# Patient Record
Sex: MALE | Race: WHITE
[De-identification: names, ages, dates, MRNs, and addresses within clinical notes are randomized per-mention and may not be internally consistent; named-entity substitution may affect disease eponyms.]

---

## 2017-01-14 ENCOUNTER — HOSPITAL ENCOUNTER (EMERGENCY)
Dept: HOSPITAL 53 - M ED | Age: 72
Discharge: HOME | End: 2017-01-14
Payer: MEDICARE

## 2017-01-14 DIAGNOSIS — E11.9: ICD-10-CM

## 2017-01-14 DIAGNOSIS — Z87.891: ICD-10-CM

## 2017-01-14 DIAGNOSIS — I25.2: ICD-10-CM

## 2017-01-14 DIAGNOSIS — Z79.82: ICD-10-CM

## 2017-01-14 DIAGNOSIS — Z79.899: ICD-10-CM

## 2017-01-14 DIAGNOSIS — E78.00: ICD-10-CM

## 2017-01-14 DIAGNOSIS — Z95.0: ICD-10-CM

## 2017-01-14 DIAGNOSIS — J09.X2: Primary | ICD-10-CM

## 2017-01-14 DIAGNOSIS — Z91.018: ICD-10-CM

## 2017-01-14 DIAGNOSIS — Z79.02: ICD-10-CM

## 2017-01-14 LAB
ANION GAP SERPL CALC-SCNC: 6 MEQ/L (ref 8–16)
BASOPHILS # BLD AUTO: 0.1 K/MM3 (ref 0–0.2)
BASOPHILS NFR BLD AUTO: 1.1 % (ref 0–1)
BUN SERPL-MCNC: 45 MG/DL (ref 7–18)
CALCIUM SERPL-MCNC: 8.4 MG/DL (ref 8.8–10.2)
CHLORIDE SERPL-SCNC: 97 MEQ/L (ref 98–107)
CO2 SERPL-SCNC: 31 MEQ/L (ref 21–32)
CREAT SERPL-MCNC: 2.18 MG/DL (ref 0.7–1.3)
EOSINOPHIL # BLD AUTO: 0.5 K/MM3 (ref 0–0.5)
EOSINOPHIL NFR BLD AUTO: 5.4 % (ref 0–3)
ERYTHROCYTE [DISTWIDTH] IN BLOOD BY AUTOMATED COUNT: 13.2 % (ref 11.5–14.5)
GFR SERPL CREATININE-BSD FRML MDRD: 32 ML/MIN/{1.73_M2} (ref 42–?)
GLUCOSE SERPL-MCNC: 352 MG/DL (ref 83–110)
LARGE UNSTAINED CELL #: 0.3 K/MM3 (ref 0–0.4)
LARGE UNSTAINED CELL %: 3.1 % (ref 0–4)
LYMPHOCYTES # BLD AUTO: 0.8 K/MM3 (ref 1.5–4.5)
LYMPHOCYTES NFR BLD AUTO: 6.7 % (ref 24–44)
MCH RBC QN AUTO: 28.7 PG (ref 27–33)
MCHC RBC AUTO-ENTMCNC: 32.6 G/DL (ref 32–36.5)
MCV RBC AUTO: 88 FL (ref 80–96)
MONOCYTES # BLD AUTO: 0.8 K/MM3 (ref 0–0.8)
MONOCYTES NFR BLD AUTO: 9 % (ref 0–5)
NEUTROPHILS # BLD AUTO: 6.3 K/MM3 (ref 1.8–7.7)
NEUTROPHILS NFR BLD AUTO: 74.7 % (ref 36–66)
PLATELET # BLD AUTO: 185 K/MM3 (ref 150–450)
POTASSIUM SERPL-SCNC: 4.9 MEQ/L (ref 3.5–5.1)
SODIUM SERPL-SCNC: 134 MEQ/L (ref 136–145)
WBC # BLD AUTO: 8.5 K/MM3 (ref 4–10)

## 2017-01-14 NOTE — EDDOCDS
Nurse's Notes                                                                                     

[facility]                                                                         

Name: Sánchez Dunlap                                                                              

Age: 71 yrs                                                                                       

Sex: Male                                                                                         

: 1945                                                                                   

MRN: [id number]                                                                                     

Arrival Date: 2017                                                                          

Time: 14:19                                                                                       

Account#: [id number]                                                                              

Bed 5                                                                                             

Private MD: Ghislaine Segovia                                                                      

Diagnosis: Influenza due to identified novel influenza A virus                                    

                                                                                                  

Presentation:                                                                                     

                                                                                             

14:46 Presenting complaint: Patient states: was seen by PMD on 'ed with pneumonia    bcj 

      and started on Zithromax for same. today feels worse - no energy. having a hard time        

      breathing. coughing up thick green yellow sputum. no temp at home. not eating /             

      drinking well. Adult Sepsis Screening: The patient does not have new or worsening           

      altered mentation. Patient's respiratory rate is less than 22. Systolic blood pressure      

      is greater than 100. Patient has a qSOFA score of 0- Negative Sepsis Screen.                

      Suicide/Homicide risk assessment- the patient denies having any suicidal and/or             

      homicidal ideations and does not present with any other emotional, behavioral or mental     

      health complaints.  Status: Patient is not a  or              

      dependent. Transition of care: patient was not received from another setting of care.       

14:46 Acuity: KENDELL Level 3                                                                     [facility] 

14:46 Method Of Arrival: Walkin/Carried/Asstd                                                 [facility] 

                                                                                                  

Triage Assessment:                                                                                

14:52 General: Appears ill, Behavior is cooperative. Pain: Location: chest Pain currently is  bcj 

      8 out of 10 on a pain scale.                                                                

                                                                                                  

Historical:                                                                                       

- Allergies: turkey;                                                                              

- Home Meds:                                                                                      

1. Zithromax 250 mg Oral tab 1 tab once daily                                                   

2. aspirin 325 mg Oral tab 1 tab once daily                                                     

3. furosemide 40 mg Oral tab 1 tab once daily                                                   

4. Lyrica 150mg Oral 2 times per day                                                            

5. metoprolol succinate 50 mg Tb24 1 tab once daily                                             

6. Percocet 5-325 mg Oral tab 2 tabs every 4-6 hours                                            

7. Plavix 75 mg Oral tab 1 tab once daily                                                       

8. potassium chloride 10 mEq Oral cpER 1 cap once daily                                         

- PMHx: Diabetes - IDDM: uncontrolled; Hypercholesterolemia; MI;                                  

- PSHx: Stents, Coronary; pacemaker/defibrillator insertion; fem/pop bypass                       

bilaterally; CABG;                                                                              

- Social history: Smoking status: Patient states former smoker of tobacco. No barriers            

to communication noted, The patient speaks fluent English, Speaks appropriately for             

age.                                                                                            

- Family history: Not pertinent.                                                                  

- : The pt / caregiver states he / she is on anticoagulants: Plavix. Home medication              

list is obtained from the patient.                                                              

- Exposure Risk Screening:: None identified.                                                      

                                                                                                  

                                                                                                  

Screening:                                                                                        

15:55 Screening information is obtained from the patient. Fall risk: At risk due to weakness. ck1 

      The following interventions are performed due to a positive Fall Risk Screen: Fall Risk     

      is added to Special Handling on the patient Summary Screen. A Fall Risk Bracelet was        

      applied to the patient. Side Rails are placed in the up position. A Call Duenas is given      

      with instruction to call for help when getting out of bed. Fall Alert bracelet is           

      placed on the patient. Assistance ADL's: requires no assistance with activities of          

      daily living. Abuse/DV Screen: The patient / caregiver reports he/she is: not in a          

      situation that causes fear, pain or injury. Nutritional screening: No deficits noted.       

      home support is adequate.                                                                   

19:28 Advance Directives: There is no active DNR order.                                       nn1 

                                                                                                  

Assessment:                                                                                       

15:30 General: Appears in no apparent distress, comfortable, Behavior is appropriate for age, ttb 

      cooperative, pleasant, quiet. General: IV started. Daughter at bedside. Pt resting on       

      stretcher. Primary RN aware.. Neurological: Level of Consciousness is awake, alert.         

      Respiratory: Airway is patent Respiratory effort is even, unlabored, Reports cough that     

      is persistent.                                                                              

15:56 General: Appears in no apparent distress, comfortable, Behavior is appropriate for age, ck1 

      cooperative. Pain: Denies pain. Neurological: Level of Consciousness is awake, alert,       

      Oriented to person, place, time. Cardiovascular: Rhythm is sinus rhythm. Respiratory:       

      Respiratory effort is unlabored, Respiratory pattern is regular, symmetrical, Breath        

      sounds with wheezes bilaterally. GI: No deficits noted. Derm: Skin is pink, warm & dry.   

16:00 Reassessment: Patient appears in no apparent distress at this time. General: Appears in ck1 

      no apparent distress, comfortable, to be sleeping. Pain: Location: chest Aggravated by      

      coughing. Neurological: Level of Consciousness is awake, alert, obeys commands.             

      Cardiovascular: Rhythm is sinus rhythm. Respiratory: Respiratory effort is unlabored,       

      Respiratory pattern is regular, symmetrical. Derm: Skin is pink, warm & dry.              

17:00 Reassessment: Patient appears in no apparent distress at this time.                     ck1 

18:18 General: Appears in no apparent distress, comfortable, to be sleeping. Behavior is      ck1 

      appropriate for age, cooperative. Pain: Location: chest Pain currently is 7 out of 10       

      on a pain scale. Aggravated by cough. Neurological: Level of Consciousness is awake,        

      alert, obeys commands, Oriented to person, place, time. Cardiovascular: Rhythm is.          

      Respiratory: Respiratory effort is unlabored, Respiratory pattern is regular,               

      symmetrical. GI: No deficits noted. Derm: Skin is pink, warm & dry.                       

18:44 General: Patient ambulated approximately 75 feet on room air. Gait steady, pulse ox     ck1 

      92%-94%. patient tolerated well. Dr. Gregory aware.                                         

19:27 General: Appears in no apparent distress, comfortable, Behavior is appropriate for age, nn1 

      cooperative. Pain: Denies pain. Neurological: Level of Consciousness is awake, alert,       

      obeys commands, Oriented to person, place, time. Respiratory: Airway is patent              

      Respiratory effort is even, unlabored, Respiratory pattern is regular.                      

                                                                                                  

Vital Signs:                                                                                      

14:20  / 73; Pulse 82; Resp 18 S; Temp 97.7(O); Pulse Ox 94% on R/A; Weight 90.72 kg    dd6 

      (R); Height 5 ft. 6 in. (167.64 cm) (R);                                                    

15:19  / 77 (auto/);                                                                    ck1 

15:20 Pulse 82 MON; Pulse Ox 94% ;                                                            ck1 

17:49  / 77 (auto/);                                                                    ck1 

17:49  / 77 (auto/);                                                                    ck1 

17:50 Pulse 94 MON; Pulse Ox 96% ;                                                            ck1 

17:50 Pulse 94 MON; Pulse Ox 96% ;                                                            ck1 

18:17  / 65; Pulse 90; Resp 18; Temp 98.6(O); Pulse Ox 94% on 2 lpm NC; Pain 7/10;      ck1 

19:26  / 63; Pulse 94; Resp 18; Temp 99.1(TE); Pulse Ox 94% on R/A; Pain 0/10;          nn1 

14:20 Body Mass Index 32.28 (90.72 kg, 167.64 cm)                                             dd6 

                                                                                                  

Vitals:                                                                                           

14:20 Log In Time: 2017 at 14:18.                                                 dd6 

                                                                                                  

ED Course:                                                                                        

14:20 Patient visited by Lauro Liu IMMANUEL.                                             dd6 

14:20 Ghislaine Segovia is Private Physician.                                                  dd6 

14:20 Patient moved to Waiting                                                                dd6 

14:21 Patient moved to Pre RCE                                                                dd6 

14:50 Triage Initiated                                                                        bcj 

14:53 Patient visited by Wayne Torres, RN.                                                  bcj 

14:54 Rosalind Marshall,RN is Primary Nurse.                                                bcj 

14:54 Patient moved to 5                                                                      bcj 

15:03 Jaleesa Roman MD is Attending Physician.                                      sd1 

15:03 Patient visited by Jaleesa Roman MD.                                          sd1 

15:25 Patient visited by Brendon Hernandez PCA.                                               jrd 

15:25 EKG done. (by ED staff). Reviewed by Jaleesa Roman MD.                          jrd 

15:36 Troponin Sent.                                                                          ttb 

15:36 Cardiac Injury Profile Sent.                                                            ttb 

15:36 CBC with Diff Sent.                                                                     ttb 

15:36 Basic Metabolic Profile Sent.                                                           ttb 

15:36 B-Type Natiuretic Peptide Sent.                                                         ttb 

15:36 -Blood Culture Sent.                                                                    ttb 

15:36 Inserted peripheral IV: 20gauge IV in right antecubital area and blood collected.       ttb 

      Patient tolerated the procedure well. Labs drawn. (by ED staff). Labs/Blood culture         

      drawn.                                                                                      

15:38 Patient visited by Mi Jain RN.                                                  ttb 

15:46 [facility] Payment Agreement was scanned into [employer] and attached to record.               [id number]

15:54 Chest, 1 View Returned.                                                                 EDMS

15:55 Patient visited by Rosalind Marshall,MAXIMILIAN.                                              ck1 

15:55 BLOOD CULTURES Sent.                                                                    ck1 

15:55 -Influenza A&B Rapid Antigen - Nose Sent.                                               ck1

15:56 The patient / caregiver is instructed regarding the plan of care and ED course.         ck1 

16:18 Patient visited by Rosalind Marshall,MAXIMILIAN.                                              ck1 

16:38 Patient visited by Rosalind Marshall,MAXIMILIAN.                                              ck1 

17:30 Patient visited by Rosalind Marshall,MAXIMILIAN.                                              ck1 

17:47 Patient visited by Rosalind Marshall,RN.                                              ck1 

17:48 Patient visited by Rosalind Marshall,RN.                                              ck1 

17:59 Patient visited by Rosalind Mrashall,MAXIMILIAN.                                              ck1 

18:19 Patient visited by Rosalind Marshall,RN.                                              ck1 

18:44 Patient visited by Rosalind Marshall,MAXIMILIAN.                                              ck1 

18:54 Primary Nurse role handed off by Rosalind Marshall,RN                                 ck1 

19:28 No procedures done that require assistance.                                             nn1 

                                                                                                  

Administered Medications:                                                                         

15:36 Drug: NS 0.9% 1000 ml [sodium chloride 0.9 % intravenous solution] Route: IV; Rate: 150 ttb 

      mL/hr; Site: right antecubital;                                                             

16:37 Drug: Albuterol-Ipratropium 3 ml [ipratropium-albuterol 0.5 mg-3 mg(2.5 mg base)/3 mL   jh6 

      nebulization soln (3 mL)] Route: Inhalation;                                                

16:38 Drug: Oseltamivir 75 mg [oseltamivir 75 mg capsule (1 caps)] Route: PO;                 ck1 

18:53 Drug: predniSONE 40 mg [prednisone 20 mg tablet (2 tabs)] Route: PO;                    ck1 

                                                                                                  

                                                                                                  

RT:                                                                                               

16:37 Initial Med Neb Given as ordered Patient was instructed and evaluated on procedure      jh6 

      Patient tolerated procedure well without adverse effect. Respiratory: Airway is patent      

      Respiratory effort is even, unlabored, Respiratory pattern is regular symmetrical,          

      Breath sounds are coarse in left posterior upper lobe, right posterior upper lobe, left     

      posterior lower lobe, right posterior middle lobe and right posterior lower lobe Breath     

      sounds are diminished in left posterior upper lobe, right posterior upper lobe, left        

      posterior lower lobe, right posterior middle lobe and right posterior lower lobe Breath     

      sounds with wheezes in left posterior upper lobe, right posterior upper lobe, left          

      posterior lower lobe, right posterior middle lobe and right posterior lower lobe at         

      expiration.                                                                                 

16:45 Respiratory: Airway is patent Respiratory effort is even, unlabored, Respiratory        jh6 

      pattern is regular symmetrical, Breath sounds are coarse in left posterior upper lobe,      

      right posterior upper lobe, left posterior lower lobe, right posterior middle lobe and      

      right posterior lower lobe Breath sounds with crackles in left posterior upper lobe,        

      right posterior upper lobe, left posterior lower lobe, right posterior middle lobe and      

      right posterior lower lobe Breath sounds are diminished in left posterior lower lobe        

      and right posterior lower lobe.                                                             

                                                                                                  

Order Results:                                                                                    

Lab Order: B-Type Natiuretic Peptide; SPEC'M 17 15:32                                       

      Test: BRAIN NATRIURETIC PEPTIDE; Value: 747; Range: <100; Abnormal: Above high normal;      

      Units: PG/ML; Status: F                                                                     

Lab Order: Basic Metabolic Profile; SPEC17 16:08                                         

      Test: GLUCOSE, FASTING; Value: 352; Range: ; Abnormal: Above high normal; Units:      

      MG/DL; Status: F                                                                            

      Test: BLOOD UREA NITROGEN; Value: 45; Range: 7-18; Abnormal: Above high normal; Units:      

      MG/DL; Status: F                                                                            

      Test: CREATININE FOR GFR; Value: 2.18; Range: 0.70-1.30; Abnormal: Above high normal;       

      Units: MG/DL; Status: F                                                                     

      Test: GLOMERULAR FILTRATION RATE; Value: 32.0; Range: >42; Abnormal: Below low normal;      

      Status: F                                                                                   

      Test: SODIUM LEVEL; Value: 134; Range: 136-145; Abnormal: Below low normal; Units:          

      MEQ/L; Status: F                                                                            

      Test: POTASSIUM SERUM; Value: 4.9; Range: 3.5-5.1; Units: MEQ/L; Status: F                  

      Test: CHLORIDE LEVEL; Value: 97; Range: ; Abnormal: Below low normal; Units:          

      MEQ/L; Status: F                                                                            

      Test: CARBON DIOXIDE LEVEL; Value: 31; Range: 21-32; Units: MEQ/L; Status: F                

      Test: ANION GAP; Value: 6; Range: 8-16; Abnormal: Below low normal; Units: MEQ/L;           

      Status: F                                                                                   

      Test: CALCIUM LEVEL; Value: 8.4; Range: 8.8-10.2; Abnormal: Below low normal; Units:        

      MG/DL; Status: F                                                                            

      Test Note: &nbsp;; Units are mL/min/1.73 m2 Chronic Kidney Disease Staging per NKF:       

      Stage I & II GFR >=60 Normal to Mildly Decreased Stage III GFR 30-59 Moderately           

      Decreased Stage IV GFR 15-29 Severely Decreased Stage V GFR <15 Very Little GFR Left        

      ESRD GFR <15 on RRT                                                                         

Lab Order: CBC with Diff; SPEC17 15:32                                                   

      Test: WHITE BLOOD COUNT; Value: 8.5; Range: 4.0-10.0; Units: K/mm3; Status: F               

      Test: RED BLOOD COUNT; Value: 3.68; Range: 4.30-6.10; Abnormal: Below low normal;           

      Units: M/mm3; Status: F                                                                     

      Test: HEMOGLOBIN; Value: 10.5; Range: 14.0-18.0; Abnormal: Below low normal; Units:         

      g/dl; Status: F                                                                             

      Test: HEMATOCRIT; Value: 32.4; Range: 42.0-52.0; Abnormal: Below low normal; Units: %;      

      Status: F                                                                                   

      Test: MEAN CORPUSCULAR VOLUME; Value: 88.0; Range: 80.0-96.0; Units: fl; Status: F          

      Test: MEAN CORPUSCULAR HEMOGLOBIN; Value: 28.7; Range: 27.0-33.0; Units: pg; Status: F      

      Test: MEAN CORPUSCULAR HGB CONC; Value: 32.6; Range: 32.0-36.5; Units: g/dl; Status: F      

      Test: RED CELL DISTRIBUTION WIDTH; Value: 13.2; Range: 11.5-14.5; Units: %; Status: F       

      Test: PLATELET COUNT, AUTOMATED; Value: 185; Range: 150-450; Units: k/mm3; Status: F        

      Test: NEUTROPHILS %; Value: 74.7; Range: 36.0-66.0; Abnormal: Above high normal; Units:     

      %; Status: F                                                                                

      Test: LYMPH %; Value: 6.7; Range: 24.0-44.0; Abnormal: Below low normal; Units: %;          

      Status: F                                                                                   

      Test: MONO %; Value: 9.0; Range: 0.0-5.0; Abnormal: Above high normal; Units: %;            

      Status: F                                                                                   

      Test: EOS %; Value: 5.4; Range: 0.0-3.0; Abnormal: Above high normal; Units: %; Status:     

      F                                                                                           

      Test: BASO %; Value: 1.1; Range: 0.0-1.0; Abnormal: Above high normal; Units: %;            

      Status: F                                                                                   

      Test: LARGE UNSTAINED CELL %; Value: 3.1; Range: 0.0-4.0; Units: %; Status: F               

      Test: NEUTROPHILS #; Value: 6.3; Range: 1.8-7.7; Units: K/mm3; Status: F                    

      Test: LYMPH #; Value: 0.8; Range: 1.5-4.5; Abnormal: Below low normal; Units: K/mm3;        

      Status: F                                                                                   

      Test: MONO #; Value: 0.8; Range: 0.0-0.8; Units: K/mm3; Status: F                           

      Test: EOS #; Value: 0.5; Range: 0.0-0.50; Units: K/mm3; Status: F                           

      Test: BASO #; Value: 0.1; Range: 0.0-0.2; Units: K/mm3; Status: F                           

      Test: LARGE UNSTAINED CELL #; Value: 0.3; Range: 0.0-0.4; Units: K/mm3; Status: F           

Lab Order: Cardiac Injury Profile; SPEC'M 17 16:08                                          

      Test: CPK CREATINE PHOSPHOKINASE; Value: 280; Range: ; Units: U/L; Status: F          

      Test: CK-MB VALUE MASS; Value: 3.2; Range: 0.0-3.6; Units: NG/ML; Status: F                 

      Test: MB/CK RELATIVE INDEX; Value: 1.14; Range: < OR =4; Status: F                          

      Test Note: &nbsp;; DIAGNOSIS CRITERIA MMB ng/ml Relative Index (RI) NON-AMI < or = 5      

      N/A GRAY ZONE > 5 < or = 4 AMI > 5 > 4                                                      

Lab Order: Troponin; SPEC'M 17 16:08                                                        

      Test: TROPONIN I; Value: < 0.02; Range: < 0.10; Units: NG/ML; Status: F                     

      Test Note: &nbsp;; Troponin I Reference Interval for Siemens Trendr LOCI: 99th             

      Percentile= 0.00-0.045 ng/ml Risk Stratification: <= 0.10 ng/ml Decreased Risk for          

      Adverse Clinical Events. 0.10-1.50 ng/ml Increased Risk for Adverse Clinical Events.        

      Evaluation of additional criterion and/or repeat testing in 2-6 hours is suggested to       

      rule out myocardial damage. >= 1.50 ng/ml Indicative of Myocardial Injury.                  

Lab Order: -Influenza A&B Rapid Antigen - Nose; SPEC'M 17 15:54                           

      Test: INFLUENZA A RAPID SCR by ICA; Value: INFLUENZA A RESULTS POSITIVE; Abnormal:          

      Abnormal; Status: F                                                                         

      Test: INFLUENZA A RAPID SCR by ICA; Value: Comments:; Status: F                             

      Test: INFLUENZA B RAPID SCR by ICA; Value: INFLUENZA B RESULTS NEGATIVE; Status: F          

      Test Note: &nbsp;; The Influenza test is a direct rapid immunoassay for the qualitative   

      detection of Influenza viral antigen. Cell culture (Viral Culture) testing should be        

      considered to confirm NEGATIVE results and to assist in detecting other viruses that        

      can provide similar clinical symptoms. Please contact the lab within 24 hours               

      ([phone] if confirmatory testing is desired.                                              

                                                                                                  

Radiology Order: Chest, 1 View                                                                    

      Test: Chest, 1 View                                                                         

      REASON FOR EXAMINATION: Shortness of Breath; Clinical: Acute shortness of breath .; ;       

      Comparison: 10/02/2016 .; ; Findings:; The mediastinum and cardiac silhouette are           

      stable. Prior sternotomy and; pacemaker are again appreciated. The lung fields              

      demonstrate stable changes; without acute consolidation, effusion, or pneumothorax.         

      Skeletal structures are; intact.; ; Impression:; No acute cardiopulmonary process           

      appreciated.; ; ; Signed by; Hao Adrian MD 2017 03:45 P;                          

Outcome:                                                                                          

19:03 Discharge ordered by Provider.                                                          sd1 

19:27 Discharge Assessment: Patient awake, alert and oriented x 3. No cognitive and/or        nn1 

      functional deficits noted. Patient verbalized understanding of disposition                  

      instructions. patient administered narcotics - no.                                          

19:28 The following High Risk Discharge criteria are identified: None. Discharged to home     nn1 

      ambulatory, with family. Condition: stable. No special radiology studies were               

      completed. Property :Personal belongings accompany Pt.                                      

19:28 Patient left the ED.                                                                    nn1 

                                                                                                  

Signatures:                                                                                       

Dispatcher [employer]                           Jaleesa Thurston MD MD   sd1                                                  

Wayne Torres, RN                      Rosalind Orozco,MAXIMILIAN                  RN   ck1                                                  

Lauro Liu, PCA                 PCA  6                                                  

Kiko Brito                                 Mi Capellan RN RN ttb Donoghue, Joseph, PCA                   PCA  Alejandro Ernandez RN                        RN   nn1                                                  

Rosaura Luque, Reg                 Reg  ks16                                                 

                                                                                                  

**************************************************************************************************

MTDD

## 2017-01-14 NOTE — REP
Clinical:  Acute shortness of breath .

 

Comparison:  10/02/2016 .

 

Findings:

The mediastinum and cardiac silhouette are stable.  Prior sternotomy and

pacemaker are again appreciated.  The lung fields demonstrate stable changes

without acute consolidation, effusion, or pneumothorax.  Skeletal structures are

intact.

 

Impression:

No acute cardiopulmonary process appreciated.

 

 

Signed by

Hao Adrian MD 01/14/2017 03:45 P

## 2017-01-14 NOTE — EDDOCDS
Physician Documentation                                                                           

[facility]                                                                         

Name: Sánchez Dunlap                                                                              

Age: 71 yrs                                                                                       

Sex: Male                                                                                         

: 1945                                                                                   

MRN: [id number]                                                                                     

Arrival Date: 2017                                                                          

Time: 14:19                                                                                       

Account#: [id number]                                                                              

Bed 5                                                                                             

Private MD: Ghislaine Segovia                                                                      

Disposition:                                                                                      

17 19:03 Discharged to Home/Self Care. Impression: Influenza due to identified novel        

influenza A virus.                                                                              

- Condition is Stable.                                                                            

- Discharge Instructions: Influenza Adult, How to Use a Nebulizer.                                

- Prescriptions for Tamiflu 75 mg Oral Capsule - take 1 capsule by ORAL route every 12            

hours for 5 days; 10 capsule. Albuterol Sulfate 2.5 mg /3 mL (0.083 %) Inhalation               

Solution for Nebulization - inhale 1 unit by NEBULIZATION route 4 times per day As              

needed; 1 box. Home Nebulizer - Dx: (wheezing/influenza). Duration: 3 months).                  

Prednisone 20 mg Oral Tablet - take 2 tablet by ORAL route once daily for 5 days; 10            

tablet.                                                                                         

- Medication Reconciliation, Local Pharmacy Hours form.                                           

- Follow up: Emergency Department; When: Tomorrow; Reason: Recheck today's complaints.            

- Problem is new.                                                                                 

- Symptoms have improved.                                                                         

                                                                                                  

                                                                                                  

                                                                                                  

Historical:                                                                                       

- Allergies: turkey;                                                                              

- Home Meds:                                                                                      

1. Zithromax 250 mg Oral tab 1 tab once daily                                                   

2. aspirin 325 mg Oral tab 1 tab once daily                                                     

3. furosemide 40 mg Oral tab 1 tab once daily                                                   

4. Lyrica 150mg Oral 2 times per day                                                            

5. metoprolol succinate 50 mg Tb24 1 tab once daily                                             

6. Percocet 5-325 mg Oral tab 2 tabs every 4-6 hours                                            

7. Plavix 75 mg Oral tab 1 tab once daily                                                       

8. potassium chloride 10 mEq Oral cpER 1 cap once daily                                         

- PMHx: Diabetes - IDDM: uncontrolled; Hypercholesterolemia; MI;                                  

- PSHx: Stents, Coronary; pacemaker/defibrillator insertion; fem/pop bypass                       

bilaterally; CABG;                                                                              

- Social history: Smoking status: Patient states former smoker of tobacco. No barriers            

to communication noted, The patient speaks fluent English, Speaks appropriately for             

age.                                                                                            

- Family history: Not pertinent.                                                                  

- : The pt / caregiver states he / she is on anticoagulants: Plavix. Home medication              

list is obtained from the patient.                                                              

- Exposure Risk Screening:: None identified.                                                      

                                                                                                  

                                                                                                  

Vital Signs:                                                                                      

01/14                                                                                             

14:20  / 73; Pulse 82; Resp 18 S; Temp 97.7(O); Pulse Ox 94% on R/A; Weight 90.72 kg /  dd6 

      200 lbs (R); Height 5 ft. 6 in. (167.64 cm) (R);                                            

15:19  / 77 (auto/);                                                                    ck1 

15:20 Pulse 82 MON; Pulse Ox 94% ;                                                            ck1 

17:49  / 77 (auto/);                                                                    ck1 

17:49  / 77 (auto/);                                                                    ck1 

17:50 Pulse 94 MON; Pulse Ox 96% ;                                                            ck1 

17:50 Pulse 94 MON; Pulse Ox 96% ;                                                            ck1 

18:17  / 65; Pulse 90; Resp 18; Temp 98.6(O); Pulse Ox 94% on 2 lpm NC; Pain 7/10;      ck1 

19:26  / 63; Pulse 94; Resp 18; Temp 99.1(TE); Pulse Ox 94% on R/A; Pain 0/10;          nn1 

14:20 Body Mass Index 32.28 (90.72 kg, 167.64 cm)                                             dd6 

                                                                                                  

MDM:                                                                                              

15:02 -Blood Culture (Adults Only), peripheral from different site, or from device/port/PICC  sd1 

      etc. if present ordered.                                                                    

15:02 Cardiac Monitor/Pulse Ox/q 15 min VS ordered.                                           sd1 

15:02 IV Saline Lock ordered.                                                                 sd1 

15:02 Oxygen at 4L/Min NC or Home dosage ordered.                                             sd1 

15:02 Rhythm Strip to chart ordered.                                                          sd1 

15:02 B-Type Natiuretic Peptide Ordered.                                                      EDMS

15:02 Basic Metabolic Profile Ordered.                                                        EDMS

15:02 CBC with Diff Ordered.                                                                  EDMS

15:02 Cardiac Injury Profile Ordered.                                                         EDMS

15:02 Troponin Ordered.                                                                       EDMS

15:02 -Blood Culture Ordered.                                                                 EDMS

15:03 Chest, 1 View Ordered.                                                                  EDMS

15:03 ECG WITH READING ER PHYS+CARDIAG ordered.                                               EDMS

15:08 NS 0.9% 1000 ml IV at 150 mL/hr continuous ordered.                                     sd1 

15:08 -Blood Culture (Adults Only), peripheral from different site, or from device/port/PICC  deg 

      etc. if present complete.                                                                   

15:09 Obtain sample by nasopharyngeal swab ordered.                                           sd1 

15:09 BLOOD CULTURES Ordered.                                                                 EDMS

15:09 -Influenza A&B Rapid Antigen - Nose Ordered.                                            EDMS

15:46 Financial registration complete.                                                        ks16

15:46 [facility] Payment Agreement was scanned into [employer] and attached to record.               ks16

15:59 CBC with Diff Reviewed.                                                                 sd1 

15:59 Chest, 1 View Reviewed.                                                                 sd1 

16:12 Albuterol-Ipratropium 3 ml Inhalation once ordered.                                     sd1 

16:31 B-Type Natiuretic Peptide Reviewed.                                                     sd1 

16:31 -Influenza A&B Rapid Antigen - Nose Reviewed.                                           sd1

16:32 Oseltamivir 75 mg PO once ordered.                                                      sd1 

16:43 REGULAR+DIET ordered.                                                                   EDMS

17:07 Basic Metabolic Profile Reviewed.                                                       sd1 

17:07 Cardiac Injury Profile Reviewed.                                                        sd1 

17:07 Troponin Reviewed.                                                                      sd1 

18:30 Misc. Nursing Order ordered.                                                            sd1 

18:43 predniSONE 40 mg PO once; administer with food or milk ordered.                         sd1 

                                                                                                  

Administered Medications:                                                                         

15:36 Drug: NS 0.9% 1000 ml [sodium chloride 0.9 % intravenous solution] Route: IV; Rate: 150 ttb 

      mL/hr; Site: right antecubital;                                                             

16:37 Drug: Albuterol-Ipratropium 3 ml [ipratropium-albuterol 0.5 mg-3 mg(2.5 mg base)/3 mL   jh6 

      nebulization soln (3 mL)] Route: Inhalation;                                                

16:38 Drug: Oseltamivir 75 mg [oseltamivir 75 mg capsule (1 caps)] Route: PO;                 ck1 

18:53 Drug: predniSONE 40 mg [prednisone 20 mg tablet (2 tabs)] Route: PO;                    ck1 

                                                                                                  

                                                                                                  

Signatures:                                                                                       

[facility]                                                 

Jaleesa Roman MD MD   sd1                                                  

Jewell Gale, Unit Clerk              Unit deg                                                  

Wayne Torres RN                      RN   Rosalind ChapaRN                  RN   ck1                                                  

Alejandro Rosenberg RN                        RN   nn1                                                  

Rosaura Luque, Reg                 Reg  ks16                                                 

Kiko Brito                                jh6                                                  

Mi Jain RN   ttb                                                  

                                                                                                  

The chart was reviewed and I authenticate all verbal orders and agree with the evaluation and 
treatment provided.Attachments:

15:46 [facility] Payment Agreement                                                                [id number]

                                                                                                  

**************************************************************************************************

MTDD

## 2017-01-15 NOTE — ECGEPIP
Stationary ECG Study

                           [facility] - ED

                                       

                                       Test Date:    2017

Pat Name:     VINNY EASLEY           Department:   

Patient ID:   [id number]                 Room:         -

Gender:       M                        Technician:   aide

:          1945               Requested By: Jaleesa Roman 

Order Number: [id number]     Reading MD:   Jaleesa Roman

                                 Measurements

Intervals                              Axis          

Rate:         80                       P:            49

SC:           192                      QRS:          25

QRSD:         109                      T:            135

QT:           387                                    

QTc:          448                                    

                           Interpretive Statements

SINUS RHYTHM

POSSIBLE LEFT ATRIAL ENLARGEMENT

PROBABLE INFERIOR MYOCARDIAL INFARCTION, PROBABLY OLD

MODERATE T-WAVE ABNORMALITY, CONSIDER ISCHEMIA

SIMILAR 10/2/16

Electronically Signed On 1- 7:29:58 EST by Jaleesa Roman

## 2017-01-16 NOTE — EDDOCDS
Physician Documentation                                                                           

[facility]                                                                         

Name: Sánchez Dunlap                                                                              

Age: 71 yrs                                                                                       

Sex: Male                                                                                         

: 1945                                                                                   

MRN: [id number]                                                                                     

Arrival Date: 2017                                                                          

Time: 14:19                                                                                       

Account#: [id number]                                                                              

Bed 5                                                                                             

Private MD: Ghislaine Segovia                                                                      

Disposition:                                                                                      

17 19:03 Discharged to Home/Self Care. Impression: Influenza due to identified novel        

influenza A virus.                                                                              

- Condition is Stable.                                                                            

- Discharge Instructions: Influenza Adult, How to Use a Nebulizer.                                

- Prescriptions for Tamiflu 75 mg Oral Capsule - take 1 capsule by ORAL route every 12            

hours for 5 days; 10 capsule. Albuterol Sulfate 2.5 mg /3 mL (0.083 %) Inhalation               

Solution for Nebulization - inhale 1 unit by NEBULIZATION route 4 times per day As              

needed; 1 box. Home Nebulizer - Dx: (wheezing/influenza). Duration: 3 months).                  

Prednisone 20 mg Oral Tablet - take 2 tablet by ORAL route once daily for 5 days; 10            

tablet.                                                                                         

- Medication Reconciliation, Local Pharmacy Hours form.                                           

- Follow up: Emergency Department; When: Tomorrow; Reason: Recheck today's complaints.            

- Problem is new.                                                                                 

- Symptoms have improved.                                                                         

                                                                                                  

                                                                                                  

                                                                                                  

Historical:                                                                                       

- Allergies: turkey;                                                                              

- Home Meds:                                                                                      

1. Zithromax 250 mg Oral tab 1 tab once daily                                                   

2. aspirin 325 mg Oral tab 1 tab once daily                                                     

3. furosemide 40 mg Oral tab 1 tab once daily                                                   

4. Lyrica 150mg Oral 2 times per day                                                            

5. metoprolol succinate 50 mg Tb24 1 tab once daily                                             

6. Percocet 5-325 mg Oral tab 2 tabs every 4-6 hours                                            

7. Plavix 75 mg Oral tab 1 tab once daily                                                       

8. potassium chloride 10 mEq Oral cpER 1 cap once daily                                         

- PMHx: Diabetes - IDDM: uncontrolled; Hypercholesterolemia; MI;                                  

- PSHx: Stents, Coronary; pacemaker/defibrillator insertion; fem/pop bypass                       

bilaterally; CABG;                                                                              

- Social history: Smoking status: Patient states former smoker of tobacco. No barriers            

to communication noted, The patient speaks fluent English, Speaks appropriately for             

age.                                                                                            

- Family history: Not pertinent.                                                                  

- : The pt / caregiver states he / she is on anticoagulants: Plavix. Home medication              

list is obtained from the patient.                                                              

- Exposure Risk Screening:: None identified.                                                      

                                                                                                  

                                                                                                  

Vital Signs:                                                                                      

01/14                                                                                             

14:20  / 73; Pulse 82; Resp 18 S; Temp 97.7(O); Pulse Ox 94% on R/A; Weight 90.72 kg /  dd6 

      200 lbs (R); Height 5 ft. 6 in. (167.64 cm) (R);                                            

15:19  / 77 (auto/);                                                                    ck1 

15:20 Pulse 82 MON; Pulse Ox 94% ;                                                            ck1 

17:49  / 77 (auto/);                                                                    ck1 

17:49  / 77 (auto/);                                                                    ck1 

17:50 Pulse 94 MON; Pulse Ox 96% ;                                                            ck1 

17:50 Pulse 94 MON; Pulse Ox 96% ;                                                            ck1 

18:17  / 65; Pulse 90; Resp 18; Temp 98.6(O); Pulse Ox 94% on 2 lpm NC; Pain 7/10;      ck1 

19:26  / 63; Pulse 94; Resp 18; Temp 99.1(TE); Pulse Ox 94% on R/A; Pain 0/10;          nn1 

14:20 Body Mass Index 32.28 (90.72 kg, 167.64 cm)                                             dd6 

                                                                                                  

MDM:                                                                                              

15:02 -Blood Culture (Adults Only), peripheral from different site, or from device/port/PICC  sd1 

      etc. if present ordered.                                                                    

15:02 Cardiac Monitor/Pulse Ox/q 15 min VS ordered.                                           sd1 

15:02 IV Saline Lock ordered.                                                                 sd1 

15:02 Oxygen at 4L/Min NC or Home dosage ordered.                                             sd1 

15:02 Rhythm Strip to chart ordered.                                                          sd1 

15:02 B-Type Natiuretic Peptide Ordered.                                                      EDMS

15:02 Basic Metabolic Profile Ordered.                                                        EDMS

15:02 CBC with Diff Ordered.                                                                  EDMS

15:02 Cardiac Injury Profile Ordered.                                                         EDMS

15:02 Troponin Ordered.                                                                       EDMS

15:02 -Blood Culture Ordered.                                                                 EDMS

15:03 Chest, 1 View Ordered.                                                                  EDMS

15:03 ECG WITH READING ER PHYS+CARDIAG ordered.                                               EDMS

15:08 NS 0.9% 1000 ml IV at 150 mL/hr continuous ordered.                                     sd1 

15:08 -Blood Culture (Adults Only), peripheral from different site, or from device/port/PICC  deg 

      etc. if present complete.                                                                   

15:09 Obtain sample by nasopharyngeal swab ordered.                                           sd1 

15:09 BLOOD CULTURES Ordered.                                                                 EDMS

15:09 -Influenza A&B Rapid Antigen - Nose Ordered.                                            EDMS

15:46 Financial registration complete.                                                        ks16

15:46 [facility] Payment Agreement was scanned into [employer] and attached to record.               ks16

15:59 CBC with Diff Reviewed.                                                                 sd1 

15:59 Chest, 1 View Reviewed.                                                                 sd1 

16:12 Albuterol-Ipratropium 3 ml Inhalation once ordered.                                     sd1 

16:31 B-Type Natiuretic Peptide Reviewed.                                                     sd1 

16:31 -Influenza A&B Rapid Antigen - Nose Reviewed.                                           sd1

16:32 Oseltamivir 75 mg PO once ordered.                                                      sd1 

16:43 REGULAR+DIET ordered.                                                                   EDMS

17:07 Basic Metabolic Profile Reviewed.                                                       sd1 

17:07 Cardiac Injury Profile Reviewed.                                                        sd1 

17:07 Troponin Reviewed.                                                                      sd1 

18:30 Misc. Nursing Order ordered.                                                            sd1 

18:43 predniSONE 40 mg PO once; administer with food or milk ordered.                         sd1 

                                                                                             

13:56 T-Sheet-- Draft Copy was scanned into [employer] and attached to record.                     

13:56 ECG/EKG was scanned into [employer] and attached to record.                                gb  

                                                                                                  

Administered Medications:                                                                         

                                                                                             

15:36 Drug: NS 0.9% 1000 ml [sodium chloride 0.9 % intravenous solution] Route: IV; Rate: 150 ttb 

      mL/hr; Site: right antecubital;                                                             

16:37 Drug: Albuterol-Ipratropium 3 ml [ipratropium-albuterol 0.5 mg-3 mg(2.5 mg base)/3 mL   6 

      nebulization soln (3 mL)] Route: Inhalation;                                                

16:38 Drug: Oseltamivir 75 mg [oseltamivir 75 mg capsule (1 caps)] Route: PO;                 ck1 

18:53 Drug: predniSONE 40 mg [prednisone 20 mg tablet (2 tabs)] Route: PO;                    ck1 

                                                                                                  

                                                                                                  

Signatures:                                                                                       

Dispatcher [employer]                           EDMS                                                 

Jaleesa Roman MD MD   sd1                                                  

Jewell Gale, Unit Clerk              Unit deg                                                  

Wayne Torres RN                      RN   Leyda Mancuso, Reg                  Reg  gb                                                   

Rosalind Marshall RN                  RN   ck1                                                  

Alejandro Rosenberg RN                        RN   nn1                                                  

Rosaura Luque, Reg                 Reg  ks16                                                 

Kiko Brito                                jh6                                                  

Mi Jain RN   ttb                                                  

                                                                                                  

The chart was reviewed and I authenticate all verbal orders and agree with the evaluation and 
treatment provided.Attachments:

15:46 [facility] Payment Agreement                                                                ks16

                                                                                             

13:56 T-Sheet-- Draft Copy                                                                    gb  

13:56 ECG/EKG                                                                                 gb  

                                                                                                  

**************************************************************************************************



*** Chart Complete ***
MTDD

## 2017-01-16 NOTE — EDDOCDS
Physician Documentation                                                                           

[facility]                                                                         

Name: Sánchez Dunlap                                                                              

Age: 71 yrs                                                                                       

Sex: Male                                                                                         

: 1945                                                                                   

MRN: [id number]                                                                                     

Arrival Date: 2017                                                                          

Time: 14:19                                                                                       

Account#: [id number]                                                                              

Bed 5                                                                                             

Private MD: Ghislaine Segovia                                                                      

Disposition:                                                                                      

17 19:03 Discharged to Home/Self Care. Impression: Influenza due to identified novel        

influenza A virus.                                                                              

- Condition is Stable.                                                                            

- Discharge Instructions: Influenza Adult, How to Use a Nebulizer.                                

- Prescriptions for Tamiflu 75 mg Oral Capsule - take 1 capsule by ORAL route every 12            

hours for 5 days; 10 capsule. Albuterol Sulfate 2.5 mg /3 mL (0.083 %) Inhalation               

Solution for Nebulization - inhale 1 unit by NEBULIZATION route 4 times per day As              

needed; 1 box. Home Nebulizer - Dx: (wheezing/influenza). Duration: 3 months).                  

Prednisone 20 mg Oral Tablet - take 2 tablet by ORAL route once daily for 5 days; 10            

tablet.                                                                                         

- Medication Reconciliation, Local Pharmacy Hours form.                                           

- Follow up: Emergency Department; When: Tomorrow; Reason: Recheck today's complaints.            

- Problem is new.                                                                                 

- Symptoms have improved.                                                                         

                                                                                                  

                                                                                                  

                                                                                                  

Historical:                                                                                       

- Allergies: turkey;                                                                              

- Home Meds:                                                                                      

1. Zithromax 250 mg Oral tab 1 tab once daily                                                   

2. aspirin 325 mg Oral tab 1 tab once daily                                                     

3. furosemide 40 mg Oral tab 1 tab once daily                                                   

4. Lyrica 150mg Oral 2 times per day                                                            

5. metoprolol succinate 50 mg Tb24 1 tab once daily                                             

6. Percocet 5-325 mg Oral tab 2 tabs every 4-6 hours                                            

7. Plavix 75 mg Oral tab 1 tab once daily                                                       

8. potassium chloride 10 mEq Oral cpER 1 cap once daily                                         

- PMHx: Diabetes - IDDM: uncontrolled; Hypercholesterolemia; MI;                                  

- PSHx: Stents, Coronary; pacemaker/defibrillator insertion; fem/pop bypass                       

bilaterally; CABG;                                                                              

- Social history: Smoking status: Patient states former smoker of tobacco. No barriers            

to communication noted, The patient speaks fluent English, Speaks appropriately for             

age.                                                                                            

- Family history: Not pertinent.                                                                  

- : The pt / caregiver states he / she is on anticoagulants: Plavix. Home medication              

list is obtained from the patient.                                                              

- Exposure Risk Screening:: None identified.                                                      

                                                                                                  

                                                                                                  

Vital Signs:                                                                                      

01/14                                                                                             

14:20  / 73; Pulse 82; Resp 18 S; Temp 97.7(O); Pulse Ox 94% on R/A; Weight 90.72 kg /  dd6 

      200 lbs (R); Height 5 ft. 6 in. (167.64 cm) (R);                                            

15:19  / 77 (auto/);                                                                    ck1 

15:20 Pulse 82 MON; Pulse Ox 94% ;                                                            ck1 

17:49  / 77 (auto/);                                                                    ck1 

17:49  / 77 (auto/);                                                                    ck1 

17:50 Pulse 94 MON; Pulse Ox 96% ;                                                            ck1 

17:50 Pulse 94 MON; Pulse Ox 96% ;                                                            ck1 

18:17  / 65; Pulse 90; Resp 18; Temp 98.6(O); Pulse Ox 94% on 2 lpm NC; Pain 7/10;      ck1 

19:26  / 63; Pulse 94; Resp 18; Temp 99.1(TE); Pulse Ox 94% on R/A; Pain 0/10;          nn1 

14:20 Body Mass Index 32.28 (90.72 kg, 167.64 cm)                                             dd6 

                                                                                                  

MDM:                                                                                              

15:02 -Blood Culture (Adults Only), peripheral from different site, or from device/port/PICC  sd1 

      etc. if present ordered.                                                                    

15:02 Cardiac Monitor/Pulse Ox/q 15 min VS ordered.                                           sd1 

15:02 IV Saline Lock ordered.                                                                 sd1 

15:02 Oxygen at 4L/Min NC or Home dosage ordered.                                             sd1 

15:02 Rhythm Strip to chart ordered.                                                          sd1 

15:02 B-Type Natiuretic Peptide Ordered.                                                      EDMS

15:02 Basic Metabolic Profile Ordered.                                                        EDMS

15:02 CBC with Diff Ordered.                                                                  EDMS

15:02 Cardiac Injury Profile Ordered.                                                         EDMS

15:02 Troponin Ordered.                                                                       EDMS

15:02 -Blood Culture Ordered.                                                                 EDMS

15:03 Chest, 1 View Ordered.                                                                  EDMS

15:03 ECG WITH READING ER PHYS+CARDIAG ordered.                                               EDMS

15:08 NS 0.9% 1000 ml IV at 150 mL/hr continuous ordered.                                     sd1 

15:08 -Blood Culture (Adults Only), peripheral from different site, or from device/port/PICC  deg 

      etc. if present complete.                                                                   

15:09 Obtain sample by nasopharyngeal swab ordered.                                           sd1 

15:09 BLOOD CULTURES Ordered.                                                                 EDMS

15:09 -Influenza A&B Rapid Antigen - Nose Ordered.                                            EDMS

15:46 Financial registration complete.                                                        ks16

15:46 [facility] Payment Agreement was scanned into [employer] and attached to record.               ks16

15:59 CBC with Diff Reviewed.                                                                 sd1 

15:59 Chest, 1 View Reviewed.                                                                 sd1 

16:12 Albuterol-Ipratropium 3 ml Inhalation once ordered.                                     sd1 

16:31 B-Type Natiuretic Peptide Reviewed.                                                     sd1 

16:31 -Influenza A&B Rapid Antigen - Nose Reviewed.                                           sd1

16:32 Oseltamivir 75 mg PO once ordered.                                                      sd1 

16:43 REGULAR+DIET ordered.                                                                   EDMS

17:07 Basic Metabolic Profile Reviewed.                                                       sd1 

17:07 Cardiac Injury Profile Reviewed.                                                        sd1 

17:07 Troponin Reviewed.                                                                      sd1 

18:30 Misc. Nursing Order ordered.                                                            sd1 

18:43 predniSONE 40 mg PO once; administer with food or milk ordered.                         sd1 

                                                                                             

13:56 T-Sheet-- Draft Copy was scanned into [employer] and attached to record.                     

13:56 ECG/EKG was scanned into [employer] and attached to record.                                gb  

                                                                                                  

Administered Medications:                                                                         

                                                                                             

15:36 Drug: NS 0.9% 1000 ml [sodium chloride 0.9 % intravenous solution] Route: IV; Rate: 150 ttb 

      mL/hr; Site: right antecubital;                                                             

16:37 Drug: Albuterol-Ipratropium 3 ml [ipratropium-albuterol 0.5 mg-3 mg(2.5 mg base)/3 mL   6 

      nebulization soln (3 mL)] Route: Inhalation;                                                

16:38 Drug: Oseltamivir 75 mg [oseltamivir 75 mg capsule (1 caps)] Route: PO;                 ck1 

18:53 Drug: predniSONE 40 mg [prednisone 20 mg tablet (2 tabs)] Route: PO;                    ck1 

                                                                                                  

                                                                                                  

Signatures:                                                                                       

Dispatcher [employer]                           EDMS                                                 

Jaleesa Roman MD MD   sd1                                                  

Jewell Gale, Unit Clerk              Unit deg                                                  

Wayne Torres RN                      RN   Leyda Mancuso, Reg                  Reg  gb                                                   

Rosalind Marshall RN                  RN   ck1                                                  

Alejandro Rosenberg RN                        RN   nn1                                                  

Rosaura Luque, Reg                 Reg  ks16                                                 

Kiko Brito                                jh6                                                  

Mi Jain RN   ttb                                                  

                                                                                                  

The chart was reviewed and I authenticate all verbal orders and agree with the evaluation and 
treatment provided.Attachments:

15:46 [facility] Payment Agreement                                                                [id number]

                                                                                             

13:56 T-Sheet-- Draft Copy                                                                    gb  

13:56 ECG/EKG                                                                                 gb  

                                                                                                  

**************************************************************************************************



*** Chart Complete ***
MTDD

## 2017-01-16 NOTE — EDDOCDS
Nurse's Notes                                                                                     

[facility]                                                                         

Name: Vinny Dunlap                                                                              

Age: 71 yrs                                                                                       

Sex: Male                                                                                         

: 1945                                                                                   

MRN: [id number]                                                                                     

Arrival Date: 2017                                                                          

Time: 14:19                                                                                       

Account#: [id number]                                                                              

Bed 5                                                                                             

Private MD: Ghislaine Segovia                                                                      

Diagnosis: Influenza due to identified novel influenza A virus                                    

                                                                                                  

Presentation:                                                                                     

                                                                                             

14:46 Presenting complaint: Patient states: was seen by PMD on 'ed with pneumonia    bcj 

      and started on Zithromax for same. today feels worse - no energy. having a hard time        

      breathing. coughing up thick green yellow sputum. no temp at home. not eating /             

      drinking well. Adult Sepsis Screening: The patient does not have new or worsening           

      altered mentation. Patient's respiratory rate is less than 22. Systolic blood pressure      

      is greater than 100. Patient has a qSOFA score of 0- Negative Sepsis Screen.                

      Suicide/Homicide risk assessment- the patient denies having any suicidal and/or             

      homicidal ideations and does not present with any other emotional, behavioral or mental     

      health complaints.  Status: Patient is not a  or              

      dependent. Transition of care: patient was not received from another setting of care.       

14:46 Acuity: KENDELL Level 3                                                                     [facility] 

14:46 Method Of Arrival: Walkin/Carried/Asstd                                                 [facility] 

                                                                                                  

Triage Assessment:                                                                                

14:52 General: Appears ill, Behavior is cooperative. Pain: Location: chest Pain currently is  bcj 

      8 out of 10 on a pain scale.                                                                

                                                                                                  

Historical:                                                                                       

- Allergies: turkey;                                                                              

- Home Meds:                                                                                      

1. Zithromax 250 mg Oral tab 1 tab once daily                                                   

2. aspirin 325 mg Oral tab 1 tab once daily                                                     

3. furosemide 40 mg Oral tab 1 tab once daily                                                   

4. Lyrica 150mg Oral 2 times per day                                                            

5. metoprolol succinate 50 mg Tb24 1 tab once daily                                             

6. Percocet 5-325 mg Oral tab 2 tabs every 4-6 hours                                            

7. Plavix 75 mg Oral tab 1 tab once daily                                                       

8. potassium chloride 10 mEq Oral cpER 1 cap once daily                                         

- PMHx: Diabetes - IDDM: uncontrolled; Hypercholesterolemia; MI;                                  

- PSHx: Stents, Coronary; pacemaker/defibrillator insertion; fem/pop bypass                       

bilaterally; CABG;                                                                              

- Social history: Smoking status: Patient states former smoker of tobacco. No barriers            

to communication noted, The patient speaks fluent English, Speaks appropriately for             

age.                                                                                            

- Family history: Not pertinent.                                                                  

- : The pt / caregiver states he / she is on anticoagulants: Plavix. Home medication              

list is obtained from the patient.                                                              

- Exposure Risk Screening:: None identified.                                                      

                                                                                                  

                                                                                                  

Screening:                                                                                        

15:55 Screening information is obtained from the patient. Fall risk: At risk due to weakness. ck1 

      The following interventions are performed due to a positive Fall Risk Screen: Fall Risk     

      is added to Special Handling on the patient Summary Screen. A Fall Risk Bracelet was        

      applied to the patient. Side Rails are placed in the up position. A Call Duenas is given      

      with instruction to call for help when getting out of bed. Fall Alert bracelet is           

      placed on the patient. Assistance ADL's: requires no assistance with activities of          

      daily living. Abuse/DV Screen: The patient / caregiver reports he/she is: not in a          

      situation that causes fear, pain or injury. Nutritional screening: No deficits noted.       

      home support is adequate.                                                                   

19:28 Advance Directives: There is no active DNR order.                                       nn1 

                                                                                                  

Assessment:                                                                                       

15:30 General: Appears in no apparent distress, comfortable, Behavior is appropriate for age, ttb 

      cooperative, pleasant, quiet. General: IV started. Daughter at bedside. Pt resting on       

      stretcher. Primary RN aware.. Neurological: Level of Consciousness is awake, alert.         

      Respiratory: Airway is patent Respiratory effort is even, unlabored, Reports cough that     

      is persistent.                                                                              

15:56 General: Appears in no apparent distress, comfortable, Behavior is appropriate for age, ck1 

      cooperative. Pain: Denies pain. Neurological: Level of Consciousness is awake, alert,       

      Oriented to person, place, time. Cardiovascular: Rhythm is sinus rhythm. Respiratory:       

      Respiratory effort is unlabored, Respiratory pattern is regular, symmetrical, Breath        

      sounds with wheezes bilaterally. GI: No deficits noted. Derm: Skin is pink, warm & dry.   

16:00 Reassessment: Patient appears in no apparent distress at this time. General: Appears in ck1 

      no apparent distress, comfortable, to be sleeping. Pain: Location: chest Aggravated by      

      coughing. Neurological: Level of Consciousness is awake, alert, obeys commands.             

      Cardiovascular: Rhythm is sinus rhythm. Respiratory: Respiratory effort is unlabored,       

      Respiratory pattern is regular, symmetrical. Derm: Skin is pink, warm & dry.              

17:00 Reassessment: Patient appears in no apparent distress at this time.                     ck1 

18:18 General: Appears in no apparent distress, comfortable, to be sleeping. Behavior is      ck1 

      appropriate for age, cooperative. Pain: Location: chest Pain currently is 7 out of 10       

      on a pain scale. Aggravated by cough. Neurological: Level of Consciousness is awake,        

      alert, obeys commands, Oriented to person, place, time. Cardiovascular: Rhythm is.          

      Respiratory: Respiratory effort is unlabored, Respiratory pattern is regular,               

      symmetrical. GI: No deficits noted. Derm: Skin is pink, warm & dry.                       

18:44 General: Patient ambulated approximately 75 feet on room air. Gait steady, pulse ox     ck1 

      92%-94%. patient tolerated well. Dr. Gregory aware.                                         

19:27 General: Appears in no apparent distress, comfortable, Behavior is appropriate for age, nn1 

      cooperative. Pain: Denies pain. Neurological: Level of Consciousness is awake, alert,       

      obeys commands, Oriented to person, place, time. Respiratory: Airway is patent              

      Respiratory effort is even, unlabored, Respiratory pattern is regular.                      

                                                                                                  

Vital Signs:                                                                                      

14:20  / 73; Pulse 82; Resp 18 S; Temp 97.7(O); Pulse Ox 94% on R/A; Weight 90.72 kg    dd6 

      (R); Height 5 ft. 6 in. (167.64 cm) (R);                                                    

15:19  / 77 (auto/);                                                                    ck1 

15:20 Pulse 82 MON; Pulse Ox 94% ;                                                            ck1 

17:49  / 77 (auto/);                                                                    ck1 

17:49  / 77 (auto/);                                                                    ck1 

17:50 Pulse 94 MON; Pulse Ox 96% ;                                                            ck1 

17:50 Pulse 94 MON; Pulse Ox 96% ;                                                            ck1 

18:17  / 65; Pulse 90; Resp 18; Temp 98.6(O); Pulse Ox 94% on 2 lpm NC; Pain 7/10;      ck1 

19:26  / 63; Pulse 94; Resp 18; Temp 99.1(TE); Pulse Ox 94% on R/A; Pain 0/10;          nn1 

14:20 Body Mass Index 32.28 (90.72 kg, 167.64 cm)                                             dd6 

                                                                                                  

Vitals:                                                                                           

14:20 Log In Time: 2017 at 14:18.                                                 dd6 

                                                                                                  

ED Course:                                                                                        

14:20 Patient visited by Lauro Liu IMMANUEL.                                             dd6 

14:20 Ghislaine Segovia is Private Physician.                                                  dd6 

14:20 Patient moved to Waiting                                                                dd6 

14:21 Patient moved to Pre RCE                                                                dd6 

14:50 Triage Initiated                                                                        bcj 

14:53 Patient visited by Wayne Torres, RN.                                                  bcj 

14:54 Rosalind Marshall,RN is Primary Nurse.                                                bcj 

14:54 Patient moved to 5                                                                      bcj 

15:03 Jaleesa Roman MD is Attending Physician.                                      sd1 

15:03 Patient visited by Jaleesa Roman MD.                                          sd1 

15:25 Patient visited by Brendon Hernandez PCA.                                               jrd 

15:25 EKG done. (by ED staff). Reviewed by Jaleesa Roman MD.                          jrd 

15:36 Troponin Sent.                                                                          ttb 

15:36 Cardiac Injury Profile Sent.                                                            ttb 

15:36 CBC with Diff Sent.                                                                     ttb 

15:36 Basic Metabolic Profile Sent.                                                           ttb 

15:36 B-Type Natiuretic Peptide Sent.                                                         ttb 

15:36 -Blood Culture Sent.                                                                    ttb 

15:36 Inserted peripheral IV: 20gauge IV in right antecubital area and blood collected.       ttb 

      Patient tolerated the procedure well. Labs drawn. (by ED staff). Labs/Blood culture         

      drawn.                                                                                      

15:38 Patient visited by Mi Jain RN.                                                  ttb 

15:46 [facility] Payment Agreement was scanned into [employer] and attached to record.               [id number]

15:54 Chest, 1 View Returned.                                                                 EDMS

15:55 Patient visited by Rosalind Marshall,MAXIMILIAN.                                              ck1 

15:55 BLOOD CULTURES Sent.                                                                    ck1 

15:55 -Influenza A&B Rapid Antigen - Nose Sent.                                               ck1

15:56 The patient / caregiver is instructed regarding the plan of care and ED course.         ck1 

16:18 Patient visited by Rosalind Marshall,MAXIMILIAN.                                              ck1 

16:38 Patient visited by Rosalind Marshall,MAXIMILIAN.                                              ck1 

17:30 Patient visited by Rosalind Marshall,MAXIMILIAN.                                              ck1 

17:47 Patient visited by Rosalind Marshall,RN.                                              ck1 

17:48 Patient visited by Rosalind Marshall,RN.                                              ck1 

17:59 Patient visited by Rosalind Marshall,RN.                                              ck1 

18:19 Patient visited by Rosalind Marshall,RN.                                              ck1 

18:44 Patient visited by Rosalind Marshall,RN.                                              ck1 

18:54 Primary Nurse role handed off by Rosalind Marshall,RN                                 ck1 

19:28 No procedures done that require assistance.                                             nn1 

01/15                                                                                             

07:58 EKG-ADULT Returned.                                                                     EDMS

                                                                                             

13:56 T-Sheet-- Draft Copy was scanned into [employer] and attached to record.                   gb  

13:56 ECG/EKG was scanned into [employer] and attached to record.                                gb  

                                                                                                  

Administered Medications:                                                                         

                                                                                             

15:36 Drug: NS 0.9% 1000 ml [sodium chloride 0.9 % intravenous solution] Route: IV; Rate: 150 ttb 

      mL/hr; Site: right antecubital;                                                             

16:37 Drug: Albuterol-Ipratropium 3 ml [ipratropium-albuterol 0.5 mg-3 mg(2.5 mg base)/3 mL   jh6 

      nebulization soln (3 mL)] Route: Inhalation;                                                

16:38 Drug: Oseltamivir 75 mg [oseltamivir 75 mg capsule (1 caps)] Route: PO;                 ck1 

18:53 Drug: predniSONE 40 mg [prednisone 20 mg tablet (2 tabs)] Route: PO;                    ck1 

                                                                                                  

                                                                                                  

RT:                                                                                               

16:37 Initial Med Neb Given as ordered Patient was instructed and evaluated on procedure      jh6 

      Patient tolerated procedure well without adverse effect. Respiratory: Airway is patent      

      Respiratory effort is even, unlabored, Respiratory pattern is regular symmetrical,          

      Breath sounds are coarse in left posterior upper lobe, right posterior upper lobe, left     

      posterior lower lobe, right posterior middle lobe and right posterior lower lobe Breath     

      sounds are diminished in left posterior upper lobe, right posterior upper lobe, left        

      posterior lower lobe, right posterior middle lobe and right posterior lower lobe Breath     

      sounds with wheezes in left posterior upper lobe, right posterior upper lobe, left          

      posterior lower lobe, right posterior middle lobe and right posterior lower lobe at         

      expiration.                                                                                 

16:45 Respiratory: Airway is patent Respiratory effort is even, unlabored, Respiratory        jh6 

      pattern is regular symmetrical, Breath sounds are coarse in left posterior upper lobe,      

      right posterior upper lobe, left posterior lower lobe, right posterior middle lobe and      

      right posterior lower lobe Breath sounds with crackles in left posterior upper lobe,        

      right posterior upper lobe, left posterior lower lobe, right posterior middle lobe and      

      right posterior lower lobe Breath sounds are diminished in left posterior lower lobe        

      and right posterior lower lobe.                                                             

                                                                                                  

Order Results:                                                                                    

Lab Order: -Blood Culture; SPEC'M 17 15:32                                                  

      Test: BLOOD CULTURE; Value: No growth after 24 hours . All specimens observed; Status: F    

      Test: BLOOD CULTURE; Value: for 5 days. Results final at that time.; Status: F              

      Test: BLOOD CULTURE; Value: No Growth after 48 hours. All Specimens observed; Status: F     

      Test: BLOOD CULTURE; Value: for 7 days. Results final at that time.; Status: F              

Lab Order: B-Type Natiuretic Peptide; SPEC'M 17 15:32                                       

      Test: BRAIN NATRIURETIC PEPTIDE; Value: 747; Range: <100; Abnormal: Above high normal;      

      Units: PG/ML; Status: F                                                                     

Lab Order: Basic Metabolic Profile; SPEC'M 17 16:08                                         

      Test: GLUCOSE, FASTING; Value: 352; Range: ; Abnormal: Above high normal; Units:      

      MG/DL; Status: F                                                                            

      Test: BLOOD UREA NITROGEN; Value: 45; Range: 7-18; Abnormal: Above high normal; Units:      

      MG/DL; Status: F                                                                            

      Test: CREATININE FOR GFR; Value: 2.18; Range: 0.70-1.30; Abnormal: Above high normal;       

      Units: MG/DL; Status: F                                                                     

      Test: GLOMERULAR FILTRATION RATE; Value: 32.0; Range: >42; Abnormal: Below low normal;      

      Status: F                                                                                   

      Test: SODIUM LEVEL; Value: 134; Range: 136-145; Abnormal: Below low normal; Units:          

      MEQ/L; Status: F                                                                            

      Test: POTASSIUM SERUM; Value: 4.9; Range: 3.5-5.1; Units: MEQ/L; Status: F                  

      Test: CHLORIDE LEVEL; Value: 97; Range: ; Abnormal: Below low normal; Units:          

      MEQ/L; Status: F                                                                            

      Test: CARBON DIOXIDE LEVEL; Value: 31; Range: 21-32; Units: MEQ/L; Status: F                

      Test: ANION GAP; Value: 6; Range: 8-16; Abnormal: Below low normal; Units: MEQ/L;           

      Status: F                                                                                   

      Test: CALCIUM LEVEL; Value: 8.4; Range: 8.8-10.2; Abnormal: Below low normal; Units:        

      MG/DL; Status: F                                                                            

      Test Note: &nbsp;; Units are mL/min/1.73 m2 Chronic Kidney Disease Staging per NKF:       

      Stage I & II GFR >=60 Normal to Mildly Decreased Stage III GFR 30-59 Moderately           

      Decreased Stage IV GFR 15-29 Severely Decreased Stage V GFR <15 Very Little GFR Left        

      ESRD GFR <15 on RRT                                                                         

Lab Order: CBC with Diff; SPEC'M 17 15:32                                                   

      Test: WHITE BLOOD COUNT; Value: 8.5; Range: 4.0-10.0; Units: K/mm3; Status: F               

      Test: RED BLOOD COUNT; Value: 3.68; Range: 4.30-6.10; Abnormal: Below low normal;           

      Units: M/mm3; Status: F                                                                     

      Test: HEMOGLOBIN; Value: 10.5; Range: 14.0-18.0; Abnormal: Below low normal; Units:         

      g/dl; Status: F                                                                             

      Test: HEMATOCRIT; Value: 32.4; Range: 42.0-52.0; Abnormal: Below low normal; Units: %;      

      Status: F                                                                                   

      Test: MEAN CORPUSCULAR VOLUME; Value: 88.0; Range: 80.0-96.0; Units: fl; Status: F          

      Test: MEAN CORPUSCULAR HEMOGLOBIN; Value: 28.7; Range: 27.0-33.0; Units: pg; Status: F      

      Test: MEAN CORPUSCULAR HGB CONC; Value: 32.6; Range: 32.0-36.5; Units: g/dl; Status: F      

      Test: RED CELL DISTRIBUTION WIDTH; Value: 13.2; Range: 11.5-14.5; Units: %; Status: F       

      Test: PLATELET COUNT, AUTOMATED; Value: 185; Range: 150-450; Units: k/mm3; Status: F        

      Test: NEUTROPHILS %; Value: 74.7; Range: 36.0-66.0; Abnormal: Above high normal; Units:     

      %; Status: F                                                                                

      Test: LYMPH %; Value: 6.7; Range: 24.0-44.0; Abnormal: Below low normal; Units: %;          

      Status: F                                                                                   

      Test: MONO %; Value: 9.0; Range: 0.0-5.0; Abnormal: Above high normal; Units: %;            

      Status: F                                                                                   

      Test: EOS %; Value: 5.4; Range: 0.0-3.0; Abnormal: Above high normal; Units: %; Status:     

      F                                                                                           

      Test: BASO %; Value: 1.1; Range: 0.0-1.0; Abnormal: Above high normal; Units: %;            

      Status: F                                                                                   

      Test: LARGE UNSTAINED CELL %; Value: 3.1; Range: 0.0-4.0; Units: %; Status: F               

      Test: NEUTROPHILS #; Value: 6.3; Range: 1.8-7.7; Units: K/mm3; Status: F                    

      Test: LYMPH #; Value: 0.8; Range: 1.5-4.5; Abnormal: Below low normal; Units: K/mm3;        

      Status: F                                                                                   

      Test: MONO #; Value: 0.8; Range: 0.0-0.8; Units: K/mm3; Status: F                           

      Test: EOS #; Value: 0.5; Range: 0.0-0.50; Units: K/mm3; Status: F                           

      Test: BASO #; Value: 0.1; Range: 0.0-0.2; Units: K/mm3; Status: F                           

      Test: LARGE UNSTAINED CELL #; Value: 0.3; Range: 0.0-0.4; Units: K/mm3; Status: F           

Lab Order: Cardiac Injury Profile; SPEC'M 17 16:08                                          

      Test: CPK CREATINE PHOSPHOKINASE; Value: 280; Range: ; Units: U/L; Status: F          

      Test: CK-MB VALUE MASS; Value: 3.2; Range: 0.0-3.6; Units: NG/ML; Status: F                 

      Test: MB/CK RELATIVE INDEX; Value: 1.14; Range: < OR =4; Status: F                          

      Test Note: &nbsp;; DIAGNOSIS CRITERIA MMB ng/ml Relative Index (RI) NON-AMI < or = 5      

      N/A GRAY ZONE > 5 < or = 4 AMI > 5 > 4                                                      

Lab Order: Troponin; SPEC'M 17 16:08                                                        

      Test: TROPONIN I; Value: < 0.02; Range: < 0.10; Units: NG/ML; Status: F                     

      Test Note: &nbsp;; Troponin I Reference Interval for Siemens Calcium LOCI: 99th             

      Percentile= 0.00-0.045 ng/ml Risk Stratification: <= 0.10 ng/ml Decreased Risk for          

      Adverse Clinical Events. 0.10-1.50 ng/ml Increased Risk for Adverse Clinical Events.        

      Evaluation of additional criterion and/or repeat testing in 2-6 hours is suggested to       

      rule out myocardial damage. >= 1.50 ng/ml Indicative of Myocardial Injury.                  

Lab Order: -Influenza A&B Rapid Antigen - Nose; SPEC'M 17 15:54                           

      Test: INFLUENZA A RAPID SCR by ICA; Value: INFLUENZA A RESULTS POSITIVE; Abnormal:          

      Abnormal; Status: F                                                                         

      Test: INFLUENZA A RAPID SCR by ICA; Value: Comments:; Status: F                             

      Test: INFLUENZA B RAPID SCR by ICA; Value: INFLUENZA B RESULTS NEGATIVE; Status: F          

      Test Note: &nbsp;; The Influenza test is a direct rapid immunoassay for the qualitative   

      detection of Influenza viral antigen. Cell culture (Viral Culture) testing should be        

      considered to confirm NEGATIVE results and to assist in detecting other viruses that        

      can provide similar clinical symptoms. Please contact the lab within 24 hours               

      ([phone] if confirmatory testing is desired.                                              

Lab Order: BLOOD CULTURES; SPEC'M 17 15:54                                                  

      Test: BLOOD CULTURE; Value: No growth after 24 hours . All specimens observed; Status: F    

      Test: BLOOD CULTURE; Value: for 5 days. Results final at that time.; Status: F              

      Test: BLOOD CULTURE; Value: No Growth after 48 hours. All Specimens observed; Status: F     

      Test: BLOOD CULTURE; Value: for 7 days. Results final at that time.; Status: F              

                                                                                                  

Radiology Order: Chest, 1 View                                                                    

      Test: Chest, 1 View                                                                         

      REASON FOR EXAMINATION: Shortness of Breath; Clinical: Acute shortness of breath .; ;       

      Comparison: 10/02/2016 .; ; Findings:; The mediastinum and cardiac silhouette are           

      stable. Prior sternotomy and; pacemaker are again appreciated. The lung fields              

      demonstrate stable changes; without acute consolidation, effusion, or pneumothorax.         

      Skeletal structures are; intact.; ; Impression:; No acute cardiopulmonary process           

      appreciated.; ; ; Signed by; Hao Adrian MD 2017 03:45 P;                          

Radiology Order: EKG-ADULT                                                                        

      Test: EKG-ADULT                                                                             

      REASON FOR EXAMINATION: Shortness of Breath; Stationary ECG Study; [facility] -     

      ED; ; Test Date: 2017; Pat Name: VINNY DUNLAP Department:; Patient ID: [id number]     

      Room: -; Gender: M Technician: aide; : 1945 Requested By: Jaleesa Roman; Order Number: [id number] Reading MD: Jaleesa Roman;      

      Measurements; Intervals Axis; Rate: 80 P: 49; UT: 192 QRS: 25; QRSD: 109 T: 135; QT:        

      387; QTc: 448; Interpretive Statements; SINUS RHYTHM; POSSIBLE LEFT ATRIAL ENLARGEMENT;     

      PROBABLE INFERIOR MYOCARDIAL INFARCTION, PROBABLY OLD; MODERATE T-WAVE ABNORMALITY,         

      CONSIDER ISCHEMIA; SIMILAR 10/2/16; Electronically Signed On 1- 7:29:58 EST by       

      Jaleesa Roman;                                                                      

Outcome:                                                                                          

19:03 Discharge ordered by Provider.                                                          sd1 

19:27 Discharge Assessment: Patient awake, alert and oriented x 3. No cognitive and/or        nn1 

      functional deficits noted. Patient verbalized understanding of disposition                  

      instructions. patient administered narcotics - no.                                          

19:28 The following High Risk Discharge criteria are identified: None. Discharged to home     nn1 

      ambulatory, with family. Condition: stable. No special radiology studies were               

      completed. Property :Personal belongings accompany Pt.                                      

19:28 Patient left the ED.                                                                    nn1 

19:29 Prescriptions given X 4.                                                                nn1 

                                                                                                  

Signatures:                                                                                       

Dispatcher [employer]                           EDMS                                                 

Jaleesa Roman MD MD sd1 Johnson, Bruce, RN                      RN   Leyda Mancuso, Reg                  Reg  gb                                                   

Rosalind Marshall,RN                  RN   ck1                                                  

Lauro Liu, PCA                 PCA  6                                                  

Kiko Brito                                 Mi Capellan, MAXIMILIAN                      RN   Brendon Constantino, PCA                   PCA  Alejandro Ernandez RN                        RN   nn1                                                  

Rosaura Luque, Reg                 Reg  ks16                                                 

                                                                                                  

**************************************************************************************************



*** Chart Complete ***
MTDD

## 2017-01-20 ENCOUNTER — HOSPITAL ENCOUNTER (OUTPATIENT)
Dept: HOSPITAL 53 - M RAD | Age: 72
End: 2017-01-20
Attending: SURGERY
Payer: MEDICARE

## 2017-01-20 DIAGNOSIS — M54.5: ICD-10-CM

## 2017-01-20 DIAGNOSIS — M51.37: ICD-10-CM

## 2017-01-20 DIAGNOSIS — M48.00: Primary | ICD-10-CM

## 2017-01-20 DIAGNOSIS — M51.36: ICD-10-CM

## 2017-01-20 NOTE — REP
CT LUMBAR SPINE WITHOUT CONTRAST:

 

HISTORY:  Back pain.

 

There is no disc bulge or herniation at the L1-2 level. The L1 nerves exit the

neural foramina without compression.

 

A diffuse disc bulge is present at the L2-3 level. There is minimal compression

at the thecal sac. The L2 nerves exit the neural foramina without compression.

 

A diffuse disc bulge is present at the L3-4 level. There is minimal compression

of the thecal sac. There is hypertrophy of the posterior articulating facets. The

L3 nerves exit the neural foramina without compression.

 

 

 

A diffuse disc bulge is present at the L4-5 level. There is minimal compression

of the thecal sac. There is hypertrophy of the posterior articulating facets. The

L4 nerves exit the neural foramina without compression.

 

A diffuse disc bulge is present at the L5-S1 level. There is minimal compression

of the thecal sac.  There is  hypertrophy of the posterior articulating facets.

The L5 nerves exit the neural foramina without compression.

 

The L2-3 through L5-S1 intervertebral discs are decreased in height consistent

with disc degeneration. There is no subluxation.

 

IMPRESSION:

 

Diffuse disc bulges at the L2-3 through L5-S1 levels with minimal thecal sac

compression.?

 

 

Signed by

Ghassan Palma MD 01/20/2017 03:09 P

## 2017-01-22 ENCOUNTER — HOSPITAL ENCOUNTER (INPATIENT)
Dept: HOSPITAL 53 - M ED | Age: 72
LOS: 11 days | Discharge: HOME HEALTH | DRG: 682 | End: 2017-02-02
Attending: HOSPITALIST | Admitting: HOSPITALIST
Payer: MEDICARE

## 2017-01-22 VITALS — SYSTOLIC BLOOD PRESSURE: 128 MMHG | DIASTOLIC BLOOD PRESSURE: 60 MMHG

## 2017-01-22 VITALS — WEIGHT: 196.21 LBS | HEIGHT: 67 IN | BODY MASS INDEX: 30.8 KG/M2

## 2017-01-22 VITALS — SYSTOLIC BLOOD PRESSURE: 142 MMHG | DIASTOLIC BLOOD PRESSURE: 63 MMHG

## 2017-01-22 DIAGNOSIS — Z79.4: ICD-10-CM

## 2017-01-22 DIAGNOSIS — Z79.51: ICD-10-CM

## 2017-01-22 DIAGNOSIS — E78.5: ICD-10-CM

## 2017-01-22 DIAGNOSIS — D47.2: ICD-10-CM

## 2017-01-22 DIAGNOSIS — Z79.899: ICD-10-CM

## 2017-01-22 DIAGNOSIS — Z95.810: ICD-10-CM

## 2017-01-22 DIAGNOSIS — F17.220: ICD-10-CM

## 2017-01-22 DIAGNOSIS — I25.2: ICD-10-CM

## 2017-01-22 DIAGNOSIS — I13.0: ICD-10-CM

## 2017-01-22 DIAGNOSIS — D63.1: ICD-10-CM

## 2017-01-22 DIAGNOSIS — Z79.82: ICD-10-CM

## 2017-01-22 DIAGNOSIS — I50.33: ICD-10-CM

## 2017-01-22 DIAGNOSIS — Z66: ICD-10-CM

## 2017-01-22 DIAGNOSIS — R65.11: ICD-10-CM

## 2017-01-22 DIAGNOSIS — Z79.02: ICD-10-CM

## 2017-01-22 DIAGNOSIS — Z98.62: ICD-10-CM

## 2017-01-22 DIAGNOSIS — Z95.5: ICD-10-CM

## 2017-01-22 DIAGNOSIS — N18.9: ICD-10-CM

## 2017-01-22 DIAGNOSIS — I73.9: ICD-10-CM

## 2017-01-22 DIAGNOSIS — N17.9: Primary | ICD-10-CM

## 2017-01-22 DIAGNOSIS — K21.9: ICD-10-CM

## 2017-01-22 DIAGNOSIS — E86.0: ICD-10-CM

## 2017-01-22 DIAGNOSIS — I25.10: ICD-10-CM

## 2017-01-22 DIAGNOSIS — E11.40: ICD-10-CM

## 2017-01-22 LAB
ANION GAP SERPL CALC-SCNC: 8 MEQ/L (ref 8–16)
BASOPHILS # BLD AUTO: 0 K/MM3 (ref 0–0.2)
BASOPHILS NFR BLD AUTO: 0.2 % (ref 0–1)
BUN SERPL-MCNC: 55 MG/DL (ref 7–18)
CALCIUM SERPL-MCNC: 9.1 MG/DL (ref 8.8–10.2)
CHLORIDE SERPL-SCNC: 94 MEQ/L (ref 98–107)
CO2 SERPL-SCNC: 31 MEQ/L (ref 21–32)
CREAT SERPL-MCNC: 2.53 MG/DL (ref 0.7–1.3)
EOSINOPHIL # BLD AUTO: 0.3 K/MM3 (ref 0–0.5)
EOSINOPHIL NFR BLD AUTO: 1.2 % (ref 0–3)
ERYTHROCYTE [DISTWIDTH] IN BLOOD BY AUTOMATED COUNT: 12.1 % (ref 11.5–14.5)
GFR SERPL CREATININE-BSD FRML MDRD: 26.9 ML/MIN/{1.73_M2} (ref 42–?)
GLUCOSE SERPL-MCNC: 231 MG/DL (ref 83–110)
INR PPP: 1.02
LARGE UNSTAINED CELL #: 0.1 K/MM3 (ref 0–0.4)
LARGE UNSTAINED CELL %: 0.6 % (ref 0–4)
LYMPHOCYTES # BLD AUTO: 0.9 K/MM3 (ref 1.5–4.5)
LYMPHOCYTES NFR BLD AUTO: 3.5 % (ref 24–44)
MAGNESIUM SERPL-MCNC: 2.4 MG/DL (ref 1.8–2.4)
MCH RBC QN AUTO: 29 PG (ref 27–33)
MCHC RBC AUTO-ENTMCNC: 33.7 G/DL (ref 32–36.5)
MCV RBC AUTO: 85.9 FL (ref 80–96)
MONOCYTES # BLD AUTO: 0.9 K/MM3 (ref 0–0.8)
MONOCYTES NFR BLD AUTO: 3.5 % (ref 0–5)
NEUTROPHILS # BLD AUTO: 23.1 K/MM3 (ref 1.8–7.7)
NEUTROPHILS NFR BLD AUTO: 91.1 % (ref 36–66)
PHOSPHATE SERPL-MCNC: 4.4 MG/DL (ref 2.5–4.9)
PLATELET # BLD AUTO: 311 K/MM3 (ref 150–450)
POTASSIUM SERPL-SCNC: 4.5 MEQ/L (ref 3.5–5.1)
SODIUM SERPL-SCNC: 133 MEQ/L (ref 136–145)
WBC # BLD AUTO: 25.4 K/MM3 (ref 4–10)

## 2017-01-22 RX ADMIN — PREGABALIN SCH MG: 75 CAPSULE ORAL at 21:27

## 2017-01-22 RX ADMIN — INSULIN LISPRO SCH UNITS: 100 INJECTION, SOLUTION INTRAVENOUS; SUBCUTANEOUS at 21:00

## 2017-01-22 RX ADMIN — CALCIUM CARBONATE-CHOLECALCIFEROL TAB 250 MG-125 UNIT SCH MG: 250-125 TAB at 21:26

## 2017-01-22 RX ADMIN — ENOXAPARIN SODIUM SCH MG: 30 INJECTION SUBCUTANEOUS at 21:27

## 2017-01-22 NOTE — EDDOCDS
Nurse's Notes                                                                                     

[facility]                                                                         

Name: Sánchez Dunlap                                                                              

Age: 71 yrs                                                                                       

Sex: Male                                                                                         

: 1945                                                                                   

MRN: [id number]                                                                                     

Arrival Date: 2017                                                                          

Time: 10:31                                                                                       

Account#: [id number]                                                                              

Bed 4                                                                                             

Private MD: Henrique Snow MD                                                                   

Diagnosis: Syncope and collapse;Acute pulmonary edema;Hypotension                                 

                                                                                                  

Presentation:                                                                                     

                                                                                             

10:33 Presenting complaint: EMS states: pt states his defibrillator went off. pt was awake,   dsf 

      alert and lethargic on EMS arrival. SR on montior. pt denies CP. Pt reports weakness.       

      Aspirin was taken PTA. Adult Sepsis Screening:. Suicide/Homicide risk assessment- the       

      patient denies having any suicidal and/or homicidal ideations and does not present with     

      any other emotional, behavioral or mental health complaints.  Status: Patient       

      is not a  or  dependent. Transition of care: patient was not          

      received from another setting of care.                                                      

10:33 Acuity: KENDELL Level 2                                                                     dsf 

10:33 Method Of Arrival: Ambulance                                                            dsf 

10:35 Care prior to arrival: See EMS report. Glucose check. 260.                              dsf 

10:35 Adult Sepsis Screening: The patient does not have new or worsening altered mentation.   bcj 

      Patient's respiratory rate is less than 22. Systolic blood pressure is less than or         

      equal to 100 (1 point). Patient has a qSOFA score of 1- Negative Sepsis Screen.             

                                                                                                  

Triage Assessment:                                                                                

10:35 General: Appears in no apparent distress, Behavior is cooperative. Pain: Denies pain.   dsf 

      The patient is triaged at the bedside. See Assessment in Nurses Notes section of ED         

      record. Neurological: Level of Consciousness is lethargic, Reports weakness.                

      Cardiovascular: Chest pain is denied. Respiratory: Airway is patent Respiratory effort      

      is even, unlabored, Respiratory pattern is regular, symmetrical. Derm: Skin is pink,        

      warm & dry.                                                                               

                                                                                                  

Historical:                                                                                       

- Allergies: turkey;                                                                              

- Home Meds:                                                                                      

1. Lyrica 150mg Oral 2 times per day                                                            

2. Percocet 5-325 mg Oral tab 1 tab every 8 hours                                               

3. Prilosec 40 mg Oral cpDR 1 cap once daily                                                    

4. furosemide 40 mg Oral tab 1 tab once daily                                                   

     (Last dose: 2017 07:00)                                                                

5. aspirin 325 mg Oral tab 1 tab once daily                                                     

     (Last dose: 2017 07:00)                                                                

6. metoprolol succinate 50 mg Tb24 1 tab once daily                                             

     (Last dose: 2017 07:00)                                                                

7. potassium chloride 10 mEq Oral cpER 1 cap once daily                                         

     (Last dose: 2017 07:00)                                                                

8. albuterol sulfate 2.5 mg /3 mL (0.083 %) Nebulizer nebu 4 times per day                      

     (Last dose: 2017 07:00)                                                                

- PMHx: Diabetes - IDDM: uncontrolled; Hypercholesterolemia; MI; Hypertension;                    

- PSHx: Stents, Coronary; pacemaker/defibrillator insertion; fem/pop bypass                       

bilaterally; CABG;                                                                              

- Social history: Smoking status: Patient states former smoker of tobacco. No barriers            

to communication noted, The patient speaks fluent English, Speaks appropriately for             

age.                                                                                            

- : Unable to assess if pt is on anticoagulants. Home medication list is obtained from            

patients' pharmacy.                                                                             

- Exposure Risk Screening:: None identified.                                                      

                                                                                                  

                                                                                                  

Screenin:09 Screening information is obtained from the patient. Fall risk: No risks identified.     bcj 

      Assistance ADL's: requires no assistance with activities of daily living. Abuse/DV          

      Screen: The patient / caregiver reports he/she is: not in a situation that causes fear,     

      pain or injury. Nutritional screening: No deficits noted. Advance Directives:               

      Currently, there is no health care proxy. home support is adequate.                         

                                                                                                  

Assessment:                                                                                       

10:53 Adult Sepsis Screening: The patient does not have new or worsening altered mentation.   dsf 

      Patient's respiratory rate is less than 22. Systolic blood pressure is less than or         

      equal to 100 (1 point). Patient has a qSOFA score of 1- Negative Sepsis Screen.             

      General: Appears in no apparent distress, Behavior is lethargic. Pain: Denies pain.         

      Neurological: Level of Consciousness is lethargic. Cardiovascular: Capillary refill < 3     

      seconds Heart tones S1 S2 present Rhythm is sinus rhythm No ectopy. Respiratory: Airway     

      is patent Respiratory effort is even, unlabored, Respiratory pattern is regular,            

      symmetrical, Breath sounds with crackles in left posterior lower lobe and right             

      posterior lower lobe Breath sounds are diminished in right upper lobe and left upper        

      lobe Reports cough that is productive. GI: Abdomen is non- distended Bowel sounds           

      present X 4 quads. Abd is soft and non tender X 4 quads. Derm: Skin is pink, warm & dry.  

11:58 General: Appears in no apparent distress, Behavior is lethargic . Pain: Denies pain.    dsf 

      Neurological: Level of Consciousness is lethargic, Reports weakness. Cardiovascular:        

      Capillary refill < 3 seconds Rhythm is sinus rhythm No ectopy. Respiratory: Airway is       

      patent Respiratory effort is even, unlabored, Respiratory pattern is regular,               

      symmetrical. Derm: Skin is pink, warm & dry.                                              

12:51 Adult Sepsis Screening: The patient does not have new or worsening altered mentation.   dsf 

      Patient's respiratory rate is less than 22. Systolic blood pressure is less than or         

      equal to 100 (1 point). Patient has a qSOFA score of 1- Negative Sepsis Screen.             

      General: Appears to be sleeping. Respiratory: Airway is patent Respiratory effort is        

      even, unlabored, Respiratory pattern is regular, symmetrical. Derm: Skin is pink, warm      

      & dry.                                                                                    

13:34 General: Appears to be sleeping. Cardiovascular: Rhythm is sinus rhythm No ectopy.      dsf 

      Respiratory: Airway is patent Respiratory effort is even, unlabored, Respiratory            

      pattern is regular, symmetrical. Derm: Skin is pink, warm & dry.                          

13:40 General: Dr. contreras talking to patient about test results and plan of care .            dsf 

14:00 Adult Sepsis Screening: The patient does not have new or worsening altered mentation.   dsf 

      Patient's respiratory rate is less than 22. Systolic blood pressure is less than or         

      equal to 100 (1 point). Patient has a qSOFA score of 1- Negative Sepsis Screen.             

      General: Appears in no apparent distress, comfortable, Behavior is appropriate for age,     

      cooperative. Pain: Denies pain. Neurological: Level of Consciousness is awake, alert.       

      Cardiovascular: Capillary refill < 3 seconds Heart tones S1 S2 present Rhythm is sinus      

      rhythm No ectopy. Respiratory: Airway is patent Respiratory effort is even, unlabored,      

      Respiratory pattern is regular, symmetrical, Breath sounds with crackles bilaterally.       

      GI: Abdomen is non- distended Bowel sounds present X 4 quads. Abd is soft and non           

      tender X 4 quads. Derm: Skin is pink, warm & dry.                                         

14:19 General: Dr. caba in room examining patient. Dr. caba aware of BP .                   dsf 

15:05 General: Appears in no apparent distress, comfortable, to be sleeping. Cardiovascular:  dsf 

      Rhythm is sinus rhythm No ectopy. Respiratory: Airway is patent Respiratory effort is       

      even, unlabored, Respiratory pattern is regular, symmetrical, Breath sounds with            

      crackles bilaterally. Derm: Skin is pink, warm & dry.                                     

16:05 Adult Sepsis Screening: The patient does not have new or worsening altered mentation.   dsf 

      Patient's respiratory rate is less than 22. Systolic blood pressure is less than or         

      equal to 100 (1 point). Patient has a qSOFA score of 1- Negative Sepsis Screen.             

      General: Appears to be sleeping. Cardiovascular: Rhythm is sinus rhythm No ectopy.          

      Respiratory: Airway is patent Respiratory effort is even, unlabored, Respiratory            

      pattern is regular, symmetrical. Derm: Skin is pink, warm & dry.                          

17:37 General: Appears in no apparent distress, to be sleeping. Cardiovascular: Capillary     dsf 

      refill < 3 seconds Heart tones S1 S2 present Rhythm is sinus rhythm No ectopy.              

      Respiratory: Airway is patent Respiratory effort is even, unlabored, Respiratory            

      pattern is regular, symmetrical, Breath sounds with crackles bilaterally. GI: Abdomen       

      is non- distended Bowel sounds present X 4 quads. Derm: Skin is pink, warm & dry.         

18:12 General: Appears in no apparent distress, Behavior is appropriate for age, cooperative. bcj 

      Neurological: Level of Consciousness is awake, alert. Cardiovascular: Capillary refill      

      < 3 seconds Rhythm is sinus rhythm No ectopy. Respiratory: Airway is patent Respiratory     

      effort is even, unlabored, Respiratory pattern is regular, symmetrical. Derm: Skin is       

      pink, warm & dry.                                                                         

                                                                                                  

Vital Signs:                                                                                      

10:35 Weight 93.89 kg (R); Height 5 ft. 7 in. (170.18 cm); Pain 0/10;                         dsf 

10:44 BP 95 / 52; Pulse 93; Resp 18; Temp 100.3(TE); Pulse Ox 98% on 3 lpm NC; Weight 87.7 kg ct3 

      (M);                                                                                        

10:48 BP 79 / 41 (auto/);                                                                     dsf 

10:50 Pulse 90 MON; Pulse Ox 93% ;                                                            dsf 

10:51 BP 91 / 52 (auto/);                                                                     dsf 

10:51 Pulse 90 MON; Pulse Ox 93% ;                                                            dsf 

11:03 BP 98 / 54 (auto/);                                                                     dsf 

11:03 Pulse 90 MON; Pulse Ox 94% ;                                                            dsf 

11:44  / 77 (auto/);                                                                    dsf 

11:44 Pulse 84 MON; Pulse Ox 97% ;                                                            dsf 

12:33 BP 77 / 41 (auto/);                                                                     dsf 

12:33 Pulse 78 MON; Pulse Ox 95% ;                                                            dsf 

12:42 Resp 18; Temp 98.7(TE);                                                                 ct3 

12:42 Pulse 76 MON; Pulse Ox 94% ;                                                            dsf 

12:42 BP 87 / 47 (auto/);                                                                     dsf 

12:48 BP 84 / 49 (auto/);                                                                     dsf 

12:48 Pulse 76 MON; Pulse Ox 94% ;                                                            dsf 

13:03 BP 89 / 48 (auto/);                                                                     dsf 

13:04 Pulse 74 MON; Pulse Ox 96% ;                                                            dsf 

13:11  / 53 (auto/);                                                                    dsf 

13:11 Pulse 74 MON; Pulse Ox 97% ;                                                            dsf 

13:18 BP 96 / 55 (auto/);                                                                     dsf 

13:19 Pulse 76 MON; Pulse Ox 97% ;                                                            dsf 

13:32 Pulse 76 MON; Pulse Ox 96% ;                                                            dsf 

13:33 BP 94 / 55 (auto/);                                                                     dsf 

13:48 BP 95 / 56 (auto/);                                                                     dsf 

13:49 Pulse 78 MON; Pulse Ox 97% ;                                                            dsf 

14:03 BP 89 / 62 (auto/);                                                                     dsf 

14:03 Pulse 76 MON; Pulse Ox 97% ;                                                            dsf 

14:37 BP 96 / 51 (auto/);                                                                     dsf 

14:37 Pulse 74 MON; Resp 20; Temp 98.0(TE); Pulse Ox 97% on 3 lpm NC; Pain 0/10;              dsf 

16:39  / 55 (auto/);                                                                    dsf 

16:40 Pulse 72 MON; Pulse Ox 97% ;                                                            dsf 

18:10  / 60 (auto/);                                                                    bcj 

18:10 Pulse 74 MON; Resp 20; Pulse Ox 97% on 3 lpm NC; Pain 0/10;                             bcj 

18:10 Temp 97.1(T);                                                                           bcj 

10:44 Body Mass Index 30.28 (87.70 kg, 170.18 cm)                                             ct3 

12:42 Dr. Contreras notified new orders recieved                                                 dsf 

                                                                                                  

Vitals:                                                                                           

10:35 Log In Time N/A - ambulance arrival.                                                    f 

                                                                                                  

ED Course:                                                                                        

10:32 Patient visited by Letitia Velazquez PCA.                                                  ar3 

10:32 Henrique Snow is Private Physician.                                                   ar3 

10:32 Patient moved to Waiting                                                                ar3 

10:32 Patient moved to 4                                                                      ar3 

10:34 Triage Initiated                                                                        dsf 

10:35 The patient / caregiver is instructed regarding the plan of care and ED course.         dsf 

10:35 O2 via nasal cannula \T\ 3L/min.                                                          dsf 

10:40 Juana Contreras MD is Attending Physician.                                               br1 

10:45 Basic Metabolic Profile Sent.                                                           dsf 

10:45 CBC with Diff Sent.                                                                     dsf 

10:45 Cardiac Injury Profile Sent.                                                            dsf 

10:45 Troponin Sent.                                                                          dsf 

10:45 EKG done. (by ED staff). Reviewed by Juana Contreras MD.                                   tk  

10:48 Patient visited by Justin Alfonso.                                                    tk  

10:53 Patient visited by Darline Sauer PCA.                                              ct3 

10:53 Accompanied by Family Member, Patient has correct armband on for positive               ct3 

      identification. Placed in gown. Bed in low position. Call light in reach. Side rails up     

      X2. Cardiac monitor on. Pulse ox on. NIBP on.                                               

10:54 Patient visited by Rita Iverson RN.                                                   dsf 

10:54 Inserted saline lock: 20 gauge in right antecubital area The patient tolerated the      dsf 

      procedure well.                                                                             

11:02 Patient visited by Juana Contreras MD.                                                   br1 

11:13 Patient moved to CT                                                                     pml 

11:17 Patient moved to 4                                                                      je3 

11:37 Jimenez cath inserted 16 Fr. Balloon inflated. Urine specimen collected. returned clear   dsf 

      yellow urine. Patient tolerated well.                                                       

11:39 [facility] Payment Agreement was scanned into [employer] and attached to record.               lg  

11:47 Urine Culture Sent.                                                                     dsf 

11:47 Urinalysis Sent.                                                                        dsf 

11:57 BLOOD CULTURES Sent.                                                                    dsf 

11:59 Patient visited by Rita Iverson RN.                                                   dsf 

12:30 Patient visited by Darline Sauer PCA.                                              ct3 

12:33 Patient visited by Justin Alfonso.                                                    tk  

12:33 Assisted with bedpan.                                                                   tk  

12:33 CT Head Without Contrast Returned.                                                      EDMS

12:43 Patient visited by Darline Sauer PCA.                                              ct3 

12:51 Patient visited by Rita Iverson RN.                                                   dsf 

13:34 Patient visited by Rita Iverson RN.                                                   dsf 

13:43 Patient visited by Rita Iverson RN.                                                   dsf 

13:43 EKG-ADULT Returned.                                                                     EDMS

14:20 Patient visited by Rita Iverson RN.                                                   dsf 

14:46 Shereen Caba is Hospitalizing Provider.                                                  br1 

15:05 Patient visited by Rita Iverson RN.                                                   dsf 

16:08 Patient moved to Ultrasound                                                             dsf 

18:06 Patient moved to [facility] 

18:08 Duplex, Ext LOWER veins, bilat Returned.                                                EDMS

18:10 No procedures done that require assistance.                                             bcj 

                                                                                                  

Administered Medications:                                                                         

11:19 Drug: Acetaminophen 650 mg [acetaminophen 325 mg tablet (2 tabs)] Route: PO;            dsf 

12:19 Follow up: Response: Temperature is decreased                                           dsf 

11:20 Drug: NS 0.9% 500 ml [sodium chloride 0.9 % injection solution] Route: IV; Rate: bolus; dsf 

      Site: right antecubital;                                                                    

12:40 Follow up: IV Status: Completed infusion; IV Intake: 500ml                              dsf 

11:57 Drug: cefTRIAXone 2 grams [ceftriaxone 1 gram solution for injection] Route: IVPB;      dsf 

      Infused Over: 30 mins; Site: right antecubital;                                             

12:40 Follow up: IV Status: Completed infusion; IV Intake: 100ml                              dsf 

12:49 Drug: NS 0.9% 500 ml [sodium chloride 0.9 % injection solution] Route: IV; Rate: bolus; dsf 

      Site: right antecubital;                                                                    

13:48 Follow up: IV Status: Completed infusion; IV Intake: 500ml                              ld5 

14:40 Follow up: IV Status: Completed infusion; IV Intake: 500ml                              dsf 

14:21 CANCELLED (Dr. Caba wants to hold fluids ): NS 0.9% 500 ml IV at bolus once            dsf 

                                                                                                  

                                                                                                  

Intake:                                                                                           

12:40 IV: 100.00ml; Total: 100.00ml.                                                          dsf 

12:40 IV: 500.00ml; Total: 600.00ml.                                                          dsf 

13:48 IV: 500.00ml; Total: 1100.00ml.                                                         ld5 

14:40 IV: 500.00ml; Total: 1600.00ml.                                                         dsf 

                                                                                                  

Output:                                                                                           

17:06 Urine: 175.00ml (Jimenez); Total: 175.00ml.                                               dsf 

                                                                                                  

Order Results:                                                                                    

Lab Order: Basic Metabolic Profile; SPEC'M 17 11:22                                         

      Test: GLUCOSE, FASTING; Value: 231; Range: ; Abnormal: Above high normal; Units:      

      MG/DL; Status: F                                                                            

      Test: BLOOD UREA NITROGEN; Value: 55; Range: 7-18; Abnormal: Above high normal; Units:      

      MG/DL; Status: F                                                                            

      Test: CREATININE FOR GFR; Value: 2.53; Range: 0.70-1.30; Abnormal: Above high normal;       

      Units: MG/DL; Status: F                                                                     

      Test: GLOMERULAR FILTRATION RATE; Value: 26.9; Range: >42; Abnormal: Below low normal;      

      Status: F                                                                                   

      Test: SODIUM LEVEL; Value: 133; Range: 136-145; Abnormal: Below low normal; Units:          

      MEQ/L; Status: F                                                                            

      Test: POTASSIUM SERUM; Value: 4.5; Range: 3.5-5.1; Units: MEQ/L; Status: F                  

      Test: CHLORIDE LEVEL; Value: 94; Range: ; Abnormal: Below low normal; Units:          

      MEQ/L; Status: F                                                                            

      Test: CARBON DIOXIDE LEVEL; Value: 31; Range: 21-32; Units: MEQ/L; Status: F                

      Test: ANION GAP; Value: 8; Range: 8-16; Units: MEQ/L; Status: F                             

      Test: CALCIUM LEVEL; Value: 9.1; Range: 8.8-10.2; Units: MG/DL; Status: F                   

      Test Note: &nbsp;; Units are mL/min/1.73 m2 Chronic Kidney Disease Staging per NKF:       

      Stage I & II GFR >=60 Normal to Mildly Decreased Stage III GFR 30-59 Moderately           

      Decreased Stage IV GFR 15-29 Severely Decreased Stage V GFR <15 Very Little GFR Left        

      ESRD GFR <15 on RRT                                                                         

Lab Order: CBC with Diff; SPEC'M 17 10:43                                                   

      Test: WHITE BLOOD COUNT; Value: 25.4; Range: 4.0-10.0; Abnormal: Above high normal;         

      Units: K/mm3; Status: F                                                                     

      Test: RED BLOOD COUNT; Value: 4.01; Range: 4.30-6.10; Abnormal: Below low normal;           

      Units: M/mm3; Status: F                                                                     

      Test: HEMOGLOBIN; Value: 11.6; Range: 14.0-18.0; Abnormal: Below low normal; Units:         

      g/dl; Status: F                                                                             

      Test: HEMATOCRIT; Value: 34.5; Range: 42.0-52.0; Abnormal: Below low normal; Units: %;      

      Status: F                                                                                   

      Test: MEAN CORPUSCULAR VOLUME; Value: 85.9; Range: 80.0-96.0; Units: fl; Status: F          

      Test: MEAN CORPUSCULAR HEMOGLOBIN; Value: 29.0; Range: 27.0-33.0; Units: pg; Status: F      

      Test: MEAN CORPUSCULAR HGB CONC; Value: 33.7; Range: 32.0-36.5; Units: g/dl; Status: F      

      Test: RED CELL DISTRIBUTION WIDTH; Value: 12.1; Range: 11.5-14.5; Units: %; Status: F       

      Test: PLATELET COUNT, AUTOMATED; Value: 311; Range: 150-450; Units: k/mm3; Status: F        

      Test: NEUTROPHILS %; Value: 91.1; Range: 36.0-66.0; Abnormal: Above high normal; Units:     

      %; Status: F                                                                                

      Test: LYMPH %; Value: 3.5; Range: 24.0-44.0; Abnormal: Below low normal; Units: %;          

      Status: F                                                                                   

      Test: MONO %; Value: 3.5; Range: 0.0-5.0; Units: %; Status: F                               

      Test: EOS %; Value: 1.2; Range: 0.0-3.0; Units: %; Status: F                                

      Test: BASO %; Value: 0.2; Range: 0.0-1.0; Units: %; Status: F                               

      Test: LARGE UNSTAINED CELL %; Value: 0.6; Range: 0.0-4.0; Units: %; Status: F               

      Test: NEUTROPHILS #; Value: 23.1; Range: 1.8-7.7; Abnormal: Above high normal; Units:       

      K/mm3; Status: F                                                                            

      Test: LYMPH #; Value: 0.9; Range: 1.5-4.5; Abnormal: Below low normal; Units: K/mm3;        

      Status: F                                                                                   

      Test: MONO #; Value: 0.9; Range: 0.0-0.8; Abnormal: Above high normal; Units: K/mm3;        

      Status: F                                                                                   

      Test: EOS #; Value: 0.3; Range: 0.0-0.50; Units: K/mm3; Status: F                           

      Test: BASO #; Value: 0.0; Range: 0.0-0.2; Units: K/mm3; Status: F                           

      Test: LARGE UNSTAINED CELL #; Value: 0.1; Range: 0.0-0.4; Units: K/mm3; Status: F           

Lab Order: Cardiac Injury Profile; SPEC'M 17 11:22                                          

      Test: CPK CREATINE PHOSPHOKINASE; Value: 166; Range: ; Units: U/L; Status: F          

      Test: CK-MB VALUE MASS; Value: 4.3; Range: 0.0-3.6; Abnormal: Above high normal; Units:     

      NG/ML; Status: F                                                                            

      Test: MB/CK RELATIVE INDEX; Value: 2.59; Range: < OR =4; Status: F                          

      Test Note: &nbsp;; DIAGNOSIS CRITERIA MMB ng/ml Relative Index (RI) NON-AMI < or = 5      

      N/A GRAY ZONE > 5 < or = 4 AMI > 5 > 4                                                      

Lab Order: Troponin; SPEC'M 17 11:22                                                        

      Test: TROPONIN I; Value: 0.05; Range: < 0.10; Units: NG/ML; Status: F                       

      Test Note: &nbsp;; Troponin I Reference Interval for [employer] [place] LOCI: 99th             

      Percentile= 0.00-0.045 ng/ml Risk Stratification: <= 0.10 ng/ml Decreased Risk for          

      Adverse Clinical Events. 0.10-1.50 ng/ml Increased Risk for Adverse Clinical Events.        

      Evaluation of additional criterion and/or repeat testing in 2-6 hours is suggested to       

      rule out myocardial damage. >= 1.50 ng/ml Indicative of Myocardial Injury.                  

Lab Order: PT/INR; SPEC'M 17 11:24                                                          

      Test: PROTHROMBIN TIME; Value: 13.5; Range: 12.3-14.5; Units: SECONDS; Status: F            

      Test: INR; Value: 1.02; Status: F                                                           

      Test Note: &nbsp;; THERAPUTIC HUMAN INR VALUES INDICATIONS NORMAL RANGES                  

      PROPHYLAXIS/TREATMENT OF: VENOUS THROMBOSIS 2.0-3.0 PULMONARY EMBOLISM 2.0-3.0              

      PREVENTION OF SYSTEMIC EMBOLISM FROM: TISSUE HEART VALVES 2.0-3.0 ACUTE MYOCARDIAL          

      INFARCTION 2.0-3.0 VALVULAR HEART DISEASE 2.0-3.0 ATRIAL FIBRILLATION 2.0-3.0               

      MECHANICAL VALVES(HIGH RISK) 2.5-3.5 RECURRENT MYOCARDIAL INFARCTION 2.5-3.5                

Lab Order: PTT; SPEC'M 17 11:24                                                             

      Test: PARTIAL THROMBOPLASTIN TIME; Value: 30.3; Range: 26.6-37.1; Units: SECONDS;           

      Status: F                                                                                   

Lab Order: MAGNESIUM LEVEL; SPEC'M 17 11:22                                                 

      Test: MAGNESIUM LEVEL; Value: 2.4; Range: 1.8-2.4; Units: MG/DL; Status: F                  

Lab Order: PHOSPHOROUS LEVEL; SPEC'M 17 11:22                                               

      Test: PHOSPHORUS LEVEL; Value: 4.4; Range: 2.5-4.9; Units: MG/DL; Status: F                 

Lab Order: Lactic Acid (Gray tube on ice); SPEC 17 11:22                                  

      Test: LACTIC ACID LEVEL, LACTATE; Value: 1.9; Range: 0.4-2.0; Units: MMOL/L; Status: F      

Lab Order: Urinalysis; SPEC 17 11:42                                                      

      Test: APPEARANCE, URINE; Value: CLEAR; Range: CLEAR; Status: F                              

      Test: COLOR, URINE; Value: YELLOW; Range: YELLOW; Status: F                                 

      Test: PH,URINE; Value: 5.0; Range: 5.0-9.0; Units: UNITS; Status: F                         

      Test: SPECIFIC GRAVITY URINE AUTO; Value: 1.008; Range: 1.002-1.035; Status: F              

      Test: PROTEIN, URINE AUTO; Value: 3+; Range: NEGATIVE; Abnormal: Above high normal;         

      Units: mg/dL; Status: F                                                                     

      Test: GLUCOSE, URINE (UA) AUTO; Value: 3+; Range: NEGATIVE; Abnormal: Above high            

      normal; Units: mg/dL; Status: F                                                             

      Test: KETONE, URINE AUTO; Value: NEGATIVE; Range: NEGATIVE; Units: mg/dL; Status: F         

      Test: UROBILINOGEN, URINE AUTO; Value: 0.2; Range: 0.0-2.0; Units: mg/dL; Status: F         

      Test: BILIRUBIN, URINE AUTO; Value: NEGATIVE; Range: NEGATIVE; Status: F                    

      Test: NITRITE, URINE AUTO; Value: NEGATIVE; Range: NEGATIVE; Status: F                      

      Test: LEUKOCYTE ESTERASE, URINE AUTO; Value: NEGATIVE; Range: NEGATIVE; Status: F           

      Test: BLOOD, URINE BLOOD; Value: 1+; Range: NEGATIVE; Abnormal: Above high normal;          

      Status: F                                                                                   

      Test: WBC, URINE AUTO; Value: 1; Range: 0-3; Units: /HPF; Status: F                         

      Test: RBC, URINE AUTO; Value: 2; Range: 0-3; Units: /HPF; Status: F                         

      Test: BACTERIA, URINE AUTO; Value: NEGATIVE; Range: NEGATIVE; Status: F                     

      Test: SQUAMOUS EPITHELIAL CELL UR AU; Value: 0; Range: 0-6; Units: /HPF; Status: F          

      Test: HYALINE CAST, URINE AUTO; Value: 4; Range: 0-1; Units: /LPF; Status: F                

      Test: AMORPHOUS SEDIMENT; Value: SMALL; Range: NEGATIVE; Abnormal: Above high normal;       

      Status: F                                                                                   

                                                                                                  

Radiology Order: EKG-ADULT                                                                        

      Test: EKG-ADULT                                                                             

      REASON FOR EXAMINATION: Chest Pain; Stationary ECG Study; [facility] - ED; ;        

      Test Date: 2017; Pat Name: SÁNCHEZ DUNLAP Department:; Patient ID: [id number] Room:     

      -; Gender: M Technician: tk; : 1945 Requested By: JUANA HERNANDEZ; Order           

      Number: [id number] Reading MD: Jaleesa Roman; Measurements; Intervals     

      Axis; Rate: 90 P: 55; MN: 163 QRS: 23; QRSD: 109 T: 137; QT: 360; QTc: 443;                 

      Interpretive Statements; SINUS RHYTHM; LEFT ATRIAL ENLARGEMENT; INFERIOR MYOCARDIAL         

      INFARCTION, PROBABLY OLD; MODERATE T-WAVE ABNORMALITY, CONSIDER ISCHEMIA; SIMILAR           

      17; Electronically Signed On 2017 13:16:30 EST by Jaleesa Roman;          

Radiology Order: CT Head Without Contrast                                                         

      Test: CT Head Without Contrast                                                              

      REASON FOR EXAMINATION: CVA >4.5hrs; CTA brain without contrast 17; ; Indication :     

      CVA greater than 4.5 hours; ; Comparison: None; ; Findings: The ventricles are of           

      normal size and configuration for age. Small; amount of periventricular and subcortical     

      white matter hypodensities are; consistent with small vessel ischemic disease. Old          

      small bilateral basal; ganglia lacunar infarcts are identified. There is no                 

      intracranial hemorrhage or; extra-axial fluid collection. There is no midline shift or      

      mass effect.; ; Moderate calcifications are present in the distal vertebral arteries,       

      left; greater than right. Small amount of calcification is seen in the basilar artery.;     

      There are extensive bilateral carotid siphon calcifications.; ; The skull is without        

      fracture . Mucoperiosteal thickening is present within the; right sphenoid sinus.           

      Mastoid sinuses are clear.; ; Impression; 1. No acute intracranial pathology or             

      hemorrhage.; ; 2. Old small bilateral basal ganglia lacunar infarcts are present.; ; 3.     

      Extensive bilateral carotid siphon calcifications.; ; ; ; ; ; ; Signed by; Shanice Agudelo MD 2017 12:15 P;                                                                

Radiology Order: Duplex, Ext LOWER veins, bilat                                                   

      Test: Duplex, Ext LOWER veins, bilat                                                        

      REASON FOR EXAMINATION: leg pain; Bilateral lower extremity deep vein duplex                

      ultrasound:; ; Comparison is 2016.; ; The deep veins demonstrate normal               

      compression, normal Doppler color flow and; normal Doppler waveforms with respiration       

      and augmentation at multiple levels; from the popliteal veins to the common femoral         

      veins bilaterally.; ; Impression:; ; There is no evidence of deep vein thrombus in the      

      right lower extremity or left; lower extremity.; ; ; Signed by; Canelo Dalton MD            

      2017 05:31 P;                                                                         

Outcome:                                                                                          

14:47 Decision to Hospitalize by Provider.                                                    br1 

18:09 CT Study completed. Admission hand-off: Report called to Sonya YAO.                    [facility] 

18:11 Discharge Assessment: Patient awake, alert and oriented x 3. No cognitive and/or        bcj 

      functional deficits noted. Patient verbalized understanding of disposition                  

      instructions. patient administered narcotics - no. The following High Risk Discharge        

      criteria are identified: None. Admitted to ICU accompanied by nurse, accompanied by         

      tech, family with patient, via stretcher, with oxygen, on monitor, with chart.              

      Condition: stable. Property :Personal belongings accompany Pt.                              

18:25 Patient left the ED.                                                                    dsf 

                                                                                                  

Signatures:                                                                                       

Dispatcher [employer]                           EDMS                                                 

Wayne Torres RN                      RN   bcYeison Flores, Yann                    Reg  Ghassan Fajardo Brian, MD MD   br1                                                  

Letitia Velazquez, PCA                      PCA  ar3                                                  

Elizabeth TranRN                      RN   ld5                                                  

Darline Sauer, PCA                  PCA  ct3                                                  

Rita Iverson RN                       RN   dsf                                                  

Danitza Epstein RN RN pml Kenealy, Timothy tk                                                   

                                                                                                  

Corrections: (The following items were deleted from the chart)                                    

10:53 10:44 87.7 kg Measured; BMI: 30.2; ct3                                                  ct3 

                                                                                                  

**************************************************************************************************

MTDD

## 2017-01-22 NOTE — REP
CTA brain without contrast  1/22/17

 

Indication :  CVA greater than  4.5 hours

 

Comparison:  None

 

Findings:  The ventricles are of normal size and configuration for age.  Small

amount of periventricular and subcortical white matter hypodensities are

consistent with  small vessel ischemic disease.  Old small bilateral basal

ganglia lacunar infarcts are identified.  There is no intracranial hemorrhage or

extra-axial fluid collection.  There is no midline shift or mass effect.

 

Moderate calcifications are present in the distal vertebral arteries, left

greater than right.  Small amount of calcification is seen in the basilar artery.

There are extensive bilateral carotid siphon calcifications.

 

The skull is without fracture . Mucoperiosteal thickening is present within the

right sphenoid sinus. Mastoid sinuses are clear.

 

Impression

1.  No acute intracranial pathology or hemorrhage.

 

2. Old small bilateral basal ganglia lacunar infarcts are present.

 

3.   Extensive bilateral carotid siphon calcifications.

 

 

 

 

 

 

Signed by

Shanice Agudelo MD 01/22/2017 12:15 P

## 2017-01-22 NOTE — HPE
DATE OF ADMISSION:  01/22/2017

 

PRIMARY CARE PROVIDER:  Ghislaine Segovia.

 

REASON FOR ADMISSION:  Sepsis, unknown source.

 

HISTORY OF PRESENT ILLNESS

The patient is a 71-year-old male past medical history significant for diabetes,

congestive heart failure, history of myocardial infarction (MI), hypertension,

hyperlipidemia, who presented to the emergency room today stating his

defibrillator went off while he was sitting at rest.  He stated he was recently

diagnosed with the flu a week ago, completed one course of Tamiflu. In the

emergency room he was found to be hypotensive with blood pressure initially

79/41, pulse of 90, had a low-grade temperature 100.3, saturating 98% on three

liters.  The patient denied any chest pain at this time. He stated he took

aspirin en route. Stated that the last time he had his defibrillator checked was

three months ago when he was seen by Dr. Snow, his cardiologist. No other

symptoms or complaints at this time.  The patient was admitted by hospitalist to

the intensive care unit (ICU).

 

REVIEW OF SYSTEMS:

12-point review of systems was obtained all of which was negative except for

those mentioned above.

 

PAST MEDICAL HISTORY: Notable for

1. Diabetes.

2. Hypertension.

3. Hyperlipidemia.

4. MI.

5. Congestive heart failure.

 

PAST SURGICAL HISTORY:

1. Significant for coronary stents, pacemaker defibrillator insertion.

 2. Femoral-popliteal (fem-pop) bypass bilaterally.

3. Coronary artery bypass graft (CABG) two years ago.

 

SOCIAL HISTORY:

The patient quit four years ago.  Used to smoke one pack per day for the past 57

years.  Drinks occasionally.  Lives at home alone.  He walks with a walker.

 

ALLERGIES:  No known drug allergies.

 

HOME MEDICATIONS:

- albuterol as needed

- aspirin 325 mg by mouth daily

- calcium and vitamin D

- Plavix 75 daily

- insulin Lantus

- lisinopril 5 mg by mouth daily

- metoprolol 50 mg by mouth daily

- nitroglycerin

- omeprazole

- torsemide

 

FAMILY HISTORY:  Noncontributory.

 

PHYSICAL FINDINGS:

VITAL SIGNS: Blood pressure right now 96/55, pulse 74, respiratory rate 18,

temperature 98.7, pulse oximetry 94% on room air.

HEENT:  Pupils equal, round, reactive to light and accommodation.

NECK: Supple.  No jugular venous distention (JVD).

LUNGS: Clear to auscultation bilaterally.

ABDOMEN: Soft, nontender, nondistended.

Extremities:  No clubbing, cyanosis. Trace edema.

 

LABORATORY FINDINGS:

WBC 25.4, hemoglobin 11.6, hematocrit 34.5, platelet count 311.  Sodium 133,

potassium 4.5, chloride 94, BUN 55, creatinine 2.5, lactic acid 1.9, glucose 231,

troponin 0.05.

 

ASSESSMENT AND PLAN:

1.  Sepsis, unknown etiology at this time. Did not receive any antibiotics at

this time. Blood and urine cultures are pending as well as a

methicillin-resistant Staphylococcus aureus (MRSA) screen. Likely may be due to

flu which the patient already completed a course of. We will continue to trend

leukocytosis and a C-reactive protein (CRP) and sedimentation rate.  We will hold

off on antibiotic initiation at this time. Lactic acid was negative.

2.  Coronary artery disease status post coronary artery bypass graft (CABG).

Continue the patient's aspirin.  We will hold the patient's metoprolol due to

hypotension.  We will resume in the morning if his  pressure is more stable.

3.  Congestive heart failure.  Status post pacemaker defibrillator which went off

earlier today.  We will defer to primary care provider to consult Dr. Snow in

the morning. We will continue to monitor the patient on telemetry.  Will continue

to trend enzymes.

Continue torsemide to the patient's home medication.

4.  Shortness of breath, resolved.

5.  Leukocytosis may be secondary to reactive process due to influenza which was

positive last week.

6.  Kidney injury.  The patient's baseline creatinine is around 1.6, currently

2.5.  We will repeat in the morning. The patient does not appear to be grossly

overloaded at this time. We will continue his home diuretics. We will hold

lisinopril due to hypotension and acute on chronic kidney disease.

7.  Deep venous thrombosis (DVT) prophylaxis.  We will start the patient on

Lovenox while in bed.

## 2017-01-22 NOTE — REP
Bilateral lower extremity deep vein duplex ultrasound:

 

Comparison is 04/01/2016.

 

The deep veins demonstrate normal compression, normal Doppler color flow and

normal Doppler waveforms with respiration and augmentation at multiple levels

from the popliteal veins to the common femoral veins bilaterally.

 

Impression:

 

There is no evidence of deep vein thrombus in the right lower extremity or left

lower extremity.

 

 

Signed by

Canelo Dalton MD 01/22/2017 05:31 P

## 2017-01-22 NOTE — REP
AP portable chest 01/22 17

 

Indication:  Chest pain

 

Comparison:  The AP portable chest 01/14/2017, CT chest 09/26/16, chest

radiograph 09/26/2016

 

Findings:  There has been a prior median sternotomy.  Right ventricular pacer

lead is unchanged.  Cardiac silhouette is upper normal size .There is

cephalization of pulmonary vasculature and interstitial prominence with

progression.  Findings are consistent with mild interstitial pulmonary edema.

Degenerative changes are present in thoracic spine.

 

Impression: prior median sternotomy; cardiac silhouette upper normal size

 

Mild interstitial pulmonary edema

 

 

Signed by

Shanice Agudelo MD 01/22/2017 08:08 P

## 2017-01-22 NOTE — EDDOCDS
Physician Documentation                                                                           

[facility]                                                                         

Name: Sánchez Dunlap                                                                              

Age: 71 yrs                                                                                       

Sex: Male                                                                                         

: 1945                                                                                   

MRN: [id number]                                                                                     

Arrival Date: 2017                                                                          

Time: 10:31                                                                                       

Account#: [id number]                                                                              

Bed 4                                                                                             

Private MD: Henrique Snow MD                                                                   

Disposition:                                                                                      

17 14:47 Hospitalization ordered by Shereen Caba for Inpatient Admission. Preliminary        

diagnosis are Syncope and collapse, Acute pulmonary edema, Hypotension.                         

- Bed requested for M ICU.                                                                        

- Status is Inpatient Admission.                                                              dsf 

- Condition is Stable.                                                                            

- Problem is new.                                                                                 

- Symptoms are unchanged.                                                                         

                                                                                                  

                                                                                                  

                                                                                                  

Historical:                                                                                       

- Allergies: turkey;                                                                              

- Home Meds:                                                                                      

1. Lyrica 150mg Oral 2 times per day                                                            

2. Percocet 5-325 mg Oral tab 1 tab every 8 hours                                               

3. Prilosec 40 mg Oral cpDR 1 cap once daily                                                    

4. furosemide 40 mg Oral tab 1 tab once daily                                                   

     (Last dose: 2017 07:00)                                                                

5. aspirin 325 mg Oral tab 1 tab once daily                                                     

     (Last dose: 2017 07:00)                                                                

6. metoprolol succinate 50 mg Tb24 1 tab once daily                                             

     (Last dose: 2017 07:00)                                                                

7. potassium chloride 10 mEq Oral cpER 1 cap once daily                                         

     (Last dose: 2017 07:00)                                                                

8. albuterol sulfate 2.5 mg /3 mL (0.083 %) Nebulizer nebu 4 times per day                      

     (Last dose: 2017 07:00)                                                                

- PMHx: Diabetes - IDDM: uncontrolled; Hypercholesterolemia; MI; Hypertension;                    

- PSHx: Stents, Coronary; pacemaker/defibrillator insertion; fem/pop bypass                       

bilaterally; CABG;                                                                              

- Social history: Smoking status: Patient states former smoker of tobacco. No barriers            

to communication noted, The patient speaks fluent English, Speaks appropriately for             

age.                                                                                            

- : Unable to assess if pt is on anticoagulants. Home medication list is obtained from            

patients' pharmacy.                                                                             

- Exposure Risk Screening:: None identified.                                                      

                                                                                                  

                                                                                                  

Vital Signs:                                                                                      

                                                                                             

10:35 Weight 93.89 kg / 206.99 lbs (R); Height 5 ft. 7 in. (170.18 cm); Pain 0/10;            dsf 

10:44 BP 95 / 52; Pulse 93; Resp 18; Temp 100.3(TE); Pulse Ox 98% on 3 lpm NC; Weight 87.7 kg ct3 

      / 193.35 lbs (M);                                                                           

10:48 BP 79 / 41 (auto/);                                                                     dsf 

10:50 Pulse 90 MON; Pulse Ox 93% ;                                                            dsf 

10:51 BP 91 / 52 (auto/);                                                                     dsf 

10:51 Pulse 90 MON; Pulse Ox 93% ;                                                            dsf 

11:03 BP 98 / 54 (auto/);                                                                     dsf 

11:03 Pulse 90 MON; Pulse Ox 94% ;                                                            dsf 

11:44  / 77 (auto/);                                                                    dsf 

11:44 Pulse 84 MON; Pulse Ox 97% ;                                                            dsf 

12:33 BP 77 / 41 (auto/);                                                                     dsf 

12:33 Pulse 78 MON; Pulse Ox 95% ;                                                            dsf 

12:42 Resp 18; Temp 98.7(TE);                                                                 ct3 

12:42 Pulse 76 MON; Pulse Ox 94% ;                                                            dsf 

12:42 BP 87 / 47 (auto/);                                                                     dsf 

12:48 BP 84 / 49 (auto/);                                                                     dsf 

12:48 Pulse 76 MON; Pulse Ox 94% ;                                                            dsf 

13:03 BP 89 / 48 (auto/);                                                                     dsf 

13:04 Pulse 74 MON; Pulse Ox 96% ;                                                            dsf 

13:11  / 53 (auto/);                                                                    dsf 

13:11 Pulse 74 MON; Pulse Ox 97% ;                                                            dsf 

13:18 BP 96 / 55 (auto/);                                                                     dsf 

13:19 Pulse 76 MON; Pulse Ox 97% ;                                                            dsf 

13:32 Pulse 76 MON; Pulse Ox 96% ;                                                            dsf 

13:33 BP 94 / 55 (auto/);                                                                     dsf 

13:48 BP 95 / 56 (auto/);                                                                     dsf 

13:49 Pulse 78 MON; Pulse Ox 97% ;                                                            dsf 

14:03 BP 89 / 62 (auto/);                                                                     dsf 

14:03 Pulse 76 MON; Pulse Ox 97% ;                                                            dsf 

14:37 BP 96 / 51 (auto/);                                                                     dsf 

14:37 Pulse 74 MON; Resp 20; Temp 98.0(TE); Pulse Ox 97% on 3 lpm NC; Pain 0/10;              dsf 

16:39  / 55 (auto/);                                                                    dsf 

16:40 Pulse 72 MON; Pulse Ox 97% ;                                                            dsf 

18:10  / 60 (auto/);                                                                    bcj 

18:10 Pulse 74 MON; Resp 20; Pulse Ox 97% on 3 lpm NC; Pain 0/10;                             bcj 

18:10 Temp 97.1(T);                                                                           bcj 

10:44 Body Mass Index 30.28 (87.70 kg, 170.18 cm)                                             ct3 

12:42 Dr. Tran notified new orders recieved                                                 dsf 

                                                                                                  

MDM:                                                                                              

10:36 Cardiac Monitor/Pulse Ox/q 30 min VS ordered.                                           br1 

10:36 IV Saline Lock ordered.                                                                 br1 

10:36 Rhythm Strip to chart ordered.                                                          br1 

10:36 Undress patient appropriately for examination ordered.                                  br1 

10:38 Basic Metabolic Profile Ordered.                                                        EDMS

10:38 CBC with Diff Ordered.                                                                  EDMS

10:38 Cardiac Injury Profile Ordered.                                                         EDMS

10:38 Troponin Ordered.                                                                       EDMS

10:38 portable chest Ordered.                                                                 EDMS

10:39 ECG WITH READING ER PHYS+CARDIAG ordered.                                               EDMS

10:47 PT/INR Ordered.                                                                         EDMS

10:47 PTT Ordered.                                                                            EDMS

10:51 MAGNESIUM LEVEL Ordered.                                                                EDMS

10:52 PHOSPHOROUS LEVEL Ordered.                                                              EDMS

11:02 NS 0.9% 500 ml IV at bolus once ordered.                                                br1 

11:02 -Blood Culture (Adults Only), peripheral from different site, or from device/port/PICC  br1 

      etc. if present ordered.                                                                    

11:03 Lactic Acid (Gray tube on ice) Ordered.                                                 EDMS

11:03 -Blood Culture Ordered.                                                                 EDMS

11:03 Acetaminophen Tablet 650 mg PO once ordered.                                            br1 

11:04 -Blood Culture (Adults Only), peripheral from different site, or from device/port/PICC  ar3 

      etc. if present complete.                                                                   

11:04 Jimenez ordered.                                                                          br1 

11:04 CBC with Diff Reviewed.                                                                 br1 

11:04 CT Head Without Contrast Ordered.                                                       EDMS

11:05 BLOOD CULTURES Ordered.                                                                 EDMS

11:05 Urine Culture Ordered.                                                                  EDMS

11:05 Urinalysis Ordered.                                                                     EDMS

11:05 cefTRIAXone 2 grams IVPB once over 30 mins; dilute in 50mL of NS or D5W ordered.        br1 

11:09 BED REQUEST+ADM ordered.                                                                EDMS

11:39 [facility] Payment Agreement was scanned into [employer] and attached to record.               lg  

11:47 Financial registration complete.                                                        lg  

12:09 Basic Metabolic Profile Reviewed.                                                       br1 

12:09 Cardiac Injury Profile Reviewed.                                                        br1 

12:09 Urinalysis Reviewed.                                                                    br1 

12:09 Troponin Reviewed.                                                                      br1 

12:09 PT/INR Reviewed.                                                                        br1 

12:09 PTT Reviewed.                                                                           br1 

12:09 MAGNESIUM LEVEL Reviewed.                                                               br1 

12:09 PHOSPHOROUS LEVEL Reviewed.                                                             br1 

12:09 Lactic Acid (Gray tube on ice) Reviewed.                                                br1 

12:51 NS 0.9% 500 ml IV at bolus once ordered.                                                dsf 

15:09 Duplex, Ext LOWER veins, bilat Ordered.                                                 EDMS

15:10 Admission / Observation Status ordered.                                                 EDMS

15:10 CONSISTENT CARBOHYDRATES ordered.                                                       EDMS

16:11 MRSA SCREEN Ordered.                                                                    EDMS

                                                                                                  

Administered Medications:                                                                         

11:19 Drug: Acetaminophen 650 mg [acetaminophen 325 mg tablet (2 tabs)] Route: PO;            dsf 

12:19 Follow up: Response: Temperature is decreased                                           dsf 

11:20 Drug: NS 0.9% 500 ml [sodium chloride 0.9 % injection solution] Route: IV; Rate: bolus; dsf 

      Site: right antecubital;                                                                    

12:40 Follow up: IV Status: Completed infusion; IV Intake: 500ml                              dsf 

11:57 Drug: cefTRIAXone 2 grams [ceftriaxone 1 gram solution for injection] Route: IVPB;      dsf 

      Infused Over: 30 mins; Site: right antecubital;                                             

12:40 Follow up: IV Status: Completed infusion; IV Intake: 100ml                              dsf 

12:49 Drug: NS 0.9% 500 ml [sodium chloride 0.9 % injection solution] Route: IV; Rate: bolus; dsf 

      Site: right antecubital;                                                                    

13:48 Follow up: IV Status: Completed infusion; IV Intake: 500ml                              ld5 

14:40 Follow up: IV Status: Completed infusion; IV Intake: 500ml                              dsf 

14:21 CANCELLED (Dr. Caba wants to hold fluids ): NS 0.9% 500 ml IV at bolus once            dsf 

                                                                                                  

                                                                                                  

Signatures:                                                                                       

Dispatcher [employer]                           EDMS                                                 

Yeison Hilario, Reg                    Reg  lg                                                   

James Tran MD MD   br1                                                  

Letitia Velazquez, PCA                      PCA  ar3                                                  

Rita Iverson RN                       RN   dsf                                                  

Elizabeth Tran                        RN   ld5                                                  

                                                                                                  

The chart was reviewed and I authenticate all verbal orders and agree with the evaluation and 
treatment provided.Corrections: (The following items were deleted from the chart)

10:51 10:47 MAGNESIUM LEVEL+LAB ordered. EDMS                                                 EDMS

10:51 10:47 PHOSPHOROUS LEVEL+LAB ordered. EDMS                                               EDMS

14:21 14:16 NS 0.9% 500 ml IV at bolus once ordered. br1                                      dsf 

                                                                                                  

Attachments:                                                                                      

11:39 [facility] Payment Agreement                                                                lg  

                                                                                                  

**************************************************************************************************

MTDD

## 2017-01-22 NOTE — ECGEPIP
Stationary ECG Study

                           [facility] - ED

                                       

                                       Test Date:    2017

Pat Name:     VINNY EASLEY           Department:   

Patient ID:   [id number]                 Room:         -

Gender:       M                        Technician:   tk

:          1945               Requested By: JUANA HERNANDEZ

Order Number: [id number]     Reading MD:   Jaleesa Roman

                                 Measurements

Intervals                              Axis          

Rate:         90                       P:            55

AZ:           163                      QRS:          23

QRSD:         109                      T:            137

QT:           360                                    

QTc:          443                                    

                           Interpretive Statements

SINUS RHYTHM

LEFT ATRIAL ENLARGEMENT

INFERIOR MYOCARDIAL INFARCTION, PROBABLY OLD

MODERATE T-WAVE ABNORMALITY, CONSIDER ISCHEMIA

SIMILAR 17

Electronically Signed On 2017 13:16:30 EST by Jaleesa Roman

## 2017-01-23 VITALS — DIASTOLIC BLOOD PRESSURE: 60 MMHG | SYSTOLIC BLOOD PRESSURE: 129 MMHG

## 2017-01-23 VITALS — DIASTOLIC BLOOD PRESSURE: 59 MMHG | SYSTOLIC BLOOD PRESSURE: 145 MMHG

## 2017-01-23 VITALS — SYSTOLIC BLOOD PRESSURE: 128 MMHG | DIASTOLIC BLOOD PRESSURE: 64 MMHG

## 2017-01-23 VITALS — SYSTOLIC BLOOD PRESSURE: 132 MMHG | DIASTOLIC BLOOD PRESSURE: 62 MMHG

## 2017-01-23 VITALS — SYSTOLIC BLOOD PRESSURE: 151 MMHG | DIASTOLIC BLOOD PRESSURE: 67 MMHG

## 2017-01-23 VITALS — SYSTOLIC BLOOD PRESSURE: 120 MMHG | DIASTOLIC BLOOD PRESSURE: 53 MMHG

## 2017-01-23 VITALS — SYSTOLIC BLOOD PRESSURE: 133 MMHG | DIASTOLIC BLOOD PRESSURE: 60 MMHG

## 2017-01-23 LAB
ALBUMIN SERPL BCG-MCNC: 2.2 GM/DL (ref 3.2–5.2)
ALBUMIN/GLOB SERPL: 0.52 {RATIO} (ref 1–1.93)
ALP SERPL-CCNC: 90 U/L (ref 45–117)
ALT SERPL W P-5'-P-CCNC: 17 U/L (ref 12–78)
ANION GAP SERPL CALC-SCNC: 10 MEQ/L (ref 8–16)
AST SERPL-CCNC: 13 U/L (ref 15–37)
BASOPHILS # BLD AUTO: 0.1 K/MM3 (ref 0–0.2)
BASOPHILS NFR BLD AUTO: 0.4 % (ref 0–1)
BILIRUB SERPL-MCNC: 0.2 MG/DL (ref 0.2–1)
BUN SERPL-MCNC: 68 MG/DL (ref 7–18)
CALCIUM SERPL-MCNC: 8.2 MG/DL (ref 8.8–10.2)
CHLORIDE SERPL-SCNC: 95 MEQ/L (ref 98–107)
CO2 SERPL-SCNC: 27 MEQ/L (ref 21–32)
CREAT SERPL-MCNC: 3 MG/DL (ref 0.7–1.3)
EOSINOPHIL # BLD AUTO: 0.3 K/MM3 (ref 0–0.5)
EOSINOPHIL NFR BLD AUTO: 1.8 % (ref 0–3)
ERYTHROCYTE [DISTWIDTH] IN BLOOD BY AUTOMATED COUNT: 12.1 % (ref 11.5–14.5)
ERYTHROCYTE [SEDIMENTATION RATE] IN BLOOD BY WESTERGREN METHOD: 73 MM/HR (ref 0–20)
GFR SERPL CREATININE-BSD FRML MDRD: 22.1 ML/MIN/{1.73_M2} (ref 42–?)
GLUCOSE SERPL-MCNC: 248 MG/DL (ref 83–110)
LARGE UNSTAINED CELL #: 0.2 K/MM3 (ref 0–0.4)
LARGE UNSTAINED CELL %: 0.9 % (ref 0–4)
LYMPHOCYTES # BLD AUTO: 1.4 K/MM3 (ref 1.5–4.5)
LYMPHOCYTES NFR BLD AUTO: 7.7 % (ref 24–44)
MAGNESIUM SERPL-MCNC: 2.4 MG/DL (ref 1.8–2.4)
MCH RBC QN AUTO: 29.3 PG (ref 27–33)
MCHC RBC AUTO-ENTMCNC: 33.4 G/DL (ref 32–36.5)
MCV RBC AUTO: 87.7 FL (ref 80–96)
MONOCYTES # BLD AUTO: 0.6 K/MM3 (ref 0–0.8)
MONOCYTES NFR BLD AUTO: 3.3 % (ref 0–5)
NEUTROPHILS # BLD AUTO: 15.2 K/MM3 (ref 1.8–7.7)
NEUTROPHILS NFR BLD AUTO: 85.8 % (ref 36–66)
PLATELET # BLD AUTO: 260 K/MM3 (ref 150–450)
POTASSIUM SERPL-SCNC: 4.4 MEQ/L (ref 3.5–5.1)
PROT SERPL-MCNC: 6.4 GM/DL (ref 6.4–8.2)
SODIUM SERPL-SCNC: 132 MEQ/L (ref 136–145)
WBC # BLD AUTO: 17.6 K/MM3 (ref 4–10)

## 2017-01-23 RX ADMIN — INSULIN LISPRO SCH UNITS: 100 INJECTION, SOLUTION INTRAVENOUS; SUBCUTANEOUS at 17:04

## 2017-01-23 RX ADMIN — OMEPRAZOLE SCH MG: 20 CAPSULE, DELAYED RELEASE ORAL at 08:39

## 2017-01-23 RX ADMIN — CLOPIDOGREL BISULFATE SCH MG: 75 TABLET ORAL at 08:39

## 2017-01-23 RX ADMIN — HYDROCODONE BITARTRATE AND ACETAMINOPHEN PRN TAB: 5; 325 TABLET ORAL at 10:28

## 2017-01-23 RX ADMIN — INSULIN LISPRO SCH UNITS: 100 INJECTION, SOLUTION INTRAVENOUS; SUBCUTANEOUS at 11:54

## 2017-01-23 RX ADMIN — CALCIUM CARBONATE-CHOLECALCIFEROL TAB 250 MG-125 UNIT SCH MG: 250-125 TAB at 21:23

## 2017-01-23 RX ADMIN — METOPROLOL SUCCINATE SCH MG: 50 TABLET, EXTENDED RELEASE ORAL at 08:41

## 2017-01-23 RX ADMIN — INSULIN LISPRO SCH UNITS: 100 INJECTION, SOLUTION INTRAVENOUS; SUBCUTANEOUS at 08:39

## 2017-01-23 RX ADMIN — CALCIUM CARBONATE-CHOLECALCIFEROL TAB 250 MG-125 UNIT SCH MG: 250-125 TAB at 08:40

## 2017-01-23 RX ADMIN — POTASSIUM CHLORIDE SCH MEQ: 750 TABLET, EXTENDED RELEASE ORAL at 08:40

## 2017-01-23 RX ADMIN — ASPIRIN 325 MG ORAL TABLET SCH MG: 325 PILL ORAL at 08:40

## 2017-01-23 RX ADMIN — INSULIN LISPRO SCH UNITS: 100 INJECTION, SOLUTION INTRAVENOUS; SUBCUTANEOUS at 21:23

## 2017-01-23 RX ADMIN — PREGABALIN SCH MG: 75 CAPSULE ORAL at 08:40

## 2017-01-23 RX ADMIN — ENOXAPARIN SODIUM SCH MG: 30 INJECTION SUBCUTANEOUS at 21:22

## 2017-01-23 RX ADMIN — PREGABALIN SCH MG: 75 CAPSULE ORAL at 21:23

## 2017-01-23 NOTE — CR.PDOC
[facility] Cardiology Consultation


Date of Consultation


17





Cadiology Consultation


REFERRING PHYSICIAN: Hospitalist





REASON FOR REFERRAL: Defibrillator activation, history of CHF and CAD





HISTORY OF PRESENT ILLNESS: Mr Dunlap is a 71-year-old male who presented to 

the hospital after he states his defibrillator fired when he was home, sitting 

down and resting. He initially presented with hypotensive blood pressure of 79/

41, with pulse in the 90s and a temperature of 100.3. He was admitted for 

sepsis secondary to influenza virus was diagnosed with about a week prior, 

history of coronary artery disease status post CABG procedure two years prior 

and CHF. The patient states that he has been in his usual state of health, but 

two days prior to presentation he started feeling fatigued. He states that one 

day ago he was laying in bed and felt a sudden "jolt" where his whole body and 

legs twitched, and he knew his defibrillator had been activated. He then got 

out of bed, and laid down on his couch where he fell asleep. He states that his 

neighbor who usually checks on him came over and tried to wake him up, but that 

he was too fatigued to arouse. His friend then called EMS. As per the patient, 

EMS also had to "shock" the patient once they arrived. 





PAST MEDICAL:


Hypertension


Diabetes 


History of myocardial infarction


hyperlipidemia


Congestive heart failure


 





SURGICAL HISTORY:


Coronary artery stent placement, pacemaker defibrillator insertion


Femoral-popliteal bypass bilaterally


Urinary artery bypass graft 2 years ago








FAMILY HISTORY: father and brother both suffered MI's


SOCIAL HISTORY: 


Patient quit smoking about 5 years ago, he used to smoke pack a day for about 

60 years. He does drink occasionally, he lives at home alone and ambulates with 

a walker.





REVIEW OF SYSTEMS:





Cardiovascular: Denies chest pain, palpitations, syncope


Respiratory: No wheezing, rhonchi, rales appreciated on exam. Good air 

expansion bilaterally


GI/: Denies changes in bowel or bladder habits, denies hematuria or dysuria


HEENT: denies blurred or changed vision or headache





PHYSICAL EXAMINATION:


VITAL SIGNS: Please see below.


GENERAL APPEARANCE: Laying in bed at 45 incline, patient appears comfortable, 

not labored and in no distress.


EYES: EOMI, PERRLA


ENT/Mouth: Moist mucus membranes, tongue midline


NECK: supple, no JVD appreciated


EXTREMITIES: No cyanosis, clubbing, edema appreciated


SKIN: intact


NEUROLOGIC/PSYCHOLOGIC: No focal deficits appreciated


HEART: normal s1 and s2, no murmurs, rubs or gallops appreciated


ARTERIAL PULSES: +2 dorsalis pedis b/l, +2 radial b/l


LOWER EXTREMITY EDEMA: non appreciable


ABDOMEN: NABSx4, non distended, non tender to palpitation, no organomegaly





ALLERGIES: Please see below.


 


HOME MEDICATIONS: Please see below.


 


CURRENT MEDICATIONS: Please see below.





Electrocardiogram: 17 shows sinus rhythm with inferior myocardial 

infarction, probably old, moderate T-wave abnormality potential ischemia. 

Similar as to prior on 2017.





LABORATORY DATA: Please see below.





IMAGIN-22-17


Duplex U/S LE





There is no evidence of deep vein thrombus in the right lower extremity or left


lower extremity.


 





Head CT 17





1.  No acute intracranial pathology or hemorrhage.


 


2. Old small bilateral basal ganglia lacunar infarcts are present.


 


3.   Extensive bilateral carotid siphon calcifications.


CXR 17





prior median sternotomy; cardiac silhouette upper normal size


 


Mild interstitial pulmonary edema





ASSESSMENT/PLAN: 





When patient was examined on bedside this morning he does not appear in distress

, he does not appear to be fluid overloaded or grossly edematous. He denied 

chest pain, palpitations or shortness of breath. Stated his legs have been achy 

for the past few days, bilaterally in his groin area with radiation down 

through the back of his thigh and calf muscles. He states his vascular sugeon 

in [place] ordered a CT of his low back to evaluate for a pinched nerve. He 

has to see him next week apparently for the results. When he followed up with 

his cardiologist 3 months prior to presentation, the patient states that his 

defibrillator was checked and he was told it was working properly. He does have 

a bit of acute kidney injury on this visit. Blood pressure seems to be stable 

at 120/60 range with heart rate in the 80s. He is currently afebrile and his 

white count is 17.6 down from 25.4 one day prior. Would suggest continued 

anticoagulation with Lovenox and Metoprolol therapy as this seems to be 

controlling his blood pressure adequately. We will continue to monitor intake 

and output. He received his home dose of 100 mg of Torsemide and seems to have 

had 20 mL out in the last 24 hours. Would continue with current care, no 

further recommendations at this time. 





Thank you kindly for asking me to participate in the care of your patient.





Addendum Franky BLAND: I reviewed patient's record and obtained independent 

history and exam. Agree with the note above. I interrogated his ICD and no 

shocks were delivered and no episodes of tachycardia were documented. It is not 

clear what patient perceived as a shock. I would continue supportive therapy 

for likely infection.





Vital Signs/I&O





 Vital Signs








  Date Time  Temp Pulse Resp B/P Pulse Ox O2 Delivery O2 Flow Rate FiO2


 


17 08:00      Room Air  


 


17 08:00 97.9 78 24 129/60 94   


 


17 00:00       1.0 














 I&O- Last 24 Hours up to 6 AM 


 


 17





 06:00


 


Intake Total 880 ml


 


Output Total 1035 ml


 


Balance -155 ml











Laboratory Data


Labs 24H


 Laboratory Tests 2


17 10:43: 


White Blood Count 25.4H, Red Blood Count 4.01L, Hemoglobin 11.6L, Hematocrit 

34.5L, Mean Corpuscular Volume 85.9, Mean Corpuscular Hemoglobin 29.0, Mean 

Corpuscular Hemoglobin Concent 33.7, Red Cell Distribution Width 12.1, Platelet 

Count 311, Neutrophils (%) (Auto) 91.1H, Lymphocytes (%) (Auto) 3.5L, Monocytes 

(%) (Auto) 3.5, Eosinophils (%) (Auto) 1.2, Basophils (%) (Auto) 0.2, 

Neutrophils # (Auto) 23.1H, Lymphocytes # (Auto) 0.9L, Monocytes # (Auto) 0.9H, 

Eosinophils # (Auto) 0.3, Basophils # (Auto) 0.0, Large Unclassified Cells # 0.1

, Large Unclassified Cells % 0.6


17 11:22: 


Anion Gap 8, Blood Urea Nitrogen 55H, Creatinine 2.53H, Sodium Level 133L, 

Potassium Level 4.5, Chloride Level 94L, Carbon Dioxide Level 31, Calcium Level 

9.1, Phosphorus Level 4.4, Total Creatine Kinase 166, Creatine Kinase MB 4.3H, 

Creatine Kinase MB Relative Index 2.59, Glomerular Filtration Rate 26.9L, 

Lactic Acid Level 1.9, Magnesium Level 2.4, Troponin I 0.05


17 11:24: 


Activated Partial Thromboplast Time 30.3, Prothromb Time International Ratio 

1.02, Prothrombin Time 13.5


17 11:42: 


Urine Amorphous Sediment SMALLH, Urine Appearance CLEAR, Urine Color YELLOW, 

Urine pH 5.0, Urine Specific Gravity 1.008, Urine Protein 3+H, Urine Glucose (UA

) 3+H, Urine Ketones NEGATIVE, Urine Urobilinogen 0.2, Urine Bilirubin NEGATIVE

, Urine Leukocyte Esterase NEGATIVE, Urine Bacteria (Auto) NEGATIVE, Urine 

Blood 1+H, Urine Calcium Carbonate Cryst(Auto) , Urine Calcium Oxalate Cryst (

Auto) , Urine Calcium Phosphate Rebekah (Auto) , Urine Cellular Casts , Urine 

Cystine Crystals , Urine Granular Casts (Auto) , Urine Hyaline Casts (Auto) 4, 

Urine Leucine Crystals , Urine Mucus (Auto) , Urine Nitrite NEGATIVE, Urine 

Oval Fat Bodies (Auto) , Urine RBC (Auto) 2, Urine Renal Epithelial Cells , 

Urine Sperm (Auto) , Urine Squamous Epithelial Cells 0, Urine Transitional 

Epithelial Cells , Urine Trichomonas (Auto) , Urine Triple Phosphate Cryst (Auto

) , Urine Tyrosine Crystals , Urine Uric Acid Crystals (Auto) , Urine WBC (Auto

) 1, Urine Waxy Casts (Auto) , Urine Yeast-Like Cells (Auto) 


17 20:21: Troponin I 0.02#


17 21:29: Bedside Glucose (Misc Panel) 235H


17 01:55: Troponin I 0.03#


17 04:34: 


Blood Urea Nitrogen 68H, Creatinine 3.00H, Sodium Level 132L, Potassium Level 

4.4, Chloride Level 95L, Carbon Dioxide Level 27, Calcium Level 8.2L, Aspartate 

Amino Transf (AST/SGOT) 13L, Alanine Aminotransferase (ALT/SGPT) 17, Alkaline 

Phosphatase 90, Total Bilirubin 0.2, Total Protein 6.4, Albumin 2.2L, Albumin/

Globulin Ratio 0.52L, Anion Gap 10, White Blood Count 17.6H, Red Blood Count 

3.55L, Hemoglobin 10.4L, Hematocrit 31.1L, Mean Corpuscular Volume 87.7, Mean 

Corpuscular Hemoglobin 29.3, Mean Corpuscular Hemoglobin Concent 33.4, Red Cell 

Distribution Width 12.1, Platelet Count 260, Neutrophils (%) (Auto) 85.8H, 

Lymphocytes (%) (Auto) 7.7L, Monocytes (%) (Auto) 3.3, Eosinophils (%) (Auto) 

1.8, Basophils (%) (Auto) 0.4, Neutrophils # (Auto) 15.2H, Lymphocytes # (Auto) 

1.4L, Monocytes # (Auto) 0.6, Eosinophils # (Auto) 0.3, Basophils # (Auto) 0.1, 

C-Reactive Protein, Quantitative 12.00H, Erythrocyte Sedimentation Rate 73H, 

Glomerular Filtration Rate 22.1L, Large Unclassified Cells # 0.2, Large 

Unclassified Cells % 0.9, Magnesium Level 2.4


17 08:10: Troponin I 0.02#


CBC/BMP


 Laboratory Tests


17 10:43








Red Blood Count 4.01 L, Mean Corpuscular Volume 85.9, Mean Corpuscular 

Hemoglobin 29.0, Mean Corpuscular Hemoglobin Concent 33.7, Red Cell 

Distribution Width 12.1, Neutrophils (%) (Auto) 91.1 H, Lymphocytes (%) (Auto) 

3.5 L, Monocytes (%) (Auto) 3.5, Eosinophils (%) (Auto) 1.2, Basophils (%) (Auto

) 0.2, Neutrophils # (Auto) 23.1 H, Lymphocytes # (Auto) 0.9 L, Monocytes # (

Auto) 0.9 H, Eosinophils # (Auto) 0.3, Basophils # (Auto) 0.0





17 11:22








Calcium Level 9.1, Phosphorus Level 4.4, Total Creatine Kinase 166





17 04:34








Red Blood Count 3.55 L, Mean Corpuscular Volume 87.7, Mean Corpuscular 

Hemoglobin 29.3, Mean Corpuscular Hemoglobin Concent 33.4, Red Cell 

Distribution Width 12.1, Neutrophils (%) (Auto) 85.8 H, Lymphocytes (%) (Auto) 

7.7 L, Monocytes (%) (Auto) 3.3, Eosinophils (%) (Auto) 1.8, Basophils (%) (Auto

) 0.4, Neutrophils # (Auto) 15.2 H, Lymphocytes # (Auto) 1.4 L, Monocytes # (

Auto) 0.6, Eosinophils # (Auto) 0.3, Basophils # (Auto) 0.1, Calcium Level 8.2 L

, Aspartate Amino Transf (AST/SGOT) 13 L, Alanine Aminotransferase (ALT/SGPT) 17

, Alkaline Phosphatase 90, Total Bilirubin 0.2, Total Protein 6.4, Albumin 2.2 L


FSBS





Laboratory Tests








Test


  17


21:29 Range/Units


 


 


Bedside Glucose (Misc Panel) 235   MG/DL








Microbiology





 Microbiology


17 Blood Culture, Received


          Pending


17 Blood Culture, Received


          Pending


17 MRSA Screen, Received


          Pending


17 Urine Culture, Received


          Pending





Home Medications


Scheduled


Aspirin (Aspirin) 325 Mg Tab 325 MG PO DAILY  (Reported) 


Calcium/Vitamin D (Oyster Shell Calcium 250+ 250-125 mg-Unit) 1 Tab Tab 1 TAB 

PO BID  (Reported) 


Clopidogrel Bisulfate (Clopidogrel) 75 Mg Tab 75 MG PO DAILY  (Reported) 


Insulin Glargine (Lantus) 1 Units/0.01 Ml Susp 1 DOSE SC BID  (Reported) 


   SEE COMMENTS 


Lisinopril (Lisinopril) 5 Mg Tab 5 MG PO DAILY  (Reported) 


Metoprolol Succinate (Metoprolol Succinate ER) 50 Mg Tab 50 MG PO DAILY  (

Reported) 


Omeprazole (Omeprazole) 40 Mg Cap 40 MG PO DAILY  (Reported) 


Potassium Chloride (Klor-Con M10) 10 Meq Tabcr 10 MEQ PO DAILY  (Reported) 


Pregabalin (Lyrica) 150 Mg Cap 150 MG PO BID  (Reported) 


Torsemide (Torsemide) 100 Mg Tab 100 MG PO DAILY  (Reported) 





Scheduled PRN


Albuterol Sulfate (Albuterol Sulfate) 2.5 Mg/3 Ml Nebu 2.5 MG INH QID PRN PRN 

SHORTNESS OF BREATH (Reported) 


Guaifenesin/Codeine (Guaifenesin/Codeine 100-10 mg/5Ml) 5 Ml Syrp 5 ML PO Q4H 

PRN PRN COUGH (Reported) 


Nitroglycerin (Nitrostat) 0.4 Mg Subl 0.4 MG SL Q5MP PRN PRN CHEST PAIN (

Reported) 


Oxycodone/Acetaminophen (Percocet 2.5-325 mg) 1 Tab Tab 1 TAB PO Q8H PRN PRN 

PAIN (Reported) 





Current Medications





 Current Medications


Acetaminophen (Tylenol) 650 mg STK-MED ONCE As Ordered ;  Start 17 at 11:13

;  Stop 17 at 11:14;  Status DC


Acetaminophen/ Hydrocodone Bitart (Norco, [place] 5/325) 1 tab Q4HP  PRN PO 

MODERATE PAIN (PS 5-7);  Start 17 at 15:15;  Stop 17 at 15:14


Aspirin (Aspirin) 325 mg DAILY PO  Last administered on  08:40;  Start 

17 at 09:00;  Stop 17 at 08:59


Calcium/Vitamin D (Oscal D) 1 mg BID PO ;  Start 17 at 21:00;  Stop  at 21:15;  Status DC


Calcium/Vitamin D (Oscal D) 250 mg BID PO  Last administered on  08:40

;  Start 17 at 21:00;  Stop 17 at 20:59


Calcium/Vitamin D (Oscal D) 250 mg BID PO ;  Start 17 at 21:15;  Stop  at 21:15;  Status DC


Clopidogrel Bisulfate (PLAVix) 75 mg DAILY PO  Last administered on  08

:39;  Start 17 at 09:00;  Stop 17 at 08:59


Codeine Phosphate/ Guaifenesin (Robitussin Ac) 5 ml Q4H  PRN PO COUGH;  Start  at 20:00;  Stop 17 at 19:59


Dextrose (Dextrose 50%) 25 ml ASDIRECTED  PRN IV SEE LABEL COMMENTS;  Start  at 22:45;  Stop 17 at 22:44


Enoxaparin Sodium (Lovenox) 30 mg DAILY@21 SC  Last administered on  21

:27;  Start 17 at 21:00;  Stop 17 at 20:59


Glucagon 1 mg 1 mg ASDIRECTED  PRN SC SEE LABEL COMMENTS;  Start 17 at 22:

45;  Stop 17 at 22:44


Glucose (Glucose) 16 GM ASDIRECTED  PRN PO SEE LABEL COMMENTS;  Start 17 

at 22:45;  Stop 17 at 22:44


Home Med (Med Rec Complete!)  ASDIRECTED XX ;  Start 17 at 14:30;  Stop  at 14:30;  Status DC


Insulin Human Lispro (HumaLOG INSULIN) SEE PROTOCOL TABLE AC SC  Last 

administered on  08:39;  Start 17 at 07:30;  Stop 17 at 07:29


Insulin Human Lispro (HumaLOG INSULIN) SEE PROTOCOL TABLE QHS SC ;  Start  at 21:00;  Stop 17 at 20:59


Metoprolol Succinate (TopROL XL) 50 mg DAILY PO  Last administered on  

08:41;  Start 17 at 09:00;  Stop 17 at 08:59


Nitroglycerin (Nitrostat (1/ 150)) 0.4 mg Q5MP  PRN SL CHEST PAIN;  Start  at 20:00;  Stop 17 at 19:59


Omeprazole (PriLOSEC) 40 mg DAILY PO  Last administered on  08:39;  

Start 17 at 09:00;  Stop 17 at 08:59


Potassium Chloride (Micro-K Extencaps) 10 meq DAILY PO  Last administered on  08:40;  Start 17 at 09:00;  Stop 17 at 08:59


Pregabalin (Lyrica) 150 mg BID PO  Last administered on  08:40;  Start 

17 at 21:00;  Stop 17 at 20:59


Sodium Chloride (Nacl 0.9%) 1,000 ml @  50 mls/hr Q20H IV  Last administered on 

 07:53;  Start 17 at 07:45;  Stop 17 at 07:44


Torsemide (Demadex) 100 mg DAILY PO ;  Start 17 at 09:00;  Stop 17 at 

09:00;  Status DC





Allergies


Allergies:  


Coded Allergies:  


     No Known Drug Allergy (Verified  Allergy, Unknown, 13)


     TURKEY (Verified  Allergy, Unknown, VOMITING, 11)





GME ATTESTATION


GME ATTESTATION


My preceptor for this patient encounter was physically present in the building 

during the encounter and was fully available. As needed, all aspects of the 

patient interview, examination, medical decision making process, and medical 

care plan development were reviewed and approved by the preceptor. Preceptor is 

aware and concurs with the plan as stated in the body of this note and will 

attest to such by his/her cosignature.








JOSÉ MIGUEL GARCIA DO 2017 10:39


Henrique Snow MD 2017 21:43

## 2017-01-23 NOTE — IPNPDOC
Assessment/Plan


Date Seen


The patient was seen on 1/23/17.





Problems


Problems:  


(1) SIRS (systemic inflammatory response syndrome)


Status:  Acute


Problem Text:  evidenced by HR 90, temp 100.3, WBC 25, and BP 79/41 upon 

arrival to ED; recent flu infection but thus far blood and urine cultures are 

negative; I suspect that this was secondary to dehydration as the patient has a 

concomitant HUSSEIN and has responded to IVF; vitals now stabilized, afebrile, WBC 

improved to 17; continue to follow up cultures; no antibiotics at this time as 

no clear infection





(2) Acute on chronic kidney failure


Status:  Acute


Problem Text:  baseline Cr around mid to upper 1s; Cr upon admission was 2.53, 

now increased to 3.0; stop torsemide and check FENa; I suspect this is 

secondary to dehydration/prerenal; start very gentle IVF





(3) Defibrillator discharge


Status:  Acute


Problem Text:  Dr. Snow consulted; patient denies chest pain; trops were 

negative





(4) CAD (coronary artery disease)


Status:  Chronic


Problem Text:  hx of MI and s/p CABG and stents; no current chest pain; trops 

negative; continue home ASA, plavix, beta blocker; needs to discuss with 

primary physicians adding a statin





(5) HTN (hypertension)


Status:  Chronic


Problem Text:  continue home beta blocker





(6) Diabetes mellitus


Status:  Chronic


Problem Text:  patient is unclear on home lantus dosing; SSI while in house and 

add and adjust levemir accordingly





(7) Chronic diastolic CHF (congestive heart failure)


Status:  Chronic


Problem Text:  not overloaded; stop home torsemide given HUSSEIN








Plan / VTE


VTE Prophylaxis Ordered?:  Yes (lovenox)





Plan


Advance Directives:  DNR





Disposition


ICU care given hypotension on arrival and defibrillator firing; may be able to 

be downgraded if BP remains stable and Dr. Snow approves it





Subjective


Review of Systems


CC/HPI


The patient is a 71-year-old male admitted with a reason for visit of Sepsis.


Events since last encounter


patient states he feels much better; no chest pain


Pulmonary:  Denies: Cough


Cardiovascular:  Denies: Chest Pain


Gastrointestinal:  Denies: Vomiting





Objective


Physical Examination


General Exam:  Positive: Alert, Cooperative, No Acute Distress


Eye Exam:  Positive: EOMI, 


   Negative: Sclera icteric


ENT Exam:  Positive: Mucous membr. moist/pink


Neck Exam:  Positive: Supple


Chest Exam:  Positive: Clear to auscultation, Normal air movement


Heart Exam:  Positive: Rate Normal, Regular Rhythm


Abdomen Exam:  Positive: Normal bowel sounds, Soft


Extremity Exam:  Negative: Edema


Neuro Exam:  Positive: Normal Speech


Psych Exam:  Positive: Mood NL, Oriented x 3





Vital Signs/I&O





 Vital Signs








  Date Time  Temp Pulse Resp B/P Pulse Ox O2 Delivery O2 Flow Rate FiO2


 


1/23/17 12:00 97.0 90 24 133/60 94 Room Air  


 


1/23/17 00:00       1.0 














 I&O- Last 24 Hours up to 6 AM 


 


 1/23/17





 06:00


 


Intake Total 880 ml


 


Output Total 1035 ml


 


Balance -155 ml











Laboratory Data


Labs 24H


 Laboratory Tests 2


1/22/17 20:21: Troponin I 0.02#


1/22/17 21:29: Bedside Glucose (Misc Panel) 235H


1/23/17 01:55: Troponin I 0.03#


1/23/17 04:34: 


Blood Urea Nitrogen 68H, Creatinine 3.00H, Sodium Level 132L, Potassium Level 

4.4, Chloride Level 95L, Carbon Dioxide Level 27, Calcium Level 8.2L, Aspartate 

Amino Transf (AST/SGOT) 13L, Alanine Aminotransferase (ALT/SGPT) 17, Alkaline 

Phosphatase 90, Total Bilirubin 0.2, Total Protein 6.4, Albumin 2.2L, Albumin/

Globulin Ratio 0.52L, Anion Gap 10, White Blood Count 17.6H, Red Blood Count 

3.55L, Hemoglobin 10.4L, Hematocrit 31.1L, Mean Corpuscular Volume 87.7, Mean 

Corpuscular Hemoglobin 29.3, Mean Corpuscular Hemoglobin Concent 33.4, Red Cell 

Distribution Width 12.1, Platelet Count 260, Neutrophils (%) (Auto) 85.8H, 

Lymphocytes (%) (Auto) 7.7L, Monocytes (%) (Auto) 3.3, Eosinophils (%) (Auto) 

1.8, Basophils (%) (Auto) 0.4, Neutrophils # (Auto) 15.2H, Lymphocytes # (Auto) 

1.4L, Monocytes # (Auto) 0.6, Eosinophils # (Auto) 0.3, Basophils # (Auto) 0.1, 

C-Reactive Protein, Quantitative 12.00H, Erythrocyte Sedimentation Rate 73H, 

Glomerular Filtration Rate 22.1L, Large Unclassified Cells # 0.2, Large 

Unclassified Cells % 0.9, Magnesium Level 2.4


1/23/17 08:10: Troponin I 0.02#


1/23/17 11:47: Bedside Glucose (Misc Panel) 380H


CBC/BMP


 Laboratory Tests


1/23/17 04:34








Calcium Level 8.2 L, Aspartate Amino Transf (AST/SGOT) 13 L, Alanine 

Aminotransferase (ALT/SGPT) 17, Alkaline Phosphatase 90, Total Bilirubin 0.2, 

Total Protein 6.4, Albumin 2.2 L, Red Blood Count 3.55 L, Mean Corpuscular 

Volume 87.7, Mean Corpuscular Hemoglobin 29.3, Mean Corpuscular Hemoglobin 

Concent 33.4, Red Cell Distribution Width 12.1, Neutrophils (%) (Auto) 85.8 H, 

Lymphocytes (%) (Auto) 7.7 L, Monocytes (%) (Auto) 3.3, Eosinophils (%) (Auto) 

1.8, Basophils (%) (Auto) 0.4, Neutrophils # (Auto) 15.2 H, Lymphocytes # (Auto

) 1.4 L, Monocytes # (Auto) 0.6, Eosinophils # (Auto) 0.3, Basophils # (Auto) 

0.1


FSBS





Laboratory Tests








Test


  1/22/17


21:29 1/23/17


11:47 Range/Units


 


 


Bedside Glucose (Misc Panel) 235 380   MG/DL








Microbiology





 Microbiology


1/22/17 Blood Culture - Preliminary, Resulted


          No growth after 24 hours . All specim...


1/22/17 Blood Culture - Preliminary, Resulted


          No growth after 24 hours . All specim...


1/22/17 MRSA Screen, Received


          Pending


1/22/17 Urine Culture, Received


          Pending








JANA ACKERMAN Jan 23, 2017 14:16

## 2017-01-24 VITALS — SYSTOLIC BLOOD PRESSURE: 147 MMHG | DIASTOLIC BLOOD PRESSURE: 68 MMHG

## 2017-01-24 VITALS — DIASTOLIC BLOOD PRESSURE: 74 MMHG | SYSTOLIC BLOOD PRESSURE: 178 MMHG

## 2017-01-24 VITALS — DIASTOLIC BLOOD PRESSURE: 63 MMHG | SYSTOLIC BLOOD PRESSURE: 142 MMHG

## 2017-01-24 VITALS — DIASTOLIC BLOOD PRESSURE: 63 MMHG | SYSTOLIC BLOOD PRESSURE: 151 MMHG

## 2017-01-24 VITALS — SYSTOLIC BLOOD PRESSURE: 159 MMHG | DIASTOLIC BLOOD PRESSURE: 73 MMHG

## 2017-01-24 VITALS — DIASTOLIC BLOOD PRESSURE: 52 MMHG | SYSTOLIC BLOOD PRESSURE: 150 MMHG

## 2017-01-24 VITALS — DIASTOLIC BLOOD PRESSURE: 76 MMHG | SYSTOLIC BLOOD PRESSURE: 193 MMHG

## 2017-01-24 LAB
ALBUMIN SERPL BCG-MCNC: 2.2 GM/DL (ref 3.2–5.2)
ALBUMIN/GLOB SERPL: 0.47 {RATIO} (ref 1–1.93)
ALP SERPL-CCNC: 111 U/L (ref 45–117)
ALT SERPL W P-5'-P-CCNC: 19 U/L (ref 12–78)
ANION GAP SERPL CALC-SCNC: 9 MEQ/L (ref 8–16)
AST SERPL-CCNC: 13 U/L (ref 15–37)
BASOPHILS # BLD AUTO: 0.1 K/MM3 (ref 0–0.2)
BASOPHILS NFR BLD AUTO: 0.4 % (ref 0–1)
BILIRUB SERPL-MCNC: 0.1 MG/DL (ref 0.2–1)
BUN SERPL-MCNC: 63 MG/DL (ref 7–18)
CALCIUM SERPL-MCNC: 8.4 MG/DL (ref 8.8–10.2)
CHLORIDE SERPL-SCNC: 99 MEQ/L (ref 98–107)
CO2 SERPL-SCNC: 25 MEQ/L (ref 21–32)
CREAT SERPL-MCNC: 2.42 MG/DL (ref 0.7–1.3)
EOSINOPHIL # BLD AUTO: 0.3 K/MM3 (ref 0–0.5)
EOSINOPHIL NFR BLD AUTO: 2.1 % (ref 0–3)
ERYTHROCYTE [DISTWIDTH] IN BLOOD BY AUTOMATED COUNT: 11.8 % (ref 11.5–14.5)
GFR SERPL CREATININE-BSD FRML MDRD: 28.4 ML/MIN/{1.73_M2} (ref 42–?)
GLUCOSE SERPL-MCNC: 315 MG/DL (ref 83–110)
LARGE UNSTAINED CELL #: 0.1 K/MM3 (ref 0–0.4)
LARGE UNSTAINED CELL %: 1 % (ref 0–4)
LYMPHOCYTES # BLD AUTO: 1 K/MM3 (ref 1.5–4.5)
LYMPHOCYTES NFR BLD AUTO: 6.8 % (ref 24–44)
MAGNESIUM SERPL-MCNC: 2.4 MG/DL (ref 1.8–2.4)
MCH RBC QN AUTO: 28.7 PG (ref 27–33)
MCHC RBC AUTO-ENTMCNC: 31.9 G/DL (ref 32–36.5)
MCV RBC AUTO: 90.1 FL (ref 80–96)
MONOCYTES # BLD AUTO: 0.8 K/MM3 (ref 0–0.8)
MONOCYTES NFR BLD AUTO: 5 % (ref 0–5)
NEUTROPHILS # BLD AUTO: 12.7 K/MM3 (ref 1.8–7.7)
NEUTROPHILS NFR BLD AUTO: 84.7 % (ref 36–66)
PLATELET # BLD AUTO: 248 K/MM3 (ref 150–450)
POTASSIUM SERPL-SCNC: 5 MEQ/L (ref 3.5–5.1)
PROT SERPL-MCNC: 6.9 GM/DL (ref 6.4–8.2)
SODIUM SERPL-SCNC: 133 MEQ/L (ref 136–145)
WBC # BLD AUTO: 15 K/MM3 (ref 4–10)

## 2017-01-24 RX ADMIN — HYDROCODONE BITARTRATE AND ACETAMINOPHEN PRN TAB: 5; 325 TABLET ORAL at 21:23

## 2017-01-24 RX ADMIN — INSULIN LISPRO SCH UNITS: 100 INJECTION, SOLUTION INTRAVENOUS; SUBCUTANEOUS at 11:29

## 2017-01-24 RX ADMIN — HYDROCODONE BITARTRATE AND ACETAMINOPHEN PRN TAB: 5; 325 TABLET ORAL at 17:14

## 2017-01-24 RX ADMIN — METOPROLOL SUCCINATE SCH MG: 50 TABLET, EXTENDED RELEASE ORAL at 08:19

## 2017-01-24 RX ADMIN — ENOXAPARIN SODIUM SCH MG: 30 INJECTION SUBCUTANEOUS at 21:22

## 2017-01-24 RX ADMIN — INSULIN LISPRO SCH UNITS: 100 INJECTION, SOLUTION INTRAVENOUS; SUBCUTANEOUS at 08:19

## 2017-01-24 RX ADMIN — PREGABALIN SCH MG: 75 CAPSULE ORAL at 08:19

## 2017-01-24 RX ADMIN — INSULIN DETEMIR SCH UNITS: 100 INJECTION, SOLUTION SUBCUTANEOUS at 08:23

## 2017-01-24 RX ADMIN — CALCIUM CARBONATE-CHOLECALCIFEROL TAB 250 MG-125 UNIT SCH MG: 250-125 TAB at 08:19

## 2017-01-24 RX ADMIN — CALCIUM CARBONATE-CHOLECALCIFEROL TAB 250 MG-125 UNIT SCH MG: 250-125 TAB at 21:24

## 2017-01-24 RX ADMIN — INSULIN LISPRO SCH UNITS: 100 INJECTION, SOLUTION INTRAVENOUS; SUBCUTANEOUS at 17:13

## 2017-01-24 RX ADMIN — ASPIRIN 325 MG ORAL TABLET SCH MG: 325 PILL ORAL at 08:19

## 2017-01-24 RX ADMIN — OMEPRAZOLE SCH MG: 20 CAPSULE, DELAYED RELEASE ORAL at 08:19

## 2017-01-24 RX ADMIN — PREGABALIN SCH MG: 75 CAPSULE ORAL at 21:23

## 2017-01-24 RX ADMIN — CLOPIDOGREL BISULFATE SCH MG: 75 TABLET ORAL at 08:19

## 2017-01-24 RX ADMIN — POTASSIUM CHLORIDE SCH MEQ: 750 TABLET, EXTENDED RELEASE ORAL at 08:20

## 2017-01-24 RX ADMIN — INSULIN LISPRO SCH UNITS: 100 INJECTION, SOLUTION INTRAVENOUS; SUBCUTANEOUS at 21:22

## 2017-01-24 NOTE — IPNPDOC
Assessment/Plan


Date Seen


The patient was seen on 1/24/17.





Problems


Problems:  


(1) SIRS due to non-infectious process without acute organ dysfunction


Status:  Acute


Problem Text:  evidenced by HR 90, temp 100.3, WBC 25, and BP 79/41 upon 

arrival to ED; recent flu infection but thus far blood and urine cultures are 

negative; I suspect that this was secondary to dehydration as the patient has a 

concomitant HUSSEIN and has responded to IVF; patient was recently recovering from 

influenza so had poor appetite and was tired so was not eating or drinking 

adequately this probably led to his dehydration and HUSSEIN. 


vitals now stabilized, afebrile, WBC improved to 15; continue to follow up 

cultures; no antibiotics at this time as no clear infection





(2) Acute on chronic kidney failure


Status:  Acute


Problem Text:  baseline Cr around mid to upper 1s; Cr upon admission was 2.53, 

now increased to 3.0; stop torsemide and check FENa; I suspect this is 

secondary to dehydration/prerenal; start very gentle IVF





(3) Defibrillator discharge


Status:  Resolved


Problem Text:  ICD interogated. ICD  did not fire there was no arrhythmia. 


Patient may have had a myoclonic jerk Dr. Snow consulted; patient denies 

chest pain; trops were negative





(4) CAD (coronary artery disease)


Status:  Chronic


Problem Text:  hx of MI and s/p CABG and stents; no current chest pain; trops 

negative; continue home ASA, plavix, beta blocker; needs to discuss with 

primary physicians adding a statin





(5) HTN (hypertension)


Status:  Chronic


Problem Text:  continue home beta blocker





(6) Diabetes mellitus


Status:  Chronic


Problem Text:  patient is unclear on home lantus dosing; SSI while in house and 

add and adjust levemir accordingly





(7) Chronic diastolic CHF (congestive heart failure)


Status:  Chronic


Problem Text:  came in dehydrated however developed some fluid overload after 

iv hydration , NS stopped, will continue to monitor I/O , will continue to hold 

torsemide





(8) Dyslipidemia


Status:  Chronic


(9) GERD (gastroesophageal reflux disease)


Status:  Chronic





Plan / VTE


VTE Prophylaxis Ordered?:  Yes (lovenox)





Plan


Advance Directives:  DNR





Subjective


Review of Systems


CC/HPI


The patient is a 71-year-old male admitted with a reason for visit of Sepsis.


Events since last encounter


no complaints this am, feeling better, no fever or chills, no cough or phlegm , 

no nausea or vomiting or diarrhea, no rash , no signs of infection identified.





Objective


Physical Examination


General Exam:  Positive: Alert, Cooperative, No Acute Distress


Eye Exam:  Positive: EOMI, 


   Negative: Sclera icteric


ENT Exam:  Positive: Mucous membr. moist/pink


Neck Exam:  Positive: Supple


Chest Exam:  Positive: Clear to auscultation, Normal air movement


Heart Exam:  Positive: Rate Normal, Regular Rhythm


Abdomen Exam:  Positive: Normal bowel sounds, Soft


Extremity Exam:  Negative: Edema


Neuro Exam:  Positive: Normal Speech


Psych Exam:  Positive: Mood NL, Oriented x 3





Vital Signs/I&O





 Vital Signs








  Date Time  Temp Pulse Resp B/P Pulse Ox O2 Delivery O2 Flow Rate FiO2


 


1/24/17 12:00 97.7 75 20 150/52 95 Room Air  


 


1/23/17 00:00       1.0 














 I&O- Last 24 Hours up to 6 AM 


 


 1/24/17





 05:59


 


Intake Total 2725 ml


 


Output Total 2255 ml


 


Balance 470 ml











Laboratory Data


Labs 24H


 Laboratory Tests 2


1/23/17 16:57: Bedside Glucose (Misc Panel) 269H


1/23/17 19:35: 


Urine Random Creatinine 64.5, Urine Random Sodium 40


1/23/17 21:15: Bedside Glucose (Misc Panel) 293H


1/24/17 05:08: 


Blood Urea Nitrogen 63H, Creatinine 2.42H, Sodium Level 133L, Potassium Level 

5.0, Chloride Level 99, Carbon Dioxide Level 25, Calcium Level 8.4L, Aspartate 

Amino Transf (AST/SGOT) 13L, Alanine Aminotransferase (ALT/SGPT) 19, Alkaline 

Phosphatase 111, Total Bilirubin 0.1L, Total Protein 6.9, Albumin 2.2L, Albumin/

Globulin Ratio 0.47L, Anion Gap 9, White Blood Count 15.0H, Red Blood Count 

3.85L, Hemoglobin 11.1L, Hematocrit 34.7L, Mean Corpuscular Volume 90.1, Mean 

Corpuscular Hemoglobin 28.7, Mean Corpuscular Hemoglobin Concent 31.9L, Red 

Cell Distribution Width 11.8, Platelet Count 248, Neutrophils (%) (Auto) 84.7H, 

Lymphocytes (%) (Auto) 6.8L, Monocytes (%) (Auto) 5.0, Eosinophils (%) (Auto) 

2.1, Basophils (%) (Auto) 0.4, Neutrophils # (Auto) 12.7H, Lymphocytes # (Auto) 

1.0L, Monocytes # (Auto) 0.8, Eosinophils # (Auto) 0.3, Basophils # (Auto) 0.1, 

C-Reactive Protein, Quantitative 9.29H, Glomerular Filtration Rate 28.4L, Large 

Unclassified Cells # 0.1, Large Unclassified Cells % 1.0, Magnesium Level 2.4


1/24/17 06:20: Erythrocyte Sedimentation Rate 95H


1/24/17 07:48: Bedside Glucose (Misc Panel) 361H


1/24/17 11:25: Bedside Glucose (Misc Panel) 396H


CBC/BMP


 Laboratory Tests


1/24/17 05:08








Calcium Level 8.4 L, Aspartate Amino Transf (AST/SGOT) 13 L, Alanine 

Aminotransferase (ALT/SGPT) 19, Alkaline Phosphatase 111, Total Bilirubin 0.1 L

, Total Protein 6.9, Albumin 2.2 L, Red Blood Count 3.85 L, Mean Corpuscular 

Volume 90.1, Mean Corpuscular Hemoglobin 28.7, Mean Corpuscular Hemoglobin 

Concent 31.9 L, Red Cell Distribution Width 11.8, Neutrophils (%) (Auto) 84.7 H

, Lymphocytes (%) (Auto) 6.8 L, Monocytes (%) (Auto) 5.0, Eosinophils (%) (Auto

) 2.1, Basophils (%) (Auto) 0.4, Neutrophils # (Auto) 12.7 H, Lymphocytes # (

Auto) 1.0 L, Monocytes # (Auto) 0.8, Eosinophils # (Auto) 0.3, Basophils # (Auto

) 0.1


FSBS





Laboratory Tests








Test


  1/23/17


16:57 1/23/17


21:15 1/24/17


07:48 1/24/17


11:25 Range/Units


 


 


Bedside Glucose (Misc Panel) 269 293 361 396   MG/DL








Microbiology





 Microbiology


1/22/17 Blood Culture - Preliminary, Resulted


          No Growth after 48 hours. All Specime...


1/22/17 Blood Culture - Preliminary, Resulted


          No Growth after 48 hours. All Specime...


1/22/17 MRSA Screen - Final, Complete


          Staph.aureus Methicillin Resis


1/22/17 Urine Culture - Final, Complete








ROSY LOUIS MD Jan 24, 2017 12:26

## 2017-01-24 NOTE — IPNPDOC
[facility] Cardiology Progress Note


Date of Service/Time


The patient was seen on 1/24/17 at 10:49.





Cardiology Progress Note


SUBJECTIVE: Mr Ghosh is a 70 y/o  male who was seen and examined at 

bedside this morning, patient was laying down comfortably in no distress. He 

denied any chest palpitations, shortness of breath or chest pain but did admit 

to nagging productive cough.





OBJECTIVE:


PHYSICAL EXAMINATION:


VITAL SIGNS: Please see below.


GENERAL APPEARANCE: Laying comfortably in bed watching TV at a slight 30 

incline, nonlabored and conversant


HEENT: NCAT, EOMI, nares patent bilaterally, moist mucous membranes, tongue 

midline.


LUNGS: Although much improved from one day prior patient still has bibasilar 

crackles, otherwise no rales or rhonchi appreciated, air expansion is equal b/l


HEART: S1, S2, no murmurs, rubs, gallops appreciated. Patient does have JVD 

measured to 4-5 cm.


ABDOMEN: Soft, nontender to palpitation, nondistended.


SKIN:  intact


EXTREMITIES: No swelling, cyanosis, clubbing appreciated


NEUROLOGICAL: No focal deficits appreciated


PSYCHIATRIC: Appropriate affect





LABORATORY WORK: Please see below.





ASSESSMENT AND PLAN:





Mr Dunlap appears to be doing very well this morning, he denies shortness of 

breath but admits to productive cough, however this is also improved from one 

day prior. His JVD is elevated to 4-5 cm, would be cautious about fluid 

management on this patient and hydration. IV fluids were discontinued one night 

prior, agree with this decision. Heart rate has been in the 80s with blood 

pressure hovering around 150 systolic over 70 diastolic. White blood cell count 

has come down to 11.1 from one day prior, CRP has also come down to 9.29 from 

12 one day prior. It would seem from an infectious standpoint patient is 

getting better, he is also febrile. Would continue to actively monitor for and 

treat the patient for infectious process, as patient still does have productive 

cough and crackles at lung bases bilaterally on physical exam. No further 

recommendations at this time, we are comfortable with patient being moved from 

the unit to progressive care.





Vital Signs/I&O


VS/I&O





 Vital Signs








  Date Time  Temp Pulse Resp B/P Pulse Ox O2 Delivery O2 Flow Rate FiO2


 


1/24/17 08:19  82  159/73    


 


1/24/17 08:00      Room Air  


 


1/24/17 08:00 98.0  18  91   


 


1/23/17 00:00       1.0 














 I&O- Last 24 Hours up to 6 AM 


 


 1/24/17





 06:00


 


Intake Total 2600 ml


 


Output Total 2180 ml


 


Balance 420 ml











Laboratory Data


24H LABS


 Laboratory Tests 2


1/23/17 11:47: Bedside Glucose (Misc Panel) 380H


1/23/17 16:57: Bedside Glucose (Misc Panel) 269H


1/23/17 19:35: 


Urine Random Creatinine 64.5, Urine Random Sodium 40


1/23/17 21:15: Bedside Glucose (Misc Panel) 293H


1/24/17 05:08: 


Blood Urea Nitrogen 63H, Creatinine 2.42H, Sodium Level 133L, Potassium Level 

5.0, Chloride Level 99, Carbon Dioxide Level 25, Calcium Level 8.4L, Aspartate 

Amino Transf (AST/SGOT) 13L, Alanine Aminotransferase (ALT/SGPT) 19, Alkaline 

Phosphatase 111, Total Bilirubin 0.1L, Total Protein 6.9, Albumin 2.2L, Albumin/

Globulin Ratio 0.47L, Anion Gap 9, White Blood Count 15.0H, Red Blood Count 

3.85L, Hemoglobin 11.1L, Hematocrit 34.7L, Mean Corpuscular Volume 90.1, Mean 

Corpuscular Hemoglobin 28.7, Mean Corpuscular Hemoglobin Concent 31.9L, Red 

Cell Distribution Width 11.8, Platelet Count 248, Neutrophils (%) (Auto) 84.7H, 

Lymphocytes (%) (Auto) 6.8L, Monocytes (%) (Auto) 5.0, Eosinophils (%) (Auto) 

2.1, Basophils (%) (Auto) 0.4, Neutrophils # (Auto) 12.7H, Lymphocytes # (Auto) 

1.0L, Monocytes # (Auto) 0.8, Eosinophils # (Auto) 0.3, Basophils # (Auto) 0.1, 

C-Reactive Protein, Quantitative 9.29H, Glomerular Filtration Rate 28.4L, Large 

Unclassified Cells # 0.1, Large Unclassified Cells % 1.0, Magnesium Level 2.4


1/24/17 06:20: Erythrocyte Sedimentation Rate 95H


1/24/17 07:48: Bedside Glucose (Misc Panel) 361H


CBC/BMP


 Laboratory Tests


1/24/17 05:08








Calcium Level 8.4 L, Aspartate Amino Transf (AST/SGOT) 13 L, Alanine 

Aminotransferase (ALT/SGPT) 19, Alkaline Phosphatase 111, Total Bilirubin 0.1 L

, Total Protein 6.9, Albumin 2.2 L, Red Blood Count 3.85 L, Mean Corpuscular 

Volume 90.1, Mean Corpuscular Hemoglobin 28.7, Mean Corpuscular Hemoglobin 

Concent 31.9 L, Red Cell Distribution Width 11.8, Neutrophils (%) (Auto) 84.7 H

, Lymphocytes (%) (Auto) 6.8 L, Monocytes (%) (Auto) 5.0, Eosinophils (%) (Auto

) 2.1, Basophils (%) (Auto) 0.4, Neutrophils # (Auto) 12.7 H, Lymphocytes # (

Auto) 1.0 L, Monocytes # (Auto) 0.8, Eosinophils # (Auto) 0.3, Basophils # (Auto

) 0.1


FSBS





Laboratory Tests








Test


  1/23/17


11:47 1/23/17


16:57 1/23/17


21:15 1/24/17


07:48 Range/Units


 


 


Bedside Glucose (Misc Panel) 380 269 293 361   MG/DL








Microbiology





 Microbiology


1/22/17 Blood Culture - Preliminary, Resulted


          No growth after 24 hours . All specim...


1/22/17 Blood Culture - Preliminary, Resulted


          No growth after 24 hours . All specim...


1/22/17 MRSA Screen - Final, Complete


          Staph.aureus Methicillin Resis


1/22/17 Urine Culture - Final, Complete





GME ATTESTATION


GME ATTESTATION


My preceptor for this patient encounter was physically present in the building 

during the encounter and was fully available. As needed, all aspects of the 

patient interview, examination, medical decision making process, and medical 

care plan development were reviewed and approved by the preceptor. Preceptor is 

aware and concurs with the plan as stated in the body of this note and will 

attest to such by his/her cosignature.








JOSÉ MIGUEL GARCIA DO Jan 24, 2017 11:03

## 2017-01-24 NOTE — EDDOCDS
Physician Documentation                                                                           

[facility]                                                                         

Name: Sánchez Dunlap                                                                              

Age: 71 yrs                                                                                       

Sex: Male                                                                                         

: 1945                                                                                   

MRN: [id number]                                                                                     

Arrival Date: 2017                                                                          

Time: 10:31                                                                                       

Account#: [id number]                                                                              

Bed 4                                                                                             

Private MD: Henrique Snow MD                                                                   

Disposition:                                                                                      

17 14:47 Hospitalization ordered by Shereen Caba for Inpatient Admission. Preliminary        

diagnosis are Syncope and collapse, Acute pulmonary edema, Hypotension.                         

- Bed requested for M ICU.                                                                        

- Status is Inpatient Admission.                                                              dsf 

- Condition is Stable.                                                                            

- Problem is new.                                                                                 

- Symptoms are unchanged.                                                                         

                                                                                                  

                                                                                                  

                                                                                                  

Historical:                                                                                       

- Allergies: turkey;                                                                              

- Home Meds:                                                                                      

1. Lyrica 150mg Oral 2 times per day                                                            

2. Percocet 5-325 mg Oral tab 1 tab every 8 hours                                               

3. Prilosec 40 mg Oral cpDR 1 cap once daily                                                    

4. furosemide 40 mg Oral tab 1 tab once daily                                                   

     (Last dose: 2017 07:00)                                                                

5. aspirin 325 mg Oral tab 1 tab once daily                                                     

     (Last dose: 2017 07:00)                                                                

6. metoprolol succinate 50 mg Tb24 1 tab once daily                                             

     (Last dose: 2017 07:00)                                                                

7. potassium chloride 10 mEq Oral cpER 1 cap once daily                                         

     (Last dose: 2017 07:00)                                                                

8. albuterol sulfate 2.5 mg /3 mL (0.083 %) Nebulizer nebu 4 times per day                      

     (Last dose: 2017 07:00)                                                                

- PMHx: Diabetes - IDDM: uncontrolled; Hypercholesterolemia; MI; Hypertension;                    

- PSHx: Stents, Coronary; pacemaker/defibrillator insertion; fem/pop bypass                       

bilaterally; CABG;                                                                              

- Social history: Smoking status: Patient states former smoker of tobacco. No barriers            

to communication noted, The patient speaks fluent English, Speaks appropriately for             

age.                                                                                            

- : Unable to assess if pt is on anticoagulants. Home medication list is obtained from            

patients' pharmacy.                                                                             

- Exposure Risk Screening:: None identified.                                                      

                                                                                                  

                                                                                                  

Vital Signs:                                                                                      

                                                                                             

10:35 Weight 93.89 kg / 206.99 lbs (R); Height 5 ft. 7 in. (170.18 cm); Pain 0/10;            dsf 

10:44 BP 95 / 52; Pulse 93; Resp 18; Temp 100.3(TE); Pulse Ox 98% on 3 lpm NC; Weight 87.7 kg ct3 

      / 193.35 lbs (M);                                                                           

10:48 BP 79 / 41 (auto/);                                                                     dsf 

10:50 Pulse 90 MON; Pulse Ox 93% ;                                                            dsf 

10:51 BP 91 / 52 (auto/);                                                                     dsf 

10:51 Pulse 90 MON; Pulse Ox 93% ;                                                            dsf 

11:03 BP 98 / 54 (auto/);                                                                     dsf 

11:03 Pulse 90 MON; Pulse Ox 94% ;                                                            dsf 

11:44  / 77 (auto/);                                                                    dsf 

11:44 Pulse 84 MON; Pulse Ox 97% ;                                                            dsf 

12:33 BP 77 / 41 (auto/);                                                                     dsf 

12:33 Pulse 78 MON; Pulse Ox 95% ;                                                            dsf 

12:42 Resp 18; Temp 98.7(TE);                                                                 ct3 

12:42 Pulse 76 MON; Pulse Ox 94% ;                                                            dsf 

12:42 BP 87 / 47 (auto/);                                                                     dsf 

12:48 BP 84 / 49 (auto/);                                                                     dsf 

12:48 Pulse 76 MON; Pulse Ox 94% ;                                                            dsf 

13:03 BP 89 / 48 (auto/);                                                                     dsf 

13:04 Pulse 74 MON; Pulse Ox 96% ;                                                            dsf 

13:11  / 53 (auto/);                                                                    dsf 

13:11 Pulse 74 MON; Pulse Ox 97% ;                                                            dsf 

13:18 BP 96 / 55 (auto/);                                                                     dsf 

13:19 Pulse 76 MON; Pulse Ox 97% ;                                                            dsf 

13:32 Pulse 76 MON; Pulse Ox 96% ;                                                            dsf 

13:33 BP 94 / 55 (auto/);                                                                     dsf 

13:48 BP 95 / 56 (auto/);                                                                     dsf 

13:49 Pulse 78 MON; Pulse Ox 97% ;                                                            dsf 

14:03 BP 89 / 62 (auto/);                                                                     dsf 

14:03 Pulse 76 MON; Pulse Ox 97% ;                                                            dsf 

14:37 BP 96 / 51 (auto/);                                                                     dsf 

14:37 Pulse 74 MON; Resp 20; Temp 98.0(TE); Pulse Ox 97% on 3 lpm NC; Pain 0/10;              dsf 

16:39  / 55 (auto/);                                                                    dsf 

16:40 Pulse 72 MON; Pulse Ox 97% ;                                                            dsf 

18:10  / 60 (auto/);                                                                    bcj 

18:10 Pulse 74 MON; Resp 20; Pulse Ox 97% on 3 lpm NC; Pain 0/10;                             bcj 

18:10 Temp 97.1(T);                                                                           bcj 

10:44 Body Mass Index 30.28 (87.70 kg, 170.18 cm)                                             ct3 

12:42 Dr. Tran notified new orders recieved                                                 dsf 

                                                                                                  

MDM:                                                                                              

10:36 Cardiac Monitor/Pulse Ox/q 30 min VS ordered.                                           br1 

10:36 IV Saline Lock ordered.                                                                 br1 

10:36 Rhythm Strip to chart ordered.                                                          br1 

10:36 Undress patient appropriately for examination ordered.                                  br1 

10:38 Basic Metabolic Profile Ordered.                                                        EDMS

10:38 CBC with Diff Ordered.                                                                  EDMS

10:38 Cardiac Injury Profile Ordered.                                                         EDMS

10:38 Troponin Ordered.                                                                       EDMS

10:38 portable chest Ordered.                                                                 EDMS

10:39 ECG WITH READING ER PHYS+CARDIAG ordered.                                               EDMS

10:47 PT/INR Ordered.                                                                         EDMS

10:47 PTT Ordered.                                                                            EDMS

10:51 MAGNESIUM LEVEL Ordered.                                                                EDMS

10:52 PHOSPHOROUS LEVEL Ordered.                                                              EDMS

11:02 NS 0.9% 500 ml IV at bolus once ordered.                                                br1 

11:02 -Blood Culture (Adults Only), peripheral from different site, or from device/port/PICC  br1 

      etc. if present ordered.                                                                    

11:03 Lactic Acid (Gray tube on ice) Ordered.                                                 EDMS

11:03 -Blood Culture Ordered.                                                                 EDMS

11:03 Acetaminophen Tablet 650 mg PO once ordered.                                            br1 

11:04 -Blood Culture (Adults Only), peripheral from different site, or from device/port/PICC  ar3 

      etc. if present complete.                                                                   

11:04 Jimenez ordered.                                                                          br1 

11:04 CBC with Diff Reviewed.                                                                 br1 

11:04 CT Head Without Contrast Ordered.                                                       EDMS

11:05 BLOOD CULTURES Ordered.                                                                 EDMS

11:05 Urine Culture Ordered.                                                                  EDMS

11:05 Urinalysis Ordered.                                                                     EDMS

11:05 cefTRIAXone 2 grams IVPB once over 30 mins; dilute in 50mL of NS or D5W ordered.        br1 

11:09 BED REQUEST+ADM ordered.                                                                EDMS

11:39 [facility] Payment Agreement was scanned into [employer] and attached to record.               lg  

11:47 Financial registration complete.                                                        lg  

12:09 Basic Metabolic Profile Reviewed.                                                       br1 

12:09 Cardiac Injury Profile Reviewed.                                                        br1 

12:09 Urinalysis Reviewed.                                                                    br1 

12:09 Troponin Reviewed.                                                                      br1 

12:09 PT/INR Reviewed.                                                                        br1 

12:09 PTT Reviewed.                                                                           br1 

12:09 MAGNESIUM LEVEL Reviewed.                                                               br1 

12:09 PHOSPHOROUS LEVEL Reviewed.                                                             br1 

12:09 Lactic Acid (Gray tube on ice) Reviewed.                                                br1 

12:51 NS 0.9% 500 ml IV at bolus once ordered.                                                dsf 

15:09 Duplex, Ext LOWER veins, bilat Ordered.                                                 EDMS

15:10 Admission / Observation Status ordered.                                                 EDMS

15:10 CONSISTENT CARBOHYDRATES ordered.                                                       EDMS

16:11 MRSA SCREEN Ordered.                                                                    EDMS

22:20 T-Sheet-- Draft Copy was scanned into [employer] and attached to record.                   klr 

                                                                                             

10:49 ECG/EKG was scanned into [employer] and attached to record.                                gb  

10:49 PCR was scanned into [employer] and attached to record.                                    gb  

11:02 T-Sheet-- Draft Copy was scanned into [employer] and attached to record.                   gb  

                                                                                                  

Administered Medications:                                                                         

                                                                                             

11:19 Drug: Acetaminophen 650 mg [acetaminophen 325 mg tablet (2 tabs)] Route: PO;            dsf 

12:19 Follow up: Response: Temperature is decreased                                           dsf 

11:20 Drug: NS 0.9% 500 ml [sodium chloride 0.9 % injection solution] Route: IV; Rate: bolus; dsf 

      Site: right antecubital;                                                                    

12:40 Follow up: IV Status: Completed infusion; IV Intake: 500ml                              dsf 

11:57 Drug: cefTRIAXone 2 grams [ceftriaxone 1 gram solution for injection] Route: IVPB;      dsf 

      Infused Over: 30 mins; Site: right antecubital;                                             

12:40 Follow up: IV Status: Completed infusion; IV Intake: 100ml                              dsf 

12:49 Drug: NS 0.9% 500 ml [sodium chloride 0.9 % injection solution] Route: IV; Rate: bolus; dsf 

      Site: right antecubital;                                                                    

13:48 Follow up: IV Status: Completed infusion; IV Intake: 500ml                              ld5 

14:40 Follow up: IV Status: Completed infusion; IV Intake: 500ml                              dsf 

14:21 CANCELLED (Dr. Caba wants to hold fluids ): NS 0.9% 500 ml IV at bolus once            dsf 

                                                                                                  

                                                                                                  

Signatures:                                                                                       

Dispatcher [employer]                           EDMS                                                 

Hali, Leyda, Reg                  Reg  Yeison Crowell, Reg                    Reg  lg                                                   

James Tran MD MD   br1                                                  

Letitia Velazquez, PCA                      PCA  ar3                                                  

Rita Iverson RN                       RN   dsf                                                  

Megan Miller Laura RN   ld5                                                  

                                                                                                  

The chart was reviewed and I authenticate all verbal orders and agree with the evaluation and 
treatment provided.Corrections: (The following items were deleted from the chart)

10:51 10:47 MAGNESIUM LEVEL+LAB ordered. EDMS                                                 EDMS

10:51 10:47 PHOSPHOROUS LEVEL+LAB ordered. EDMS                                               EDMS

14:21 14:16 NS 0.9% 500 ml IV at bolus once ordered. [email or web address]                                      bertha 

                                                                                                  

Attachments:                                                                                      

11:39 [facility] Payment Agreement                                                                lg  

                                                                                             

10:49 ECG/EKG                                                                                 gb  

11:02 T-Sheet-- Draft Copy                                                                    gb  

                                                                                                  

**************************************************************************************************



*** Chart Complete ***
MTDD

## 2017-01-24 NOTE — EDDOCDS
Nurse's Notes                                                                                     

[facility]                                                                         

Name: Sánchez Dunlap                                                                              

Age: 71 yrs                                                                                       

Sex: Male                                                                                         

: 1945                                                                                   

MRN: [id number]                                                                                     

Arrival Date: 2017                                                                          

Time: 10:31                                                                                       

Account#: [id number]                                                                              

Bed 4                                                                                             

Private MD: Henrique Snow MD                                                                   

Diagnosis: Syncope and collapse;Acute pulmonary edema;Hypotension                                 

                                                                                                  

Presentation:                                                                                     

                                                                                             

10:33 Presenting complaint: EMS states: pt states his defibrillator went off. pt was awake,   dsf 

      alert and lethargic on EMS arrival. SR on montior. pt denies CP. Pt reports weakness.       

      Aspirin was taken PTA. Adult Sepsis Screening:. Suicide/Homicide risk assessment- the       

      patient denies having any suicidal and/or homicidal ideations and does not present with     

      any other emotional, behavioral or mental health complaints.  Status: Patient       

      is not a  or  dependent. Transition of care: patient was not          

      received from another setting of care.                                                      

10:33 Acuity: KENDELL Level 2                                                                     dsf 

10:33 Method Of Arrival: Ambulance                                                            dsf 

10:35 Care prior to arrival: See EMS report. Glucose check. 260.                              dsf 

10:35 Adult Sepsis Screening: The patient does not have new or worsening altered mentation.   bcj 

      Patient's respiratory rate is less than 22. Systolic blood pressure is less than or         

      equal to 100 (1 point). Patient has a qSOFA score of 1- Negative Sepsis Screen.             

                                                                                                  

Triage Assessment:                                                                                

10:35 General: Appears in no apparent distress, Behavior is cooperative. Pain: Denies pain.   dsf 

      The patient is triaged at the bedside. See Assessment in Nurses Notes section of ED         

      record. Neurological: Level of Consciousness is lethargic, Reports weakness.                

      Cardiovascular: Chest pain is denied. Respiratory: Airway is patent Respiratory effort      

      is even, unlabored, Respiratory pattern is regular, symmetrical. Derm: Skin is pink,        

      warm & dry.                                                                               

                                                                                                  

Historical:                                                                                       

- Allergies: turkey;                                                                              

- Home Meds:                                                                                      

1. Lyrica 150mg Oral 2 times per day                                                            

2. Percocet 5-325 mg Oral tab 1 tab every 8 hours                                               

3. Prilosec 40 mg Oral cpDR 1 cap once daily                                                    

4. furosemide 40 mg Oral tab 1 tab once daily                                                   

     (Last dose: 2017 07:00)                                                                

5. aspirin 325 mg Oral tab 1 tab once daily                                                     

     (Last dose: 2017 07:00)                                                                

6. metoprolol succinate 50 mg Tb24 1 tab once daily                                             

     (Last dose: 2017 07:00)                                                                

7. potassium chloride 10 mEq Oral cpER 1 cap once daily                                         

     (Last dose: 2017 07:00)                                                                

8. albuterol sulfate 2.5 mg /3 mL (0.083 %) Nebulizer nebu 4 times per day                      

     (Last dose: 2017 07:00)                                                                

- PMHx: Diabetes - IDDM: uncontrolled; Hypercholesterolemia; MI; Hypertension;                    

- PSHx: Stents, Coronary; pacemaker/defibrillator insertion; fem/pop bypass                       

bilaterally; CABG;                                                                              

- Social history: Smoking status: Patient states former smoker of tobacco. No barriers            

to communication noted, The patient speaks fluent English, Speaks appropriately for             

age.                                                                                            

- : Unable to assess if pt is on anticoagulants. Home medication list is obtained from            

patients' pharmacy.                                                                             

- Exposure Risk Screening:: None identified.                                                      

                                                                                                  

                                                                                                  

Screenin:09 Screening information is obtained from the patient. Fall risk: No risks identified.     bcj 

      Assistance ADL's: requires no assistance with activities of daily living. Abuse/DV          

      Screen: The patient / caregiver reports he/she is: not in a situation that causes fear,     

      pain or injury. Nutritional screening: No deficits noted. Advance Directives:               

      Currently, there is no health care proxy. home support is adequate.                         

                                                                                                  

Assessment:                                                                                       

10:53 Adult Sepsis Screening: The patient does not have new or worsening altered mentation.   dsf 

      Patient's respiratory rate is less than 22. Systolic blood pressure is less than or         

      equal to 100 (1 point). Patient has a qSOFA score of 1- Negative Sepsis Screen.             

      General: Appears in no apparent distress, Behavior is lethargic. Pain: Denies pain.         

      Neurological: Level of Consciousness is lethargic. Cardiovascular: Capillary refill < 3     

      seconds Heart tones S1 S2 present Rhythm is sinus rhythm No ectopy. Respiratory: Airway     

      is patent Respiratory effort is even, unlabored, Respiratory pattern is regular,            

      symmetrical, Breath sounds with crackles in left posterior lower lobe and right             

      posterior lower lobe Breath sounds are diminished in right upper lobe and left upper        

      lobe Reports cough that is productive. GI: Abdomen is non- distended Bowel sounds           

      present X 4 quads. Abd is soft and non tender X 4 quads. Derm: Skin is pink, warm & dry.  

11:58 General: Appears in no apparent distress, Behavior is lethargic . Pain: Denies pain.    dsf 

      Neurological: Level of Consciousness is lethargic, Reports weakness. Cardiovascular:        

      Capillary refill < 3 seconds Rhythm is sinus rhythm No ectopy. Respiratory: Airway is       

      patent Respiratory effort is even, unlabored, Respiratory pattern is regular,               

      symmetrical. Derm: Skin is pink, warm & dry.                                              

12:51 Adult Sepsis Screening: The patient does not have new or worsening altered mentation.   dsf 

      Patient's respiratory rate is less than 22. Systolic blood pressure is less than or         

      equal to 100 (1 point). Patient has a qSOFA score of 1- Negative Sepsis Screen.             

      General: Appears to be sleeping. Respiratory: Airway is patent Respiratory effort is        

      even, unlabored, Respiratory pattern is regular, symmetrical. Derm: Skin is pink, warm      

      & dry.                                                                                    

13:34 General: Appears to be sleeping. Cardiovascular: Rhythm is sinus rhythm No ectopy.      dsf 

      Respiratory: Airway is patent Respiratory effort is even, unlabored, Respiratory            

      pattern is regular, symmetrical. Derm: Skin is pink, warm & dry.                          

13:40 General: Dr. contreras talking to patient about test results and plan of care .            dsf 

14:00 Adult Sepsis Screening: The patient does not have new or worsening altered mentation.   dsf 

      Patient's respiratory rate is less than 22. Systolic blood pressure is less than or         

      equal to 100 (1 point). Patient has a qSOFA score of 1- Negative Sepsis Screen.             

      General: Appears in no apparent distress, comfortable, Behavior is appropriate for age,     

      cooperative. Pain: Denies pain. Neurological: Level of Consciousness is awake, alert.       

      Cardiovascular: Capillary refill < 3 seconds Heart tones S1 S2 present Rhythm is sinus      

      rhythm No ectopy. Respiratory: Airway is patent Respiratory effort is even, unlabored,      

      Respiratory pattern is regular, symmetrical, Breath sounds with crackles bilaterally.       

      GI: Abdomen is non- distended Bowel sounds present X 4 quads. Abd is soft and non           

      tender X 4 quads. Derm: Skin is pink, warm & dry.                                         

14:19 General: Dr. caba in room examining patient. Dr. caba aware of BP .                   dsf 

15:05 General: Appears in no apparent distress, comfortable, to be sleeping. Cardiovascular:  dsf 

      Rhythm is sinus rhythm No ectopy. Respiratory: Airway is patent Respiratory effort is       

      even, unlabored, Respiratory pattern is regular, symmetrical, Breath sounds with            

      crackles bilaterally. Derm: Skin is pink, warm & dry.                                     

16:05 Adult Sepsis Screening: The patient does not have new or worsening altered mentation.   dsf 

      Patient's respiratory rate is less than 22. Systolic blood pressure is less than or         

      equal to 100 (1 point). Patient has a qSOFA score of 1- Negative Sepsis Screen.             

      General: Appears to be sleeping. Cardiovascular: Rhythm is sinus rhythm No ectopy.          

      Respiratory: Airway is patent Respiratory effort is even, unlabored, Respiratory            

      pattern is regular, symmetrical. Derm: Skin is pink, warm & dry.                          

17:37 General: Appears in no apparent distress, to be sleeping. Cardiovascular: Capillary     dsf 

      refill < 3 seconds Heart tones S1 S2 present Rhythm is sinus rhythm No ectopy.              

      Respiratory: Airway is patent Respiratory effort is even, unlabored, Respiratory            

      pattern is regular, symmetrical, Breath sounds with crackles bilaterally. GI: Abdomen       

      is non- distended Bowel sounds present X 4 quads. Derm: Skin is pink, warm & dry.         

18:12 General: Appears in no apparent distress, Behavior is appropriate for age, cooperative. bcj 

      Neurological: Level of Consciousness is awake, alert. Cardiovascular: Capillary refill      

      < 3 seconds Rhythm is sinus rhythm No ectopy. Respiratory: Airway is patent Respiratory     

      effort is even, unlabored, Respiratory pattern is regular, symmetrical. Derm: Skin is       

      pink, warm & dry.                                                                         

                                                                                                  

Vital Signs:                                                                                      

10:35 Weight 93.89 kg (R); Height 5 ft. 7 in. (170.18 cm); Pain 0/10;                         dsf 

10:44 BP 95 / 52; Pulse 93; Resp 18; Temp 100.3(TE); Pulse Ox 98% on 3 lpm NC; Weight 87.7 kg ct3 

      (M);                                                                                        

10:48 BP 79 / 41 (auto/);                                                                     dsf 

10:50 Pulse 90 MON; Pulse Ox 93% ;                                                            dsf 

10:51 BP 91 / 52 (auto/);                                                                     dsf 

10:51 Pulse 90 MON; Pulse Ox 93% ;                                                            dsf 

11:03 BP 98 / 54 (auto/);                                                                     dsf 

11:03 Pulse 90 MON; Pulse Ox 94% ;                                                            dsf 

11:44  / 77 (auto/);                                                                    dsf 

11:44 Pulse 84 MON; Pulse Ox 97% ;                                                            dsf 

12:33 BP 77 / 41 (auto/);                                                                     dsf 

12:33 Pulse 78 MON; Pulse Ox 95% ;                                                            dsf 

12:42 Resp 18; Temp 98.7(TE);                                                                 ct3 

12:42 Pulse 76 MON; Pulse Ox 94% ;                                                            dsf 

12:42 BP 87 / 47 (auto/);                                                                     dsf 

12:48 BP 84 / 49 (auto/);                                                                     dsf 

12:48 Pulse 76 MON; Pulse Ox 94% ;                                                            dsf 

13:03 BP 89 / 48 (auto/);                                                                     dsf 

13:04 Pulse 74 MON; Pulse Ox 96% ;                                                            dsf 

13:11  / 53 (auto/);                                                                    dsf 

13:11 Pulse 74 MON; Pulse Ox 97% ;                                                            dsf 

13:18 BP 96 / 55 (auto/);                                                                     dsf 

13:19 Pulse 76 MON; Pulse Ox 97% ;                                                            dsf 

13:32 Pulse 76 MON; Pulse Ox 96% ;                                                            dsf 

13:33 BP 94 / 55 (auto/);                                                                     dsf 

13:48 BP 95 / 56 (auto/);                                                                     dsf 

13:49 Pulse 78 MON; Pulse Ox 97% ;                                                            dsf 

14:03 BP 89 / 62 (auto/);                                                                     dsf 

14:03 Pulse 76 MON; Pulse Ox 97% ;                                                            dsf 

14:37 BP 96 / 51 (auto/);                                                                     dsf 

14:37 Pulse 74 MON; Resp 20; Temp 98.0(TE); Pulse Ox 97% on 3 lpm NC; Pain 0/10;              dsf 

16:39  / 55 (auto/);                                                                    dsf 

16:40 Pulse 72 MON; Pulse Ox 97% ;                                                            dsf 

18:10  / 60 (auto/);                                                                    bcj 

18:10 Pulse 74 MON; Resp 20; Pulse Ox 97% on 3 lpm NC; Pain 0/10;                             bcj 

18:10 Temp 97.1(T);                                                                           bcj 

10:44 Body Mass Index 30.28 (87.70 kg, 170.18 cm)                                             ct3 

12:42 Dr. Contreras notified new orders recieved                                                 dsf 

                                                                                                  

Vitals:                                                                                           

10:35 Log In Time N/A - ambulance arrival.                                                    f 

                                                                                                  

ED Course:                                                                                        

10:32 Patient visited by Letitia Velazquez PCA.                                                  ar3 

10:32 Henrique Snow is Private Physician.                                                   ar3 

10:32 Patient moved to Waiting                                                                ar3 

10:32 Patient moved to 4                                                                      ar3 

10:34 Triage Initiated                                                                        dsf 

10:35 The patient / caregiver is instructed regarding the plan of care and ED course.         dsf 

10:35 O2 via nasal cannula \T\ 3L/min.                                                          dsf 

10:40 Juana Contreras MD is Attending Physician.                                               br1 

10:45 Basic Metabolic Profile Sent.                                                           dsf 

10:45 CBC with Diff Sent.                                                                     dsf 

10:45 Cardiac Injury Profile Sent.                                                            dsf 

10:45 Troponin Sent.                                                                          dsf 

10:45 EKG done. (by ED staff). Reviewed by Juana Contreras MD.                                   tk  

10:48 Patient visited by Justin Alfonso.                                                    tk  

10:53 Patient visited by Darline Sauer PCA.                                              ct3 

10:53 Accompanied by Family Member, Patient has correct armband on for positive               ct3 

      identification. Placed in gown. Bed in low position. Call light in reach. Side rails up     

      X2. Cardiac monitor on. Pulse ox on. NIBP on.                                               

10:54 Patient visited by Rita Iverson RN.                                                   dsf 

10:54 Inserted saline lock: 20 gauge in right antecubital area The patient tolerated the      dsf 

      procedure well.                                                                             

11:02 Patient visited by Juana Contreras MD.                                                   br1 

11:13 Patient moved to CT                                                                     pml 

11:17 Patient moved to 4                                                                      je3 

11:37 Jimenez cath inserted 16 Fr. Balloon inflated. Urine specimen collected. returned clear   dsf 

      yellow urine. Patient tolerated well.                                                       

11:39 [facility] Payment Agreement was scanned into [employer] and attached to record.               lg  

11:47 Urine Culture Sent.                                                                     dsf 

11:47 Urinalysis Sent.                                                                        dsf 

11:57 BLOOD CULTURES Sent.                                                                    dsf 

11:59 Patient visited by Rita Iverson RN.                                                   dsf 

12:30 Patient visited by Darline Sauer PCA.                                              ct3 

12:33 Patient visited by Justin Alfonso.                                                    tk  

12:33 Assisted with bedpan.                                                                   tk  

12:33 CT Head Without Contrast Returned.                                                      EDMS

12:43 Patient visited by Darline Sauer PCA.                                              ct3 

12:51 Patient visited by Rita Iverson RN.                                                   dsf 

13:34 Patient visited by Rita Iverson RN.                                                   dsf 

13:43 Patient visited by Rita Iverson RN.                                                   dsf 

13:43 EKG-ADULT Returned.                                                                     EDMS

14:20 Patient visited by Rita IversonMAXIMILIAN.                                                   dsf 

14:46 Shereen Caba is Hospitalizing Provider.                                                  br1 

15:05 Patient visited by Rita Iverson RN.                                                   dsf 

16:08 Patient moved to Ultrasound                                                             dsf 

18:06 Patient moved to 4                                                                      bcj 

18:08 Duplex, Ext LOWER veins, bilat Returned.                                                EDMS

18:10 No procedures done that require assistance.                                             bcj 

22:20 T-Sheet-- Draft Copy was scanned into [employer] and attached to record.                   klr 

                                                                                             

10:49 ECG/EKG was scanned into [employer] and attached to record.                                gb  

10:49 PCR was scanned into [employer] and attached to record.                                    gb  

11:02 T-Sheet-- Draft Copy was scanned into [employer] and attached to record.                   gb  

                                                                                                  

Administered Medications:                                                                         

                                                                                             

11:19 Drug: Acetaminophen 650 mg [acetaminophen 325 mg tablet (2 tabs)] Route: PO;            dsf 

12:19 Follow up: Response: Temperature is decreased                                           dsf 

11:20 Drug: NS 0.9% 500 ml [sodium chloride 0.9 % injection solution] Route: IV; Rate: bolus; dsf 

      Site: right antecubital;                                                                    

12:40 Follow up: IV Status: Completed infusion; IV Intake: 500ml                              dsf 

11:57 Drug: cefTRIAXone 2 grams [ceftriaxone 1 gram solution for injection] Route: IVPB;      dsf 

      Infused Over: 30 mins; Site: right antecubital;                                             

12:40 Follow up: IV Status: Completed infusion; IV Intake: 100ml                              dsf 

12:49 Drug: NS 0.9% 500 ml [sodium chloride 0.9 % injection solution] Route: IV; Rate: bolus; dsf 

      Site: right antecubital;                                                                    

13:48 Follow up: IV Status: Completed infusion; IV Intake: 500ml                              ld5 

14:40 Follow up: IV Status: Completed infusion; IV Intake: 500ml                              dsf 

14:21 CANCELLED (Dr. Caba wants to hold fluids ): NS 0.9% 500 ml IV at bolus once            dsf 

                                                                                                  

                                                                                                  

Intake:                                                                                           

12:40 IV: 100.00ml; Total: 100.00ml.                                                          dsf 

12:40 IV: 500.00ml; Total: 600.00ml.                                                          dsf 

13:48 IV: 500.00ml; Total: 1100.00ml.                                                         ld5 

14:40 IV: 500.00ml; Total: 1600.00ml.                                                         dsf 

                                                                                                  

Output:                                                                                           

17:06 Urine: 175.00ml (Jimenez); Total: 175.00ml.                                               dsf 

                                                                                                  

Order Results:                                                                                    

Lab Order: Basic Metabolic Profile; SPEC'M 17 11:22                                         

      Test: GLUCOSE, FASTING; Value: 231; Range: ; Abnormal: Above high normal; Units:      

      MG/DL; Status: F                                                                            

      Test: BLOOD UREA NITROGEN; Value: 55; Range: 7-18; Abnormal: Above high normal; Units:      

      MG/DL; Status: F                                                                            

      Test: CREATININE FOR GFR; Value: 2.53; Range: 0.70-1.30; Abnormal: Above high normal;       

      Units: MG/DL; Status: F                                                                     

      Test: GLOMERULAR FILTRATION RATE; Value: 26.9; Range: >42; Abnormal: Below low normal;      

      Status: F                                                                                   

      Test: SODIUM LEVEL; Value: 133; Range: 136-145; Abnormal: Below low normal; Units:          

      MEQ/L; Status: F                                                                            

      Test: POTASSIUM SERUM; Value: 4.5; Range: 3.5-5.1; Units: MEQ/L; Status: F                  

      Test: CHLORIDE LEVEL; Value: 94; Range: ; Abnormal: Below low normal; Units:          

      MEQ/L; Status: F                                                                            

      Test: CARBON DIOXIDE LEVEL; Value: 31; Range: 21-32; Units: MEQ/L; Status: F                

      Test: ANION GAP; Value: 8; Range: 8-16; Units: MEQ/L; Status: F                             

      Test: CALCIUM LEVEL; Value: 9.1; Range: 8.8-10.2; Units: MG/DL; Status: F                   

      Test Note: &nbsp;; Units are mL/min/1.73 m2 Chronic Kidney Disease Staging per NKF:       

      Stage I & II GFR >=60 Normal to Mildly Decreased Stage III GFR 30-59 Moderately           

      Decreased Stage IV GFR 15-29 Severely Decreased Stage V GFR <15 Very Little GFR Left        

      ESRD GFR <15 on RRT                                                                         

Lab Order: CBC with Diff; SPEC'M 17 10:43                                                   

      Test: WHITE BLOOD COUNT; Value: 25.4; Range: 4.0-10.0; Abnormal: Above high normal;         

      Units: K/mm3; Status: F                                                                     

      Test: RED BLOOD COUNT; Value: 4.01; Range: 4.30-6.10; Abnormal: Below low normal;           

      Units: M/mm3; Status: F                                                                     

      Test: HEMOGLOBIN; Value: 11.6; Range: 14.0-18.0; Abnormal: Below low normal; Units:         

      g/dl; Status: F                                                                             

      Test: HEMATOCRIT; Value: 34.5; Range: 42.0-52.0; Abnormal: Below low normal; Units: %;      

      Status: F                                                                                   

      Test: MEAN CORPUSCULAR VOLUME; Value: 85.9; Range: 80.0-96.0; Units: fl; Status: F          

      Test: MEAN CORPUSCULAR HEMOGLOBIN; Value: 29.0; Range: 27.0-33.0; Units: pg; Status: F      

      Test: MEAN CORPUSCULAR HGB CONC; Value: 33.7; Range: 32.0-36.5; Units: g/dl; Status: F      

      Test: RED CELL DISTRIBUTION WIDTH; Value: 12.1; Range: 11.5-14.5; Units: %; Status: F       

      Test: PLATELET COUNT, AUTOMATED; Value: 311; Range: 150-450; Units: k/mm3; Status: F        

      Test: NEUTROPHILS %; Value: 91.1; Range: 36.0-66.0; Abnormal: Above high normal; Units:     

      %; Status: F                                                                                

      Test: LYMPH %; Value: 3.5; Range: 24.0-44.0; Abnormal: Below low normal; Units: %;          

      Status: F                                                                                   

      Test: MONO %; Value: 3.5; Range: 0.0-5.0; Units: %; Status: F                               

      Test: EOS %; Value: 1.2; Range: 0.0-3.0; Units: %; Status: F                                

      Test: BASO %; Value: 0.2; Range: 0.0-1.0; Units: %; Status: F                               

      Test: LARGE UNSTAINED CELL %; Value: 0.6; Range: 0.0-4.0; Units: %; Status: F               

      Test: NEUTROPHILS #; Value: 23.1; Range: 1.8-7.7; Abnormal: Above high normal; Units:       

      K/mm3; Status: F                                                                            

      Test: LYMPH #; Value: 0.9; Range: 1.5-4.5; Abnormal: Below low normal; Units: K/mm3;        

      Status: F                                                                                   

      Test: MONO #; Value: 0.9; Range: 0.0-0.8; Abnormal: Above high normal; Units: K/mm3;        

      Status: F                                                                                   

      Test: EOS #; Value: 0.3; Range: 0.0-0.50; Units: K/mm3; Status: F                           

      Test: BASO #; Value: 0.0; Range: 0.0-0.2; Units: K/mm3; Status: F                           

      Test: LARGE UNSTAINED CELL #; Value: 0.1; Range: 0.0-0.4; Units: K/mm3; Status: F           

Lab Order: Cardiac Injury Profile; SPEC' 17 11:22                                          

      Test: CPK CREATINE PHOSPHOKINASE; Value: 166; Range: ; Units: U/L; Status: F          

      Test: CK-MB VALUE MASS; Value: 4.3; Range: 0.0-3.6; Abnormal: Above high normal; Units:     

      NG/ML; Status: F                                                                            

      Test: MB/CK RELATIVE INDEX; Value: 2.59; Range: < OR =4; Status: F                          

      Test Note: &nbsp;; DIAGNOSIS CRITERIA MMB ng/ml Relative Index (RI) NON-AMI < or = 5      

      N/A GRAY ZONE > 5 < or = 4 AMI > 5 > 4                                                      

Lab Order: Troponin; SPEC' 17 11:22                                                        

      Test: TROPONIN I; Value: 0.05; Range: < 0.10; Units: NG/ML; Status: F                       

      Test Note: &nbsp;; Troponin I Reference Interval for [employer] [place] LOCI: 99th             

      Percentile= 0.00-0.045 ng/ml Risk Stratification: <= 0.10 ng/ml Decreased Risk for          

      Adverse Clinical Events. 0.10-1.50 ng/ml Increased Risk for Adverse Clinical Events.        

      Evaluation of additional criterion and/or repeat testing in 2-6 hours is suggested to       

      rule out myocardial damage. >= 1.50 ng/ml Indicative of Myocardial Injury.                  

Lab Order: PT/INR; SPEC' 17 11:24                                                          

      Test: PROTHROMBIN TIME; Value: 13.5; Range: 12.3-14.5; Units: SECONDS; Status: F            

      Test: INR; Value: 1.02; Status: F                                                           

      Test Note: &nbsp;; THERAPUTIC HUMAN INR VALUES INDICATIONS NORMAL RANGES                  

      PROPHYLAXIS/TREATMENT OF: VENOUS THROMBOSIS 2.0-3.0 PULMONARY EMBOLISM 2.0-3.0              

      PREVENTION OF SYSTEMIC EMBOLISM FROM: TISSUE HEART VALVES 2.0-3.0 ACUTE MYOCARDIAL          

      INFARCTION 2.0-3.0 VALVULAR HEART DISEASE 2.0-3.0 ATRIAL FIBRILLATION 2.0-3.0               

      MECHANICAL VALVES(HIGH RISK) 2.5-3.5 RECURRENT MYOCARDIAL INFARCTION 2.5-3.5                

Lab Order: PTT; SPEC'M 17 11:24                                                             

      Test: PARTIAL THROMBOPLASTIN TIME; Value: 30.3; Range: 26.6-37.1; Units: SECONDS;           

      Status: F                                                                                   

Lab Order: MAGNESIUM LEVEL; SPEC'M 17 11:22                                                 

      Test: MAGNESIUM LEVEL; Value: 2.4; Range: 1.8-2.4; Units: MG/DL; Status: F                  

Lab Order: PHOSPHOROUS LEVEL; SPEC'M 17 11:22                                               

      Test: PHOSPHORUS LEVEL; Value: 4.4; Range: 2.5-4.9; Units: MG/DL; Status: F                 

Lab Order: Lactic Acid (Gray tube on ice); SPEC'M 17 11:22                                  

      Test: LACTIC ACID LEVEL, LACTATE; Value: 1.9; Range: 0.4-2.0; Units: MMOL/L; Status: F      

Lab Order: Urinalysis; SPEC'M 17 11:42                                                      

      Test: APPEARANCE, URINE; Value: CLEAR; Range: CLEAR; Status: F                              

      Test: COLOR, URINE; Value: YELLOW; Range: YELLOW; Status: F                                 

      Test: PH,URINE; Value: 5.0; Range: 5.0-9.0; Units: UNITS; Status: F                         

      Test: SPECIFIC GRAVITY URINE AUTO; Value: 1.008; Range: 1.002-1.035; Status: F              

      Test: PROTEIN, URINE AUTO; Value: 3+; Range: NEGATIVE; Abnormal: Above high normal;         

      Units: mg/dL; Status: F                                                                     

      Test: GLUCOSE, URINE (UA) AUTO; Value: 3+; Range: NEGATIVE; Abnormal: Above high            

      normal; Units: mg/dL; Status: F                                                             

      Test: KETONE, URINE AUTO; Value: NEGATIVE; Range: NEGATIVE; Units: mg/dL; Status: F         

      Test: UROBILINOGEN, URINE AUTO; Value: 0.2; Range: 0.0-2.0; Units: mg/dL; Status: F         

      Test: BILIRUBIN, URINE AUTO; Value: NEGATIVE; Range: NEGATIVE; Status: F                    

      Test: NITRITE, URINE AUTO; Value: NEGATIVE; Range: NEGATIVE; Status: F                      

      Test: LEUKOCYTE ESTERASE, URINE AUTO; Value: NEGATIVE; Range: NEGATIVE; Status: F           

      Test: BLOOD, URINE BLOOD; Value: 1+; Range: NEGATIVE; Abnormal: Above high normal;          

      Status: F                                                                                   

      Test: WBC, URINE AUTO; Value: 1; Range: 0-3; Units: /HPF; Status: F                         

      Test: RBC, URINE AUTO; Value: 2; Range: 0-3; Units: /HPF; Status: F                         

      Test: BACTERIA, URINE AUTO; Value: NEGATIVE; Range: NEGATIVE; Status: F                     

      Test: SQUAMOUS EPITHELIAL CELL UR AU; Value: 0; Range: 0-6; Units: /HPF; Status: F          

      Test: HYALINE CAST, URINE AUTO; Value: 4; Range: 0-1; Units: /LPF; Status: F                

      Test: AMORPHOUS SEDIMENT; Value: SMALL; Range: NEGATIVE; Abnormal: Above high normal;       

      Status: F                                                                                   

                                                                                                  

Radiology Order: EKG-ADULT                                                                        

      Test: EKG-ADULT                                                                             

      REASON FOR EXAMINATION: Chest Pain; Stationary ECG Study; [facility] - ED; ;        

      Test Date: 2017; Pat Name: SÁNCHEZ DUNLAP Department:; Patient ID: [id number] Room:     

      -; Gender: M Technician: tk; : 1945 Requested By: JUANA HERNANDEZ; Order           

      Number: [id number] Reading MD: Jaleesa Roman; Measurements; Intervals     

      Axis; Rate: 90 P: 55; UT: 163 QRS: 23; QRSD: 109 T: 137; QT: 360; QTc: 443;                 

      Interpretive Statements; SINUS RHYTHM; LEFT ATRIAL ENLARGEMENT; INFERIOR MYOCARDIAL         

      INFARCTION, PROBABLY OLD; MODERATE T-WAVE ABNORMALITY, CONSIDER ISCHEMIA; SIMILAR           

      17; Electronically Signed On 2017 13:16:30 EST by Jaleesa Roman;          

Radiology Order: CT Head Without Contrast                                                         

      Test: CT Head Without Contrast                                                              

      REASON FOR EXAMINATION: CVA >4.5hrs; CTA brain without contrast 17; ; Indication :     

      CVA greater than 4.5 hours; ; Comparison: None; ; Findings: The ventricles are of           

      normal size and configuration for age. Small; amount of periventricular and subcortical     

      white matter hypodensities are; consistent with small vessel ischemic disease. Old          

      small bilateral basal; ganglia lacunar infarcts are identified. There is no                 

      intracranial hemorrhage or; extra-axial fluid collection. There is no midline shift or      

      mass effect.; ; Moderate calcifications are present in the distal vertebral arteries,       

      left; greater than right. Small amount of calcification is seen in the basilar artery.;     

      There are extensive bilateral carotid siphon calcifications.; ; The skull is without        

      fracture . Mucoperiosteal thickening is present within the; right sphenoid sinus.           

      Mastoid sinuses are clear.; ; Impression; 1. No acute intracranial pathology or             

      hemorrhage.; ; 2. Old small bilateral basal ganglia lacunar infarcts are present.; ; 3.     

      Extensive bilateral carotid siphon calcifications.; ; ; ; ; ; ; Signed by; Shanice Agudelo MD 2017 12:15 P;                                                                

Radiology Order: Duplex, Ext LOWER veins, bilat                                                   

      Test: Duplex, Ext LOWER veins, bilat                                                        

      REASON FOR EXAMINATION: leg pain; Bilateral lower extremity deep vein duplex                

      ultrasound:; ; Comparison is 2016.; ; The deep veins demonstrate normal               

      compression, normal Doppler color flow and; normal Doppler waveforms with respiration       

      and augmentation at multiple levels; from the popliteal veins to the common femoral         

      veins bilaterally.; ; Impression:; ; There is no evidence of deep vein thrombus in the      

      right lower extremity or left; lower extremity.; ; ; Signed by; Canelo Dalton MD            

      2017 05:31 P;                                                                         

Outcome:                                                                                          

14:47 Decision to Hospitalize by Provider.                                                    br1 

18:09 CT Study completed. Admission hand-off: Report called to Sonya YAO.                    susie 

18:11 Discharge Assessment: Patient awake, alert and oriented x 3. No cognitive and/or        bcj 

      functional deficits noted. Patient verbalized understanding of disposition                  

      instructions. patient administered narcotics - no. The following High Risk Discharge        

      criteria are identified: None. Admitted to ICU accompanied by nurse, accompanied by         

      tech, family with patient, via stretcher, with oxygen, on monitor, with chart.              

      Condition: stable. Property :Personal belongings accompany Pt.                              

18:25 Patient left the ED.                                                                    dsf 

                                                                                                  

Signatures:                                                                                       

Dispatcher [employer]                           EDWayne Clayton RN RN bcj Barnhardt, Gloria, Yeiosn Zheng, Reg                    Reg  lg                                                   

, Ghassan                                je3                                                  

Juana Contreras MD MD   br1                                                  

Letitia Velazquez, PCA                      PCA  ar3                                                  

Elizabeth Tran,RN                      RN   ld5                                                  

Darline Sauer, PCA                  PCA  ct3                                                  

Riat Iverson RN                       RN   dsDanitza Tidwell RN                         RN   Justin Esquivel Kathie klr                                                  

                                                                                                  

Corrections: (The following items were deleted from the chart)                                    

10:53 10:44 87.7 kg Measured; BMI: 30.2; ct3                                                  ct3 

                                                                                                  

**************************************************************************************************



*** Chart Complete ***
MTDD

## 2017-01-24 NOTE — EDDOCDS
Physician Documentation                                                                           

[facility]                                                                         

Name: Sánchez Dunlap                                                                              

Age: 71 yrs                                                                                       

Sex: Male                                                                                         

: 1945                                                                                   

MRN: [id number]                                                                                     

Arrival Date: 2017                                                                          

Time: 10:31                                                                                       

Account#: [id number]                                                                              

Bed 4                                                                                             

Private MD: Henrique Snow MD                                                                   

Disposition:                                                                                      

17 14:47 Hospitalization ordered by Shereen Caba for Inpatient Admission. Preliminary        

diagnosis are Syncope and collapse, Acute pulmonary edema, Hypotension.                         

- Bed requested for M ICU.                                                                        

- Status is Inpatient Admission.                                                              dsf 

- Condition is Stable.                                                                            

- Problem is new.                                                                                 

- Symptoms are unchanged.                                                                         

                                                                                                  

                                                                                                  

                                                                                                  

Historical:                                                                                       

- Allergies: turkey;                                                                              

- Home Meds:                                                                                      

1. Lyrica 150mg Oral 2 times per day                                                            

2. Percocet 5-325 mg Oral tab 1 tab every 8 hours                                               

3. Prilosec 40 mg Oral cpDR 1 cap once daily                                                    

4. furosemide 40 mg Oral tab 1 tab once daily                                                   

     (Last dose: 2017 07:00)                                                                

5. aspirin 325 mg Oral tab 1 tab once daily                                                     

     (Last dose: 2017 07:00)                                                                

6. metoprolol succinate 50 mg Tb24 1 tab once daily                                             

     (Last dose: 2017 07:00)                                                                

7. potassium chloride 10 mEq Oral cpER 1 cap once daily                                         

     (Last dose: 2017 07:00)                                                                

8. albuterol sulfate 2.5 mg /3 mL (0.083 %) Nebulizer nebu 4 times per day                      

     (Last dose: 2017 07:00)                                                                

- PMHx: Diabetes - IDDM: uncontrolled; Hypercholesterolemia; MI; Hypertension;                    

- PSHx: Stents, Coronary; pacemaker/defibrillator insertion; fem/pop bypass                       

bilaterally; CABG;                                                                              

- Social history: Smoking status: Patient states former smoker of tobacco. No barriers            

to communication noted, The patient speaks fluent English, Speaks appropriately for             

age.                                                                                            

- : Unable to assess if pt is on anticoagulants. Home medication list is obtained from            

patients' pharmacy.                                                                             

- Exposure Risk Screening:: None identified.                                                      

                                                                                                  

                                                                                                  

Vital Signs:                                                                                      

                                                                                             

10:35 Weight 93.89 kg / 206.99 lbs (R); Height 5 ft. 7 in. (170.18 cm); Pain 0/10;            dsf 

10:44 BP 95 / 52; Pulse 93; Resp 18; Temp 100.3(TE); Pulse Ox 98% on 3 lpm NC; Weight 87.7 kg ct3 

      / 193.35 lbs (M);                                                                           

10:48 BP 79 / 41 (auto/);                                                                     dsf 

10:50 Pulse 90 MON; Pulse Ox 93% ;                                                            dsf 

10:51 BP 91 / 52 (auto/);                                                                     dsf 

10:51 Pulse 90 MON; Pulse Ox 93% ;                                                            dsf 

11:03 BP 98 / 54 (auto/);                                                                     dsf 

11:03 Pulse 90 MON; Pulse Ox 94% ;                                                            dsf 

11:44  / 77 (auto/);                                                                    dsf 

11:44 Pulse 84 MON; Pulse Ox 97% ;                                                            dsf 

12:33 BP 77 / 41 (auto/);                                                                     dsf 

12:33 Pulse 78 MON; Pulse Ox 95% ;                                                            dsf 

12:42 Resp 18; Temp 98.7(TE);                                                                 ct3 

12:42 Pulse 76 MON; Pulse Ox 94% ;                                                            dsf 

12:42 BP 87 / 47 (auto/);                                                                     dsf 

12:48 BP 84 / 49 (auto/);                                                                     dsf 

12:48 Pulse 76 MON; Pulse Ox 94% ;                                                            dsf 

13:03 BP 89 / 48 (auto/);                                                                     dsf 

13:04 Pulse 74 MON; Pulse Ox 96% ;                                                            dsf 

13:11  / 53 (auto/);                                                                    dsf 

13:11 Pulse 74 MON; Pulse Ox 97% ;                                                            dsf 

13:18 BP 96 / 55 (auto/);                                                                     dsf 

13:19 Pulse 76 MON; Pulse Ox 97% ;                                                            dsf 

13:32 Pulse 76 MON; Pulse Ox 96% ;                                                            dsf 

13:33 BP 94 / 55 (auto/);                                                                     dsf 

13:48 BP 95 / 56 (auto/);                                                                     dsf 

13:49 Pulse 78 MON; Pulse Ox 97% ;                                                            dsf 

14:03 BP 89 / 62 (auto/);                                                                     dsf 

14:03 Pulse 76 MON; Pulse Ox 97% ;                                                            dsf 

14:37 BP 96 / 51 (auto/);                                                                     dsf 

14:37 Pulse 74 MON; Resp 20; Temp 98.0(TE); Pulse Ox 97% on 3 lpm NC; Pain 0/10;              dsf 

16:39  / 55 (auto/);                                                                    dsf 

16:40 Pulse 72 MON; Pulse Ox 97% ;                                                            dsf 

18:10  / 60 (auto/);                                                                    bcj 

18:10 Pulse 74 MON; Resp 20; Pulse Ox 97% on 3 lpm NC; Pain 0/10;                             bcj 

18:10 Temp 97.1(T);                                                                           bcj 

10:44 Body Mass Index 30.28 (87.70 kg, 170.18 cm)                                             ct3 

12:42 Dr. Tran notified new orders recieved                                                 dsf 

                                                                                                  

MDM:                                                                                              

10:36 Cardiac Monitor/Pulse Ox/q 30 min VS ordered.                                           br1 

10:36 IV Saline Lock ordered.                                                                 br1 

10:36 Rhythm Strip to chart ordered.                                                          br1 

10:36 Undress patient appropriately for examination ordered.                                  br1 

10:38 Basic Metabolic Profile Ordered.                                                        EDMS

10:38 CBC with Diff Ordered.                                                                  EDMS

10:38 Cardiac Injury Profile Ordered.                                                         EDMS

10:38 Troponin Ordered.                                                                       EDMS

10:38 portable chest Ordered.                                                                 EDMS

10:39 ECG WITH READING ER PHYS+CARDIAG ordered.                                               EDMS

10:47 PT/INR Ordered.                                                                         EDMS

10:47 PTT Ordered.                                                                            EDMS

10:51 MAGNESIUM LEVEL Ordered.                                                                EDMS

10:52 PHOSPHOROUS LEVEL Ordered.                                                              EDMS

11:02 NS 0.9% 500 ml IV at bolus once ordered.                                                br1 

11:02 -Blood Culture (Adults Only), peripheral from different site, or from device/port/PICC  br1 

      etc. if present ordered.                                                                    

11:03 Lactic Acid (Gray tube on ice) Ordered.                                                 EDMS

11:03 -Blood Culture Ordered.                                                                 EDMS

11:03 Acetaminophen Tablet 650 mg PO once ordered.                                            br1 

11:04 -Blood Culture (Adults Only), peripheral from different site, or from device/port/PICC  ar3 

      etc. if present complete.                                                                   

11:04 Jimenez ordered.                                                                          br1 

11:04 CBC with Diff Reviewed.                                                                 br1 

11:04 CT Head Without Contrast Ordered.                                                       EDMS

11:05 BLOOD CULTURES Ordered.                                                                 EDMS

11:05 Urine Culture Ordered.                                                                  EDMS

11:05 Urinalysis Ordered.                                                                     EDMS

11:05 cefTRIAXone 2 grams IVPB once over 30 mins; dilute in 50mL of NS or D5W ordered.        br1 

11:09 BED REQUEST+ADM ordered.                                                                EDMS

11:39 [facility] Payment Agreement was scanned into [employer] and attached to record.               lg  

11:47 Financial registration complete.                                                        lg  

12:09 Basic Metabolic Profile Reviewed.                                                       br1 

12:09 Cardiac Injury Profile Reviewed.                                                        br1 

12:09 Urinalysis Reviewed.                                                                    br1 

12:09 Troponin Reviewed.                                                                      br1 

12:09 PT/INR Reviewed.                                                                        br1 

12:09 PTT Reviewed.                                                                           br1 

12:09 MAGNESIUM LEVEL Reviewed.                                                               br1 

12:09 PHOSPHOROUS LEVEL Reviewed.                                                             br1 

12:09 Lactic Acid (Gray tube on ice) Reviewed.                                                br1 

12:51 NS 0.9% 500 ml IV at bolus once ordered.                                                dsf 

15:09 Duplex, Ext LOWER veins, bilat Ordered.                                                 EDMS

15:10 Admission / Observation Status ordered.                                                 EDMS

15:10 CONSISTENT CARBOHYDRATES ordered.                                                       EDMS

16:11 MRSA SCREEN Ordered.                                                                    EDMS

22:20 T-Sheet-- Draft Copy was scanned into [employer] and attached to record.                   klr 

                                                                                             

10:49 ECG/EKG was scanned into [employer] and attached to record.                                gb  

10:49 PCR was scanned into [employer] and attached to record.                                    gb  

11:02 T-Sheet-- Draft Copy was scanned into [employer] and attached to record.                   gb  

                                                                                                  

Administered Medications:                                                                         

                                                                                             

11:19 Drug: Acetaminophen 650 mg [acetaminophen 325 mg tablet (2 tabs)] Route: PO;            dsf 

12:19 Follow up: Response: Temperature is decreased                                           dsf 

11:20 Drug: NS 0.9% 500 ml [sodium chloride 0.9 % injection solution] Route: IV; Rate: bolus; dsf 

      Site: right antecubital;                                                                    

12:40 Follow up: IV Status: Completed infusion; IV Intake: 500ml                              dsf 

11:57 Drug: cefTRIAXone 2 grams [ceftriaxone 1 gram solution for injection] Route: IVPB;      dsf 

      Infused Over: 30 mins; Site: right antecubital;                                             

12:40 Follow up: IV Status: Completed infusion; IV Intake: 100ml                              dsf 

12:49 Drug: NS 0.9% 500 ml [sodium chloride 0.9 % injection solution] Route: IV; Rate: bolus; dsf 

      Site: right antecubital;                                                                    

13:48 Follow up: IV Status: Completed infusion; IV Intake: 500ml                              ld5 

14:40 Follow up: IV Status: Completed infusion; IV Intake: 500ml                              dsf 

14:21 CANCELLED (Dr. Caba wants to hold fluids ): NS 0.9% 500 ml IV at bolus once            dsf 

                                                                                                  

                                                                                                  

Signatures:                                                                                       

Dispatcher [employer]                           EDMS                                                 

Hali, Leyda, Reg                  Reg  Yeison Crowell, Reg                    Reg  lg                                                   

James Tran MD MD   br1                                                  

Letitia Velazquez, PCA                      PCA  ar3                                                  

Rita Iverson RN                       RN   dsf                                                  

Megan Miller Laura RN   ld5                                                  

                                                                                                  

The chart was reviewed and I authenticate all verbal orders and agree with the evaluation and 
treatment provided.Corrections: (The following items were deleted from the chart)

10:51 10:47 MAGNESIUM LEVEL+LAB ordered. EDMS                                                 EDMS

10:51 10:47 PHOSPHOROUS LEVEL+LAB ordered. EDMS                                               EDMS

14:21 14:16 NS 0.9% 500 ml IV at bolus once ordered. [email or web address]                                      bertha 

                                                                                                  

Attachments:                                                                                      

11:39 [facility] Payment Agreement                                                                lg  

                                                                                             

10:49 ECG/EKG                                                                                 gb  

11:02 T-Sheet-- Draft Copy                                                                    gb  

                                                                                                  

**************************************************************************************************



*** Chart Complete ***
MTDD

## 2017-01-25 VITALS — DIASTOLIC BLOOD PRESSURE: 66 MMHG | SYSTOLIC BLOOD PRESSURE: 146 MMHG

## 2017-01-25 VITALS — SYSTOLIC BLOOD PRESSURE: 162 MMHG | DIASTOLIC BLOOD PRESSURE: 68 MMHG

## 2017-01-25 VITALS — SYSTOLIC BLOOD PRESSURE: 126 MMHG | DIASTOLIC BLOOD PRESSURE: 69 MMHG

## 2017-01-25 VITALS — SYSTOLIC BLOOD PRESSURE: 130 MMHG | DIASTOLIC BLOOD PRESSURE: 56 MMHG

## 2017-01-25 VITALS — SYSTOLIC BLOOD PRESSURE: 177 MMHG | DIASTOLIC BLOOD PRESSURE: 72 MMHG

## 2017-01-25 VITALS — SYSTOLIC BLOOD PRESSURE: 162 MMHG | DIASTOLIC BLOOD PRESSURE: 91 MMHG

## 2017-01-25 VITALS — DIASTOLIC BLOOD PRESSURE: 83 MMHG | SYSTOLIC BLOOD PRESSURE: 129 MMHG

## 2017-01-25 LAB
ALBUMIN SERPL BCG-MCNC: 2.1 GM/DL (ref 3.2–5.2)
ALBUMIN/GLOB SERPL: 0.55 {RATIO} (ref 1–1.93)
ALP SERPL-CCNC: 116 U/L (ref 45–117)
ALT SERPL W P-5'-P-CCNC: 17 U/L (ref 12–78)
ANION GAP SERPL CALC-SCNC: 6 MEQ/L (ref 8–16)
AST SERPL-CCNC: 11 U/L (ref 15–37)
BASE EXCESS BLDA CALC-SCNC: -1.9 MMOL/L (ref -2–2)
BASOPHILS # BLD AUTO: 0 K/MM3 (ref 0–0.2)
BASOPHILS NFR BLD AUTO: 0.3 % (ref 0–1)
BILIRUB SERPL-MCNC: 0.2 MG/DL (ref 0.2–1)
BUN SERPL-MCNC: 53 MG/DL (ref 7–18)
CALCIUM SERPL-MCNC: 7.7 MG/DL (ref 8.8–10.2)
CHLORIDE SERPL-SCNC: 103 MEQ/L (ref 98–107)
CO2 BLDA CALC-SCNC: 24.8 MEQ/L (ref 23–31)
CO2 SERPL-SCNC: 28 MEQ/L (ref 21–32)
CREAT SERPL-MCNC: 2.05 MG/DL (ref 0.7–1.3)
EOSINOPHIL # BLD AUTO: 0.3 K/MM3 (ref 0–0.5)
EOSINOPHIL NFR BLD AUTO: 2.1 % (ref 0–3)
ERYTHROCYTE [DISTWIDTH] IN BLOOD BY AUTOMATED COUNT: 11.9 % (ref 11.5–14.5)
ERYTHROCYTE [SEDIMENTATION RATE] IN BLOOD BY WESTERGREN METHOD: 107 MM/HR (ref 0–20)
GFR SERPL CREATININE-BSD FRML MDRD: 34.3 ML/MIN/{1.73_M2} (ref 42–?)
GLUCOSE SERPL-MCNC: 260 MG/DL (ref 83–110)
HCO3 BLDA-SCNC: 23.5 MEQ/L (ref 22–26)
HCO3 STD BLDA-SCNC: 22.7 MEQ/L (ref 22–26)
LARGE UNSTAINED CELL #: 0.2 K/MM3 (ref 0–0.4)
LARGE UNSTAINED CELL %: 1.3 % (ref 0–4)
LYMPHOCYTES # BLD AUTO: 1 K/MM3 (ref 1.5–4.5)
LYMPHOCYTES NFR BLD AUTO: 6.5 % (ref 24–44)
MAGNESIUM SERPL-MCNC: 2.4 MG/DL (ref 1.8–2.4)
MCH RBC QN AUTO: 28.7 PG (ref 27–33)
MCHC RBC AUTO-ENTMCNC: 32.7 G/DL (ref 32–36.5)
MCV RBC AUTO: 87.7 FL (ref 80–96)
MONOCYTES # BLD AUTO: 0.6 K/MM3 (ref 0–0.8)
MONOCYTES NFR BLD AUTO: 4 % (ref 0–5)
NEUTROPHILS # BLD AUTO: 12.7 K/MM3 (ref 1.8–7.7)
NEUTROPHILS NFR BLD AUTO: 85.8 % (ref 36–66)
PCO2 BLDA: 43 MMHG (ref 35–45)
PH BLDA: 7.36 UNITS (ref 7.35–7.45)
PLATELET # BLD AUTO: 222 K/MM3 (ref 150–450)
PO2 BLDA: 59.5 MMHG (ref 75–100)
POTASSIUM SERPL-SCNC: 5.3 MEQ/L (ref 3.5–5.1)
PROT SERPL-MCNC: 5.9 GM/DL (ref 6.4–8.2)
SODIUM SERPL-SCNC: 137 MEQ/L (ref 136–145)
WBC # BLD AUTO: 14.8 K/MM3 (ref 4–10)

## 2017-01-25 RX ADMIN — PREGABALIN SCH MG: 75 CAPSULE ORAL at 20:37

## 2017-01-25 RX ADMIN — INSULIN DETEMIR SCH UNITS: 100 INJECTION, SOLUTION SUBCUTANEOUS at 09:34

## 2017-01-25 RX ADMIN — NICOTINE SCH PATCH: 14 PATCH, EXTENDED RELEASE TRANSDERMAL at 09:32

## 2017-01-25 RX ADMIN — INSULIN DETEMIR SCH UNITS: 100 INJECTION, SOLUTION SUBCUTANEOUS at 09:00

## 2017-01-25 RX ADMIN — ENOXAPARIN SODIUM SCH MG: 30 INJECTION SUBCUTANEOUS at 20:38

## 2017-01-25 RX ADMIN — INSULIN LISPRO SCH UNITS: 100 INJECTION, SOLUTION INTRAVENOUS; SUBCUTANEOUS at 17:25

## 2017-01-25 RX ADMIN — INSULIN LISPRO SCH UNITS: 100 INJECTION, SOLUTION INTRAVENOUS; SUBCUTANEOUS at 20:38

## 2017-01-25 RX ADMIN — METOPROLOL SUCCINATE SCH MG: 50 TABLET, EXTENDED RELEASE ORAL at 09:33

## 2017-01-25 RX ADMIN — INSULIN LISPRO SCH UNITS: 100 INJECTION, SOLUTION INTRAVENOUS; SUBCUTANEOUS at 12:17

## 2017-01-25 RX ADMIN — INSULIN LISPRO SCH UNITS: 100 INJECTION, SOLUTION INTRAVENOUS; SUBCUTANEOUS at 07:30

## 2017-01-25 RX ADMIN — PREGABALIN SCH MG: 75 CAPSULE ORAL at 09:33

## 2017-01-25 RX ADMIN — CLOPIDOGREL BISULFATE SCH MG: 75 TABLET ORAL at 09:00

## 2017-01-25 RX ADMIN — ASPIRIN 325 MG ORAL TABLET SCH MG: 325 PILL ORAL at 09:33

## 2017-01-25 RX ADMIN — OMEPRAZOLE SCH MG: 20 CAPSULE, DELAYED RELEASE ORAL at 09:33

## 2017-01-25 RX ADMIN — POTASSIUM CHLORIDE SCH MEQ: 750 TABLET, EXTENDED RELEASE ORAL at 09:00

## 2017-01-25 RX ADMIN — CALCIUM CARBONATE-CHOLECALCIFEROL TAB 250 MG-125 UNIT SCH MG: 250-125 TAB at 09:33

## 2017-01-25 RX ADMIN — CALCIUM CARBONATE-CHOLECALCIFEROL TAB 250 MG-125 UNIT SCH MG: 250-125 TAB at 20:37

## 2017-01-25 NOTE — REP
CT STUDY OF THE CHEST WITHOUT IV CONTRAST:

 

HISTORY:  Shortness of breath.

 

Comparison chest CT study is from September 26, 2016.  This showed a 10 mm left

upper lobe nodule.

 

CT FINDINGS:  Preliminary  radiograph and CT images demonstrate median

sternotomy wires and a unipolar right heart pacemaker via the left side.

 

Cardiomegaly is observed.  There is a small quantity of bilateral pleural fluid.

Some patchy pleural calcification is seen in the left base as before.  Vascular

calcification is noted including coronary artery vascular calcification

bilaterally.  No pericardial effusion is seen.  There are scattered subcarinal

and mediastinal lymph nodes again noted unchanged.  None of these is

pathologically enlarged.

 

The 10 mm noncalcified pulmonary nodule noted previously is again noted in the

left perihilar region unchanged.  No other pulmonary nodule or mass lesion is

seen.  Interstitial markings are somewhat prominent in the bases bilaterally.

There is a hazy 7 mm new opacity in the right lower lobe.  There is a little

pleural thickening in the major fissure in the right base which is unchanged.

There is mild motion unsharpness on today's images.  The central pulmonary

arteries are somewhat prominent question pulmonary arterial hypertension or

vascular congestion.  No adrenal lesion is seen.  The visualized upper abdominal

structures are unremarkable.

 

IMPRESSION:

 

Cardiomegaly with pacemaker and some pulmonary vascular congestion.  Interstitial

markings are prominent in the bases and there are small bilateral effusions

compatible with mild CHF pattern.  There is a stable 10 mm noncalcified pulmonary

nodule in the left upper lobe on image number 34 of 106 series 201.  A new

oval-shaped nodular opacity 7 mm in diameter is seen on page 44 in the right

lower lobe.  No focal infiltrate is seen.  Extensive vascular calcification is

again noted as before.

 

 

Signed by

Christian Doll MD 01/25/2017 06:32 P

## 2017-01-25 NOTE — IPNPDOC
Assessment/Plan


Date Seen


The patient was seen on 1/25/17.





Problems


Problems:  


(1) SIRS-noninf w ac org dys


Status:  Acute


Problem Text:  evidenced by HR 90, temp 100.3, WBC 25, and BP 79/41 upon 

arrival to ED; recent flu infection but thus far blood and urine cultures are 

negative; I suspect that this was secondary to dehydration as the patient has a 

concomitant HUSSEIN and has responded to IVF; patient was recently recovering from 

influenza so had poor appetite and was tired so was not eating or drinking 

adequately this probably led to his dehydration and HUSSEIN. 


vitals now stabilized, afebrile, WBC improved to 15; continue to follow up 

cultures; no antibiotics at this time as no clear infection


will get CT chest as having low grade fevers.





(2) Acute on chronic kidney failure


Status:  Acute


Problem Text:  baseline Cr around mid to upper 1s; Cr upon admission was 2.53, 

now increased to 3.0; stop torsemide and check FENa; I suspect this is 

secondary to dehydration/prerenal; start very gentle IVF





(3) Defibrillator discharge


Status:  Resolved


Problem Text:  ICD interogated. ICD  did not fire there was no arrhythmia. 


Patient may have had a myoclonic jerk Dr. Snow consulted; patient denies 

chest pain; trops were negative





(4) CAD (coronary artery disease)


Status:  Chronic


Problem Text:  hx of MI and s/p CABG and stents; no current chest pain; trops 

negative; continue home ASA, plavix, beta blocker; needs to discuss with 

primary physicians adding a statin





(5) HTN (hypertension)


Status:  Chronic


Problem Text:  continue home beta blocker





(6) Diabetes mellitus


Status:  Chronic


Problem Text:  patient is unclear on home lantus dosing; SSI while in house and 

add and adjust levemir accordingly





(7) Chronic diastolic CHF (congestive heart failure)


Status:  Chronic


Problem Text:  came in dehydrated however developed some fluid overload after 

iv hydration , NS stopped, will continue to monitor I/O , will continue to hold 

torsemide





(8) Dyslipidemia


Status:  Chronic


(9) GERD (gastroesophageal reflux disease)


Status:  Chronic





Plan / VTE


VTE Prophylaxis Ordered?:  Yes (lovenox)





Plan


Advance Directives:  DNR





Subjective


Review of Systems


CC/HPI


The patient is a 71-year-old male admitted with a reason for visit of Sepsis.


Events since last encounter


patient had tried to get out of bed by himself during the night and slipped to 

the floor on his knees, did not sustain any trauma , had a low grade temp this 

morning, appears to be more sleepy than yesterday and has myoclonic jerky 

movements of the body. no chest pain or cough , no abdominal pain , nausea or 

vomiting,





Objective


Physical Examination


General Exam:  Positive: Alert, Cooperative, No Acute Distress


Eye Exam:  Positive: EOMI, 


   Negative: Sclera icteric


ENT Exam:  Positive: Mucous membr. moist/pink


Neck Exam:  Positive: Supple


Chest Exam:  Positive: Clear to auscultation, Normal air movement


Heart Exam:  Positive: Rate Normal, Regular Rhythm


Abdomen Exam:  Positive: Normal bowel sounds, Soft


Extremity Exam:  Negative: Edema


Neuro Exam:  Positive: Normal Speech


Psych Exam:  Positive: Mood NL, Oriented x 3





Vital Signs/I&O





 Vital Signs








  Date Time  Temp Pulse Resp B/P Pulse Ox O2 Delivery O2 Flow Rate FiO2


 


1/25/17 08:00 99.0 88 21 129/83 91 Room Air  


 


1/23/17 00:00       1.0 














 I&O- Last 24 Hours up to 6 AM 


 


 1/25/17





 06:00


 


Intake Total 2220 ml


 


Output Total 1860 ml


 


Balance 360 ml











Laboratory Data


Labs 24H


 Laboratory Tests 2


1/24/17 11:25: Bedside Glucose (Misc Panel) 396H


1/24/17 17:08: Bedside Glucose (Misc Panel) 344H


1/24/17 20:28: Bedside Glucose (Misc Panel) 361H


1/25/17 05:27: 


Blood Urea Nitrogen 53H, Creatinine 2.05H, Sodium Level 137, Potassium Level 

5.3H, Chloride Level 103, Carbon Dioxide Level 28, Calcium Level 7.7L, 

Aspartate Amino Transf (AST/SGOT) 11L, Alanine Aminotransferase (ALT/SGPT) 17, 

Alkaline Phosphatase 116, Total Bilirubin 0.2#, Total Protein 5.9L, Albumin 2.1L

, Albumin/Globulin Ratio 0.55L, Anion Gap 6L, White Blood Count 14.8H, Red 

Blood Count 3.51L, Hemoglobin 10.1L, Hematocrit 30.8L, Mean Corpuscular Volume 

87.7, Mean Corpuscular Hemoglobin 28.7, Mean Corpuscular Hemoglobin Concent 32.7

, Red Cell Distribution Width 11.9, Platelet Count 222, Neutrophils (%) (Auto) 

85.8H, Lymphocytes (%) (Auto) 6.5L, Monocytes (%) (Auto) 4.0, Eosinophils (%) (

Auto) 2.1, Basophils (%) (Auto) 0.3, Neutrophils # (Auto) 12.7H, Lymphocytes # (

Auto) 1.0L, Monocytes # (Auto) 0.6, Eosinophils # (Auto) 0.3, Basophils # (Auto

) 0.0, C-Reactive Protein, Quantitative 10.10H, Erythrocyte Sedimentation Rate 

107H, Glomerular Filtration Rate 34.3L, Large Unclassified Cells # 0.2, Large 

Unclassified Cells % 1.3, Magnesium Level 2.4


1/25/17 07:00: 


CBC/BMP


 Laboratory Tests


1/25/17 05:27








Calcium Level 7.7 L, Aspartate Amino Transf (AST/SGOT) 11 L, Alanine 

Aminotransferase (ALT/SGPT) 17, Alkaline Phosphatase 116, Total Bilirubin 0.2 #

, Total Protein 5.9 L, Albumin 2.1 L, Red Blood Count 3.51 L, Mean Corpuscular 

Volume 87.7, Mean Corpuscular Hemoglobin 28.7, Mean Corpuscular Hemoglobin 

Concent 32.7, Red Cell Distribution Width 11.9, Neutrophils (%) (Auto) 85.8 H, 

Lymphocytes (%) (Auto) 6.5 L, Monocytes (%) (Auto) 4.0, Eosinophils (%) (Auto) 

2.1, Basophils (%) (Auto) 0.3, Neutrophils # (Auto) 12.7 H, Lymphocytes # (Auto

) 1.0 L, Monocytes # (Auto) 0.6, Eosinophils # (Auto) 0.3, Basophils # (Auto) 

0.0


FSBS





Laboratory Tests








Test


  1/24/17


11:25 1/24/17


17:08 1/24/17


20:28 Range/Units


 


 


Bedside Glucose (Misc Panel) 396 344 361   MG/DL








Microbiology





 Microbiology


1/22/17 Blood Culture - Preliminary, Resulted


          No Growth after 48 hours. All Specime...


1/22/17 Blood Culture - Preliminary, Resulted


          No Growth after 48 hours. All Specime...


1/22/17 MRSA Screen - Final, Complete


          Staph.aureus Methicillin Resis


1/22/17 Urine Culture - Final, Complete








ROSY LOUIS MD Jan 25, 2017 09:02

## 2017-01-25 NOTE — IPNPDOC
[facility] Cardiology Progress Note


Date of Service/Time


The patient was seen on 1/25/17 at 09:34.





Cardiology Progress Note


SUBJECTIVE: Mr. Dunlap is a 72 y/o  male who is seen and examined at 

bedside this morning, he appears to be doing well, has no active complaints 

aside from the productive cough that he has had for the past few days, but he 

says this too was getting better. Denies any chest pain, palpitations or chest 

pressure.





OBJECTIVE:


PHYSICAL EXAMINATION:


VITAL SIGNS: Please see below.


GENERAL APPEARANCE: Comfortable, conversant and pleasant.


HEENT: nares patent bilaterally, tongue midline, moist mucous membranes.EOMI. 

NCAT.


LUNGS: Faint crackles appreciated at bilateral bases, otherwise no wheezing, 

rhonchi or rales appreciated. Good air expansion bilaterally


HEART: Normal S1, S2, no murmurs, gallops, rubs appreciated heart rate in the 70

's.


ABDOMEN: [id number], soft, nondistended, nontender to palpitation, no organomegaly 

appreciated


SKIN: Dry but intact


EXTREMITIES: No cyanosis, clubbing or edema appreciated


NEUROLOGICAL: No focal deficits appreciated


PSYCHIATRIC: Affect is appropriate





LABORATORY WORK: Please see below.





ASSESSMENT AND PLAN:





Mr Dunlap is doing well this morning, he unfortunately does still have elevated 

blood pressure in the 170 to 190 systolic over 70 diastolic, would suggest 

diuretic therapy per patient in the future for blood pressure control. His 

creatinine one day prior was 2.42, unfortunately labs for this morning are 

still pending. He has had very low-grade temperatures however blood and urine 

cultures so far have been negative, white count has come down to 14.8 from 15 

one day prior, CRP is elevated minimally from 9.9-10.10, primary team has 

ordered chest CT for further evaluation. It is suspected that his increasing 

creatinine was due to dehydration as patient described decreased oral intake 

for a few days prior to presentation due to not feeling well from influenza 

virus. His torsemide has been held. He continues to be on aspirin, Plavix and 

beta blocker. Would continue well current medical management, no further 

recommendations at this time.





Vital Signs/I&O


VS/I&O





 Vital Signs








  Date Time  Temp Pulse Resp B/P Pulse Ox O2 Delivery O2 Flow Rate FiO2


 


1/25/17 08:00 99.0 88 21 129/83 91 Room Air  


 


1/23/17 00:00       1.0 














 I&O- Last 24 Hours up to 6 AM 


 


 1/25/17





 06:00


 


Intake Total 2220 ml


 


Output Total 1860 ml


 


Balance 360 ml











Laboratory Data


24H LABS


 Laboratory Tests 2


1/24/17 11:25: Bedside Glucose (Misc Panel) 396H


1/24/17 17:08: Bedside Glucose (Misc Panel) 344H


1/24/17 20:28: Bedside Glucose (Misc Panel) 361H


1/25/17 05:27: 


Blood Urea Nitrogen 53H, Creatinine 2.05H, Sodium Level 137, Potassium Level 

5.3H, Chloride Level 103, Carbon Dioxide Level 28, Calcium Level 7.7L, 

Aspartate Amino Transf (AST/SGOT) 11L, Alanine Aminotransferase (ALT/SGPT) 17, 

Alkaline Phosphatase 116, Total Bilirubin 0.2#, Total Protein 5.9L, Albumin 2.1L

, Albumin/Globulin Ratio 0.55L, Anion Gap 6L, White Blood Count 14.8H, Red 

Blood Count 3.51L, Hemoglobin 10.1L, Hematocrit 30.8L, Mean Corpuscular Volume 

87.7, Mean Corpuscular Hemoglobin 28.7, Mean Corpuscular Hemoglobin Concent 32.7

, Red Cell Distribution Width 11.9, Platelet Count 222, Neutrophils (%) (Auto) 

85.8H, Lymphocytes (%) (Auto) 6.5L, Monocytes (%) (Auto) 4.0, Eosinophils (%) (

Auto) 2.1, Basophils (%) (Auto) 0.3, Neutrophils # (Auto) 12.7H, Lymphocytes # (

Auto) 1.0L, Monocytes # (Auto) 0.6, Eosinophils # (Auto) 0.3, Basophils # (Auto

) 0.0, C-Reactive Protein, Quantitative 10.10H, Erythrocyte Sedimentation Rate 

107H, Glomerular Filtration Rate 34.3L, Large Unclassified Cells # 0.2, Large 

Unclassified Cells % 1.3, Magnesium Level 2.4


1/25/17 07:00: 


1/25/17 09:13: 


Arterial Blood pH 7.356, Arterial Blood Partial Pressure CO2 43.0, Arterial 

Blood Partial Pressure O2 59.5L, Arterial Blood Total CO2 24.8, Arterial Blood 

HCO3 23.5, Arterial Blood Base Excess -1.9, Arterial Blood Oxygen Saturation 

90.8L, Blood Gas Bicarbonate Standard 22.7


CBC/BMP


 Laboratory Tests


1/25/17 05:27








Calcium Level 7.7 L, Aspartate Amino Transf (AST/SGOT) 11 L, Alanine 

Aminotransferase (ALT/SGPT) 17, Alkaline Phosphatase 116, Total Bilirubin 0.2 #

, Total Protein 5.9 L, Albumin 2.1 L, Red Blood Count 3.51 L, Mean Corpuscular 

Volume 87.7, Mean Corpuscular Hemoglobin 28.7, Mean Corpuscular Hemoglobin 

Concent 32.7, Red Cell Distribution Width 11.9, Neutrophils (%) (Auto) 85.8 H, 

Lymphocytes (%) (Auto) 6.5 L, Monocytes (%) (Auto) 4.0, Eosinophils (%) (Auto) 

2.1, Basophils (%) (Auto) 0.3, Neutrophils # (Auto) 12.7 H, Lymphocytes # (Auto

) 1.0 L, Monocytes # (Auto) 0.6, Eosinophils # (Auto) 0.3, Basophils # (Auto) 

0.0


FSBS





Laboratory Tests








Test


  1/24/17


11:25 1/24/17


17:08 1/24/17


20:28 Range/Units


 


 


Bedside Glucose (Misc Panel) 396 344 361   MG/DL








Microbiology





 Microbiology


1/22/17 Blood Culture - Preliminary, Resulted


          No Growth after 48 hours. All Specime...


1/22/17 Blood Culture - Preliminary, Resulted


          No Growth after 48 hours. All Specime...


1/22/17 MRSA Screen - Final, Complete


          Staph.aureus Methicillin Resis


1/22/17 Urine Culture - Final, Complete





GME ATTESTATION


GME ATTESTATION


My preceptor for this patient encounter was physically present in the building 

during the encounter and was fully available. As needed, all aspects of the 

patient interview, examination, medical decision making process, and medical 

care plan development were reviewed and approved by the preceptor. Preceptor is 

aware and concurs with the plan as stated in the body of this note and will 

attest to such by his/her cosignature.








JOSÉ MIGUEL GARCIA DO Jan 25, 2017 09:41

## 2017-01-26 VITALS — SYSTOLIC BLOOD PRESSURE: 156 MMHG | DIASTOLIC BLOOD PRESSURE: 78 MMHG

## 2017-01-26 VITALS — DIASTOLIC BLOOD PRESSURE: 86 MMHG | SYSTOLIC BLOOD PRESSURE: 156 MMHG

## 2017-01-26 VITALS — DIASTOLIC BLOOD PRESSURE: 72 MMHG | SYSTOLIC BLOOD PRESSURE: 114 MMHG

## 2017-01-26 VITALS — SYSTOLIC BLOOD PRESSURE: 167 MMHG | DIASTOLIC BLOOD PRESSURE: 67 MMHG

## 2017-01-26 VITALS — SYSTOLIC BLOOD PRESSURE: 138 MMHG | DIASTOLIC BLOOD PRESSURE: 74 MMHG

## 2017-01-26 VITALS — SYSTOLIC BLOOD PRESSURE: 158 MMHG | DIASTOLIC BLOOD PRESSURE: 72 MMHG

## 2017-01-26 LAB
ALBUMIN MFR UR ELPH: 42.5 % (ref 55.8–66.1)
ALBUMIN SERPL BCG-MCNC: 2 GM/DL (ref 3.2–5.2)
ALBUMIN SERPL-MCNC: 2.51 GM/DL (ref 3.29–5.55)
ALBUMIN/GLOB SERPL: 0.43 {RATIO} (ref 1–1.93)
ALP SERPL-CCNC: 148 U/L (ref 45–117)
ALT SERPL W P-5'-P-CCNC: 23 U/L (ref 12–78)
ANION GAP SERPL CALC-SCNC: 10 MEQ/L (ref 8–16)
ANION GAP SERPL CALC-SCNC: 8 MEQ/L (ref 8–16)
AST SERPL-CCNC: 15 U/L (ref 15–37)
BASOPHILS # BLD AUTO: 0.2 K/MM3 (ref 0–0.2)
BASOPHILS NFR BLD AUTO: 1.3 % (ref 0–1)
BILIRUB SERPL-MCNC: 0.3 MG/DL (ref 0.2–1)
BUN SERPL-MCNC: 51 MG/DL (ref 7–18)
BUN SERPL-MCNC: 51 MG/DL (ref 7–18)
CALCIUM SERPL-MCNC: 8 MG/DL (ref 8.8–10.2)
CALCIUM SERPL-MCNC: 8.3 MG/DL (ref 8.8–10.2)
CHLORIDE SERPL-SCNC: 100 MEQ/L (ref 98–107)
CHLORIDE SERPL-SCNC: 102 MEQ/L (ref 98–107)
CO2 SERPL-SCNC: 26 MEQ/L (ref 21–32)
CO2 SERPL-SCNC: 27 MEQ/L (ref 21–32)
CREAT SERPL-MCNC: 1.96 MG/DL (ref 0.7–1.3)
CREAT SERPL-MCNC: 2.08 MG/DL (ref 0.7–1.3)
EOSINOPHIL # BLD AUTO: 0.2 K/MM3 (ref 0–0.5)
EOSINOPHIL NFR BLD AUTO: 1.5 % (ref 0–3)
ERYTHROCYTE [DISTWIDTH] IN BLOOD BY AUTOMATED COUNT: 12.8 % (ref 11.5–14.5)
GAMMA GLOB 24H MFR UR ELPH: 14.5 % (ref 11.1–18.8)
GFR SERPL CREATININE-BSD FRML MDRD: 33.8 ML/MIN/{1.73_M2} (ref 42–?)
GFR SERPL CREATININE-BSD FRML MDRD: 36.2 ML/MIN/{1.73_M2} (ref 42–?)
GLUCOSE SERPL-MCNC: 267 MG/DL (ref 83–110)
GLUCOSE SERPL-MCNC: 354 MG/DL (ref 83–110)
LARGE UNSTAINED CELL #: 0.2 K/MM3 (ref 0–0.4)
LARGE UNSTAINED CELL %: 1.4 % (ref 0–4)
LYMPHOCYTES # BLD AUTO: 1.3 K/MM3 (ref 1.5–4.5)
LYMPHOCYTES NFR BLD AUTO: 7.2 % (ref 24–44)
MAGNESIUM SERPL-MCNC: 2.5 MG/DL (ref 1.8–2.4)
MCH RBC QN AUTO: 28.6 PG (ref 27–33)
MCHC RBC AUTO-ENTMCNC: 31.5 G/DL (ref 32–36.5)
MCV RBC AUTO: 90.7 FL (ref 80–96)
MONOCYTES # BLD AUTO: 0.8 K/MM3 (ref 0–0.8)
MONOCYTES NFR BLD AUTO: 5.3 % (ref 0–5)
NEUTROPHILS # BLD AUTO: 13 K/MM3 (ref 1.8–7.7)
NEUTROPHILS NFR BLD AUTO: 83.4 % (ref 36–66)
PLATELET # BLD AUTO: 216 K/MM3 (ref 150–450)
POTASSIUM SERPL-SCNC: 5.1 MEQ/L (ref 3.5–5.1)
POTASSIUM SERPL-SCNC: 5.5 MEQ/L (ref 3.5–5.1)
PROT SERPL-MCNC: 5.9 GM/DL (ref 6.4–8.2)
PROT SERPL-MCNC: 6.7 GM/DL (ref 6.4–8.2)
SODIUM SERPL-SCNC: 136 MEQ/L (ref 136–145)
SODIUM SERPL-SCNC: 137 MEQ/L (ref 136–145)
WBC # BLD AUTO: 15.6 K/MM3 (ref 4–10)

## 2017-01-26 RX ADMIN — NICOTINE SCH PATCH: 14 PATCH, EXTENDED RELEASE TRANSDERMAL at 08:19

## 2017-01-26 RX ADMIN — INSULIN LISPRO SCH UNITS: 100 INJECTION, SOLUTION INTRAVENOUS; SUBCUTANEOUS at 08:22

## 2017-01-26 RX ADMIN — INSULIN LISPRO SCH UNITS: 100 INJECTION, SOLUTION INTRAVENOUS; SUBCUTANEOUS at 18:18

## 2017-01-26 RX ADMIN — ASPIRIN 325 MG ORAL TABLET SCH MG: 325 PILL ORAL at 08:19

## 2017-01-26 RX ADMIN — OMEPRAZOLE SCH MG: 20 CAPSULE, DELAYED RELEASE ORAL at 08:20

## 2017-01-26 RX ADMIN — CLOPIDOGREL BISULFATE SCH MG: 75 TABLET ORAL at 08:20

## 2017-01-26 RX ADMIN — INSULIN LISPRO SCH UNITS: 100 INJECTION, SOLUTION INTRAVENOUS; SUBCUTANEOUS at 12:43

## 2017-01-26 RX ADMIN — IPRATROPIUM BROMIDE AND ALBUTEROL SULFATE SCH ML: .5; 3 SOLUTION RESPIRATORY (INHALATION) at 13:05

## 2017-01-26 RX ADMIN — POTASSIUM CHLORIDE SCH MEQ: 750 TABLET, EXTENDED RELEASE ORAL at 08:20

## 2017-01-26 RX ADMIN — IPRATROPIUM BROMIDE AND ALBUTEROL SULFATE SCH ML: .5; 3 SOLUTION RESPIRATORY (INHALATION) at 19:50

## 2017-01-26 RX ADMIN — INSULIN LISPRO SCH UNITS: 100 INJECTION, SOLUTION INTRAVENOUS; SUBCUTANEOUS at 20:44

## 2017-01-26 RX ADMIN — PREGABALIN SCH MG: 75 CAPSULE ORAL at 20:43

## 2017-01-26 RX ADMIN — ENOXAPARIN SODIUM SCH MG: 30 INJECTION SUBCUTANEOUS at 20:43

## 2017-01-26 RX ADMIN — METOPROLOL SUCCINATE SCH MG: 50 TABLET, EXTENDED RELEASE ORAL at 08:20

## 2017-01-26 RX ADMIN — CALCIUM CARBONATE-CHOLECALCIFEROL TAB 250 MG-125 UNIT SCH MG: 250-125 TAB at 08:20

## 2017-01-26 RX ADMIN — IPRATROPIUM BROMIDE AND ALBUTEROL SULFATE SCH ML: .5; 3 SOLUTION RESPIRATORY (INHALATION) at 08:00

## 2017-01-26 RX ADMIN — INSULIN DETEMIR SCH UNITS: 100 INJECTION, SOLUTION SUBCUTANEOUS at 08:21

## 2017-01-26 RX ADMIN — IPRATROPIUM BROMIDE AND ALBUTEROL SULFATE SCH ML: .5; 3 SOLUTION RESPIRATORY (INHALATION) at 22:54

## 2017-01-26 RX ADMIN — CALCIUM CARBONATE-CHOLECALCIFEROL TAB 250 MG-125 UNIT SCH MG: 250-125 TAB at 20:43

## 2017-01-26 RX ADMIN — PREGABALIN SCH MG: 75 CAPSULE ORAL at 08:20

## 2017-01-26 NOTE — IPNPDOC
[facility] Cardiology Progress Note


Date of Service/Time


The patient was seen on 1/26/17 at 09:34.





Cardiology Progress Note


SUBJECTIVE: Mr Dunlap is a 70 y/o  male who was seen and examined at 

bedside this morning, he was sitting upright in his hospital bed eating 

breakfast, was pleasant and conversant and in no distress.





OBJECTIVE: Patient was seen and examined at bedside, in no distress. Denied 

chest pain, palpitations or SOB. He seems to be doing well, still has a bit of 

a productive cough. 





PHYSICAL EXAMINATION:


VITAL SIGNS: Please see below.


GENERAL APPEARANCE: Sitting upright in bed eating, was pleasant, conversant and 

non-labored


HEENT:NCAT, EOMI, nares patent bilaterally, moist mucous membranes, tongue 

midline.


LUNGS: Diffuse crackles heard throughout lung fields, good air expansion 

bilaterally, no rales, rhonchi appreciated.


HEART: normal S1, S2, no murmurs, rubs or gallops appreciated. JVD 4-5 cm.


ABDOMEN: soft, non distended


SKIN: intact


EXTREMITIES: no edema, cyanosis, clubbing appreciated.


NEUROLOGICAL: no focal deficit appreciated


PSYCHIATRIC: affect is appropriate





LABORATORY WORK: Please see below.





ASSESSMENT AND PLAN:





Mr. Dunlap appears to be doing well this morning, has no active complaints 

except for persistent productive cough, says he thinks that this is improving. 

Will order incentive spirometry for patient.  On physical exam JVD was noted to 

be 4-5 cm, seems a bit fluid overloaded, would suggest beginning diuretic 

therapy for patient of 80 mg IV Lasix 1, creatine improved today from 2.42 to 

1.96, we will repeat BMP this afternoon. Patient was noted to have potassium of 

5.5, we will discontinue potassium supplement. Would continue to hold off on 

ACE inhibitor medical therapy at this time, no further recommendations.





Vital Signs/I&O


VS/I&O





 Vital Signs








  Date Time  Temp Pulse Resp B/P Pulse Ox O2 Delivery O2 Flow Rate FiO2


 


1/26/17 08:20  85  158/72    


 


1/26/17 06:00 97.9  19  95 Room Air  


 


1/23/17 00:00       1.0 














 I&O- Last 24 Hours up to 6 AM 


 


 1/26/17





 06:00


 


Intake Total 2380 ml


 


Output Total 525 ml


 


Balance 1855 ml











Laboratory Data


24H LABS


 Laboratory Tests 2


1/25/17 11:50: Bedside Glucose (Misc Panel) 291H


1/25/17 16:29: Bedside Glucose (Misc Panel) 279H


1/25/17 20:30: Bedside Glucose (Misc Panel) 348H


1/26/17 06:06: 


Blood Urea Nitrogen 51H, Creatinine 1.96H, Sodium Level 137, Potassium Level 

5.5H, Chloride Level 102, Carbon Dioxide Level 27, Calcium Level 8.3L, 

Aspartate Amino Transf (AST/SGOT) 15, Alanine Aminotransferase (ALT/SGPT) 23, 

Alkaline Phosphatase 148H, Total Bilirubin 0.3, Total Protein 6.7, Albumin 2.0L

, Albumin/Globulin Ratio 0.43L, Anion Gap 8, White Blood Count 15.6H, Red Blood 

Count 3.30L, Hemoglobin 9.4L, Hematocrit 29.9L, Mean Corpuscular Volume 90.7, 

Mean Corpuscular Hemoglobin 28.6, Mean Corpuscular Hemoglobin Concent 31.5L, 

Red Cell Distribution Width 12.8, Platelet Count 216, Neutrophils (%) (Auto) 

83.4H, Lymphocytes (%) (Auto) 7.2L, Monocytes (%) (Auto) 5.3H, Eosinophils (%) (

Auto) 1.5, Basophils (%) (Auto) 1.3H, Neutrophils # (Auto) 13.0H, Lymphocytes # 

(Auto) 1.3L, Monocytes # (Auto) 0.8, Eosinophils # (Auto) 0.2, Basophils # (Auto

) 0.2, Glomerular Filtration Rate 36.2L, Large Unclassified Cells # 0.2, Large 

Unclassified Cells % 1.4, Magnesium Level 2.5H


CBC/BMP


 Laboratory Tests


1/26/17 06:06








Calcium Level 8.3 L, Aspartate Amino Transf (AST/SGOT) 15, Alanine 

Aminotransferase (ALT/SGPT) 23, Alkaline Phosphatase 148 H, Total Bilirubin 0.3

, Total Protein 6.7, Albumin 2.0 L, Red Blood Count 3.30 L, Mean Corpuscular 

Volume 90.7, Mean Corpuscular Hemoglobin 28.6, Mean Corpuscular Hemoglobin 

Concent 31.5 L, Red Cell Distribution Width 12.8, Neutrophils (%) (Auto) 83.4 H

, Lymphocytes (%) (Auto) 7.2 L, Monocytes (%) (Auto) 5.3 H, Eosinophils (%) (

Auto) 1.5, Basophils (%) (Auto) 1.3 H, Neutrophils # (Auto) 13.0 H, Lymphocytes 

# (Auto) 1.3 L, Monocytes # (Auto) 0.8, Eosinophils # (Auto) 0.2, Basophils # (

Auto) 0.2


FSBS





Laboratory Tests








Test


  1/25/17


11:50 1/25/17


16:29 1/25/17


20:30 Range/Units


 


 


Bedside Glucose (Misc Panel) 291 279 348   MG/DL








Microbiology





 Microbiology


1/22/17 Blood Culture - Preliminary, Resulted


          No Growth after 72 hours. All specime...


1/22/17 Blood Culture - Preliminary, Resulted


          No Growth after 72 hours. All specime...


1/25/17 MRSA Screen - Final, Complete


          Staph.aureus Methicillin Resis


1/22/17 MRSA Screen - Final, Complete


          Staph.aureus Methicillin Resis


1/22/17 Urine Culture - Final, Complete





GME ATTESTATION


GME ATTESTATION


My preceptor for this patient encounter was physically present in the building 

during the encounter and was fully available. As needed, all aspects of the 

patient interview, examination, medical decision making process, and medical 

care plan development were reviewed and approved by the preceptor. Preceptor is 

aware and concurs with the plan as stated in the body of this note and will 

attest to such by his/her cosignature.








JOSÉ MIGUEL GARCIA DO Jan 26, 2017 09:45

## 2017-01-26 NOTE — IPNPDOC
Assessment/Plan


Date Seen


The patient was seen on 1/26/17.





Problems


Problems:  


(1) SIRS-noninf w ac org dys


Status:  Acute


Problem Text:  evidenced by HR 90, temp 100.3, WBC 25, and BP 79/41 upon 

arrival to ED; recent flu infection but thus far blood and urine cultures are 

negative; I suspect that this was secondary to dehydration as the patient has a 

concomitant HUSSEIN and has responded to IVF; patient was recently recovering from 

influenza so had poor appetite and was tired so was not eating or drinking 

adequately this probably led to his dehydration and HUSSEIN. 


vitals now stabilized, afebrile, WBC improved to 15; continue to follow up 

cultures; no antibiotics at this time as no clear infection


will get CT chest as having low grade fevers.





(2) Acute on chronic kidney failure


Status:  Acute


Problem Text:  baseline Cr around mid to upper 1s; Cr upon admission was 2.53, 

now increased to 3.0;  I suspect this is secondary to dehydration/prerenal; Now 

improving 


will restart lasix. seems to be getting mildly fluid overloaded. 





(3) Defibrillator discharge


Status:  Resolved


Problem Text:  ICD interogated. ICD  did not fire there was no arrhythmia. 


Patient may have had a myoclonic jerk Dr. Snow consulted; patient denies 

chest pain; trops were negative





(4) CAD (coronary artery disease)


Status:  Chronic


Problem Text:  hx of MI and s/p CABG and stents; no current chest pain; trops 

negative; continue home ASA, plavix, beta blocker; needs to discuss with 

primary physicians adding a statin





(5) HTN (hypertension)


Status:  Chronic


Problem Text:  continue home beta blocker





(6) Diabetes mellitus


Status:  Chronic


Problem Text:  patient is unclear on home lantus dosing; SSI while in house and 

add and adjust levemir accordingly





(7) Chronic diastolic CHF (congestive heart failure)


Status:  Chronic


Problem Text:  came in dehydrated however developed some fluid overload after 

iv hydration , NS stopped, will continue to monitor I/O , will start lasix





(8) Dyslipidemia


Status:  Chronic


(9) GERD (gastroesophageal reflux disease)


Status:  Chronic





Plan / VTE


VTE Prophylaxis Ordered?:  Yes (lovenox)





Plan


Advance Directives:  DNR





Subjective


Review of Systems


CC/HPI


The patient is a 71-year-old male admitted with a reason for visit of Sepsis.


Events since last encounter


feeling weak and tired, denied any chest pain or SOB , but does have cough , no 

fever or chills, no anusea or vomiting or diarrhea.





Objective


Physical Examination


General Exam:  Positive: Alert, Cooperative, No Acute Distress


Eye Exam:  Positive: EOMI, 


   Negative: Sclera icteric


ENT Exam:  Positive: Mucous membr. moist/pink


Neck Exam:  Positive: Supple


Chest Exam:  Positive: Normal air movement, Rhonchi, Wheezing


Heart Exam:  Positive: Rate Normal, Regular Rhythm


Abdomen Exam:  Positive: Normal bowel sounds, Soft


Extremity Exam:  Negative: Edema


Neuro Exam:  Positive: Normal Speech


Psych Exam:  Positive: Mood NL, Oriented x 3





Vital Signs/I&O





 Vital Signs








  Date Time  Temp Pulse Resp B/P Pulse Ox O2 Delivery O2 Flow Rate FiO2


 


1/26/17 08:20  85  158/72    


 


1/26/17 06:00 97.9  19  95 Room Air  


 


1/23/17 00:00       1.0 














 I&O- Last 24 Hours up to 6 AM 


 


 1/26/17





 06:00


 


Intake Total 2380 ml


 


Output Total 525 ml


 


Balance 1855 ml











Laboratory Data


Labs 24H


 Laboratory Tests 2


1/25/17 11:50: Bedside Glucose (Misc Panel) 291H


1/25/17 16:29: Bedside Glucose (Misc Panel) 279H


1/25/17 20:30: Bedside Glucose (Misc Panel) 348H


1/26/17 06:06: 


Blood Urea Nitrogen 51H, Creatinine 1.96H, Sodium Level 137, Potassium Level 

5.5H, Chloride Level 102, Carbon Dioxide Level 27, Calcium Level 8.3L, 

Aspartate Amino Transf (AST/SGOT) 15, Alanine Aminotransferase (ALT/SGPT) 23, 

Alkaline Phosphatase 148H, Total Bilirubin 0.3, Total Protein 6.7, Albumin 2.0L

, Albumin/Globulin Ratio 0.43L, Anion Gap 8, White Blood Count 15.6H, Red Blood 

Count 3.30L, Hemoglobin 9.4L, Hematocrit 29.9L, Mean Corpuscular Volume 90.7, 

Mean Corpuscular Hemoglobin 28.6, Mean Corpuscular Hemoglobin Concent 31.5L, 

Red Cell Distribution Width 12.8, Platelet Count 216, Neutrophils (%) (Auto) 

83.4H, Lymphocytes (%) (Auto) 7.2L, Monocytes (%) (Auto) 5.3H, Eosinophils (%) (

Auto) 1.5, Basophils (%) (Auto) 1.3H, Neutrophils # (Auto) 13.0H, Lymphocytes # 

(Auto) 1.3L, Monocytes # (Auto) 0.8, Eosinophils # (Auto) 0.2, Basophils # (Auto

) 0.2, Glomerular Filtration Rate 36.2L, Large Unclassified Cells # 0.2, Large 

Unclassified Cells % 1.4, Magnesium Level 2.5H


CBC/BMP


 Laboratory Tests


1/26/17 06:06








Calcium Level 8.3 L, Aspartate Amino Transf (AST/SGOT) 15, Alanine 

Aminotransferase (ALT/SGPT) 23, Alkaline Phosphatase 148 H, Total Bilirubin 0.3

, Total Protein 6.7, Albumin 2.0 L, Red Blood Count 3.30 L, Mean Corpuscular 

Volume 90.7, Mean Corpuscular Hemoglobin 28.6, Mean Corpuscular Hemoglobin 

Concent 31.5 L, Red Cell Distribution Width 12.8, Neutrophils (%) (Auto) 83.4 H

, Lymphocytes (%) (Auto) 7.2 L, Monocytes (%) (Auto) 5.3 H, Eosinophils (%) (

Auto) 1.5, Basophils (%) (Auto) 1.3 H, Neutrophils # (Auto) 13.0 H, Lymphocytes 

# (Auto) 1.3 L, Monocytes # (Auto) 0.8, Eosinophils # (Auto) 0.2, Basophils # (

Auto) 0.2


FSBS





Laboratory Tests








Test


  1/25/17


11:50 1/25/17


16:29 1/25/17


20:30 Range/Units


 


 


Bedside Glucose (Misc Panel) 291 279 348   MG/DL








Microbiology





 Microbiology


1/22/17 Blood Culture - Preliminary, Resulted


          No Growth after 72 hours. All specime...


1/22/17 Blood Culture - Preliminary, Resulted


          No Growth after 72 hours. All specime...


1/25/17 MRSA Screen - Final, Complete


          Staph.aureus Methicillin Resis


1/22/17 MRSA Screen - Final, Complete


          Staph.aureus Methicillin Resis


1/22/17 Urine Culture - Final, Complete








ROSY LOUIS MD Jan 26, 2017 10:09

## 2017-01-27 VITALS — DIASTOLIC BLOOD PRESSURE: 73 MMHG | SYSTOLIC BLOOD PRESSURE: 148 MMHG

## 2017-01-27 VITALS — SYSTOLIC BLOOD PRESSURE: 143 MMHG | DIASTOLIC BLOOD PRESSURE: 72 MMHG

## 2017-01-27 VITALS — SYSTOLIC BLOOD PRESSURE: 152 MMHG | DIASTOLIC BLOOD PRESSURE: 80 MMHG

## 2017-01-27 VITALS — SYSTOLIC BLOOD PRESSURE: 150 MMHG | DIASTOLIC BLOOD PRESSURE: 58 MMHG

## 2017-01-27 VITALS — SYSTOLIC BLOOD PRESSURE: 162 MMHG | DIASTOLIC BLOOD PRESSURE: 74 MMHG

## 2017-01-27 LAB
ALBUMIN SERPL BCG-MCNC: 2 GM/DL (ref 3.2–5.2)
ALBUMIN/GLOB SERPL: 0.43 {RATIO} (ref 1–1.93)
ALP SERPL-CCNC: 198 U/L (ref 45–117)
ALT SERPL W P-5'-P-CCNC: 31 U/L (ref 12–78)
ANION GAP SERPL CALC-SCNC: 6 MEQ/L (ref 8–16)
AST SERPL-CCNC: 23 U/L (ref 15–37)
BASOPHILS # BLD AUTO: 0.1 K/MM3 (ref 0–0.2)
BASOPHILS NFR BLD AUTO: 0.8 % (ref 0–1)
BILIRUB SERPL-MCNC: 0.2 MG/DL (ref 0.2–1)
BUN SERPL-MCNC: 51 MG/DL (ref 7–18)
CALCIUM SERPL-MCNC: 8.5 MG/DL (ref 8.8–10.2)
CHLORIDE SERPL-SCNC: 100 MEQ/L (ref 98–107)
CO2 SERPL-SCNC: 29 MEQ/L (ref 21–32)
CREAT SERPL-MCNC: 2.02 MG/DL (ref 0.7–1.3)
EOSINOPHIL # BLD AUTO: 0.3 K/MM3 (ref 0–0.5)
EOSINOPHIL NFR BLD AUTO: 1.6 % (ref 0–3)
ERYTHROCYTE [DISTWIDTH] IN BLOOD BY AUTOMATED COUNT: 12.8 % (ref 11.5–14.5)
GFR SERPL CREATININE-BSD FRML MDRD: 34.9 ML/MIN/{1.73_M2} (ref 42–?)
GLUCOSE SERPL-MCNC: 264 MG/DL (ref 83–110)
KAPPA LC FREE SER-MCNC: 65.37 MG/L (ref 3.3–19.4)
KAPPA LC/LAMBDA SER: 1.59 {RATIO} (ref 0.26–1.65)
LAMBDA LC FREE SERPL-MCNC: 41.1 MG/L (ref 5.71–26.3)
LARGE UNSTAINED CELL #: 0.2 K/MM3 (ref 0–0.4)
LARGE UNSTAINED CELL %: 1.5 % (ref 0–4)
LYMPHOCYTES # BLD AUTO: 1.3 K/MM3 (ref 1.5–4.5)
LYMPHOCYTES NFR BLD AUTO: 6.6 % (ref 24–44)
MAGNESIUM SERPL-MCNC: 2.4 MG/DL (ref 1.8–2.4)
MCH RBC QN AUTO: 28.4 PG (ref 27–33)
MCHC RBC AUTO-ENTMCNC: 32.1 G/DL (ref 32–36.5)
MCV RBC AUTO: 88.7 FL (ref 80–96)
MONOCYTES # BLD AUTO: 0.8 K/MM3 (ref 0–0.8)
MONOCYTES NFR BLD AUTO: 4.9 % (ref 0–5)
NEUTROPHILS # BLD AUTO: 14 K/MM3 (ref 1.8–7.7)
NEUTROPHILS NFR BLD AUTO: 84.7 % (ref 36–66)
PLATELET # BLD AUTO: 223 K/MM3 (ref 150–450)
POTASSIUM SERPL-SCNC: 5.7 MEQ/L (ref 3.5–5.1)
PROT SERPL-MCNC: 6.7 GM/DL (ref 6.4–8.2)
SODIUM SERPL-SCNC: 135 MEQ/L (ref 136–145)
WBC # BLD AUTO: 16.5 K/MM3 (ref 4–10)

## 2017-01-27 RX ADMIN — HYDROCODONE BITARTRATE AND ACETAMINOPHEN PRN TAB: 5; 325 TABLET ORAL at 03:47

## 2017-01-27 RX ADMIN — METOPROLOL SUCCINATE SCH MG: 50 TABLET, EXTENDED RELEASE ORAL at 08:34

## 2017-01-27 RX ADMIN — ENOXAPARIN SODIUM SCH MG: 30 INJECTION SUBCUTANEOUS at 20:40

## 2017-01-27 RX ADMIN — INSULIN LISPRO SCH UNITS: 100 INJECTION, SOLUTION INTRAVENOUS; SUBCUTANEOUS at 17:26

## 2017-01-27 RX ADMIN — INSULIN DETEMIR SCH UNITS: 100 INJECTION, SOLUTION SUBCUTANEOUS at 20:41

## 2017-01-27 RX ADMIN — CALCIUM CARBONATE-CHOLECALCIFEROL TAB 250 MG-125 UNIT SCH MG: 250-125 TAB at 08:34

## 2017-01-27 RX ADMIN — PREGABALIN SCH MG: 75 CAPSULE ORAL at 20:40

## 2017-01-27 RX ADMIN — IPRATROPIUM BROMIDE AND ALBUTEROL SULFATE SCH ML: .5; 3 SOLUTION RESPIRATORY (INHALATION) at 13:23

## 2017-01-27 RX ADMIN — IPRATROPIUM BROMIDE AND ALBUTEROL SULFATE SCH ML: .5; 3 SOLUTION RESPIRATORY (INHALATION) at 08:00

## 2017-01-27 RX ADMIN — OMEPRAZOLE SCH MG: 20 CAPSULE, DELAYED RELEASE ORAL at 08:33

## 2017-01-27 RX ADMIN — IPRATROPIUM BROMIDE AND ALBUTEROL SULFATE SCH ML: .5; 3 SOLUTION RESPIRATORY (INHALATION) at 20:18

## 2017-01-27 RX ADMIN — ASPIRIN 325 MG ORAL TABLET SCH MG: 325 PILL ORAL at 08:32

## 2017-01-27 RX ADMIN — PREGABALIN SCH MG: 75 CAPSULE ORAL at 08:34

## 2017-01-27 RX ADMIN — NICOTINE SCH PATCH: 14 PATCH, EXTENDED RELEASE TRANSDERMAL at 08:32

## 2017-01-27 RX ADMIN — INSULIN DETEMIR SCH UNITS: 100 INJECTION, SOLUTION SUBCUTANEOUS at 08:33

## 2017-01-27 RX ADMIN — INSULIN LISPRO SCH UNITS: 100 INJECTION, SOLUTION INTRAVENOUS; SUBCUTANEOUS at 12:47

## 2017-01-27 RX ADMIN — CALCIUM CARBONATE-CHOLECALCIFEROL TAB 250 MG-125 UNIT SCH MG: 250-125 TAB at 20:40

## 2017-01-27 RX ADMIN — INSULIN LISPRO SCH UNITS: 100 INJECTION, SOLUTION INTRAVENOUS; SUBCUTANEOUS at 08:33

## 2017-01-27 RX ADMIN — INSULIN LISPRO SCH UNITS: 100 INJECTION, SOLUTION INTRAVENOUS; SUBCUTANEOUS at 20:42

## 2017-01-27 RX ADMIN — CLOPIDOGREL BISULFATE SCH MG: 75 TABLET ORAL at 08:34

## 2017-01-27 NOTE — IPNPDOC
[facility] Cardiology Progress Note


Date of Service/Time


The patient was seen on 1/27/17 at 10:02.





Cardiology Progress Note


SUBJECTIVE: Mr Pozo was seen and examined at bedside this morning, he 

appeared to be doing quite well. He was sitting upright in his bedside chair 

shaving after having eaten his breakfast. He denied any active complaints and 

stated that he felt much  better since his lasix treatment one day prior, he 

states he could breath easier. He denied chest pain, palpitations or SOB. 





OBJECTIVE: Mr Ashby looked well this morning, he was not labored and was not 

coughing or SOB, he denied CP or palpitations and states that his breathing had 

improved since given the lasix therapy one day prior.





PHYSICAL EXAMINATION:


VITAL SIGNS: Please see below.


GENERAL APPEARANCE: Comfortable elderly man, sitting in bedside chair shaving 

his facial beard. Was in no distress, pleasant and conversant.  


HEENT: NCAT, nares patent b/l, tongue midline, moist mucus membranes, neck 

supple without JVD as seen in prior days. 


LUNGS: minor crackles at bases b/l, however much improved from one day prior, 

no wheezing, rales or rhonchi appreciated


HEART: normal s1 and s2, no murmurs, rubs or gallops appreciated, rate in the 70

's


ABDOMEN: soft, non-distended, non-tender. 


SKIN: dry, intact


EXTREMITIES: trace edema b/l LE, otherwise no cyanosis or clubbing appreciated


NEUROLOGICAL: no focal deficits appreciated


PSYCHIATRIC: affect is appropriate





LABORATORY WORK: Please see below.





ASSESSMENT AND PLAN: 





Mr Ashby seems to be doing very well this morning and has no active complaints 

at this time. Will give another dose of Lasix IV 80 mg x1 today. Creatine today 

is 2.02 fro, 2.08, seems it is beginning to plateau. Would continue with 

current plan of care. No further recommendations at this time.





Vital Signs/I&O


VS/I&O





 Vital Signs








  Date Time  Temp Pulse Resp B/P Pulse Ox O2 Delivery O2 Flow Rate FiO2


 


1/27/17 08:34  71  150/58    


 


1/27/17 06:00 98.4  18  93 Room Air  


 


1/23/17 00:00       1.0 














 I&O- Last 24 Hours up to 6 AM 


 


 1/27/17





 06:00


 


Intake Total 2160 ml


 


Output Total 2477 ml


 


Balance -317 ml











Laboratory Data


24H LABS


 Laboratory Tests 2


1/26/17 11:44: Bedside Glucose (Misc Panel) 476H


1/26/17 15:03: 


Anion Gap 10, Blood Urea Nitrogen 51H, Creatinine 2.08H, Sodium Level 136, 

Potassium Level 5.1, Chloride Level 100, Carbon Dioxide Level 26, Calcium Level 

8.0L, Glomerular Filtration Rate 33.8L


1/26/17 16:57: Bedside Glucose (Misc Panel) 524*H


1/26/17 17:14: Bedside Glucose Confirm (Misc) 342H


1/26/17 19:59: Bedside Glucose (Misc Panel) 412H


1/27/17 06:12: 


1/27/17 06:19: 


Blood Urea Nitrogen 51H, Creatinine 2.02H, Sodium Level 135L, Potassium Level 

5.7H, Chloride Level 100, Carbon Dioxide Level 29, Calcium Level 8.5L, 

Aspartate Amino Transf (AST/SGOT) 23, Alanine Aminotransferase (ALT/SGPT) 31, 

Alkaline Phosphatase 198H, Total Bilirubin 0.2, Total Protein 6.7, Albumin 2.0L

, Albumin/Globulin Ratio 0.43L, Anion Gap 6L, White Blood Count 16.5H, Red 

Blood Count 3.19L, Hemoglobin 9.1L, Hematocrit 28.3L, Mean Corpuscular Volume 

88.7, Mean Corpuscular Hemoglobin 28.4, Mean Corpuscular Hemoglobin Concent 32.1

, Red Cell Distribution Width 12.8, Platelet Count 223, Neutrophils (%) (Auto) 

84.7H, Lymphocytes (%) (Auto) 6.6L, Monocytes (%) (Auto) 4.9, Eosinophils (%) (

Auto) 1.6, Basophils (%) (Auto) 0.8, Neutrophils # (Auto) 14.0H, Lymphocytes # (

Auto) 1.3L, Monocytes # (Auto) 0.8, Eosinophils # (Auto) 0.3, Basophils # (Auto

) 0.1, Glomerular Filtration Rate 34.9L, Large Unclassified Cells # 0.2, Large 

Unclassified Cells % 1.5, Magnesium Level 2.4


CBC/BMP


 Laboratory Tests


1/26/17 15:03








Calcium Level 8.0 L





1/27/17 06:19








Calcium Level 8.5 L, Aspartate Amino Transf (AST/SGOT) 23, Alanine 

Aminotransferase (ALT/SGPT) 31, Alkaline Phosphatase 198 H, Total Bilirubin 0.2

, Total Protein 6.7, Albumin 2.0 L, Red Blood Count 3.19 L, Mean Corpuscular 

Volume 88.7, Mean Corpuscular Hemoglobin 28.4, Mean Corpuscular Hemoglobin 

Concent 32.1, Red Cell Distribution Width 12.8, Neutrophils (%) (Auto) 84.7 H, 

Lymphocytes (%) (Auto) 6.6 L, Monocytes (%) (Auto) 4.9, Eosinophils (%) (Auto) 

1.6, Basophils (%) (Auto) 0.8, Neutrophils # (Auto) 14.0 H, Lymphocytes # (Auto

) 1.3 L, Monocytes # (Auto) 0.8, Eosinophils # (Auto) 0.3, Basophils # (Auto) 

0.1


FSBS





Laboratory Tests








Test


  1/26/17


11:44 1/26/17


16:57 1/26/17


19:59 Range/Units


 


 


Bedside Glucose (Misc Panel) 476 524 412   MG/DL








Microbiology





 Microbiology


1/22/17 Blood Culture - Preliminary, Resulted


          No Growth after 72 hours. All specime...


1/22/17 Blood Culture - Preliminary, Resulted


          No Growth after 72 hours. All specime...


1/25/17 MRSA Screen - Final, Complete


          Staph.aureus Methicillin Resis


1/22/17 MRSA Screen - Final, Complete


          Staph.aureus Methicillin Resis


1/22/17 Urine Culture - Final, Complete





GME ATTESTATION


GME ATTESTATION


My preceptor for this patient encounter was physically present in the building 

during the encounter and was fully available. As needed, all aspects of the 

patient interview, examination, medical decision making process, and medical 

care plan development were reviewed and approved by the preceptor. Preceptor is 

aware and concurs with the plan as stated in the body of this note and will 

attest to such by his/her cosignature.








JOSÉ MIGUEL GARCIA DO Jan 27, 2017 10:09

## 2017-01-27 NOTE — IPNPDOC
Assessment/Plan


Date Seen


The patient was seen on 1/27/17.





Problems


Problems:  


(1) Leucocytosis


Status:  Acute


Problem Text:  persistent leucocytosis in th 15 to 16 k range with high esr and 

crp. no sourse of infection identified


will send c diff


SPEP shows 2 m spikes in the gamma region , free light chanin ratio is normal , 

ordered immunofixation of serum. 





(2) SIRS-noninf w ac org dys


Status:  Acute


Problem Text:  evidenced by HR 90, temp 100.3, WBC 25, and BP 79/41 upon 

arrival to ED; recent flu infection but thus far blood and urine cultures are 

negative; I suspect that this was secondary to dehydration as the patient has a 

concomitant HUSSEIN and has responded to IVF; patient was recently recovering from 

influenza so had poor appetite and was tired so was not eating or drinking 

adequately this probably led to his dehydration and HUSSEIN. 


vitals now stabilized, afebrile, WBC improved to 15; continue to follow up 

cultures; no antibiotics at this time as no clear infection


will get CT chest as having low grade fevers.





(3) Acute on chronic kidney failure


Status:  Acute


Problem Text:  baseline Cr around mid to upper 1s; Cr upon admission was 2.53, 

now increased to 3.0;  I suspect this is secondary to dehydration/prerenal; Now 

improving 


will restart lasix. seems to be getting mildly fluid overloaded. 





(4) Defibrillator discharge


Status:  Resolved


Problem Text:  ICD interogated. ICD  did not fire there was no arrhythmia. 


Patient may have had a myoclonic jerk Dr. Snow consulted; patient denies 

chest pain; trops were negative





(5) CAD (coronary artery disease)


Status:  Chronic


Problem Text:  hx of MI and s/p CABG and stents; no current chest pain; trops 

negative; continue home ASA, plavix, beta blocker; needs to discuss with 

primary physicians adding a statin





(6) HTN (hypertension)


Status:  Chronic


Problem Text:  continue home beta blocker





(7) Diabetes mellitus


Status:  Chronic


Response to Treatment:  Uncontrolled


Problem Text:  patient is unclear on home lantus dosing; SSI while in house and 

add and adjust levemir accordingly





(8) Chronic diastolic CHF (congestive heart failure)


Status:  Chronic


Problem Text:  came in dehydrated however developed some fluid overload after 

iv hydration , NS stopped, will continue to monitor I/O , will start lasix





(9) Dyslipidemia


Status:  Chronic


(10) GERD (gastroesophageal reflux disease)


Status:  Chronic





Plan / VTE


VTE Prophylaxis Ordered?:  Yes (lovenox)





Plan


Advance Directives:  DNR





Subjective


Review of Systems


CC/HPI


The patient is a 71-year-old male admitted with a reason for visit of Sepsis.


Events since last encounter


No complaints this morning. Had 3 bowel movements yesterday . good urine output 

but high oral intake so remains in positive balance. no fever or chills, has 

some cough , no shortness of breath





Objective


Physical Examination


General Exam:  Positive: Alert, Cooperative, No Acute Distress


Eye Exam:  Positive: EOMI, 


   Negative: Sclera icteric


ENT Exam:  Positive: Mucous membr. moist/pink


Neck Exam:  Positive: Supple


Chest Exam:  Positive: Normal air movement, Rales


Heart Exam:  Positive: Rate Normal, Regular Rhythm


Abdomen Exam:  Positive: Normal bowel sounds, Soft


Extremity Exam:  Negative: Edema


Neuro Exam:  Positive: Normal Speech


Psych Exam:  Positive: Mood NL, Oriented x 3





Vital Signs/I&O





 Vital Signs








  Date Time  Temp Pulse Resp B/P Pulse Ox O2 Delivery O2 Flow Rate FiO2


 


1/27/17 08:34  71  150/58    


 


1/27/17 06:00 98.4  18  93 Room Air  


 


1/23/17 00:00       1.0 














 I&O- Last 24 Hours up to 6 AM 


 


 1/27/17





 06:00


 


Intake Total 2160 ml


 


Output Total 2477 ml


 


Balance -317 ml











Laboratory Data


Labs 24H


 Laboratory Tests 2


1/26/17 11:44: Bedside Glucose (Misc Panel) 476H


1/26/17 15:03: 


Anion Gap 10, Blood Urea Nitrogen 51H, Creatinine 2.08H, Sodium Level 136, 

Potassium Level 5.1, Chloride Level 100, Carbon Dioxide Level 26, Calcium Level 

8.0L, Glomerular Filtration Rate 33.8L


1/26/17 16:57: Bedside Glucose (Misc Panel) 524*H


1/26/17 17:14: Bedside Glucose Confirm (Misc) 342H


1/26/17 19:59: Bedside Glucose (Misc Panel) 412H


1/27/17 06:12: 


1/27/17 06:19: 


Blood Urea Nitrogen 51H, Creatinine 2.02H, Sodium Level 135L, Potassium Level 

5.7H, Chloride Level 100, Carbon Dioxide Level 29, Calcium Level 8.5L, 

Aspartate Amino Transf (AST/SGOT) 23, Alanine Aminotransferase (ALT/SGPT) 31, 

Alkaline Phosphatase 198H, Total Bilirubin 0.2, Total Protein 6.7, Albumin 2.0L

, Albumin/Globulin Ratio 0.43L, Anion Gap 6L, White Blood Count 16.5H, Red 

Blood Count 3.19L, Hemoglobin 9.1L, Hematocrit 28.3L, Mean Corpuscular Volume 

88.7, Mean Corpuscular Hemoglobin 28.4, Mean Corpuscular Hemoglobin Concent 32.1

, Red Cell Distribution Width 12.8, Platelet Count 223, Neutrophils (%) (Auto) 

84.7H, Lymphocytes (%) (Auto) 6.6L, Monocytes (%) (Auto) 4.9, Eosinophils (%) (

Auto) 1.6, Basophils (%) (Auto) 0.8, Neutrophils # (Auto) 14.0H, Lymphocytes # (

Auto) 1.3L, Monocytes # (Auto) 0.8, Eosinophils # (Auto) 0.3, Basophils # (Auto

) 0.1, Glomerular Filtration Rate 34.9L, Large Unclassified Cells # 0.2, Large 

Unclassified Cells % 1.5, Magnesium Level 2.4


CBC/BMP


 Laboratory Tests


1/26/17 15:03








Calcium Level 8.0 L





1/27/17 06:19








Calcium Level 8.5 L, Aspartate Amino Transf (AST/SGOT) 23, Alanine 

Aminotransferase (ALT/SGPT) 31, Alkaline Phosphatase 198 H, Total Bilirubin 0.2

, Total Protein 6.7, Albumin 2.0 L, Red Blood Count 3.19 L, Mean Corpuscular 

Volume 88.7, Mean Corpuscular Hemoglobin 28.4, Mean Corpuscular Hemoglobin 

Concent 32.1, Red Cell Distribution Width 12.8, Neutrophils (%) (Auto) 84.7 H, 

Lymphocytes (%) (Auto) 6.6 L, Monocytes (%) (Auto) 4.9, Eosinophils (%) (Auto) 

1.6, Basophils (%) (Auto) 0.8, Neutrophils # (Auto) 14.0 H, Lymphocytes # (Auto

) 1.3 L, Monocytes # (Auto) 0.8, Eosinophils # (Auto) 0.3, Basophils # (Auto) 

0.1


FSBS





Laboratory Tests








Test


  1/26/17


11:44 1/26/17


16:57 1/26/17


19:59 Range/Units


 


 


Bedside Glucose (Misc Panel) 476 524 412   MG/DL








Microbiology





 Microbiology


1/22/17 Blood Culture - Preliminary, Resulted


          No Growth after 72 hours. All specime...


1/22/17 Blood Culture - Preliminary, Resulted


          No Growth after 72 hours. All specime...


1/25/17 MRSA Screen - Final, Complete


          Staph.aureus Methicillin Resis


1/22/17 MRSA Screen - Final, Complete


          Staph.aureus Methicillin Resis


1/22/17 Urine Culture - Final, Complete








ROSY LOUIS MD Jan 27, 2017 11:23

## 2017-01-28 VITALS — SYSTOLIC BLOOD PRESSURE: 127 MMHG | DIASTOLIC BLOOD PRESSURE: 73 MMHG

## 2017-01-28 VITALS — SYSTOLIC BLOOD PRESSURE: 184 MMHG | DIASTOLIC BLOOD PRESSURE: 80 MMHG

## 2017-01-28 VITALS — DIASTOLIC BLOOD PRESSURE: 61 MMHG | SYSTOLIC BLOOD PRESSURE: 137 MMHG

## 2017-01-28 VITALS — DIASTOLIC BLOOD PRESSURE: 63 MMHG | SYSTOLIC BLOOD PRESSURE: 140 MMHG

## 2017-01-28 VITALS — SYSTOLIC BLOOD PRESSURE: 134 MMHG | DIASTOLIC BLOOD PRESSURE: 60 MMHG

## 2017-01-28 VITALS — SYSTOLIC BLOOD PRESSURE: 133 MMHG | DIASTOLIC BLOOD PRESSURE: 67 MMHG

## 2017-01-28 VITALS — DIASTOLIC BLOOD PRESSURE: 77 MMHG | SYSTOLIC BLOOD PRESSURE: 144 MMHG

## 2017-01-28 LAB
ALBUMIN SERPL BCG-MCNC: 1.9 GM/DL (ref 3.2–5.2)
ALBUMIN/GLOB SERPL: 0.39 {RATIO} (ref 1–1.93)
ALP SERPL-CCNC: 237 U/L (ref 45–117)
ALT SERPL W P-5'-P-CCNC: 41 U/L (ref 12–78)
ANION GAP SERPL CALC-SCNC: 9 MEQ/L (ref 8–16)
AST SERPL-CCNC: 25 U/L (ref 15–37)
BASOPHILS # BLD AUTO: 0 K/MM3 (ref 0–0.2)
BASOPHILS NFR BLD AUTO: 0.3 % (ref 0–1)
BILIRUB SERPL-MCNC: 0.3 MG/DL (ref 0.2–1)
BUN SERPL-MCNC: 48 MG/DL (ref 7–18)
CALCIUM SERPL-MCNC: 8.4 MG/DL (ref 8.8–10.2)
CHLORIDE SERPL-SCNC: 98 MEQ/L (ref 98–107)
CO2 SERPL-SCNC: 29 MEQ/L (ref 21–32)
CREAT SERPL-MCNC: 1.88 MG/DL (ref 0.7–1.3)
EOSINOPHIL # BLD AUTO: 0.2 K/MM3 (ref 0–0.5)
EOSINOPHIL NFR BLD AUTO: 1.8 % (ref 0–3)
ERYTHROCYTE [DISTWIDTH] IN BLOOD BY AUTOMATED COUNT: 11.7 % (ref 11.5–14.5)
GFR SERPL CREATININE-BSD FRML MDRD: 38 ML/MIN/{1.73_M2} (ref 42–?)
GLUCOSE SERPL-MCNC: 196 MG/DL (ref 83–110)
LARGE UNSTAINED CELL #: 0.2 K/MM3 (ref 0–0.4)
LARGE UNSTAINED CELL %: 2 % (ref 0–4)
LYMPHOCYTES # BLD AUTO: 1 K/MM3 (ref 1.5–4.5)
LYMPHOCYTES NFR BLD AUTO: 8.7 % (ref 24–44)
MAGNESIUM SERPL-MCNC: 2.3 MG/DL (ref 1.8–2.4)
MCH RBC QN AUTO: 27.7 PG (ref 27–33)
MCHC RBC AUTO-ENTMCNC: 30.5 G/DL (ref 32–36.5)
MCV RBC AUTO: 90.7 FL (ref 80–96)
MONOCYTES # BLD AUTO: 0.8 K/MM3 (ref 0–0.8)
MONOCYTES NFR BLD AUTO: 7.3 % (ref 0–5)
NEUTROPHILS # BLD AUTO: 8.9 K/MM3 (ref 1.8–7.7)
NEUTROPHILS NFR BLD AUTO: 79.9 % (ref 36–66)
PLATELET # BLD AUTO: 212 K/MM3 (ref 150–450)
POTASSIUM SERPL-SCNC: 5 MEQ/L (ref 3.5–5.1)
PROT SERPL-MCNC: 6.8 GM/DL (ref 6.4–8.2)
SODIUM SERPL-SCNC: 136 MEQ/L (ref 136–145)
WBC # BLD AUTO: 11.1 K/MM3 (ref 4–10)

## 2017-01-28 RX ADMIN — PREGABALIN SCH MG: 75 CAPSULE ORAL at 09:48

## 2017-01-28 RX ADMIN — ASPIRIN 325 MG ORAL TABLET SCH MG: 325 PILL ORAL at 09:47

## 2017-01-28 RX ADMIN — INSULIN LISPRO SCH UNITS: 100 INJECTION, SOLUTION INTRAVENOUS; SUBCUTANEOUS at 17:41

## 2017-01-28 RX ADMIN — CALCIUM CARBONATE-CHOLECALCIFEROL TAB 250 MG-125 UNIT SCH MG: 250-125 TAB at 20:50

## 2017-01-28 RX ADMIN — IPRATROPIUM BROMIDE AND ALBUTEROL SULFATE SCH ML: .5; 3 SOLUTION RESPIRATORY (INHALATION) at 01:22

## 2017-01-28 RX ADMIN — DEXTROSE MONOHYDRATE SCH MG: 50 INJECTION, SOLUTION INTRAVENOUS at 11:41

## 2017-01-28 RX ADMIN — INSULIN LISPRO SCH UNITS: 100 INJECTION, SOLUTION INTRAVENOUS; SUBCUTANEOUS at 20:15

## 2017-01-28 RX ADMIN — CLOPIDOGREL BISULFATE SCH MG: 75 TABLET ORAL at 09:48

## 2017-01-28 RX ADMIN — INSULIN LISPRO SCH UNITS: 100 INJECTION, SOLUTION INTRAVENOUS; SUBCUTANEOUS at 13:28

## 2017-01-28 RX ADMIN — INSULIN DETEMIR SCH UNITS: 100 INJECTION, SOLUTION SUBCUTANEOUS at 20:50

## 2017-01-28 RX ADMIN — IPRATROPIUM BROMIDE AND ALBUTEROL SULFATE SCH ML: .5; 3 SOLUTION RESPIRATORY (INHALATION) at 20:11

## 2017-01-28 RX ADMIN — ENOXAPARIN SODIUM SCH MG: 30 INJECTION SUBCUTANEOUS at 20:49

## 2017-01-28 RX ADMIN — NICOTINE SCH PATCH: 14 PATCH, EXTENDED RELEASE TRANSDERMAL at 09:52

## 2017-01-28 RX ADMIN — INSULIN LISPRO SCH UNITS: 100 INJECTION, SOLUTION INTRAVENOUS; SUBCUTANEOUS at 09:35

## 2017-01-28 RX ADMIN — INSULIN DETEMIR SCH UNITS: 100 INJECTION, SOLUTION SUBCUTANEOUS at 09:51

## 2017-01-28 RX ADMIN — IPRATROPIUM BROMIDE AND ALBUTEROL SULFATE SCH ML: .5; 3 SOLUTION RESPIRATORY (INHALATION) at 08:47

## 2017-01-28 RX ADMIN — METOPROLOL SUCCINATE SCH MG: 50 TABLET, EXTENDED RELEASE ORAL at 09:50

## 2017-01-28 RX ADMIN — IPRATROPIUM BROMIDE AND ALBUTEROL SULFATE SCH ML: .5; 3 SOLUTION RESPIRATORY (INHALATION) at 14:21

## 2017-01-28 RX ADMIN — OMEPRAZOLE SCH MG: 20 CAPSULE, DELAYED RELEASE ORAL at 09:49

## 2017-01-28 RX ADMIN — CALCIUM CARBONATE-CHOLECALCIFEROL TAB 250 MG-125 UNIT SCH MG: 250-125 TAB at 09:48

## 2017-01-28 RX ADMIN — PREGABALIN SCH MG: 75 CAPSULE ORAL at 20:49

## 2017-01-28 NOTE — CR
DATE OF CONSULTATION:  01/27/2017

 

INFECTIOUS DISEASE CONSULTATION

 

Asked to consult by Dr. Chen for evaluation of fever with elevated inflammatory

markers.

 

HISTORY OF THE PRESENT ILLNESS:  Mr. Dunlap is a pleasant 71-year-old gentleman

who was admitted 5 days ago after he was noted to be unresponsive by his 
friends.

The patient recalls that he woke up in the morning after he had felt a jolt and

he thought it was his defibrillator that woke him up.  He went to his couch, 
laid

down on his couch and slept there. He was found by his neighbor who takes care 
of

his pigs as they have pigs together and is in charge of feeding the pigs in the

morning, came to see him, he was noted to be unresponsive on the couch.  The

patient was hypotensive.  He had a low grade temperature on admission.  He was

brought into the emergency room (ER) by the ambulance and was placed in the

intensive care unit (ICU).  The patient denied having any chest pain.  He took

aspirin on the way, and he follows up with Dr. Snow, his cardiologist.  He

stated he had an episode of bronchitis about 2 weeks ago for which he received

Zithromax, but he was not feeling any better and therefore, he came to the

emergency room on 01/14/2017 where he was diagnosed with the flu, influenza A.

He was given Tamiflu for 5 days which he finished on 01/19/2017.  The patient

still has a cough, which he stated is mostly nonproductive and it has markedly

improved.  But, he has had those fevers that have remained persistent.

 

On admission, his white count was 25,000.  On 01/14/2017 when he had the flu, it

was 8.5.

 

The patient states that his cough has markedly improved.  He is not short of

breath.  He is not having nausea or vomiting.  He had one episode of diarrhea.

No abdominal pain.  No chills.  He feels much better and is anxious to go home.

 

 

PAST MEDICAL HISTORY:  Significant for:

Coronary artery disease, status post coronary artery bypass graft (CABG).

Diabetes insulin-dependent with diabetic neuropathy.

Hypertension.

Hyperlipidemia.

Myocardial infarction.

Congestive heart failure.

 

PAST SURGICAL HISTORY:

Coronary stent.

Pacemaker defibrillator.

Femoral-popliteal bypass bilaterally.

CABG in 2013.

 

SOCIAL HISTORY:  He quit smoking 4 years ago.  He is .  He has two

supportive children.  He used to smoke a pack of cigarettes a day, now he quit

but he chews tobacco.  He lives alone.  He has one pig and his neighbor has four

pigs, but they are all on his land and they take care of them together.  He has

also 30 chickens.

 

ALLERGIES:  He has no known drug allergies.

 

MEDICATIONS:

- albuterol as needed

- aspirin 325 mg daily

 - calcium and vitamin D

- Plavix 75 mg daily

- nicotine patch one daily

- omeprazole 40 mg daily

- metoprolol 50 mg daily

- Lyrica 150 mg by mouth twice a day

- Lovenox 30 mg subcu daily

 

LABORATORY DATA:  White count on admission was 25.4, currently 16.5, hemoglobin

9.1, hematocrit 28.3, platelets 223, 84% neutrophils, 6% lymphocytes, 4%

monocytes.  ESR is 95 to 107.  Sodium 135, potassium 5.7, chloride 100,

bicarbonate 29, BUN 51, creatinine 2.02. His normal creatinine is about 1.4-1.5.

Glucose 264, calcium 8.5, magnesium 2.4, AST 23, ALT 31, alkaline phosphatase

198.  CRP continues to rise from 9.29 to 15.3 today.  Blood cultures drawn on

01/22/2017 were no growth after 5 days.  Blood cultures done in the emergency

room when he had influenza were also negative.  Urine culture was negative.

Methicillin-resistant Staphylococcus aureus (MRSA) screen was positive when he

was in the intensive care unit (ICU).  Two more sets of blood cultures were 
drawn

today.

 

IMAGING STUDIES:  CT of the lumbar spine done 2 days prior to admission that was

ordered by Dr. Jelani Power shows diffuse disc bulges at L2-3 through L5-S1

with minimal thecal sac compression.

 

Chest x-ray done on admission shows prior median sternotomy, cardiac silhouette

is normal and mild interstitial edema.

 

Chest CT done on 01/25/2017 shows cardiomegaly, pacemaker, some pulmonary

vascular congestion.  Interstitial markings are prominent in the bases.  Small

bilateral effusions compatible with mild CHF and stable 10 mm noncalcified

pulmonary nodule and another small 7 mm nodule in the right lower lobe.  No 
focal

filtrate is seen and there is extensive vascular calcification.

 

On physical exam, temperature is 100.5, pulse 90, respirations 18, blood 
pressure

162/74, oxygen saturation 92% on room air.

Heart:  Normal S1, S2 with a systolic ejection murmur, holosystolic, best heard

at the apex.

Lungs have a few wheezes at the left base.  No rhonchi or wheezing.

Abdomen:  Obese, soft, nontender.

Back:  No costovertebral angle or lumbosacral tenderness.

Extremities:  No clubbing, cyanosis or edema.  No calf tenderness.  Very dry

feet.  He has a blister on the bottom of the right foot which is old and 
healing.

 

Neurologic:  Exam is normal.

 

IMPRESSION:  This is a 71-year-old gentleman with a history of influenza,

diagnosed on 01/14/2017, was admitted after a syncopal episode and congestive

heart failure.  The patient has markedly improved, feels much better.  His cough

has improved as well as his shortness of breath.  He continues with fever, high

sedimentation (sed) rate and C-reactive protein (CRP) even though he is

asymptomatic.

 

PLAN:  Will repeat two sets of blood cultures today.  The patient has remained

off antibiotics.  I would suggest obtaining CT abdomen and pelvis for workup of

an fever of undetermined origin, looking for occult abscess.  Will repeat also a

chest x-ray, PA and lateral, in the morning looking for source of infection.  He

could have post influenza pneumonia, although he states his cough is much

improved.  Usually this is caused by Staphylococcus (Staph) aureus, especially 
in

a patient who has had MRSA.  We could consider giving him Zyvox or doxycycline.

Will obtain a sputum, gram stain and culture as well.  The patient will be

scheduled for a CT abdomen and pelvis without contrast as his creatinine is 2



MTDD

## 2017-01-28 NOTE — IPNPDOC
Assessment/Plan


Date Seen


The patient was seen on 1/28/17.





Problems


Problems:  


(1) Leucocytosis


Status:  Acute


Problem Text:  persistent leucocytosis in th 15 to 16 k range with high esr and 

crp. no source of infection identified


will send c diff


SPEP shows 2 m spikes in the gamma region , free light chanin ratio is normal , 

ordered immunofixation of serum. 


ct abdomen and pelvis done , cxr done reading pending. 





(2) SIRS-noninf w ac org dys


Status:  Acute


Problem Text:  evidenced by HR 90, temp 100.3, WBC 25, and BP 79/41 upon 

arrival to ED; recent flu infection but thus far blood and urine cultures are 

negative; I suspect that this was secondary to dehydration as the patient has a 

concomitant HUSSEIN and has responded to IVF; patient was recently recovering from 

influenza so had poor appetite and was tired so was not eating or drinking 

adequately this probably led to his dehydration and HUSSEIN. 


vitals now stabilized, afebrile, WBC improved to 15; continue to follow up 

cultures; no antibiotics at this time as no clear infection


will get CT chest as having low grade fevers.





(3) Acute on chronic kidney failure


Status:  Acute


Problem Text:  baseline Cr around mid to upper 1s; Cr upon admission was 2.53, 

now increased to 3.0;  I suspect this is secondary to dehydration/prerenal; Now 

improving 


will restart lasix. seems to be getting mildly fluid overloaded. 





(4) Defibrillator discharge


Status:  Resolved


Problem Text:  ICD interogated. ICD  did not fire there was no arrhythmia. 


Patient may have had a myoclonic jerk Dr. Snow consulted; patient denies 

chest pain; trops were negative





(5) CAD (coronary artery disease)


Status:  Chronic


Problem Text:  hx of MI and s/p CABG and stents; no current chest pain; trops 

negative; continue home ASA, plavix, beta blocker; needs to discuss with 

primary physicians adding a statin





(6) HTN (hypertension)


Status:  Chronic


Problem Text:  continue home beta blocker





(7) Diabetes mellitus


Status:  Chronic


Response to Treatment:  Uncontrolled


Problem Text:  patient is unclear on home lantus dosing; SSI while in house and 

add and adjust levemir accordingly





(8) Dyslipidemia


Status:  Chronic


(9) GERD (gastroesophageal reflux disease)


Status:  Chronic


(10) Acute on chronic diastolic heart failure


Status:  Acute


Problem Text:  now on lasix 80 mg iv as required based on fluid status. 


came in dehydrated however developed some fluid overload and had some acute 

diastolic chf. 








Plan / VTE


VTE Prophylaxis Ordered?:  Yes (lovenox)





Plan


Advance Directives:  DNR





Subjective


Review of Systems


CC/HPI


The patient is a 71-year-old male admitted with a reason for visit of Sepsis.


Events since last encounter


has been coughing since last night , had low grade fever overnight , feeling 

very tired and exhausted as did not get much sleep last night.





Objective


Physical Examination


General Exam:  Positive: Alert, Cooperative, No Acute Distress


Eye Exam:  Positive: EOMI, 


   Negative: Sclera icteric


ENT Exam:  Positive: Mucous membr. moist/pink


Neck Exam:  Positive: Supple


Chest Exam:  Positive: Normal air movement, Rales


Heart Exam:  Positive: Rate Normal, Regular Rhythm


Abdomen Exam:  Positive: Normal bowel sounds, Soft


Extremity Exam:  Negative: Edema


Neuro Exam:  Positive: Normal Speech


Psych Exam:  Positive: Mood NL, Oriented x 3





Vital Signs/I&O





 Vital Signs








  Date Time  Temp Pulse Resp B/P Pulse Ox O2 Delivery O2 Flow Rate FiO2


 


1/28/17 09:50  86  184/80    


 


1/28/17 06:00 100.8  19  93 Room Air  


 


1/23/17 00:00       1.0 














 I&O- Last 24 Hours up to 6 AM 


 


 1/28/17





 06:00


 


Intake Total 1200 ml


 


Output Total 2400 ml


 


Balance -1200 ml











Laboratory Data


Labs 24H


 Laboratory Tests 2


1/27/17 11:48: Bedside Glucose (Misc Panel) 364H


1/27/17 16:55: Bedside Glucose (Misc Panel) 460H


1/27/17 16:58: 


1/27/17 20:33: Bedside Glucose (Misc Panel) 401H


1/28/17 05:35: 


Blood Urea Nitrogen 48H, Creatinine 1.88H, Sodium Level 136, Potassium Level 5.0

, Chloride Level 98, Carbon Dioxide Level 29, Calcium Level 8.4L, Aspartate 

Amino Transf (AST/SGOT) 25, Alanine Aminotransferase (ALT/SGPT) 41, Alkaline 

Phosphatase 237H, Total Bilirubin 0.3, Total Protein 6.8, Albumin 1.9L, Albumin/

Globulin Ratio 0.39L, Anion Gap 9, White Blood Count 11.1H, Red Blood Count 

3.09L, Hemoglobin 8.6L, Hematocrit 28.0L, Mean Corpuscular Volume 90.7, Mean 

Corpuscular Hemoglobin 27.7, Mean Corpuscular Hemoglobin Concent 30.5L, Red 

Cell Distribution Width 11.7, Platelet Count 212, Neutrophils (%) (Auto) 79.9H, 

Lymphocytes (%) (Auto) 8.7L, Monocytes (%) (Auto) 7.3H, Eosinophils (%) (Auto) 

1.8, Basophils (%) (Auto) 0.3, Neutrophils # (Auto) 8.9H, Lymphocytes # (Auto) 

1.0L, Monocytes # (Auto) 0.8, Eosinophils # (Auto) 0.2, Basophils # (Auto) 0.0, 

Glomerular Filtration Rate 38.0L, Large Unclassified Cells # 0.2, Large 

Unclassified Cells % 2.0, Magnesium Level 2.3


CBC/BMP


 Laboratory Tests


1/28/17 05:35








Calcium Level 8.4 L, Aspartate Amino Transf (AST/SGOT) 25, Alanine 

Aminotransferase (ALT/SGPT) 41, Alkaline Phosphatase 237 H, Total Bilirubin 0.3

, Total Protein 6.8, Albumin 1.9 L, Red Blood Count 3.09 L, Mean Corpuscular 

Volume 90.7, Mean Corpuscular Hemoglobin 27.7, Mean Corpuscular Hemoglobin 

Concent 30.5 L, Red Cell Distribution Width 11.7, Neutrophils (%) (Auto) 79.9 H

, Lymphocytes (%) (Auto) 8.7 L, Monocytes (%) (Auto) 7.3 H, Eosinophils (%) (

Auto) 1.8, Basophils (%) (Auto) 0.3, Neutrophils # (Auto) 8.9 H, Lymphocytes # (

Auto) 1.0 L, Monocytes # (Auto) 0.8, Eosinophils # (Auto) 0.2, Basophils # (Auto

) 0.0


FSBS





Laboratory Tests








Test


  1/27/17


11:48 1/27/17


16:55 1/27/17


20:33 Range/Units


 


 


Bedside Glucose (Misc Panel) 364 460 401   MG/DL








Microbiology





 Microbiology


1/27/17 Blood Culture, Received


          Pending


1/27/17 Blood Culture, Received


          Pending


1/22/17 Blood Culture - Final, Complete


          NO GROWTH AFTER 5 DAYS


1/22/17 Blood Culture - Final, Complete


          NO GROWTH AFTER 5 DAYS


1/25/17 MRSA Screen - Final, Complete


          Staph.aureus Methicillin Resis


1/22/17 MRSA Screen - Final, Complete


          Staph.aureus Methicillin Resis


1/22/17 Urine Culture - Final, Complete








ROSY LOUIS MD Jan 28, 2017 10:41

## 2017-01-29 VITALS — DIASTOLIC BLOOD PRESSURE: 82 MMHG | SYSTOLIC BLOOD PRESSURE: 127 MMHG

## 2017-01-29 VITALS — DIASTOLIC BLOOD PRESSURE: 72 MMHG | SYSTOLIC BLOOD PRESSURE: 184 MMHG

## 2017-01-29 VITALS — SYSTOLIC BLOOD PRESSURE: 144 MMHG | DIASTOLIC BLOOD PRESSURE: 63 MMHG

## 2017-01-29 VITALS — DIASTOLIC BLOOD PRESSURE: 59 MMHG | SYSTOLIC BLOOD PRESSURE: 104 MMHG

## 2017-01-29 VITALS — SYSTOLIC BLOOD PRESSURE: 134 MMHG | DIASTOLIC BLOOD PRESSURE: 77 MMHG

## 2017-01-29 VITALS — SYSTOLIC BLOOD PRESSURE: 157 MMHG | DIASTOLIC BLOOD PRESSURE: 72 MMHG

## 2017-01-29 LAB
ALBUMIN SERPL BCG-MCNC: 2.1 GM/DL (ref 3.2–5.2)
ALBUMIN/GLOB SERPL: 0.48 {RATIO} (ref 1–1.93)
ALP SERPL-CCNC: 279 U/L (ref 45–117)
ALT SERPL W P-5'-P-CCNC: 41 U/L (ref 12–78)
ANION GAP SERPL CALC-SCNC: 13 MEQ/L (ref 8–16)
AST SERPL-CCNC: 28 U/L (ref 15–37)
BASOPHILS # BLD AUTO: 0.1 K/MM3 (ref 0–0.2)
BASOPHILS NFR BLD AUTO: 0.6 % (ref 0–1)
BILIRUB SERPL-MCNC: 0.3 MG/DL (ref 0.2–1)
BUN SERPL-MCNC: 55 MG/DL (ref 7–18)
CALCIUM SERPL-MCNC: 8.3 MG/DL (ref 8.8–10.2)
CHLORIDE SERPL-SCNC: 103 MEQ/L (ref 98–107)
CO2 SERPL-SCNC: 23 MEQ/L (ref 21–32)
CREAT SERPL-MCNC: 2.02 MG/DL (ref 0.7–1.3)
EOSINOPHIL # BLD AUTO: 0.3 K/MM3 (ref 0–0.5)
EOSINOPHIL NFR BLD AUTO: 3.5 % (ref 0–3)
ERYTHROCYTE [DISTWIDTH] IN BLOOD BY AUTOMATED COUNT: 11.9 % (ref 11.5–14.5)
GFR SERPL CREATININE-BSD FRML MDRD: 34.9 ML/MIN/{1.73_M2} (ref 42–?)
GLUCOSE SERPL-MCNC: 62 MG/DL (ref 83–110)
LARGE UNSTAINED CELL #: 0.2 K/MM3 (ref 0–0.4)
LARGE UNSTAINED CELL %: 2 % (ref 0–4)
LYMPHOCYTES # BLD AUTO: 1.3 K/MM3 (ref 1.5–4.5)
LYMPHOCYTES NFR BLD AUTO: 13.9 % (ref 24–44)
MAGNESIUM SERPL-MCNC: 2.4 MG/DL (ref 1.8–2.4)
MCH RBC QN AUTO: 28 PG (ref 27–33)
MCHC RBC AUTO-ENTMCNC: 30.8 G/DL (ref 32–36.5)
MCV RBC AUTO: 91.1 FL (ref 80–96)
MONOCYTES # BLD AUTO: 0.7 K/MM3 (ref 0–0.8)
MONOCYTES NFR BLD AUTO: 7.3 % (ref 0–5)
NEUTROPHILS # BLD AUTO: 7 K/MM3 (ref 1.8–7.7)
NEUTROPHILS NFR BLD AUTO: 72.7 % (ref 36–66)
PLATELET # BLD AUTO: 246 K/MM3 (ref 150–450)
POTASSIUM SERPL-SCNC: 5.2 MEQ/L (ref 3.5–5.1)
PROT SERPL-MCNC: 6.5 GM/DL (ref 6.4–8.2)
SODIUM SERPL-SCNC: 139 MEQ/L (ref 136–145)
WBC # BLD AUTO: 9.7 K/MM3 (ref 4–10)

## 2017-01-29 RX ADMIN — CALCIUM CARBONATE-CHOLECALCIFEROL TAB 250 MG-125 UNIT SCH MG: 250-125 TAB at 09:17

## 2017-01-29 RX ADMIN — IPRATROPIUM BROMIDE AND ALBUTEROL SULFATE SCH ML: .5; 3 SOLUTION RESPIRATORY (INHALATION) at 07:23

## 2017-01-29 RX ADMIN — DEXTROSE MONOHYDRATE SCH MG: 50 INJECTION, SOLUTION INTRAVENOUS at 09:00

## 2017-01-29 RX ADMIN — IPRATROPIUM BROMIDE AND ALBUTEROL SULFATE SCH ML: .5; 3 SOLUTION RESPIRATORY (INHALATION) at 02:00

## 2017-01-29 RX ADMIN — ENOXAPARIN SODIUM SCH MG: 30 INJECTION SUBCUTANEOUS at 20:49

## 2017-01-29 RX ADMIN — PREGABALIN SCH MG: 75 CAPSULE ORAL at 09:18

## 2017-01-29 RX ADMIN — OMEPRAZOLE SCH MG: 20 CAPSULE, DELAYED RELEASE ORAL at 09:18

## 2017-01-29 RX ADMIN — INSULIN LISPRO SCH UNITS: 100 INJECTION, SOLUTION INTRAVENOUS; SUBCUTANEOUS at 07:57

## 2017-01-29 RX ADMIN — CALCIUM CARBONATE-CHOLECALCIFEROL TAB 250 MG-125 UNIT SCH MG: 250-125 TAB at 20:49

## 2017-01-29 RX ADMIN — INSULIN DETEMIR SCH UNITS: 100 INJECTION, SOLUTION SUBCUTANEOUS at 09:00

## 2017-01-29 RX ADMIN — ASPIRIN 325 MG ORAL TABLET SCH MG: 325 PILL ORAL at 09:18

## 2017-01-29 RX ADMIN — INSULIN LISPRO SCH UNITS: 100 INJECTION, SOLUTION INTRAVENOUS; SUBCUTANEOUS at 18:23

## 2017-01-29 RX ADMIN — IPRATROPIUM BROMIDE AND ALBUTEROL SULFATE SCH ML: .5; 3 SOLUTION RESPIRATORY (INHALATION) at 13:13

## 2017-01-29 RX ADMIN — HYDROCODONE BITARTRATE AND ACETAMINOPHEN PRN TAB: 5; 325 TABLET ORAL at 00:08

## 2017-01-29 RX ADMIN — METOPROLOL SUCCINATE SCH MG: 50 TABLET, EXTENDED RELEASE ORAL at 09:17

## 2017-01-29 RX ADMIN — CLOPIDOGREL BISULFATE SCH MG: 75 TABLET ORAL at 09:17

## 2017-01-29 RX ADMIN — IPRATROPIUM BROMIDE AND ALBUTEROL SULFATE SCH ML: .5; 3 SOLUTION RESPIRATORY (INHALATION) at 19:08

## 2017-01-29 RX ADMIN — INSULIN LISPRO SCH UNITS: 100 INJECTION, SOLUTION INTRAVENOUS; SUBCUTANEOUS at 12:07

## 2017-01-29 RX ADMIN — INSULIN DETEMIR SCH UNITS: 100 INJECTION, SOLUTION SUBCUTANEOUS at 09:26

## 2017-01-29 RX ADMIN — PREGABALIN SCH MG: 75 CAPSULE ORAL at 20:49

## 2017-01-29 RX ADMIN — HYDROCODONE BITARTRATE AND ACETAMINOPHEN PRN TAB: 5; 325 TABLET ORAL at 21:38

## 2017-01-29 RX ADMIN — INSULIN LISPRO SCH UNITS: 100 INJECTION, SOLUTION INTRAVENOUS; SUBCUTANEOUS at 20:51

## 2017-01-29 RX ADMIN — INSULIN LISPRO SCH UNITS: 100 INJECTION, SOLUTION INTRAVENOUS; SUBCUTANEOUS at 17:30

## 2017-01-29 RX ADMIN — NICOTINE SCH PATCH: 14 PATCH, EXTENDED RELEASE TRANSDERMAL at 09:16

## 2017-01-29 RX ADMIN — INSULIN DETEMIR SCH UNITS: 100 INJECTION, SOLUTION SUBCUTANEOUS at 20:50

## 2017-01-29 NOTE — IPNPDOC
Assessment/Plan


Date Seen


The patient was seen on 1/29/17.





Problems


Problems:  


(1) Leucocytosis


Status:  Acute


Response to Treatment:  Improving


Problem Text:  leucocytosis has improved with out antibiotics . crp improving. 

no source of infection identified


will send c diff and sputum culture 


SPEP shows 2 m spikes in the gamma region , free light chain ratio is normal , 

ordered immunofixation of serum. 


ct abdomen and pelvis done , cxr done reading pending. 





(2) SIRS-noninf w ac org dys


Status:  Acute


Problem Text:  evidenced by HR 90, temp 100.3, WBC 25, and BP 79/41 upon 

arrival to ED; recent flu infection but thus far blood and urine cultures are 

negative; I suspect that this was secondary to dehydration as the patient has a 

concomitant HUSSEIN and has responded to IVF; patient was recently recovering from 

influenza so had poor appetite and was tired so was not eating or drinking 

adequately this probably led to his dehydration and HUSSEIN. 


vitals now stabilized, afebrile, WBC improved to 15; continue to follow up 

cultures; no antibiotics at this time as no clear infection


will get CT chest as having low grade fevers.





(3) Acute on chronic kidney failure


Status:  Acute


Problem Text:  baseline Cr around mid to upper 1s; Cr upon admission was 2.53, 

had  increased to 3.0;Now stabilized around 2.0.  I suspect this is secondary 

to dehydration/prerenal; 


restarted lasix as was getting fluid overloaded. 





(4) Defibrillator discharge


Status:  Resolved


Problem Text:  ICD interogated. ICD  did not fire there was no arrhythmia. 


Patient may have had a myoclonic jerk Dr. Snow consulted; patient denies 

chest pain; trops were negative





(5) CAD (coronary artery disease)


Status:  Chronic


Problem Text:  hx of MI and s/p CABG and stents; no current chest pain; trops 

negative; continue home ASA, plavix, beta blocker; needs to discuss with 

primary physicians adding a statin





(6) HTN (hypertension)


Status:  Chronic


Problem Text:  continue home beta blocker





(7) Diabetes mellitus


Status:  Chronic


Response to Treatment:  Uncontrolled


Problem Text:  patient is unclear on home lantus dosing; SSI while in house and 

add and adjust levemir accordingly





(8) Dyslipidemia


Status:  Chronic


(9) GERD (gastroesophageal reflux disease)


Status:  Chronic


(10) Acute on chronic diastolic heart failure


Status:  Acute


Problem Text:  now on lasix 80 mg iv as required based on fluid status. 








Plan / VTE


VTE Prophylaxis Ordered?:  Yes (lovenox)





Plan


Advance Directives:  DNR





Subjective


Review of Systems


CC/HPI


The patient is a 71-year-old male admitted with a reason for visit of Sepsis.


Events since last encounter


feeling better this morning though still had a low grade fever, cough better, 

eating well , though had low blood sugars in morning lab. have not got a stool 

sample yet or any sputum sample yet.





Objective


Physical Examination


General Exam:  Positive: Alert, Cooperative, No Acute Distress


Eye Exam:  Positive: EOMI, 


   Negative: Sclera icteric


ENT Exam:  Positive: Mucous membr. moist/pink


Neck Exam:  Positive: Supple


Chest Exam:  Positive: Normal air movement, Rales


Heart Exam:  Positive: Rate Normal, Regular Rhythm


Abdomen Exam:  Positive: Normal bowel sounds, Soft


Extremity Exam:  Negative: Edema


Neuro Exam:  Positive: Normal Speech


Psych Exam:  Positive: Mood NL, Oriented x 3





Vital Signs/I&O





 Vital Signs








  Date Time  Temp Pulse Resp B/P Pulse Ox O2 Delivery O2 Flow Rate FiO2


 


1/29/17 09:17  72  127/82    


 


1/29/17 06:00 97.2  18  94 Room Air  


 


1/23/17 00:00       1.0 














 I&O- Last 24 Hours up to 6 AM 


 


 1/29/17





 06:00


 


Intake Total 825 ml


 


Output Total 1850 ml


 


Balance -1025 ml











Laboratory Data


Labs 24H


 Laboratory Tests 2


1/28/17 12:30: Bedside Glucose (Misc Panel) 182H


1/28/17 16:35: Bedside Glucose (Misc Panel) 116H


1/28/17 20:10: Bedside Glucose (Misc Panel) 169H


1/29/17 05:42: 


Blood Urea Nitrogen 55H, Creatinine 2.02H, Sodium Level 139, Potassium Level 

5.2H, Chloride Level 103, Carbon Dioxide Level 23, Calcium Level 8.3L, 

Aspartate Amino Transf (AST/SGOT) 28, Alanine Aminotransferase (ALT/SGPT) 41, 

Alkaline Phosphatase 279H, Total Bilirubin 0.3, Total Protein 6.5, Albumin 2.1L

, Albumin/Globulin Ratio 0.48L, Anion Gap 13, White Blood Count 9.7, Red Blood 

Count 3.26L, Hemoglobin 9.1L, Hematocrit 29.7L, Mean Corpuscular Volume 91.1, 

Mean Corpuscular Hemoglobin 28.0, Mean Corpuscular Hemoglobin Concent 30.8L, 

Red Cell Distribution Width 11.9, Platelet Count 246, Neutrophils (%) (Auto) 

72.7H, Lymphocytes (%) (Auto) 13.9L, Monocytes (%) (Auto) 7.3H, Eosinophils (%) 

(Auto) 3.5H, Basophils (%) (Auto) 0.6, Neutrophils # (Auto) 7.0, Lymphocytes # (

Auto) 1.3L, Monocytes # (Auto) 0.7, Eosinophils # (Auto) 0.3, Basophils # (Auto

) 0.1, Glomerular Filtration Rate 34.9L, Large Unclassified Cells # 0.2, Large 

Unclassified Cells % 2.0, Magnesium Level 2.4


CBC/BMP


 Laboratory Tests


1/29/17 05:42








Calcium Level 8.3 L, Aspartate Amino Transf (AST/SGOT) 28, Alanine 

Aminotransferase (ALT/SGPT) 41, Alkaline Phosphatase 279 H, Total Bilirubin 0.3

, Total Protein 6.5, Albumin 2.1 L, Red Blood Count 3.26 L, Mean Corpuscular 

Volume 91.1, Mean Corpuscular Hemoglobin 28.0, Mean Corpuscular Hemoglobin 

Concent 30.8 L, Red Cell Distribution Width 11.9, Neutrophils (%) (Auto) 72.7 H

, Lymphocytes (%) (Auto) 13.9 L, Monocytes (%) (Auto) 7.3 H, Eosinophils (%) (

Auto) 3.5 H, Basophils (%) (Auto) 0.6, Neutrophils # (Auto) 7.0, Lymphocytes # (

Auto) 1.3 L, Monocytes # (Auto) 0.7, Eosinophils # (Auto) 0.3, Basophils # (Auto

) 0.1


FSBS





Laboratory Tests








Test


  1/28/17


12:30 1/28/17


16:35 1/28/17


20:10 Range/Units


 


 


Bedside Glucose (Misc Panel) 182 116 169   MG/DL








Microbiology





 Microbiology


1/27/17 Blood Culture - Preliminary, Resulted


          No growth after 24 hours . All specim...


1/27/17 Blood Culture - Preliminary, Resulted


          No growth after 24 hours . All specim...


1/22/17 Blood Culture - Final, Complete


          NO GROWTH AFTER 5 DAYS


1/22/17 Blood Culture - Final, Complete


          NO GROWTH AFTER 5 DAYS


1/29/17 Gram Stain, Received


          Pending


1/29/17 Sputum Culture, Received


          Pending


1/25/17 MRSA Screen - Final, Complete


          Staph.aureus Methicillin Resis


1/22/17 MRSA Screen - Final, Complete


          Staph.aureus Methicillin Resis


1/22/17 Urine Culture - Final, Complete








ROSY LOUIS MD Jan 29, 2017 10:14

## 2017-01-30 VITALS — SYSTOLIC BLOOD PRESSURE: 142 MMHG | DIASTOLIC BLOOD PRESSURE: 66 MMHG

## 2017-01-30 VITALS — DIASTOLIC BLOOD PRESSURE: 70 MMHG | SYSTOLIC BLOOD PRESSURE: 135 MMHG

## 2017-01-30 VITALS — SYSTOLIC BLOOD PRESSURE: 158 MMHG | DIASTOLIC BLOOD PRESSURE: 70 MMHG

## 2017-01-30 VITALS — DIASTOLIC BLOOD PRESSURE: 78 MMHG | SYSTOLIC BLOOD PRESSURE: 172 MMHG

## 2017-01-30 VITALS — DIASTOLIC BLOOD PRESSURE: 68 MMHG | SYSTOLIC BLOOD PRESSURE: 158 MMHG

## 2017-01-30 VITALS — DIASTOLIC BLOOD PRESSURE: 84 MMHG | SYSTOLIC BLOOD PRESSURE: 140 MMHG

## 2017-01-30 LAB
ANION GAP SERPL CALC-SCNC: 8 MEQ/L (ref 8–16)
BASOPHILS # BLD AUTO: 0.1 K/MM3 (ref 0–0.2)
BASOPHILS NFR BLD AUTO: 0.8 % (ref 0–1)
BUN SERPL-MCNC: 51 MG/DL (ref 7–18)
CALCIUM SERPL-MCNC: 8.3 MG/DL (ref 8.8–10.2)
CHLORIDE SERPL-SCNC: 101 MEQ/L (ref 98–107)
CO2 SERPL-SCNC: 29 MEQ/L (ref 21–32)
CREAT SERPL-MCNC: 2.03 MG/DL (ref 0.7–1.3)
EOSINOPHIL # BLD AUTO: 0.4 K/MM3 (ref 0–0.5)
EOSINOPHIL NFR BLD AUTO: 3.8 % (ref 0–3)
ERYTHROCYTE [DISTWIDTH] IN BLOOD BY AUTOMATED COUNT: 12.8 % (ref 11.5–14.5)
ERYTHROCYTE [SEDIMENTATION RATE] IN BLOOD BY WESTERGREN METHOD: 126 MM/HR (ref 0–20)
GFR SERPL CREATININE-BSD FRML MDRD: 34.7 ML/MIN/{1.73_M2} (ref 42–?)
GLUCOSE SERPL-MCNC: 130 MG/DL (ref 83–110)
LARGE UNSTAINED CELL #: 0.2 K/MM3 (ref 0–0.4)
LARGE UNSTAINED CELL %: 1.7 % (ref 0–4)
LYMPHOCYTES # BLD AUTO: 1.5 K/MM3 (ref 1.5–4.5)
LYMPHOCYTES NFR BLD AUTO: 13.1 % (ref 24–44)
MCH RBC QN AUTO: 27.7 PG (ref 27–33)
MCHC RBC AUTO-ENTMCNC: 31.3 G/DL (ref 32–36.5)
MCV RBC AUTO: 88.6 FL (ref 80–96)
MONOCYTES # BLD AUTO: 0.7 K/MM3 (ref 0–0.8)
MONOCYTES NFR BLD AUTO: 6.4 % (ref 0–5)
NEUTROPHILS # BLD AUTO: 7.6 K/MM3 (ref 1.8–7.7)
NEUTROPHILS NFR BLD AUTO: 74.2 % (ref 36–66)
PLATELET # BLD AUTO: 250 K/MM3 (ref 150–450)
POTASSIUM SERPL-SCNC: 5.2 MEQ/L (ref 3.5–5.1)
SODIUM SERPL-SCNC: 138 MEQ/L (ref 136–145)
WBC # BLD AUTO: 10.2 K/MM3 (ref 4–10)

## 2017-01-30 RX ADMIN — INSULIN LISPRO SCH UNITS: 100 INJECTION, SOLUTION INTRAVENOUS; SUBCUTANEOUS at 21:42

## 2017-01-30 RX ADMIN — DEXTROSE MONOHYDRATE SCH MG: 50 INJECTION, SOLUTION INTRAVENOUS at 08:18

## 2017-01-30 RX ADMIN — CALCIUM CARBONATE-CHOLECALCIFEROL TAB 250 MG-125 UNIT SCH MG: 250-125 TAB at 08:20

## 2017-01-30 RX ADMIN — PREGABALIN SCH MG: 75 CAPSULE ORAL at 21:41

## 2017-01-30 RX ADMIN — ASPIRIN 325 MG ORAL TABLET SCH MG: 325 PILL ORAL at 08:19

## 2017-01-30 RX ADMIN — METOPROLOL SUCCINATE SCH MG: 50 TABLET, EXTENDED RELEASE ORAL at 08:19

## 2017-01-30 RX ADMIN — HYDROCODONE BITARTRATE AND ACETAMINOPHEN PRN TAB: 5; 325 TABLET ORAL at 21:41

## 2017-01-30 RX ADMIN — OMEPRAZOLE SCH MG: 20 CAPSULE, DELAYED RELEASE ORAL at 08:19

## 2017-01-30 RX ADMIN — ENOXAPARIN SODIUM SCH MG: 30 INJECTION SUBCUTANEOUS at 21:40

## 2017-01-30 RX ADMIN — NICOTINE SCH PATCH: 14 PATCH, EXTENDED RELEASE TRANSDERMAL at 08:19

## 2017-01-30 RX ADMIN — INSULIN DETEMIR SCH UNITS: 100 INJECTION, SOLUTION SUBCUTANEOUS at 21:42

## 2017-01-30 RX ADMIN — IPRATROPIUM BROMIDE AND ALBUTEROL SULFATE SCH ML: .5; 3 SOLUTION RESPIRATORY (INHALATION) at 20:13

## 2017-01-30 RX ADMIN — IPRATROPIUM BROMIDE AND ALBUTEROL SULFATE SCH ML: .5; 3 SOLUTION RESPIRATORY (INHALATION) at 13:20

## 2017-01-30 RX ADMIN — INSULIN LISPRO SCH UNITS: 100 INJECTION, SOLUTION INTRAVENOUS; SUBCUTANEOUS at 12:16

## 2017-01-30 RX ADMIN — PREGABALIN SCH MG: 75 CAPSULE ORAL at 08:19

## 2017-01-30 RX ADMIN — CLOPIDOGREL BISULFATE SCH MG: 75 TABLET ORAL at 08:20

## 2017-01-30 RX ADMIN — INSULIN LISPRO SCH UNITS: 100 INJECTION, SOLUTION INTRAVENOUS; SUBCUTANEOUS at 17:53

## 2017-01-30 RX ADMIN — IPRATROPIUM BROMIDE AND ALBUTEROL SULFATE SCH ML: .5; 3 SOLUTION RESPIRATORY (INHALATION) at 02:00

## 2017-01-30 RX ADMIN — CALCIUM CARBONATE-CHOLECALCIFEROL TAB 250 MG-125 UNIT SCH MG: 250-125 TAB at 21:41

## 2017-01-30 RX ADMIN — INSULIN DETEMIR SCH UNITS: 100 INJECTION, SOLUTION SUBCUTANEOUS at 08:18

## 2017-01-30 RX ADMIN — IPRATROPIUM BROMIDE AND ALBUTEROL SULFATE SCH ML: .5; 3 SOLUTION RESPIRATORY (INHALATION) at 08:02

## 2017-01-30 RX ADMIN — INSULIN LISPRO SCH UNITS: 100 INJECTION, SOLUTION INTRAVENOUS; SUBCUTANEOUS at 08:17

## 2017-01-30 NOTE — REP
CT abdomen pelvis without IV and oral contrast:

 

Comparison is 01/24/2016.

 

There are small bilateral pleural effusions.  These were also present previously.

The current pleural effusions are smaller.

 

Cardiac size appears normal.  Cardiac size is enlarged previously.

 

The unenhanced hepatic parenchyma is unremarkable.  The gallbladder, pancreas and

spleen are normal size and unremarkable.

 

The adrenals and kidneys are unremarkable except for a left renal cortical cyst,

unchanged.

 

The abdominal aorta is unremarkable except for calcified atheroma, unchanged.

 

There is no bowel distension.  Mesentery is unremarkable.

 

There is diverticulosis of the descending colon and sigmoid colon without

diverticulitis.  This is unchanged.

 

Pelvis:

 

There is no ascites.  The previously identified left iliac and femoral adenopathy

is again noted, however the lymph nodes have decreased size.  The bladder is

unremarkable.

 

Impression:

 

No ascites.  No focal fluid collections to suggest abscess.  No hydronephrosis.

No cholelithiasis or biliary duct dilatation.

 

There is diverticulosis without diverticulitis, unchanged.

 

The left iliac and femoral adenopathy has decreased.  Lymph nodes now appear

upper normal size.

 

There are no lytic, blastic or destructive skeletal changes.

 

 

Signed by

Canelo Dalton MD 01/28/2017 08:30 A

## 2017-01-30 NOTE — IPNPDOC
Assessment/Plan


Date Seen


The patient was seen on 1/30/17.





Problems


Problems:  


(1) Leucocytosis


Status:  Acute


Problem Text:  persistent leucocytosis in th 15 to 16 k range with high esr and 

crp. no source of infection identified


will send c diff


SPEP shows 2 m spikes in the gamma region , free light chain ratio is normal , 

ordered immunofixation of serum. 


ct abdomen and pelvis done , cxr done reading pending. echo pending.


sputum to gram positive cocci in pairs and chains.


ID following.  





(2) SIRS-noninf w ac org dys


Status:  Acute


Problem Text:  evidenced by HR 90, temp 100.3, WBC 25, and BP 79/41 upon 

arrival to ED; recent flu infection but thus far blood and urine cultures are 

negative; I suspect that this was secondary to dehydration as the patient has a 

concomitant HUSSEIN and has responded to IVF; patient was recently recovering from 

influenza so had poor appetite and was tired so was not eating or drinking 

adequately this probably led to his dehydration and HUSSEIN. 


vitals now stabilized, afebrile, WBC improved to 15; continue to follow up 

cultures; no antibiotics at this time as no clear infection


will get CT chest as having low grade fevers.





(3) Acute on chronic kidney failure


Status:  Acute


Problem Text:  baseline Cr around 1.4 to 1.6; Cr upon admission was 2.53, had 

increased to 3.0;  I suspect this is secondary to dehydration/prerenal; Now 

improving stabilized around 2.0


 restarted lasix. was getting mildly fluid overloaded. 





(4) Acute on chronic diastolic heart failure


Status:  Acute


Problem Text:  now on lasix 80 mg iv as required based on fluid status. 


came in dehydrated however developed some fluid overload and had some acute 

diastolic chf. 





(5) Defibrillator discharge


Status:  Resolved


Problem Text:  ICD interogated. ICD  did not fire there was no arrhythmia. 


Patient may have had a myoclonic jerk Dr. Snow consulted; patient denies 

chest pain; trops were negative





(6) CAD (coronary artery disease)


Status:  Chronic


Problem Text:  hx of MI and s/p CABG and stents; no current chest pain; trops 

negative; continue home ASA, plavix, beta blocker; needs to discuss with 

primary physicians adding a statin





(7) HTN (hypertension)


Status:  Chronic


Problem Text:  continue home beta blocker





(8) Diabetes mellitus


Status:  Chronic


Response to Treatment:  Uncontrolled


Problem Text:  patient is unclear on home lantus dosing; SSI while in house and 

add and adjust levemir accordingly





(9) Dyslipidemia


Status:  Chronic


(10) GERD (gastroesophageal reflux disease)


Status:  Chronic


(11) PAD (peripheral artery disease)


Status:  Chronic


Problem Text:  has history of bilateral bypasses in the legs says the one on 

the left was done an year ago and it did not work. 








Plan / VTE


VTE Prophylaxis Ordered?:  Yes (lovenox)





Plan


Advance Directives:  DNR





Subjective


Review of Systems


CC/HPI


The patient is a 71-year-old male admitted with a reason for visit of Sepsis.


Events since last encounter


still continues to have cough , says still feels weak , no fever or chills, crp 

still elevated, wbc better. echo still pending. CT abdomen shows bilateral 

inguinal lymphadenopathy





Objective


Physical Examination


General Exam:  Positive: Alert, Cooperative, No Acute Distress


Eye Exam:  Positive: EOMI, 


   Negative: Sclera icteric


ENT Exam:  Positive: Mucous membr. moist/pink


Neck Exam:  Positive: Supple


Chest Exam:  Positive: Normal air movement, Rales


Heart Exam:  Positive: Rate Normal, Regular Rhythm


Abdomen Exam:  Positive: Normal bowel sounds, Soft


Extremity Exam:  Negative: Edema


Neuro Exam:  Positive: Normal Speech


Psych Exam:  Positive: Mood NL, Oriented x 3





Vital Signs/I&O





 Vital Signs








  Date Time  Temp Pulse Resp B/P Pulse Ox O2 Delivery O2 Flow Rate FiO2


 


1/30/17 10:00 97.7 80 18 158/70 92 Room Air  














 I&O- Last 24 Hours up to 6 AM 


 


 1/30/17





 06:00


 


Intake Total 1440 ml


 


Output Total 1650 ml


 


Balance -210 ml











Laboratory Data


Labs 24H


 Laboratory Tests 2


1/29/17 11:44: Bedside Glucose (Misc Panel) 269H


1/29/17 16:22: Bedside Glucose (Misc Panel) 235H


1/29/17 20:19: Bedside Glucose (Misc Panel) 342H


1/30/17 05:48: 


Anion Gap 8, White Blood Count 10.2H, Red Blood Count 2.98L, Hemoglobin 8.2L, 

Hematocrit 26.4L, Mean Corpuscular Volume 88.6, Mean Corpuscular Hemoglobin 27.7

, Mean Corpuscular Hemoglobin Concent 31.3L, Red Cell Distribution Width 12.8, 

Platelet Count 250, Neutrophils (%) (Auto) 74.2H, Lymphocytes (%) (Auto) 13.1L, 

Monocytes (%) (Auto) 6.4H, Eosinophils (%) (Auto) 3.8H, Basophils (%) (Auto) 0.8

, Neutrophils # (Auto) 7.6, Lymphocytes # (Auto) 1.5, Monocytes # (Auto) 0.7, 

Eosinophils # (Auto) 0.4, Basophils # (Auto) 0.1, C-Reactive Protein, 

Quantitative 17.30H, Blood Urea Nitrogen 51H, Creatinine 2.03H, Sodium Level 138

, Potassium Level 5.2H, Chloride Level 101, Carbon Dioxide Level 29, Calcium 

Level 8.3L, Erythrocyte Sedimentation Rate 126H, Glomerular Filtration Rate 

34.7L, Large Unclassified Cells # 0.2, Large Unclassified Cells % 1.7


1/30/17 07:18: Bedside Glucose (Misc Panel) 169H


CBC/BMP


 Laboratory Tests


1/30/17 05:48








Calcium Level 8.3 L, Red Blood Count 2.98 L, Mean Corpuscular Volume 88.6, Mean 

Corpuscular Hemoglobin 27.7, Mean Corpuscular Hemoglobin Concent 31.3 L, Red 

Cell Distribution Width 12.8, Neutrophils (%) (Auto) 74.2 H, Lymphocytes (%) (

Auto) 13.1 L, Monocytes (%) (Auto) 6.4 H, Eosinophils (%) (Auto) 3.8 H, 

Basophils (%) (Auto) 0.8, Neutrophils # (Auto) 7.6, Lymphocytes # (Auto) 1.5, 

Monocytes # (Auto) 0.7, Eosinophils # (Auto) 0.4, Basophils # (Auto) 0.1


FSBS





Laboratory Tests








Test


  1/29/17


11:44 1/29/17


16:22 1/29/17


20:19 1/30/17


07:18 Range/Units


 


 


Bedside Glucose (Misc Panel) 269 235 342 169   MG/DL








Microbiology





 Microbiology


1/27/17 Blood Culture - Preliminary, Resulted


          No Growth after 48 hours. All Specime...


1/27/17 Blood Culture - Preliminary, Resulted


          No Growth after 48 hours. All Specime...


1/22/17 Blood Culture - Final, Complete


          NO GROWTH AFTER 5 DAYS


1/22/17 Blood Culture - Final, Complete


          NO GROWTH AFTER 5 DAYS


1/29/17 Gram Stain - Final, Resulted


          


1/29/17 Sputum Culture, Resulted


          Pending


1/25/17 MRSA Screen - Final, Complete


          Staph.aureus Methicillin Resis


1/22/17 MRSA Screen - Final, Complete


          Staph.aureus Methicillin Resis


1/22/17 Urine Culture - Final, Complete








ROSY LOUIS MD Jan 30, 2017 11:07

## 2017-01-30 NOTE — REP
PA and lateral chest:

 

Comparison is the portable chest of 01/22/2017.

 

The interstitial infiltrates noted previously have resolved.  There are no

pleural effusions.  There are sternotomy wires and cardiomegaly , unchanged.

Pacemaker is unchanged.

 

 

Signed by

Canelo Dalton MD 01/28/2017 08:25 A

## 2017-01-30 NOTE — IPN
INFECTIOUS DISEASE PROGRESS NOTE:  01/30/2017

 

Sánchez seems to be doing great.  He states his cough is much improved.  He only

coughed this morning.  He has had no fever in the past 48 hours.  His last

temperature was on January 28; was 100.4 and none since.  He has no nausea,

vomiting or diarrhea.  No abdominal pain.  No fever or chills.  He feels well, he

is anxious to go home.

 

On 01/29 his white count was 9.7, today it was 10.2, hemoglobin 8.2, hematocrit

26.4, platelets 250.  .  Sodium 138, potassium 5.2, chloride 101, bicarb

29, BUN 51, creatinine 2, glucose 130, calcium 8.3 and CRP 17.3.

 

Blood cultures, four sets, were all negative on 01/22 and 01/27.

 

Urine culture was negative. Methicillin-resistant Staphylococcus aureus (MRSA)

screen was positive. Sputum had moderate epithelial cells; few white cells, few

gram positive cocci in pairs and chains.

 

CT of the abdomen was done on January 28, which showed no ascites.  No

cholelithiasis. Diverticulosis, but no diverticulitis.  No adenopathy.  No

blastic lesions.  No hydronephrosis.

 

Chest x-ray shows interstitial infiltrates that were previously noted, have

resolved.  No pleural effusion.

 

IMPRESSION:

1.  Fever with elevated sedimentation rate and CRP of unknown reason.  He did

have influenza and could have a post residual inflammatory response.  The patient

clinically has improved; has received 5 days of Tamiflu and now feeling much

better.

 

2.  Congestive heart failure, has resolved with IV diuresis.  Chest x-ray

interstitial infiltrates have completely resolved.

 

3.  Coronary artery disease with defibrillator.  There is no evidence of

defibrillator firing causing his presentation of feeling jolted.

 

PLAN: Monitor as an outpatient; have the patient check his temperature every

night.  Please order for CBC, sed rate, CRP in 1 week to be done as an outpatient

to make sure things have improved, as well as a basic profile.

## 2017-01-31 VITALS — SYSTOLIC BLOOD PRESSURE: 164 MMHG | DIASTOLIC BLOOD PRESSURE: 74 MMHG

## 2017-01-31 VITALS — SYSTOLIC BLOOD PRESSURE: 141 MMHG | DIASTOLIC BLOOD PRESSURE: 60 MMHG

## 2017-01-31 VITALS — DIASTOLIC BLOOD PRESSURE: 72 MMHG | SYSTOLIC BLOOD PRESSURE: 138 MMHG

## 2017-01-31 VITALS — SYSTOLIC BLOOD PRESSURE: 120 MMHG | DIASTOLIC BLOOD PRESSURE: 70 MMHG

## 2017-01-31 VITALS — DIASTOLIC BLOOD PRESSURE: 80 MMHG | SYSTOLIC BLOOD PRESSURE: 160 MMHG

## 2017-01-31 LAB
ANION GAP SERPL CALC-SCNC: 6 MEQ/L (ref 8–16)
BASOPHILS # BLD AUTO: 0.1 K/MM3 (ref 0–0.2)
BASOPHILS NFR BLD AUTO: 1.1 % (ref 0–1)
BUN SERPL-MCNC: 51 MG/DL (ref 7–18)
CALCIUM SERPL-MCNC: 8.2 MG/DL (ref 8.8–10.2)
CHLORIDE SERPL-SCNC: 101 MEQ/L (ref 98–107)
CO2 SERPL-SCNC: 30 MEQ/L (ref 21–32)
CREAT SERPL-MCNC: 2.21 MG/DL (ref 0.7–1.3)
EOSINOPHIL # BLD AUTO: 0.4 K/MM3 (ref 0–0.5)
EOSINOPHIL NFR BLD AUTO: 3.7 % (ref 0–3)
ERYTHROCYTE [DISTWIDTH] IN BLOOD BY AUTOMATED COUNT: 12.7 % (ref 11.5–14.5)
GFR SERPL CREATININE-BSD FRML MDRD: 31.5 ML/MIN/{1.73_M2} (ref 42–?)
GLUCOSE SERPL-MCNC: 169 MG/DL (ref 83–110)
LARGE UNSTAINED CELL #: 0.2 K/MM3 (ref 0–0.4)
LARGE UNSTAINED CELL %: 1.9 % (ref 0–4)
LYMPHOCYTES # BLD AUTO: 1.4 K/MM3 (ref 1.5–4.5)
LYMPHOCYTES NFR BLD AUTO: 12.1 % (ref 24–44)
MCH RBC QN AUTO: 28.5 PG (ref 27–33)
MCHC RBC AUTO-ENTMCNC: 31.6 G/DL (ref 32–36.5)
MCV RBC AUTO: 90.4 FL (ref 80–96)
MONOCYTES # BLD AUTO: 0.6 K/MM3 (ref 0–0.8)
MONOCYTES NFR BLD AUTO: 5.5 % (ref 0–5)
NEUTROPHILS # BLD AUTO: 7.8 K/MM3 (ref 1.8–7.7)
NEUTROPHILS NFR BLD AUTO: 75.6 % (ref 36–66)
PLATELET # BLD AUTO: 261 K/MM3 (ref 150–450)
POTASSIUM SERPL-SCNC: 5 MEQ/L (ref 3.5–5.1)
SODIUM SERPL-SCNC: 137 MEQ/L (ref 136–145)
WBC # BLD AUTO: 10.3 K/MM3 (ref 4–10)

## 2017-01-31 RX ADMIN — HYDROCODONE BITARTRATE AND ACETAMINOPHEN PRN TAB: 5; 325 TABLET ORAL at 18:16

## 2017-01-31 RX ADMIN — HYDROCODONE BITARTRATE AND ACETAMINOPHEN PRN TAB: 5; 325 TABLET ORAL at 06:52

## 2017-01-31 RX ADMIN — IPRATROPIUM BROMIDE AND ALBUTEROL SULFATE SCH ML: .5; 3 SOLUTION RESPIRATORY (INHALATION) at 14:04

## 2017-01-31 RX ADMIN — OMEPRAZOLE SCH MG: 20 CAPSULE, DELAYED RELEASE ORAL at 08:52

## 2017-01-31 RX ADMIN — DEXTROSE MONOHYDRATE SCH MG: 50 INJECTION, SOLUTION INTRAVENOUS at 08:52

## 2017-01-31 RX ADMIN — CALCIUM CARBONATE-CHOLECALCIFEROL TAB 250 MG-125 UNIT SCH MG: 250-125 TAB at 08:52

## 2017-01-31 RX ADMIN — INSULIN DETEMIR SCH UNITS: 100 INJECTION, SOLUTION SUBCUTANEOUS at 08:53

## 2017-01-31 RX ADMIN — ASPIRIN 325 MG ORAL TABLET SCH MG: 325 PILL ORAL at 08:52

## 2017-01-31 RX ADMIN — HYDROCODONE BITARTRATE AND ACETAMINOPHEN PRN TAB: 5; 325 TABLET ORAL at 22:22

## 2017-01-31 RX ADMIN — CALCIUM CARBONATE-CHOLECALCIFEROL TAB 250 MG-125 UNIT SCH MG: 250-125 TAB at 22:21

## 2017-01-31 RX ADMIN — IPRATROPIUM BROMIDE AND ALBUTEROL SULFATE SCH ML: .5; 3 SOLUTION RESPIRATORY (INHALATION) at 02:00

## 2017-01-31 RX ADMIN — METOPROLOL SUCCINATE SCH MG: 50 TABLET, EXTENDED RELEASE ORAL at 08:53

## 2017-01-31 RX ADMIN — IPRATROPIUM BROMIDE AND ALBUTEROL SULFATE SCH ML: .5; 3 SOLUTION RESPIRATORY (INHALATION) at 19:57

## 2017-01-31 RX ADMIN — INSULIN LISPRO SCH UNITS: 100 INJECTION, SOLUTION INTRAVENOUS; SUBCUTANEOUS at 08:51

## 2017-01-31 RX ADMIN — CLOPIDOGREL BISULFATE SCH MG: 75 TABLET ORAL at 08:53

## 2017-01-31 RX ADMIN — PREGABALIN SCH MG: 75 CAPSULE ORAL at 08:52

## 2017-01-31 RX ADMIN — INSULIN LISPRO SCH UNITS: 100 INJECTION, SOLUTION INTRAVENOUS; SUBCUTANEOUS at 17:10

## 2017-01-31 RX ADMIN — INSULIN LISPRO SCH UNITS: 100 INJECTION, SOLUTION INTRAVENOUS; SUBCUTANEOUS at 22:24

## 2017-01-31 RX ADMIN — ENOXAPARIN SODIUM SCH MG: 30 INJECTION SUBCUTANEOUS at 22:22

## 2017-01-31 RX ADMIN — IPRATROPIUM BROMIDE AND ALBUTEROL SULFATE SCH ML: .5; 3 SOLUTION RESPIRATORY (INHALATION) at 07:33

## 2017-01-31 RX ADMIN — INSULIN LISPRO SCH UNITS: 100 INJECTION, SOLUTION INTRAVENOUS; SUBCUTANEOUS at 12:35

## 2017-01-31 RX ADMIN — PREGABALIN SCH MG: 75 CAPSULE ORAL at 22:21

## 2017-01-31 RX ADMIN — NICOTINE SCH PATCH: 14 PATCH, EXTENDED RELEASE TRANSDERMAL at 08:51

## 2017-01-31 RX ADMIN — INSULIN DETEMIR SCH UNITS: 100 INJECTION, SOLUTION SUBCUTANEOUS at 22:23

## 2017-01-31 NOTE — IPNPDOC
Assessment/Plan


Date Seen


The patient was seen on 1/31/17.





Problems


Problems:  


(1) Leucocytosis


Status:  Acute


Response to Treatment:  Improving


Problem Text:  improving, almost normalized,


no fevers





(2) Acute on chronic kidney failure


Status:  Acute


Response to Treatment:  Worse


Problem Text:  unknown etiology


he was restarted on hi s diuretics after they were held for a period of time


baseline crt around 1.5


will consult dr polanco





(3) Acute on chronic diastolic heart failure


Status:  Acute


Problem Text:  now on lasix 80 mg iv as required based on fluid status. 


came in dehydrated however developed some fluid overload and had some acute 

diastolic chf. 





(4) Defibrillator discharge


Status:  Resolved


Problem Text:  ICD interogated. ICD  did not fire there was no arrhythmia. 


Patient may have had a myoclonic jerk Dr. Snow consulted; patient denies 

chest pain; trops were negative





(5) CAD (coronary artery disease)


Status:  Chronic


Problem Text:  hx of MI and s/p CABG and stents; no current chest pain; trops 

negative; continue home ASA, plavix, beta blocker; needs to discuss with 

primary physicians adding a statin





(6) HTN (hypertension)


Status:  Chronic


Problem Text:  continue home beta blocker





(7) Diabetes mellitus


Status:  Chronic


Response to Treatment:  Uncontrolled


Problem Text:  patient is unclear on home lantus dosing; SSI while in house and 

add and adjust levemir accordingly





(8) Dyslipidemia


Status:  Chronic


(9) GERD (gastroesophageal reflux disease)


Status:  Chronic


(10) PAD (peripheral artery disease)


Status:  Chronic


Problem Text:  has history of bilateral bypasses in the legs says the one on 

the left was done an year ago and it did not work. 





(11) Gammopathy with multiple M spikes


Status:  Acute


Problem Text:  


* questionable multiple myeloma 


* consult dr Guadalupe








Plan / VTE


VTE Prophylaxis Ordered?:  Yes (lovenox)





Plan


Advance Directives:  DNR





Subjective


Review of Systems


CC/HPI


The patient is a 71-year-old male admitted with a reason for visit of Sepsis.


Constitutional:  Denies: Chills, Fever, Malaise, Night Sweats, Weakness


Pulmonary:  Denies: Cough, Dyspnea


Cardiovascular:  Denies: Chest Pain, Lt Headedness, Orthopnea, Palpitations, 

Paroxysmal Noc. Dyspnea





Objective


Physical Examination


General Exam:  Positive: Alert, Cooperative, No Acute Distress


Eye Exam:  Positive: EOMI, 


   Negative: Sclera icteric


ENT Exam:  Positive: Mucous membr. moist/pink


Neck Exam:  Positive: Supple


Chest Exam:  Positive: Normal air movement, Rales


Heart Exam:  Positive: Rate Normal, Regular Rhythm


Abdomen Exam:  Positive: Normal bowel sounds, Soft


Extremity Exam:  Negative: Edema


Neuro Exam:  Positive: Normal Speech


Psych Exam:  Positive: Mood NL, Oriented x 3





Vital Signs/I&O





 Vital Signs








  Date Time  Temp Pulse Resp B/P Pulse Ox O2 Delivery O2 Flow Rate FiO2


 


1/31/17 14:00 96.6 78 20 120/70 95 Room Air  














 I&O- Last 24 Hours up to 6 AM 


 


 1/31/17





 06:00


 


Intake Total 1110 ml


 


Output Total 1500 ml


 


Balance -390 ml











Laboratory Data


Labs 24H


 Laboratory Tests 2


1/30/17 21:19: Bedside Glucose (Misc Panel) 252H


1/31/17 06:02: 


Anion Gap 6L, White Blood Count 10.3H, Red Blood Count 2.91L, Hemoglobin 8.3L, 

Hematocrit 26.4L, Mean Corpuscular Volume 90.4, Mean Corpuscular Hemoglobin 28.5

, Mean Corpuscular Hemoglobin Concent 31.6L, Red Cell Distribution Width 12.7, 

Platelet Count 261, Neutrophils (%) (Auto) 75.6H, Lymphocytes (%) (Auto) 12.1L, 

Monocytes (%) (Auto) 5.5H, Eosinophils (%) (Auto) 3.7H, Basophils (%) (Auto) 

1.1H, Neutrophils # (Auto) 7.8H, Lymphocytes # (Auto) 1.4L, Monocytes # (Auto) 

0.6, Eosinophils # (Auto) 0.4, Basophils # (Auto) 0.1, Blood Urea Nitrogen 51H, 

Creatinine 2.21H, Sodium Level 137, Potassium Level 5.0, Chloride Level 101, 

Carbon Dioxide Level 30, Calcium Level 8.2L, Glomerular Filtration Rate 31.5L, 

Large Unclassified Cells # 0.2, Large Unclassified Cells % 1.9


1/31/17 11:34: Urine Total Protein 117.7H


1/31/17 11:40: Bedside Glucose (Misc Panel) 269H


1/31/17 16:46: Bedside Glucose (Misc Panel) 192H


CBC/BMP


 Laboratory Tests


1/31/17 06:02








Calcium Level 8.2 L, Red Blood Count 2.91 L, Mean Corpuscular Volume 90.4, Mean 

Corpuscular Hemoglobin 28.5, Mean Corpuscular Hemoglobin Concent 31.6 L, Red 

Cell Distribution Width 12.7, Neutrophils (%) (Auto) 75.6 H, Lymphocytes (%) (

Auto) 12.1 L, Monocytes (%) (Auto) 5.5 H, Eosinophils (%) (Auto) 3.7 H, 

Basophils (%) (Auto) 1.1 H, Neutrophils # (Auto) 7.8 H, Lymphocytes # (Auto) 

1.4 L, Monocytes # (Auto) 0.6, Eosinophils # (Auto) 0.4, Basophils # (Auto) 0.1


FSBS





Laboratory Tests








Test


  1/30/17


21:19 1/31/17


11:40 1/31/17


16:46 Range/Units


 


 


Bedside Glucose (Misc Panel) 252 269 192   MG/DL








Microbiology





 Microbiology


1/27/17 Blood Culture - Preliminary, Resulted


          No Growth after 72 hours. All specime...


1/27/17 Blood Culture - Preliminary, Resulted


          No Growth after 72 hours. All specime...


1/22/17 Blood Culture - Final, Complete


          NO GROWTH AFTER 5 DAYS


1/22/17 Blood Culture - Final, Complete


          NO GROWTH AFTER 5 DAYS


1/29/17 Gram Stain - Final, Complete


          


1/29/17 Sputum Culture - Final, Complete


          Klebsiella Pneumoniae


1/25/17 MRSA Screen - Final, Complete


          Staph.aureus Methicillin Resis


1/22/17 MRSA Screen - Final, Complete


          Staph.aureus Methicillin Resis


1/22/17 Urine Culture - Final, Complete








JOEY CALDERON DO Jan 31, 2017 17:20

## 2017-01-31 NOTE — IPNPDOC
[facility] Cardiology Progress Note


Date of Service/Time


The patient was seen on 1/31/17 at 09:30.





Cardiology Progress Note


SUBJECTIVE: Sitting upright in bed, eating breakfast, appears comfortable, has 

no active complaints. Denies chest pain, SOB or palpitations.  





OBJECTIVE: Resting in his bedside chair, eating his breakfast and watching 

television. Appears in no discomfort and is non-labored.





PHYSICAL EXAMINATION:


VITAL SIGNS: Please see below.


GENERAL APPEARANCE: NAD, appears well, alert and orientated. 


HEENT: NCAT, PERRLA, EOMI, nares patent b/l, tongue midline, moist mucus 

membranes. 


LUNGS: CTA b/l, no wheezing, rales, rhonchi or crackles appreciated, minimally 

diminished expansion b/l


HEART: normal s1 and s2, no rubs, gallops or murmurs appreciated. rate in the 80

's.  


ABDOMEN: soft, non-distended


SKIN: intact. 


EXTREMITIES: no swelling, cyanosis or clubbing appreciated


NEUROLOGICAL: no focal deficits appreciated


PSYCHIATRIC: affect appropriate





LABORATORY WORK: Please see below.





ASSESSMENT AND PLAN: 





Mr. Dunlap seems to be doing well this morning. He denies being in any 

discomfort, experiencing chest pain or having palpitations. From a cardiology 

perspective, Mr. Dunlap is stable and no new recommendations are advised at 

this time. It seems that he is being worked up for potential multiple myeloma, 

he does have an elevated ESR and CRP, hemoglobin also is low with a creatine of 

2.2. Will place order for urine electrophoresis studies. Pt may very well 

require bone marrow bx. upon d/c, will let primary care tend to this. He is 

okay to discharge from our standpoint.





Vital Signs/I&O


VS/I&O





 Vital Signs








  Date Time  Temp Pulse Resp B/P Pulse Ox O2 Delivery O2 Flow Rate FiO2


 


1/31/17 08:53  80  160/80    


 


1/31/17 07:22   18     


 


1/31/17 06:00 97.8    92 Room Air  














 I&O- Last 24 Hours up to 6 AM 


 


 1/31/17





 05:59


 


Intake Total 1120 ml


 


Output Total 1500 ml


 


Balance -380 ml











Laboratory Data


24H LABS


 Laboratory Tests 2


1/30/17 11:37: Bedside Glucose (Misc Panel) 217H


1/30/17 16:41: Bedside Glucose (Misc Panel) 207H


1/30/17 21:19: Bedside Glucose (Misc Panel) 252H


1/31/17 06:02: 


Anion Gap 6L, White Blood Count 10.3H, Red Blood Count 2.91L, Hemoglobin 8.3L, 

Hematocrit 26.4L, Mean Corpuscular Volume 90.4, Mean Corpuscular Hemoglobin 28.5

, Mean Corpuscular Hemoglobin Concent 31.6L, Red Cell Distribution Width 12.7, 

Platelet Count 261, Neutrophils (%) (Auto) 75.6H, Lymphocytes (%) (Auto) 12.1L, 

Monocytes (%) (Auto) 5.5H, Eosinophils (%) (Auto) 3.7H, Basophils (%) (Auto) 

1.1H, Neutrophils # (Auto) 7.8H, Lymphocytes # (Auto) 1.4L, Monocytes # (Auto) 

0.6, Eosinophils # (Auto) 0.4, Basophils # (Auto) 0.1, Blood Urea Nitrogen 51H, 

Creatinine 2.21H, Sodium Level 137, Potassium Level 5.0, Chloride Level 101, 

Carbon Dioxide Level 30, Calcium Level 8.2L, Glomerular Filtration Rate 31.5L, 

Large Unclassified Cells # 0.2, Large Unclassified Cells % 1.9


CBC/BMP


 Laboratory Tests


1/31/17 06:02








Calcium Level 8.2 L, Red Blood Count 2.91 L, Mean Corpuscular Volume 90.4, Mean 

Corpuscular Hemoglobin 28.5, Mean Corpuscular Hemoglobin Concent 31.6 L, Red 

Cell Distribution Width 12.7, Neutrophils (%) (Auto) 75.6 H, Lymphocytes (%) (

Auto) 12.1 L, Monocytes (%) (Auto) 5.5 H, Eosinophils (%) (Auto) 3.7 H, 

Basophils (%) (Auto) 1.1 H, Neutrophils # (Auto) 7.8 H, Lymphocytes # (Auto) 

1.4 L, Monocytes # (Auto) 0.6, Eosinophils # (Auto) 0.4, Basophils # (Auto) 0.1


FSBS





Laboratory Tests








Test


  1/30/17


11:37 1/30/17


16:41 1/30/17


21:19 Range/Units


 


 


Bedside Glucose (Misc Panel) 217 207 252   MG/DL








Microbiology





 Microbiology


1/27/17 Blood Culture - Preliminary, Resulted


          No Growth after 72 hours. All specime...


1/27/17 Blood Culture - Preliminary, Resulted


          No Growth after 72 hours. All specime...


1/22/17 Blood Culture - Final, Complete


          NO GROWTH AFTER 5 DAYS


1/22/17 Blood Culture - Final, Complete


          NO GROWTH AFTER 5 DAYS


1/29/17 Gram Stain - Final, Resulted


          


1/29/17 Sputum Culture, Resulted


          Pending


1/25/17 MRSA Screen - Final, Complete


          Staph.aureus Methicillin Resis


1/22/17 MRSA Screen - Final, Complete


          Staph.aureus Methicillin Resis


1/22/17 Urine Culture - Final, Complete








JOSÉ MIGUEL GARCIA DO Jan 31, 2017 09:50

## 2017-01-31 NOTE — CR.PDOC
[facility] Consultation


Consultation


DATE OF ADMISSION: 1/22/2017





DATE OF CONSULTATION: 01/31/2017





ATTENDING PHYSICIAN: Dr. Shereen Caba





CONSULTING PHYSICIAN: Dr. Evita Valencia





REASON FOR CONSULTATION: Acute kidney injury





HISTORY OF PRESENT ILLNESS: Mr. Dunlap is a 71-year-old male past medical 

history significant for diastolic heart failure, coronary artery disease status 

post MI and CABG, hypertension, diabetes, hyperlipidemia, peripheral vascular 

disease, and GERD who presented to the emergency room on January 22nd stating 

that his defibrillator had fired. Prior to firing, he was diagnosed with the 

flu and completed treatment. He originally presented with hypotension, fever 

and was diagnosed with possible sepsis. He was admitted to the hospital and 

further workup was undertaken. He had a chest CT and abdominal CT which did not 

show any source of infection. His urinalysis revealed 3+ protein, 3+ glucose 

and 1+ blood. He underwent SPEP which showed increase in the gammaglobulin. 

Patient was originally given IV fluids secondary to being hypotensive and 

dehydrated. He then became fluid overloaded and was diuresis. His renal 

function on admission was 2.5, with a baseline of about 1.6. Despite his 

hospital course and the measures taken, his renal function has not 

significantly improved. He reports that he has chronic shortness of breath with 

exertion, no increased shortness of breath. No chest pain. He reports that he 

has been eating and drinking well. No trouble with urinating. He denies any GI 

symptoms. Nephrology consult was requested due to acute kidney injury.


 


PAST MEDICAL HISTORY:


1. Diastolic heart failure


2. Diabetes.


3. Hypertension.


5. Hyperlipidemia.


6. Coronary artery disease status post MI.


5. Peripheral vascular disease


6. GERD


 


PAST SURGICAL HISTORY:


1. Coronary stents.


2. Femoral-popliteal (fem-pop) bypass bilaterally.


3. Coronary artery bypass graft (CABG) two years ago.


4. Pacemaker/defibrillator insertion


 


SOCIAL HISTORY:


The patient quit smoking four years ago.  Used to smoke one pack per day for 

almost 60 years. Drinks occasionally. No illicit drug use. Lives at home alone. 


 


ALLERGIES:  No known drug allergies.


 


HOME MEDICATIONS: Please see below.





REVIEW OF SYSTEMS:


GENERAL: No fever, chills, night sweats. No excessive fatigue.


HEENT: No headache, lightheadedness or dizziness. No acute changes to vision or 

hearing.


CARDIOVASCULAR: No chest pain, pressure. He has chronic shortness of breath 

with exertion. No chronic lower extremity edema.


GASTROINTESTINAL: No nausea, vomiting, diarrhea, abdominal pain. No 

hematochezia or melena.


GENITOURINARY: No change in urinary frequency. No dysuria or hematuria.


ENDOCRINE: Positive for Diabetes. No thyroid disorder.


HEMATOLOGIC: No excessive bleeding or bruising.


INTEGUMENT: No unusual rashes or skin lesions.


MUSCULOSKELETAL: No muscle or joint pains.


NEUROLOGIC: No syncope. No  paresthesias. No focal deficits.





PHYSICAL EXAMINATION:


Vital Signs








  Date Time  Temp Pulse Resp B/P Pulse Ox O2 Delivery O2 Flow Rate FiO2


 


1/31/17 18:16   18     


 


1/31/17 14:00 96.6 78  120/70 95 Room Air  














 I&O- Last 24 Hours up to 6 AM 


 


 1/31/17





 06:00


 


Intake Total 1110 ml


 


Output Total 1500 ml


 


Balance -390 ml








GENERAL: Alert and oriented. In no acute distress.


HEENT:  Normocephalic, atraumatic. Shock, sore intact. Pupils are equally 

reactive to light. No scleral icterus. Moist mucosa. 


NECK: Supple. No thyromegaly. Jugular venous distention is appreciated.


HEART: Normal S1, S2. Regular rate and rhythm.


LUNGS: Positive for bilateral crackles. No rhonchi or wheezing appreciated.


ABDOMEN: Soft, nontender, nondistended. Bowel sounds are present.


SKIN: Warm and dry. Good skin turgor. No rashes noted.


EXTREMITIES: 2+ lower extremity edema. He also has presacral edema. No 

cyanosis. Positive pedal pulses bilaterally.


NEUROLOGIC: No focal deficits. Cranial nerves II through XII are grossly 

intact. Motor and sensation intact.


 


LABORATORY DATA:


1/31/17 06:02











MICROBIOLOGY: Blood cultures show no growth thus far. Urine culture negative. 

MRSA screen was positive. Sputum was positive for Klebsiella.





 


ASSESSMENT AND PLAN:


1. Acute on chronic kidney disease, likely secondary to fluid overload/heart 

failure. Patient has been diuresed, however it has not been aggressive enough. 

His Lasix is being changed to 80 mg IV twice daily and we will reassess his 

volume status and urinary output tomorrow. Electrolytes are stable. Clinically, 

he is significantly volume overloaded with elevated jugular veins, lower 

extremity edema and presacral edema.


2. Leukocytosis and elevated CRP, questionable infection. Patient is no longer 

febrile. He reports having the flu last week. This is being managed by primary 

team. He does not appear to be on any antibiotics.


3. History of diastolic heart failure. Volume status is not compensated at this 

time. We will diuresis more aggressively and monitor. Strict I's and O's. Weigh 

daily. Maintain 1800 mL fluid restriction. Home dose of torsemide held while 

being diuresis with IV Lasix.


4. Hypertension. Blood pressure stable. He continues to be on metoprolol. 

Lisinopril held due to acute kidney injury.


5. New nodular opacity noted on chest CT. He does continue to have a stable 

calcified nodule as well.


6. Increased gamma globulins with noted M spike. It appears that hem/onc has 

been consulted.


7. Coronary artery disease status post coronary artery bypass graft (CABG). He 

is on aspirin and Plavix. He also appears to be on a beta blocker.


8. Diabetes. Continue Levemir and insulin sliding scale.


9. Anemia. Hemoglobin is currently 8.3. Continue to follow as this has been 

trending down from admission.








Thank you for the consultation and allowing us to participate in the care of 

Mr. Dunlap. We will continue to follow along with you.





Allergies


Coded Allergies:  


     No Known Drug Allergy (Verified  Allergy, Unknown, 4/8/13)


     TURKEY (Verified  Allergy, Unknown, VOMITING, 5/31/11)





Home Medications


Scheduled


Aspirin (Aspirin) 325 Mg Tab 325 MG PO DAILY  (Reported) 


Calcium/Vitamin D (Oyster Shell Calcium 250+ 250-125 mg-Unit) 1 Tab Tab 1 TAB 

PO BID  (Reported) 


Clopidogrel Bisulfate (Clopidogrel) 75 Mg Tab 75 MG PO DAILY  (Reported) 


Insulin Glargine (Lantus) 1 Units/0.01 Ml Susp 1 DOSE SC BID  (Reported) 


   SEE COMMENTS 


Lisinopril (Lisinopril) 5 Mg Tab 5 MG PO DAILY  (Reported) 


Metoprolol Succinate (Metoprolol Succinate ER) 50 Mg Tab 50 MG PO DAILY  (

Reported) 


Omeprazole (Omeprazole) 40 Mg Cap 40 MG PO DAILY  (Reported) 


Potassium Chloride (Klor-Con M10) 10 Meq Tabcr 10 MEQ PO DAILY  (Reported) 


Pregabalin (Lyrica) 150 Mg Cap 150 MG PO BID  (Reported) 


Torsemide (Torsemide) 100 Mg Tab 100 MG PO DAILY  (Reported) 





Scheduled PRN


Albuterol Sulfate (Albuterol Sulfate) 2.5 Mg/3 Ml Nebu 2.5 MG INH QID PRN PRN 

SHORTNESS OF BREATH (Reported) 


Guaifenesin/Codeine (Guaifenesin/Codeine 100-10 mg/5Ml) 5 Ml Syrp 5 ML PO Q4H 

PRN PRN COUGH (Reported) 


Nitroglycerin (Nitrostat) 0.4 Mg Subl 0.4 MG SL Q5MP PRN PRN CHEST PAIN (

Reported) 


Oxycodone/Acetaminophen (Percocet 2.5-325 mg) 1 Tab Tab 1 TAB PO Q8H PRN PRN 

PAIN (Reported) 





GME ATTESTATION


GME ATTESTATION


My preceptor for this patient encounter was physically present in the building 

during the encounter and was fully available. As needed, all aspects of the 

patient interview, examination, medical decision making process, and medical 

care plan development were reviewed and approved by the preceptor. Preceptor is 

aware and concurs with the plan as stated in the body of this note and will 

attest to such by his/her cosignature.








MIGUELINA MORE DO Jan 31, 2017 19:16

## 2017-02-01 VITALS — DIASTOLIC BLOOD PRESSURE: 70 MMHG | SYSTOLIC BLOOD PRESSURE: 163 MMHG

## 2017-02-01 VITALS — SYSTOLIC BLOOD PRESSURE: 172 MMHG | DIASTOLIC BLOOD PRESSURE: 76 MMHG

## 2017-02-01 VITALS — DIASTOLIC BLOOD PRESSURE: 79 MMHG | SYSTOLIC BLOOD PRESSURE: 138 MMHG

## 2017-02-01 VITALS — SYSTOLIC BLOOD PRESSURE: 151 MMHG | DIASTOLIC BLOOD PRESSURE: 80 MMHG

## 2017-02-01 VITALS — SYSTOLIC BLOOD PRESSURE: 160 MMHG | DIASTOLIC BLOOD PRESSURE: 78 MMHG

## 2017-02-01 VITALS — DIASTOLIC BLOOD PRESSURE: 67 MMHG | SYSTOLIC BLOOD PRESSURE: 149 MMHG

## 2017-02-01 LAB
ANION GAP SERPL CALC-SCNC: 6 MEQ/L (ref 8–16)
BASOPHILS # BLD AUTO: 0 K/MM3 (ref 0–0.2)
BASOPHILS NFR BLD AUTO: 0.6 % (ref 0–1)
BUN SERPL-MCNC: 54 MG/DL (ref 7–18)
CALCIUM SERPL-MCNC: 8.3 MG/DL (ref 8.8–10.2)
CHLORIDE SERPL-SCNC: 100 MEQ/L (ref 98–107)
CO2 SERPL-SCNC: 32 MEQ/L (ref 21–32)
CREAT SERPL-MCNC: 2.22 MG/DL (ref 0.7–1.3)
EOSINOPHIL # BLD AUTO: 0.4 K/MM3 (ref 0–0.5)
EOSINOPHIL NFR BLD AUTO: 4.4 % (ref 0–3)
ERYTHROCYTE [DISTWIDTH] IN BLOOD BY AUTOMATED COUNT: 11.7 % (ref 11.5–14.5)
GFR SERPL CREATININE-BSD FRML MDRD: 31.3 ML/MIN/{1.73_M2} (ref 42–?)
GLUCOSE SERPL-MCNC: 174 MG/DL (ref 83–110)
LARGE UNSTAINED CELL #: 0.1 K/MM3 (ref 0–0.4)
LARGE UNSTAINED CELL %: 1.3 % (ref 0–4)
LYMPHOCYTES # BLD AUTO: 1 K/MM3 (ref 1.5–4.5)
LYMPHOCYTES NFR BLD AUTO: 10.8 % (ref 24–44)
MCH RBC QN AUTO: 27.8 PG (ref 27–33)
MCHC RBC AUTO-ENTMCNC: 30.1 G/DL (ref 32–36.5)
MCV RBC AUTO: 92.4 FL (ref 80–96)
MONOCYTES # BLD AUTO: 0.5 K/MM3 (ref 0–0.8)
MONOCYTES NFR BLD AUTO: 5.3 % (ref 0–5)
NEUTROPHILS # BLD AUTO: 6.9 K/MM3 (ref 1.8–7.7)
NEUTROPHILS NFR BLD AUTO: 77.5 % (ref 36–66)
PLATELET # BLD AUTO: 299 K/MM3 (ref 150–450)
POTASSIUM SERPL-SCNC: 5 MEQ/L (ref 3.5–5.1)
SODIUM SERPL-SCNC: 138 MEQ/L (ref 136–145)
WBC # BLD AUTO: 8.9 K/MM3 (ref 4–10)

## 2017-02-01 RX ADMIN — HYDROCODONE BITARTRATE AND ACETAMINOPHEN PRN TAB: 5; 325 TABLET ORAL at 20:46

## 2017-02-01 RX ADMIN — PREGABALIN SCH MG: 75 CAPSULE ORAL at 20:43

## 2017-02-01 RX ADMIN — IPRATROPIUM BROMIDE AND ALBUTEROL SULFATE SCH ML: .5; 3 SOLUTION RESPIRATORY (INHALATION) at 13:28

## 2017-02-01 RX ADMIN — DEXTROSE MONOHYDRATE SCH MG: 50 INJECTION, SOLUTION INTRAVENOUS at 08:22

## 2017-02-01 RX ADMIN — NICOTINE SCH PATCH: 14 PATCH, EXTENDED RELEASE TRANSDERMAL at 08:22

## 2017-02-01 RX ADMIN — INSULIN LISPRO SCH UNITS: 100 INJECTION, SOLUTION INTRAVENOUS; SUBCUTANEOUS at 12:52

## 2017-02-01 RX ADMIN — IPRATROPIUM BROMIDE AND ALBUTEROL SULFATE SCH ML: .5; 3 SOLUTION RESPIRATORY (INHALATION) at 19:38

## 2017-02-01 RX ADMIN — IPRATROPIUM BROMIDE AND ALBUTEROL SULFATE SCH ML: .5; 3 SOLUTION RESPIRATORY (INHALATION) at 07:31

## 2017-02-01 RX ADMIN — PREGABALIN SCH MG: 75 CAPSULE ORAL at 08:22

## 2017-02-01 RX ADMIN — METOPROLOL SUCCINATE SCH MG: 50 TABLET, EXTENDED RELEASE ORAL at 08:23

## 2017-02-01 RX ADMIN — INSULIN DETEMIR SCH UNITS: 100 INJECTION, SOLUTION SUBCUTANEOUS at 08:21

## 2017-02-01 RX ADMIN — INSULIN DETEMIR SCH UNITS: 100 INJECTION, SOLUTION SUBCUTANEOUS at 20:44

## 2017-02-01 RX ADMIN — OMEPRAZOLE SCH MG: 20 CAPSULE, DELAYED RELEASE ORAL at 08:22

## 2017-02-01 RX ADMIN — IPRATROPIUM BROMIDE AND ALBUTEROL SULFATE SCH ML: .5; 3 SOLUTION RESPIRATORY (INHALATION) at 01:52

## 2017-02-01 RX ADMIN — DEXTROSE MONOHYDRATE SCH MG: 50 INJECTION, SOLUTION INTRAVENOUS at 17:57

## 2017-02-01 RX ADMIN — ASPIRIN 325 MG ORAL TABLET SCH MG: 325 PILL ORAL at 08:22

## 2017-02-01 RX ADMIN — CLOPIDOGREL BISULFATE SCH MG: 75 TABLET ORAL at 08:23

## 2017-02-01 RX ADMIN — INSULIN LISPRO SCH UNITS: 100 INJECTION, SOLUTION INTRAVENOUS; SUBCUTANEOUS at 08:21

## 2017-02-01 RX ADMIN — CALCIUM CARBONATE-CHOLECALCIFEROL TAB 250 MG-125 UNIT SCH MG: 250-125 TAB at 08:23

## 2017-02-01 RX ADMIN — INSULIN LISPRO SCH UNITS: 100 INJECTION, SOLUTION INTRAVENOUS; SUBCUTANEOUS at 20:44

## 2017-02-01 RX ADMIN — INSULIN LISPRO SCH UNITS: 100 INJECTION, SOLUTION INTRAVENOUS; SUBCUTANEOUS at 17:58

## 2017-02-01 RX ADMIN — CALCIUM CARBONATE-CHOLECALCIFEROL TAB 250 MG-125 UNIT SCH MG: 250-125 TAB at 20:44

## 2017-02-01 NOTE — CR
DATE OF CONSULTATION: 01/31/2016

 

REFERRING PHYSICIAN: Shereen Caba MD

 

REASON FOR CONSULTATION: Monoclonal gammopathy, ? multiple myeloma.

 

HISTORY OF PRESENT ILLNESS: Mr. Dunlap is a 71-year-old man who is currently

admitted to [facility] for sepsis. He was given antibiotics and 
has

had an improvement in his symptoms. He was referred to Hematology as he was 
noted to be anemic,

with renal insufficiency, and a serum protein electrophoresis (SPEP) showed

two M spikes. Serum immunofixation showed IgG kappa and IgG lambda biclonal

gammopathy. The free kappa light chains were slightly elevated at 65.37 mg/L, 
and

the free lambda light chain was elevated at 41.10 with a normal free kappa/
lambda

ratio.

 

Mr. Dunlap reports that he has been feeling better since admission. He reports 
no

back pains. He has good energy levels. His weight has been stable.

 

ON PHYSICAL EXAM:

He was lying comfortably in the bed, not in distress.

He had pinkish conjunctivae, anicteric sclerae. No oral mucosal lesions.

Lungs: Fair air entry.

S1, S2.

Abdomen was soft, nontender. No guarding.

Extremities: Trace bipedal edema. No calf swelling. No calf tenderness.

 

IMPRESSION/PLAN:

Mr. Dunlap had an SPEP which showed hypoalbuminemia, M spike noted in the early

gamma region, concentration = 0.18 gm/dL, and another M spike noted in the mid

gamma region, concentration = 0.20 gm/dL. He had serum immunofixation, which

showed IgG kappa and IgG lambda monoclonal gammopathy. His serum free kappa 
light

chains and serum lambda light chains were slightly elevated, but the ratio was

normal.

 

I discussed with Mr. Dunlap that he has monoclonal/biclonal gammopathy but the M

spikes were low and the kappa light chains were only slightly elevated as well 
as

the lambda light chain. He does have anemia and renal insufficiency, but he

likely does not have multiple myeloma at this point in time.

 

We will arrange to see him for a followup appointment at [facility] once

he gets discharged.

 

Thank you for this referral.

[facility]

## 2017-02-01 NOTE — IPNPDOC
Date/Time Seen


The patient was seen on 2/1/17 at 13:09.





Progress Note


DATE OF ENCOUNTER: 02/01/2017





SUBJECTIVE: Mr. Dunlap was seen this morning at bedside. No acute overnight 

issues. He reports feeling well. No chest pain, chest pressure or increased 

shortness of breath. No nausea, vomiting, abdominal pain, diarrhea, headache, 

dizziness. No fevers or chills. Patient reports that he is eating well. He also 

reports that he is urinating a lot and has had a negative balance of about 1 L. 





OBJECTIVE:


Vital Signs








  Date Time  Temp Pulse Resp B/P Pulse Ox O2 Delivery O2 Flow Rate FiO2


 


2/1/17 10:00 97.3 82 20 172/76 93 Room Air  














 I&O- Last 24 Hours up to 6 AM 


 


 2/1/17





 06:00


 


Intake Total 1975 ml


 


Output Total 3325 ml


 


Balance -1350 ml








GENERAL: Alert and oriented. In no acute distress.


HEENT:  Normocephalic, atraumatic. Extraocular muscles intact. Moist mucosa. 


NECK: Supple. No thyromegaly. Jugular venous distention is appreciated.


HEART: Normal S1, S2. Regular rate and rhythm. Positive systolic ejection 

murmur heard in the lower left sternal border.


LUNGS: Positive for bilateral crackles. No rhonchi or wheezing appreciated.


ABDOMEN: Soft, nontender, nondistended. Bowel sounds are present.


SKIN: Warm and dry. Good skin turgor. No rashes noted.


EXTREMITIES: 1+ lower extremity edema. No cyanosis. Positive pedal pulses 

bilaterally.


NEUROLOGIC: No focal deficits. Motor and sensation intact.


 


LABORATORY DATA:


2/1/17 06:22











 


ASSESSMENT AND PLAN:


1. Acute on chronic kidney disease, likely secondary to fluid overload/heart 

failure. Creatinine is essentially unchanged. He continues to be volume 

overloaded and we will continue diuresing him. He is on IV Lasix 80 mg twice 

daily. Electrolytes are stable.


2. History of diastolic heart failure. Volume status is not compensated at this 

time. We will diuresis more aggressively and monitor. Strict I's and O's. Weigh 

daily. Maintain 1800 mL fluid restriction. Home dose of torsemide held while 

being diuresed with IV Lasix. 


3. Hypertension. Blood pressure is suboptimal. He continues to be on metoprolol 

and is being diuresed with IV Lasix. Lisinopril held due to acute kidney injury.


4. Increased gamma globulins with noted M spike. Hem/onc was consulted. It doesn

't appear that there is a high concern for malignancy at this time.


5. Anemia. Hemoglobin is currently 8.3. No signs of acute bleeding. Monitor for 

trend, transfuse if needed.





DISPOSITION: Patient should be placed on torsemide 60mg twice a day on 

discharge. He will need to follow up with nephrology as outpatient. We will 

continue to follow along while inpatient.





GME ATTESTATION


GME ATTESTATION


My preceptor for this patient encounter was physically present in the building 

during the encounter and was fully available. As needed, all aspects of the 

patient interview, examination, medical decision making process, and medical 

care plan development were reviewed and approved by the preceptor. Preceptor is 

aware and concurs with the plan as stated in the body of this note and will 

attest to such by his/her cosignature.








MIGUELINA MORE DO Feb 1, 2017 13:24

## 2017-02-02 VITALS — SYSTOLIC BLOOD PRESSURE: 148 MMHG | DIASTOLIC BLOOD PRESSURE: 66 MMHG

## 2017-02-02 VITALS — DIASTOLIC BLOOD PRESSURE: 66 MMHG | SYSTOLIC BLOOD PRESSURE: 148 MMHG

## 2017-02-02 VITALS — SYSTOLIC BLOOD PRESSURE: 153 MMHG | DIASTOLIC BLOOD PRESSURE: 67 MMHG

## 2017-02-02 VITALS — SYSTOLIC BLOOD PRESSURE: 134 MMHG | DIASTOLIC BLOOD PRESSURE: 62 MMHG

## 2017-02-02 LAB
ANION GAP SERPL CALC-SCNC: 8 MEQ/L (ref 8–16)
BASOPHILS # BLD AUTO: 0 K/MM3 (ref 0–0.2)
BASOPHILS NFR BLD AUTO: 0.5 % (ref 0–1)
BUN SERPL-MCNC: 47 MG/DL (ref 7–18)
CALCIUM SERPL-MCNC: 8.9 MG/DL (ref 8.8–10.2)
CHLORIDE SERPL-SCNC: 99 MEQ/L (ref 98–107)
CO2 SERPL-SCNC: 31 MEQ/L (ref 21–32)
CREAT SERPL-MCNC: 2.07 MG/DL (ref 0.7–1.3)
EOSINOPHIL # BLD AUTO: 0.3 K/MM3 (ref 0–0.5)
EOSINOPHIL NFR BLD AUTO: 4.4 % (ref 0–3)
ERYTHROCYTE [DISTWIDTH] IN BLOOD BY AUTOMATED COUNT: 11.8 % (ref 11.5–14.5)
GFR SERPL CREATININE-BSD FRML MDRD: 34 ML/MIN/{1.73_M2} (ref 42–?)
GLUCOSE SERPL-MCNC: 114 MG/DL (ref 83–110)
LARGE UNSTAINED CELL #: 0.2 K/MM3 (ref 0–0.4)
LARGE UNSTAINED CELL %: 2.7 % (ref 0–4)
LYMPHOCYTES # BLD AUTO: 1 K/MM3 (ref 1.5–4.5)
LYMPHOCYTES NFR BLD AUTO: 13.1 % (ref 24–44)
MCH RBC QN AUTO: 27.9 PG (ref 27–33)
MCHC RBC AUTO-ENTMCNC: 31 G/DL (ref 32–36.5)
MCV RBC AUTO: 90 FL (ref 80–96)
MONOCYTES # BLD AUTO: 0.5 K/MM3 (ref 0–0.8)
MONOCYTES NFR BLD AUTO: 6.6 % (ref 0–5)
NEUTROPHILS # BLD AUTO: 5.6 K/MM3 (ref 1.8–7.7)
NEUTROPHILS NFR BLD AUTO: 72.7 % (ref 36–66)
PLATELET # BLD AUTO: 273 K/MM3 (ref 150–450)
POTASSIUM SERPL-SCNC: 4.9 MEQ/L (ref 3.5–5.1)
SODIUM SERPL-SCNC: 138 MEQ/L (ref 136–145)
WBC # BLD AUTO: 7.6 K/MM3 (ref 4–10)

## 2017-02-02 RX ADMIN — CALCIUM CARBONATE-CHOLECALCIFEROL TAB 250 MG-125 UNIT SCH MG: 250-125 TAB at 08:13

## 2017-02-02 RX ADMIN — NICOTINE SCH PATCH: 14 PATCH, EXTENDED RELEASE TRANSDERMAL at 08:12

## 2017-02-02 RX ADMIN — METOPROLOL SUCCINATE SCH MG: 50 TABLET, EXTENDED RELEASE ORAL at 08:13

## 2017-02-02 RX ADMIN — IPRATROPIUM BROMIDE AND ALBUTEROL SULFATE SCH ML: .5; 3 SOLUTION RESPIRATORY (INHALATION) at 07:40

## 2017-02-02 RX ADMIN — ASPIRIN 325 MG ORAL TABLET SCH MG: 325 PILL ORAL at 08:12

## 2017-02-02 RX ADMIN — INSULIN LISPRO SCH UNITS: 100 INJECTION, SOLUTION INTRAVENOUS; SUBCUTANEOUS at 08:16

## 2017-02-02 RX ADMIN — IPRATROPIUM BROMIDE AND ALBUTEROL SULFATE SCH ML: .5; 3 SOLUTION RESPIRATORY (INHALATION) at 02:00

## 2017-02-02 RX ADMIN — CLOPIDOGREL BISULFATE SCH MG: 75 TABLET ORAL at 08:13

## 2017-02-02 RX ADMIN — PREGABALIN SCH MG: 75 CAPSULE ORAL at 08:13

## 2017-02-02 RX ADMIN — INSULIN DETEMIR SCH UNITS: 100 INJECTION, SOLUTION SUBCUTANEOUS at 08:16

## 2017-02-02 RX ADMIN — OMEPRAZOLE SCH MG: 20 CAPSULE, DELAYED RELEASE ORAL at 08:12

## 2017-02-02 RX ADMIN — DEXTROSE MONOHYDRATE SCH MG: 50 INJECTION, SOLUTION INTRAVENOUS at 08:14

## 2017-02-02 NOTE — IPNPDOC
Date Seen


The patient was seen on 2/2/17.





Progress Note


DATE OF ENCOUNTER: 02/02/2017





SUBJECTIVE: Mr. Dunlap was seen this morning at bedside. No acute overnight 

issues. He reports feeling well. No chest pain, chest pressure or increased 

shortness of breath. No nausea, vomiting, abdominal pain, diarrhea, headache, 

dizziness. No fevers or chills. Patient reports that he is eating well. He had 

a negative balance of 1098ml in previous 24 hours. 





OBJECTIVE:


Vital Signs








  Date Time  Temp Pulse Resp B/P Pulse Ox O2 Delivery O2 Flow Rate FiO2


 


2/2/17 10:00 98.0 80 18 153/67 91 Room Air  














 I&O- Last 24 Hours up to 6 AM 


 


 2/2/17





 06:00


 


Intake Total 1940 ml


 


Output Total 3003 ml


 


Balance -1063 ml








GENERAL: Alert and oriented. In no acute distress.


HEENT:  Normocephalic, atraumatic. Extraocular muscles intact. Moist mucosa. 


NECK: Supple. No thyromegaly. Jugular venous distention is appreciated.


HEART: Normal S1, S2. Regular rate and rhythm. Positive systolic ejection 

murmur.


LUNGS: Positive for faint bilateral crackles. No rhonchi or wheezing 

appreciated.


ABDOMEN: Soft, nontender, nondistended. Bowel sounds are present.


SKIN: Warm and dry. Good skin turgor. No rashes noted.


EXTREMITIES: Mild lower extremity edema. No cyanosis. Positive pedal pulses 

bilaterally.


NEUROLOGIC: No focal deficits. Motor and sensation intact.


 


LABORATORY DATA:


2/2/17 06:06











 


ASSESSMENT AND PLAN:


1. Acute on chronic kidney disease, likely secondary to fluid overload/heart 

failure. Creatinine has improved. Electrolytes are stable. He can resume oral 

diuretics. He will need to follow up with nephrology as outpatient. 


2. History of diastolic heart failure. Volume status has improved. He should be 

on torsemide 60mg twice daily. Maintain 1800 mL fluid restriction. 


3. Hypertension. Blood pressure is stable. Continue with metoprolol. Lisinopril 

held due to acute kidney injury.


4. Increased gamma globulins with noted M spike. Hem/onc was consulted. It doesn

't appear that there is a high concern for multiple myeloma at this time. He 

will need to follow up with hem/onc as outpatient.


5. Anemia in renal disease. Hemoglobin is currently 8.3. No signs of acute 

bleeding. Continue to monitor.





DISPOSITION: Patient is being discharged today. He will be discharged on 

torsemide 60mg twice a day. He will follow up with nephrology office on 2/8/17.





GME ATTESTATION


GME ATTESTATION


My preceptor for this patient encounter was physically present in the building 

during the encounter and was fully available. As needed, all aspects of the 

patient interview, examination, medical decision making process, and medical 

care plan development were reviewed and approved by the preceptor. Preceptor is 

aware and concurs with the plan as stated in the body of this note and will 

attest to such by his/her cosignature.





ATTENDING NOTE


Nephrology:





Pt was examined in the AM during work rounds. OK to discharge from nephrology 

stand point. Follow up in the clinic. Rest of plan as per resident's note.








MIGUELINA MORE DO Feb 2, 2017 16:43


CHAYITO FRIEDMAN MD Feb 5, 2017 21:41

## 2017-02-02 NOTE — ECHO
DATE OF PROCEDURE: 02/01/2017

 

YOB: 1945

AGE: 71

 

REFERRING PROVIDER: Layla Ortega DO

 

PATIENT LOCATION: Room 4223

 

REASON FOR ECHOCARDIOGRAM: Heart failure, unspecified.

 

2D MEASUREMENTS:

IVS: 1.5 cm

LV: 5.7 cm

LVPW: 1.5 cm

LA: 4.9 cm

Aorta: 3.0 cm

IVC: 2.4 cm

 

DOPPLER MEASUREMENTS:

Peak velocity across the aortic valve: 2.2 m/s

Peak velocity across the LVOT: 0.9 m/s

Mitral E: 1.5   Mitral A: 1.0   Ratio 1.4

Maximum tricuspid valve velocity: 2.3 m/s

 

2D COMMENTS:

1.  Moderately increased left ventricular wall thickness with mildly enlarged

left ventricle but depressed left ventricular systolic function.  The anterior

septum mid to basal portion appeared to be moderately hypokinetic as well as the

basal and mid portion of the inferior wall.  The estimated global left

ventricular systolic function is 40%. The rest of the left ventricle was mildly

hyperkinetic.

2.  Mildly enlarged left atrium. Normal right atrium and right ventricle.

3.  The atrial septum appeared to be normal without evidence of defect or shunt.

 

4.  Normal aortic root.

5.  No pericardial effusion seen.

6.  Mildly calcified aortic valve with mildly restricted leaflet motion. Mildly

calcified mitral annulus with normal anterior mitral valve leaflet motion. Normal

tricuspid valve and pulmonic valve.  The proximal pulmonary artery branches also

appeared to be normal.

7.  The inferior vena cava was mildly enlarged, central venous pressure was

mildly elevated.

 

DOPPLER:  It detects mild aortic stenosis, mild mitral regurgitation, mild

tricuspid regurgitation with probably mild pulmonary hypertension. There are

findings consistent with left ventricular diastolic dysfunction, maybe grade 2,

left ventricular and diastolic pressure mildly elevated. The calculated pulmonary

artery systolic pressure varies between 30 to 40 mmHg.

 

IMPRESSION:

1.  Mild to moderate global left ventricular systolic dysfunction with regional

wall motion abnormalities consistent with probably underlying coronary artery

disease.

2.  Aortic valve sclerosis with mild aortic stenosis.

3.  Mildly enlarged left atrium with mild mitral regurgitation and mitral annulus

calcification.

4.  Mild tricuspid regurgitation with mild pulmonary hypertension.

5.  There are findings consistent with elevated central venous pressure, the

inferior vena cava/IVC was mildly enlarged.

6.  Pacemaker/AICD wire artifact noted.

## 2017-02-08 ENCOUNTER — HOSPITAL ENCOUNTER (OUTPATIENT)
Dept: HOSPITAL 53 - M LAB REF | Age: 72
End: 2017-02-08
Attending: INTERNAL MEDICINE
Payer: MEDICARE

## 2017-02-08 DIAGNOSIS — D50.9: Primary | ICD-10-CM

## 2017-02-08 LAB
FERRITIN SERPL-MCNC: 258 NG/ML (ref 26–388)
IRON SATN MFR SERPL: 19 % (ref 19.7–37.4)
TIBC SERPL-MCNC: 294 UG/DL (ref 250–450)

## 2017-03-09 ENCOUNTER — HOSPITAL ENCOUNTER (EMERGENCY)
Dept: HOSPITAL 53 - M ED | Age: 72
LOS: 1 days | Discharge: HOME | End: 2017-03-10
Payer: MEDICARE

## 2017-03-09 VITALS — WEIGHT: 215 LBS | BODY MASS INDEX: 33.74 KG/M2 | HEIGHT: 67 IN

## 2017-03-09 DIAGNOSIS — Z79.4: ICD-10-CM

## 2017-03-09 DIAGNOSIS — E11.9: ICD-10-CM

## 2017-03-09 DIAGNOSIS — J44.9: ICD-10-CM

## 2017-03-09 DIAGNOSIS — Z91.018: ICD-10-CM

## 2017-03-09 DIAGNOSIS — I25.2: ICD-10-CM

## 2017-03-09 DIAGNOSIS — Z79.82: ICD-10-CM

## 2017-03-09 DIAGNOSIS — Z79.52: ICD-10-CM

## 2017-03-09 DIAGNOSIS — I25.10: ICD-10-CM

## 2017-03-09 DIAGNOSIS — E78.5: ICD-10-CM

## 2017-03-09 DIAGNOSIS — Z87.891: ICD-10-CM

## 2017-03-09 DIAGNOSIS — Z95.1: ICD-10-CM

## 2017-03-09 DIAGNOSIS — I73.9: ICD-10-CM

## 2017-03-09 DIAGNOSIS — Z87.440: ICD-10-CM

## 2017-03-09 DIAGNOSIS — E78.00: ICD-10-CM

## 2017-03-09 DIAGNOSIS — Z79.899: ICD-10-CM

## 2017-03-09 DIAGNOSIS — I50.20: Primary | ICD-10-CM

## 2017-03-09 LAB
ANION GAP SERPL CALC-SCNC: 8 MEQ/L (ref 8–16)
BASOPHILS # BLD AUTO: 0 K/MM3 (ref 0–0.2)
BASOPHILS NFR BLD AUTO: 0.4 % (ref 0–1)
BUN SERPL-MCNC: 39 MG/DL (ref 7–18)
CALCIUM SERPL-MCNC: 8.7 MG/DL (ref 8.8–10.2)
CHLORIDE SERPL-SCNC: 105 MEQ/L (ref 98–107)
CO2 SERPL-SCNC: 27 MEQ/L (ref 21–32)
CREAT SERPL-MCNC: 1.88 MG/DL (ref 0.7–1.3)
EOSINOPHIL # BLD AUTO: 0.1 K/MM3 (ref 0–0.5)
EOSINOPHIL NFR BLD AUTO: 1.4 % (ref 0–3)
ERYTHROCYTE [DISTWIDTH] IN BLOOD BY AUTOMATED COUNT: 13.9 % (ref 11.5–14.5)
GFR SERPL CREATININE-BSD FRML MDRD: 38 ML/MIN/{1.73_M2} (ref 42–?)
GLUCOSE SERPL-MCNC: 227 MG/DL (ref 83–110)
LARGE UNSTAINED CELL #: 0.1 K/MM3 (ref 0–0.4)
LARGE UNSTAINED CELL %: 1 % (ref 0–4)
LYMPHOCYTES # BLD AUTO: 0.9 K/MM3 (ref 1.5–4.5)
LYMPHOCYTES NFR BLD AUTO: 8.2 % (ref 24–44)
MCH RBC QN AUTO: 28.8 PG (ref 27–33)
MCHC RBC AUTO-ENTMCNC: 32.5 G/DL (ref 32–36.5)
MCV RBC AUTO: 88.6 FL (ref 80–96)
MONOCYTES # BLD AUTO: 0.3 K/MM3 (ref 0–0.8)
MONOCYTES NFR BLD AUTO: 3.2 % (ref 0–5)
NEUTROPHILS # BLD AUTO: 8.1 K/MM3 (ref 1.8–7.7)
NEUTROPHILS NFR BLD AUTO: 85.8 % (ref 36–66)
PLATELET # BLD AUTO: 198 K/MM3 (ref 150–450)
POTASSIUM SERPL-SCNC: 4.7 MEQ/L (ref 3.5–5.1)
SODIUM SERPL-SCNC: 140 MEQ/L (ref 136–145)
WBC # BLD AUTO: 9.5 K/MM3 (ref 4–10)

## 2017-03-09 PROCEDURE — 85025 COMPLETE CBC W/AUTO DIFF WBC: CPT

## 2017-03-09 PROCEDURE — 96374 THER/PROPH/DIAG INJ IV PUSH: CPT

## 2017-03-09 PROCEDURE — 94760 N-INVAS EAR/PLS OXIMETRY 1: CPT

## 2017-03-09 PROCEDURE — 83880 ASSAY OF NATRIURETIC PEPTIDE: CPT

## 2017-03-09 PROCEDURE — 99284 EMERGENCY DEPT VISIT MOD MDM: CPT

## 2017-03-09 PROCEDURE — 83605 ASSAY OF LACTIC ACID: CPT

## 2017-03-09 PROCEDURE — 93005 ELECTROCARDIOGRAM TRACING: CPT

## 2017-03-09 PROCEDURE — 93041 RHYTHM ECG TRACING: CPT

## 2017-03-09 PROCEDURE — 71010: CPT

## 2017-03-09 PROCEDURE — 36415 COLL VENOUS BLD VENIPUNCTURE: CPT

## 2017-03-09 PROCEDURE — 80048 BASIC METABOLIC PNL TOTAL CA: CPT

## 2017-03-10 VITALS — DIASTOLIC BLOOD PRESSURE: 81 MMHG | SYSTOLIC BLOOD PRESSURE: 167 MMHG

## 2017-03-10 NOTE — REP
PORTABLE CHEST, ONE VIEW:

 

HISTORY:  Cough.

 

COMPARISON:  01/28/2017.

 

Increased density is present in the right lower lobe consistent with atelectasis

or infiltrate.  The heart is upper limits of normal in size.  The pulmonary

vasculature is normal in appearance.  A cardiac pacemaker is present.

 

IMPRESSION:

 

Right lower lobe atelectasis or infiltrate.

 

 

Signed by

Ghassan Palma MD 03/10/2017 09:45 A

## 2017-03-11 NOTE — ECGEPIP
Stationary ECG Study

                           [facility] - ED

                                       

                                       Test Date:    2017

Pat Name:     VINNY EASLEY           Department:   

Patient ID:   [id number]                 Room:         -

Gender:       M                        Technician:   josé manuel

:          1945               Requested By: LUCY CORLEY

Order Number: [id number]     Reading MD:   Jaleesa Roman

                                 Measurements

Intervals                              Axis          

Rate:         82                       P:            38

[place]:           201                      QRS:          11

QRSD:         108                      T:            139

QT:           411                                    

QTc:          481                                    

                           Interpretive Statements

SINUS RHYTHM

POSSIBLE LEFT ATRIAL ENLARGEMENT

ST DEVIATION AND MODERATE T-WAVE ABNORMALITY, CONSIDER LATERAL ISCHEMIA

?INFERIOR INFARCT 

SIMILAR 17

Electronically Signed On 3- 8:56:20 EST by Jaleesa Roman

## 2017-03-31 ENCOUNTER — HOSPITAL ENCOUNTER (OUTPATIENT)
Dept: HOSPITAL 53 - M LAB | Age: 72
End: 2017-03-31
Attending: INTERNAL MEDICINE
Payer: MEDICARE

## 2017-03-31 DIAGNOSIS — R60.0: Primary | ICD-10-CM

## 2017-03-31 DIAGNOSIS — R93.8: ICD-10-CM

## 2017-03-31 LAB
ALBUMIN SERPL BCG-MCNC: 2.7 GM/DL (ref 3.2–5.2)
ALBUMIN/GLOB SERPL: 0.77 {RATIO} (ref 1–1.93)
ALP SERPL-CCNC: 138 U/L (ref 45–117)
ALT SERPL W P-5'-P-CCNC: 13 U/L (ref 12–78)
ANION GAP SERPL CALC-SCNC: 6 MEQ/L (ref 8–16)
AST SERPL-CCNC: 10 U/L (ref 15–37)
BILIRUB SERPL-MCNC: 0.3 MG/DL (ref 0.2–1)
BUN SERPL-MCNC: 30 MG/DL (ref 7–18)
CALCIUM SERPL-MCNC: 8.4 MG/DL (ref 8.8–10.2)
CAOX CRY URNS QL MICRO: (no result)
CHLORIDE SERPL-SCNC: 102 MEQ/L (ref 98–107)
CO2 SERPL-SCNC: 30 MEQ/L (ref 21–32)
CREAT SERPL-MCNC: 2.07 MG/DL (ref 0.7–1.3)
ERYTHROCYTE [DISTWIDTH] IN BLOOD BY AUTOMATED COUNT: 13.4 % (ref 11.5–14.5)
GFR SERPL CREATININE-BSD FRML MDRD: 34 ML/MIN/{1.73_M2} (ref 42–?)
GLUCOSE SERPL-MCNC: 95 MG/DL (ref 83–110)
MCH RBC QN AUTO: 27.8 PG (ref 27–33)
MCHC RBC AUTO-ENTMCNC: 31.3 G/DL (ref 32–36.5)
MCV RBC AUTO: 88.7 FL (ref 80–96)
PLATELET # BLD AUTO: 254 K/MM3 (ref 150–450)
POTASSIUM SERPL-SCNC: 4.6 MEQ/L (ref 3.5–5.1)
PROT SERPL-MCNC: 6.2 GM/DL (ref 6.4–8.2)
SODIUM SERPL-SCNC: 138 MEQ/L (ref 136–145)
WBC # BLD AUTO: 9.8 K/MM3 (ref 4–10)

## 2017-03-31 NOTE — REP
Clinical: Pain and swelling.

 

Technique:  Gray scale and color Doppler evaluation using linear high frequency

transducer.

 

Findings:

Ultrasound examination of the right and left lower extremity deep venous

structures from the common femoral vein to the popliteal vein demonstrates normal

compressibility flow and wave patterns in response to respiration and

augmentation.  There is no evidence for deep venous thrombosis.

 

Incidental note is made of a right groin node measuring 4.5 x 1.4 x 5.0 cm and

1.6 cm ovoid hypoechoic subdermal lesion which may reflect lymph node or

sebaceous cyst.

 

Impression:

No evidence for deep venous thrombosis.

Right groin noted and possible sebaceous cyst.

 

 

Signed by

Hao Adrian MD 03/31/2017 12:35 P

## 2017-04-07 ENCOUNTER — HOSPITAL ENCOUNTER (INPATIENT)
Dept: HOSPITAL 53 - M ED | Age: 72
LOS: 7 days | Discharge: HOME HEALTH | DRG: 291 | End: 2017-04-14
Attending: HOSPITALIST | Admitting: GENERAL PRACTICE
Payer: MEDICARE

## 2017-04-07 VITALS — WEIGHT: 189.6 LBS | HEIGHT: 67 IN | BODY MASS INDEX: 29.76 KG/M2

## 2017-04-07 DIAGNOSIS — Z79.899: ICD-10-CM

## 2017-04-07 DIAGNOSIS — Z95.5: ICD-10-CM

## 2017-04-07 DIAGNOSIS — L97.429: ICD-10-CM

## 2017-04-07 DIAGNOSIS — R19.7: ICD-10-CM

## 2017-04-07 DIAGNOSIS — E11.9: ICD-10-CM

## 2017-04-07 DIAGNOSIS — Z87.891: ICD-10-CM

## 2017-04-07 DIAGNOSIS — I25.2: ICD-10-CM

## 2017-04-07 DIAGNOSIS — N18.3: ICD-10-CM

## 2017-04-07 DIAGNOSIS — Z79.4: ICD-10-CM

## 2017-04-07 DIAGNOSIS — K21.9: ICD-10-CM

## 2017-04-07 DIAGNOSIS — I13.0: Primary | ICD-10-CM

## 2017-04-07 DIAGNOSIS — E78.5: ICD-10-CM

## 2017-04-07 DIAGNOSIS — Z91.018: ICD-10-CM

## 2017-04-07 DIAGNOSIS — Z91.19: ICD-10-CM

## 2017-04-07 DIAGNOSIS — L97.419: ICD-10-CM

## 2017-04-07 DIAGNOSIS — K81.1: ICD-10-CM

## 2017-04-07 DIAGNOSIS — I25.10: ICD-10-CM

## 2017-04-07 DIAGNOSIS — I50.23: ICD-10-CM

## 2017-04-07 DIAGNOSIS — D50.9: ICD-10-CM

## 2017-04-07 DIAGNOSIS — Z95.810: ICD-10-CM

## 2017-04-07 DIAGNOSIS — K29.80: ICD-10-CM

## 2017-04-07 LAB
ALBUMIN SERPL BCG-MCNC: 2.5 GM/DL (ref 3.2–5.2)
ALBUMIN/GLOB SERPL: 0.51 {RATIO} (ref 1–1.93)
ALP SERPL-CCNC: 148 U/L (ref 45–117)
ALT SERPL W P-5'-P-CCNC: 14 U/L (ref 12–78)
ANION GAP SERPL CALC-SCNC: 4 MEQ/L (ref 8–16)
ANION GAP SERPL CALC-SCNC: 6 MEQ/L (ref 8–16)
ANION GAP SERPL CALC-SCNC: 7 MEQ/L (ref 8–16)
AST SERPL-CCNC: 12 U/L (ref 15–37)
BASOPHILS # BLD AUTO: 0 K/MM3 (ref 0–0.2)
BASOPHILS NFR BLD AUTO: 0.3 % (ref 0–1)
BILIRUB CONJ SERPL-MCNC: 0.1 MG/DL (ref 0–0.2)
BILIRUB SERPL-MCNC: 0.4 MG/DL (ref 0.2–1)
BUN SERPL-MCNC: 26 MG/DL (ref 7–18)
BUN SERPL-MCNC: 28 MG/DL (ref 7–18)
BUN SERPL-MCNC: 28 MG/DL (ref 7–18)
CALCIUM SERPL-MCNC: 8.2 MG/DL (ref 8.8–10.2)
CALCIUM SERPL-MCNC: 8.6 MG/DL (ref 8.8–10.2)
CALCIUM SERPL-MCNC: 8.9 MG/DL (ref 8.8–10.2)
CHLORIDE SERPL-SCNC: 101 MEQ/L (ref 98–107)
CHLORIDE SERPL-SCNC: 102 MEQ/L (ref 98–107)
CHLORIDE SERPL-SCNC: 103 MEQ/L (ref 98–107)
CO2 SERPL-SCNC: 29 MEQ/L (ref 21–32)
CO2 SERPL-SCNC: 29 MEQ/L (ref 21–32)
CO2 SERPL-SCNC: 32 MEQ/L (ref 21–32)
CREAT SERPL-MCNC: 1.93 MG/DL (ref 0.7–1.3)
CREAT SERPL-MCNC: 1.93 MG/DL (ref 0.7–1.3)
CREAT SERPL-MCNC: 1.97 MG/DL (ref 0.7–1.3)
EOSINOPHIL # BLD AUTO: 0.1 K/MM3 (ref 0–0.5)
EOSINOPHIL NFR BLD AUTO: 1.4 % (ref 0–3)
ERYTHROCYTE [DISTWIDTH] IN BLOOD BY AUTOMATED COUNT: 13.4 % (ref 11.5–14.5)
GFR SERPL CREATININE-BSD FRML MDRD: 36 ML/MIN/{1.73_M2} (ref 42–?)
GFR SERPL CREATININE-BSD FRML MDRD: 36.8 ML/MIN/{1.73_M2} (ref 42–?)
GFR SERPL CREATININE-BSD FRML MDRD: 36.8 ML/MIN/{1.73_M2} (ref 42–?)
GLUCOSE SERPL-MCNC: 207 MG/DL (ref 83–110)
GLUCOSE SERPL-MCNC: 234 MG/DL (ref 83–110)
GLUCOSE SERPL-MCNC: 249 MG/DL (ref 83–110)
INR PPP: 1.27
LARGE UNSTAINED CELL #: 0.1 K/MM3 (ref 0–0.4)
LARGE UNSTAINED CELL %: 1.4 % (ref 0–4)
LYMPHOCYTES # BLD AUTO: 1 K/MM3 (ref 1.5–4.5)
LYMPHOCYTES NFR BLD AUTO: 9.3 % (ref 24–44)
MAGNESIUM SERPL-MCNC: 2.7 MG/DL (ref 1.8–2.4)
MCH RBC QN AUTO: 27.2 PG (ref 27–33)
MCHC RBC AUTO-ENTMCNC: 31 G/DL (ref 32–36.5)
MCV RBC AUTO: 87.7 FL (ref 80–96)
MONOCYTES # BLD AUTO: 0.6 K/MM3 (ref 0–0.8)
MONOCYTES NFR BLD AUTO: 6.8 % (ref 0–5)
NEUTROPHILS # BLD AUTO: 7.7 K/MM3 (ref 1.8–7.7)
NEUTROPHILS NFR BLD AUTO: 80.9 % (ref 36–66)
PLATELET # BLD AUTO: 259 K/MM3 (ref 150–450)
POTASSIUM SERPL-SCNC: 4.3 MEQ/L (ref 3.5–5.1)
POTASSIUM SERPL-SCNC: 4.4 MEQ/L (ref 3.5–5.1)
POTASSIUM SERPL-SCNC: 4.5 MEQ/L (ref 3.5–5.1)
PROT SERPL-MCNC: 7.4 GM/DL (ref 6.4–8.2)
SODIUM SERPL-SCNC: 137 MEQ/L (ref 136–145)
SODIUM SERPL-SCNC: 138 MEQ/L (ref 136–145)
SODIUM SERPL-SCNC: 138 MEQ/L (ref 136–145)
WBC # BLD AUTO: 9.5 K/MM3 (ref 4–10)

## 2017-04-07 RX ADMIN — SODIUM CHLORIDE SCH UNITS: 4.5 INJECTION, SOLUTION INTRAVENOUS at 14:00

## 2017-04-07 RX ADMIN — FUROSEMIDE SCH MG: 10 INJECTION, SOLUTION INTRAMUSCULAR; INTRAVENOUS at 20:00

## 2017-04-07 NOTE — HPE
DATE OF ADMISSION:  04/07/2017

 

PRIMARY CARE PROVIDER: AURA Bradshaw

 

CARDIOLOGIST: Henrique Snow MD

 

CHIEF COMPLAINT: Shortness of breath. Chest tightness. Epigastric abdominal

pain.

 

HISTORY OF PRESENT ILLNESS: This is a 71-year-old male with history of type 2

diabetes, hypertension, hyperlipidemia, congestive heart failure (CHF), systolic

dysfunction, ejection fraction (EF) of 40%, automatic implantable

cardioverter-defibrillator (AICD) pacemaker insertion, coronary artery disease

(CAD), myocardial infarction (MI), fem-pop bilaterally, peripheral vascular

disease, coronary artery bypass graft (CABG) 2015, previous smoker, one pack a

day for 57 years, quit 4 years ago. Lives alone, walks with a walker. Presents to

the emergency room with three day history of nausea, vomiting, epigastric

abdominal pain, chest tightness radiating to the back with persistent diarrhea

every time he eats. The patient has worsening pain when he lies down. Describes

this in the midback. No radiation to the jaw or left arm. No diaphoresis. Has

been increasingly short of breath and has been dry heaving and throwing up, and

has had a dry cough without fever, chills. He has had some weight gain of about

15 pounds. Usual weight is around 205 pounds. Currently at 215, 217 pounds. He

has been diuresed by his cardiologist, but still with chronic edema and worsening

shortness of breath prompting him to present to the emergency room. No

medications were taken for his chest pain. Ameliorating factor was to sit still

and lie down, worsening when he ambulates. He usually drinks 4 to 5 bottles of

water at home, 8 ounces of milk daily, usually less than a liter of water, but he

noncompliant with a salt restriction and has been eating cans of Ravioli

throughout the week, mashed potatoes and gravy. In the ER, the patient was found

to have CHF on x-ray exam. Has jugular venous distention (JVD), lower extremity

edema and rales. He has chronic right basilar infiltrate, atelectasis on the

x-ray and change from previous. No white count or fevers. Hospitalist service was

called for admission for CHF exacerbation. Evaluation of epigastric and chest

pain.

 

PAST MEDICAL HISTORY:

Diabetes.

Hypertension.

Hyperlipidemia.

CAD.

CHF, EF 40%.

Chronic kidney disease, stage III. At baseline creatinine.

 

PAST SURGICAL HISTORY:

Coronary stents.

Pacemaker defibrillator insertion.

Fem-pop bypass bilaterally.

CABG two years ago.

 

ALLERGIES: No known drug allergies. TURKEY.

 

HOME MEDICATIONS:

- albuterol sulfate as needed every 4 hours

- aspirin 325 daily

- calcium 1 capsule twice a day

- Plavix 75 daily

- Lasix 40 daily

- guaifenesin 5 mL every 4 as needed

- Lantus insulin twice a day

- metoprolol 50 daily

- nitroglycerin 0.4 as needed

- Prilosec 40 daily

- Lyrica 150 twice a day

- torsemide 60 mg twice a day

 

SOCIAL HISTORY: The patient lives alone. Has a daughter involved in his care.

Drinks occasionally. Used to smoke a pack a day for 57 years, quit 4 years ago.

Walks with a walker.

 

FAMILY HISTORY: Noncontributory due to age.

 

REVIEW OF SYSTEMS: 12-point system otherwise negative.

 

PHYSICAL EXAMINATION: Temperature 96.8, pulse 79, respiratory rate 18, blood

pressure 184/70. 99% on 2 liters nasal cannula.

GENERAL: Awake, alert, oriented times three. Providing history.

Positive jugular venous distention.

LUNGS: Diminished. Bilateral rales and rhonchi.

HEART: S1, S2. Sinus rhythm.

ABDOMEN: Soft, slightly distended. Tender epigastric region. No rebound,

guarding. Positive bowel sounds in four quadrants.

EXTREMITIES: 2+ edema to the sacrum.

 

LABORATORY DATA: White count 9.5, hemoglobin 9, hematocrit 29, platelet count

259. Sodium 139, potassium 4.3, chloride 101, bicarbonate 32, BUN 28, creatinine

1.93, glucose 234, calcium 8.9, total bilirubin 0.4, direct bilirubin 0.1. AST

12, ALT 14, alkaline phosphatase 148, total CK 89, MB fraction is 3, troponin

less than 0.02. BNP 2490. Total protein 7.4, albumin 2.5. Baseline creatinine

2.07.

 

04/07/2017 chest x-ray: Cardiomegaly, vascular congestion. No change in mild

right basilar atelectasis or infiltrate since 03/09/2017.

 

ASSESSMENT AND PLAN: This is a 71-year-old male with history of systolic heart

failure at 40%, mild pulmonary hypertension, diabetes, hypertension, follows with

Dr. Melissa, noncompliant with his salt intake, presents to the emergency room with

chest pain, shortness of breath, worsening lower extremity edema, nausea,

vomiting, and diarrhea.

 

CURRENT ISSUES:

1. CHF. Systolic dysfunction. Acute decompensation. Strict Intake and output (I

and O) daily, fluid restriction, Lasix, avoid nephrotoxins, renally dose all

medications. Continue diuresis until euvolemic. Continue with fluid restriction

and salt intake restriction. Cardiac rehabilitation as outpatient. Cycle cardiac

markers and monitor 12-leak EKG for acute ST changes. EKG shows sinus rhythm,

ventricular rate of 80, possible left atrial enlargement. Nonspecific ST-T

changes.

 

2. Diarrhea. Check a GI panel. Lasix diuresis for now for heart failure. Monitor

for dehydration.

 

3. Chronic kidney disease, stage III, at baseline creatinine. Monitor intake and

output (I and Os), daily weights, fluid restriction. Avoid nephrotoxins. Renally

dose all medications.

 

4. Epigastric and right upper quadrant abdominal pain. Rule out gallbladder

disease. Obtain gallbladder ultrasound, nothing by mouth (npo) for the procedure.

Resume diet if tolerated.

 

5. History of CAD, stents, CABG two years ago. Continue on aspirin and Plavix.

 

6. Type 2 diabetes. Continue on sliding scale. Consistent carbohydrate diet if

tolerated, and nausea, vomiting and diarrhea have subsided. For now keep on clear

diet, advance as tolerated.

 

7. Hypertension. Continue with home medications, metoprolol. Hold of on

lisinopril until patient's abdominal pain and diarrhea is improved.

 

8. Reflux. Continue on Prilosec.

 

ADDENDUM:  04/07/2017, joleen

 

The patient's gallbladder ultrasound shows chronic cholecystitis.  The patient

will be placed on a clears diet and interventional radiology for cholecystotomy

tube placement on Monday.  If worsening abdominal pain, change to nothing by

mouth and surgical consultation.

## 2017-04-07 NOTE — REP
PORTABLE CHEST:

 

AP portable view of the chest is performed.

 

COMPARISON: 03/09/2017.

 

Once again, there is cardiomegaly and vascular congestion. There is mild right

basilar atelectasis/infiltrate unchanged. There is probably a small right

effusion. There are multiple sternal wires. There is a left pacemaker. The

mediastinal silhouette is unchanged.

 

IMPRESSION:

 

Cardiomegaly and vascular congestion.  No change in mild right basilar

atelectasis/infiltrate since 03/09/2017 exam.

 

 

Signed by

Canelo Nicole MD 04/07/2017 05:38 P

## 2017-04-07 NOTE — ECGEPIP
Stationary ECG Study

                              [facility]

                                       

                                       Test Date:    2017

Pat Name:     VINNY EASLEY           Department:   

Patient ID:   [id number]                 Room:         [place] [address]

Gender:       M                        Technician:   

:          1945               Requested By: TORRI TURNER

Order Number: [id number]     Reading MD:   Anand Isabel

                                 Measurements

Intervals                              Axis          

Rate:         75                       P:            59

[place]:           198                      QRS:          24

QRSD:         113                      T:            108

QT:           426                                    

QTc:          477                                    

                           Interpretive Statements

Normal sinus rhythm

LA conduction disturbance, prominent precordial voltage, slow precordial

R-wave 

progression and repolarization abnormalities consistent with LVH

Could not rule out prior inferior injury

No change from 17

Electronically Signed On 2017 21:05:47 EDT by Anand Isabel

## 2017-04-07 NOTE — REP
GALLBLADDER ULTRASOUND:  04/07/2017.

 

Clinical history: Gastric abdominal pain.  Nausea and vomiting.

 

Sonographic evaluation of the right upper quadrant shows the liver generally

homogeneous in echotexture. There is no gross hepatomegaly, hepatic mass or

intrahepatic biliary dilatation.  Gallbladder shows sludge and multiple stones,

some with shadowing.  Has a wall thickness of up to 3 mm.  No pericholecystic

fluid.  Common duct 3.3 mm without a filling defect.  Pancreas is not seen due to

extensive gas shadowing.

 

Right kidney 10.9 x 4.8 x 5.5 cm without hydronephrosis or stone.

 

Impression:

 

1.  There was sludge and multiple mobile calculi in the gallbladder with

thickened wall up to 3 mm suggesting cholelithiasis and chronic cholecystitis.  I

see no intrahepatic biliary dilatation and the common duct 3.3 mm and normal.

There was a positive sonographic Deras's sign, however.

 

2.  Liver homogeneous without focal lesion, biliary dilatation or adjacent

ascites.

 

3.  Pancreas obscured by gas shadowing.  The right kidney unremarkable.

 

 

Signed by

Emmanuel Cullen MD 04/08/2017 08:03 P

## 2017-04-08 VITALS — SYSTOLIC BLOOD PRESSURE: 171 MMHG | DIASTOLIC BLOOD PRESSURE: 72 MMHG

## 2017-04-08 VITALS — SYSTOLIC BLOOD PRESSURE: 175 MMHG | DIASTOLIC BLOOD PRESSURE: 78 MMHG | HEART RATE: 73 BPM

## 2017-04-08 VITALS — SYSTOLIC BLOOD PRESSURE: 152 MMHG | DIASTOLIC BLOOD PRESSURE: 72 MMHG

## 2017-04-08 VITALS — SYSTOLIC BLOOD PRESSURE: 143 MMHG | DIASTOLIC BLOOD PRESSURE: 65 MMHG

## 2017-04-08 VITALS — DIASTOLIC BLOOD PRESSURE: 87 MMHG | SYSTOLIC BLOOD PRESSURE: 141 MMHG

## 2017-04-08 VITALS — SYSTOLIC BLOOD PRESSURE: 136 MMHG | DIASTOLIC BLOOD PRESSURE: 84 MMHG

## 2017-04-08 VITALS — DIASTOLIC BLOOD PRESSURE: 80 MMHG | SYSTOLIC BLOOD PRESSURE: 136 MMHG

## 2017-04-08 LAB
ALBUMIN SERPL BCG-MCNC: 2.5 GM/DL (ref 3.2–5.2)
ALBUMIN/GLOB SERPL: 0.64 {RATIO} (ref 1–1.93)
ALP SERPL-CCNC: 142 U/L (ref 45–117)
ALT SERPL W P-5'-P-CCNC: 13 U/L (ref 12–78)
AMYLASE SERPL-CCNC: 16 U/L (ref 25–115)
ANION GAP SERPL CALC-SCNC: 6 MEQ/L (ref 8–16)
ANION GAP SERPL CALC-SCNC: 9 MEQ/L (ref 8–16)
AST SERPL-CCNC: 8 U/L (ref 15–37)
BILIRUB CONJ SERPL-MCNC: 0.1 MG/DL (ref 0–0.2)
BILIRUB SERPL-MCNC: 0.3 MG/DL (ref 0.2–1)
BUN SERPL-MCNC: 28 MG/DL (ref 7–18)
BUN SERPL-MCNC: 30 MG/DL (ref 7–18)
CALCIUM SERPL-MCNC: 8 MG/DL (ref 8.8–10.2)
CALCIUM SERPL-MCNC: 8.1 MG/DL (ref 8.8–10.2)
CHLORIDE SERPL-SCNC: 100 MEQ/L (ref 98–107)
CHLORIDE SERPL-SCNC: 102 MEQ/L (ref 98–107)
CO2 SERPL-SCNC: 29 MEQ/L (ref 21–32)
CO2 SERPL-SCNC: 32 MEQ/L (ref 21–32)
CREAT SERPL-MCNC: 1.99 MG/DL (ref 0.7–1.3)
CREAT SERPL-MCNC: 2.2 MG/DL (ref 0.7–1.3)
ERYTHROCYTE [DISTWIDTH] IN BLOOD BY AUTOMATED COUNT: 13.4 % (ref 11.5–14.5)
GFR SERPL CREATININE-BSD FRML MDRD: 31.7 ML/MIN/{1.73_M2} (ref 42–?)
GFR SERPL CREATININE-BSD FRML MDRD: 35.5 ML/MIN/{1.73_M2} (ref 42–?)
GLUCOSE SERPL-MCNC: 241 MG/DL (ref 83–110)
GLUCOSE SERPL-MCNC: 313 MG/DL (ref 83–110)
MCH RBC QN AUTO: 27.2 PG (ref 27–33)
MCHC RBC AUTO-ENTMCNC: 30.7 G/DL (ref 32–36.5)
MCV RBC AUTO: 88.7 FL (ref 80–96)
PLATELET # BLD AUTO: 251 K/MM3 (ref 150–450)
POTASSIUM SERPL-SCNC: 4.2 MEQ/L (ref 3.5–5.1)
POTASSIUM SERPL-SCNC: 4.7 MEQ/L (ref 3.5–5.1)
PROT SERPL-MCNC: 6.4 GM/DL (ref 6.4–8.2)
SODIUM SERPL-SCNC: 138 MEQ/L (ref 136–145)
SODIUM SERPL-SCNC: 140 MEQ/L (ref 136–145)
WBC # BLD AUTO: 8.5 K/MM3 (ref 4–10)

## 2017-04-08 RX ADMIN — SODIUM CHLORIDE SCH UNITS: 4.5 INJECTION, SOLUTION INTRAVENOUS at 05:39

## 2017-04-08 RX ADMIN — SILVER SULFADIAZINE SCH GM: 10 CREAM TOPICAL at 09:00

## 2017-04-08 RX ADMIN — OMEPRAZOLE SCH MG: 20 CAPSULE, DELAYED RELEASE ORAL at 09:18

## 2017-04-08 RX ADMIN — FUROSEMIDE SCH MG: 10 INJECTION, SOLUTION INTRAMUSCULAR; INTRAVENOUS at 16:00

## 2017-04-08 RX ADMIN — SUCRALFATE SCH GM: 1 TABLET ORAL at 22:10

## 2017-04-08 RX ADMIN — SUCRALFATE SCH GM: 1 TABLET ORAL at 15:08

## 2017-04-08 RX ADMIN — SODIUM CHLORIDE SCH UNITS: 4.5 INJECTION, SOLUTION INTRAVENOUS at 22:11

## 2017-04-08 RX ADMIN — ONDANSETRON PRN MG: 2 INJECTION INTRAMUSCULAR; INTRAVENOUS at 09:17

## 2017-04-08 RX ADMIN — FUROSEMIDE SCH MG: 10 INJECTION, SOLUTION INTRAMUSCULAR; INTRAVENOUS at 05:39

## 2017-04-08 RX ADMIN — FUROSEMIDE SCH MG: 10 INJECTION, SOLUTION INTRAMUSCULAR; INTRAVENOUS at 01:59

## 2017-04-08 RX ADMIN — SUCRALFATE SCH GM: 1 TABLET ORAL at 17:23

## 2017-04-08 RX ADMIN — INSULIN LISPRO SCH UNITS: 100 INJECTION, SOLUTION INTRAVENOUS; SUBCUTANEOUS at 17:24

## 2017-04-08 RX ADMIN — PROBIOTIC PRODUCT - TAB SCH EA: TAB at 17:23

## 2017-04-08 RX ADMIN — PROBIOTIC PRODUCT - TAB SCH EA: TAB at 15:09

## 2017-04-08 RX ADMIN — ASPIRIN 325 MG ORAL TABLET SCH MG: 325 PILL ORAL at 09:17

## 2017-04-08 RX ADMIN — SPIRONOLACTONE SCH MG: 25 TABLET, FILM COATED ORAL at 09:18

## 2017-04-08 RX ADMIN — SODIUM CHLORIDE SCH UNITS: 4.5 INJECTION, SOLUTION INTRAVENOUS at 14:00

## 2017-04-08 RX ADMIN — INSULIN LISPRO SCH UNITS: 100 INJECTION, SOLUTION INTRAVENOUS; SUBCUTANEOUS at 21:00

## 2017-04-08 RX ADMIN — FUROSEMIDE SCH MG: 10 INJECTION, SOLUTION INTRAMUSCULAR; INTRAVENOUS at 16:31

## 2017-04-08 RX ADMIN — CLOPIDOGREL BISULFATE SCH MG: 75 TABLET ORAL at 09:18

## 2017-04-08 RX ADMIN — FUROSEMIDE SCH MG: 10 INJECTION, SOLUTION INTRAMUSCULAR; INTRAVENOUS at 09:17

## 2017-04-08 RX ADMIN — METOPROLOL SUCCINATE SCH MG: 50 TABLET, EXTENDED RELEASE ORAL at 09:18

## 2017-04-08 RX ADMIN — SILVER SULFADIAZINE SCH GM: 10 CREAM TOPICAL at 22:11

## 2017-04-08 RX ADMIN — SODIUM CHLORIDE SCH UNITS: 4.5 INJECTION, SOLUTION INTRAVENOUS at 02:00

## 2017-04-08 NOTE — ECGEPIP
Stationary ECG Study

                           [facility] - ED

                                       

                                       Test Date:    2017

Pat Name:     VINNY EASLEY           Department:   

Patient ID:   [id number]                 Room:         -

Gender:       M                        Technician:   rn

:          1945               Requested By: Jaleesa Roman 

Order Number: [id number]     Reading MD:   Jaleesa Roman

                                 Measurements

Intervals                              Axis          

Rate:         80                       P:            41

NE:           201                      QRS:          18

QRSD:         108                      T:            96

QT:           411                                    

QTc:          475                                    

                           Interpretive Statements

SINUS RHYTHM

POSSIBLE LEFT ATRIAL ENLARGEMENT

NONSPECIFIC ST & T-WAVE ABNORMALITY

LVH

?INFERIOR INFARCT

SIMILAR 3/9/17

Electronically Signed On 2017 8:29:54 EDT by Jaleesa Roman

## 2017-04-08 NOTE — IPNPDOC
Date Seen


The patient was seen on 4/8/17.





Progress Note


SUBJECTIVE: Patient tells me that his shortness of breath is slightly better 

than last night but not completely gone. He tells me that he has dry heaves but 

only after coughing fit he denies kellen abdominal pain. He tells me that his 

epigastrium hurts if he pushes down on it he denies fevers chills chest pain 

lightheadedness dizziness or nausea at the present time 





OBJECTIVE


PHYSICAL EXAMINATION:


VITAL SIGNS: Please see below. 


GENERAL: Obese elderly  man sitting on the edge of his bed watching 

television he does not appear to be in any acute distress


HEENT: Pupils equally round reactive to light he has moist mucous membranes 

there is some elevation central venous pressure


CARDIOVASCULAR: S1 S2 regular.


RESPIRATORY: Diminished breath sounds at the bases scattered rales in the lower 

third sounds congested.


ABDOMINAL: Obese bowel sounds present abdomen soft


EXTREMITIES: Bilateral lower heel dressings are clean dry and intact with gauze 

bandages he has 2+ edema bilaterally





LABORATORY DATA: Please see below.





MICROBIOLOGY: Please see below.





IMAGING: Gallbladder U/S:1.  There was sludge and multiple mobile calculi in 

the gallbladder with


thickened wall up to 3 mm suggesting cholelithiasis and chronic cholecystitis.  

I


see no intrahepatic biliary dilatation and the common duct 3.3 mm and normal.


There was a positive sonographic Deras's sign, however.


2.  Liver homogeneous without focal lesion, biliary dilatation or adjacent


ascites.


3.  Pancreas obscured by gas shadowing.  The right kidney unremarkable."





CXR:"Cardiomegaly and vascular congestion.  No change in mild right basilar


atelectasis/infiltrate since 03/09/2017 exam."





Echocardiogram: Feb 2017:" 1.  Mild to moderate global left ventricular 

systolic dysfunction with regional


wall motion abnormalities consistent with probably underlying coronary artery


disease.


2.  Aortic valve sclerosis with mild aortic stenosis.


3.  Mildly enlarged left atrium with mild mitral regurgitation and mitral 

annulus


calcification.


4.  Mild tricuspid regurgitation with mild pulmonary hypertension.


5.  There are findings consistent with elevated central venous pressure, the


inferior vena cava/IVC was mildly enlarged.


6.  Pacemaker/AICD wire artifact noted.".





DVT prophylaxis ordered?: Heparin





ASSESSMENT AND PLAN: This is a 71-year-old man with decompensated congestive 

heart failure with dry heaves and epigastric discomfort.





PROBLEMS:


1. Decompensated congestive heart failure: The patient has reportedly been 

noncompliant with 2 g sodium diet. He's been started on aggressive IV Lasix we'

ll continue with this which just the dose slightly we'll continue to monitor 

his electrolytes and renal function. He has systolic dysfunction he has an AICD 

in place normally follows with Dr. Snow. His predominant symptom at this time 

with orthopnea lower extremity edema which appears to be improving. Patient is 

on a beta blocker Aldactone no ACE inhibitor at this time secondary to his 

progressive renal disease





2. Epigastric discomfort dry heaves: His drug user associated only with 

coughing bouts which may be related to pulmonary edema will see if this 

resolves with diuresis and volume status optimization Dr. Gosselin's help is 

greatly appreciated. I did ask him to see the patient this morning. He does not 

feel that this is related to gallstones or acute cholecystitis which I agree 

with he's been started on Carafate in addition to his PPI we will see if this 

improves his symptoms and suspicion for potential gastritis versus hiatal some 

dramatic hiatal hernia. I agree that should he have persistent dry heaves and 

pain without etiology in the face of appropriate treatment of her endoscopy 

would be indicated his diet has been advanced. For the time being we'll hold 

off on the percutaneous cholecystostomy drainage





3. Lower extremity ulcers: And Dr. Gosselin's help is greatly appreciated 

dressing changes as per his recommendations.





4. Chronic kidney disease appears to be relatively stable the last 6 months 

continue monitor closely while we are diuresing him





5. Diarrhea: The patient doesn't that he's had this quite chronically is a 

colonoscopies in the past GI PCR panel is ordered will simply monitor. He is on 

Bacid





6. Coronary artery disease: History of MI history of CABG 2 years ago the 

patient on aspirin Plavix


He is also on a beta blocker he is not on a statin will defer to Dr. Snow





7. Type 2 diabetes: The patient on sliding-scale insulin continue to monitor 

fingersticks adequately controlled at this time





8 hypertension: Continue current medications lisinopril is currently on hold 

blood pressure is adequate





9. Gastric esophageal reflux disease: As outlined above the patient is on a PPI





DISPOSITION: We'll continue to monitor the patient closely.





VS, I&O, 24H, Fishbone


Vital Signs/I&O





 Vital Signs








  Date Time  Temp Pulse Resp B/P Pulse Ox O2 Delivery O2 Flow Rate FiO2


 


4/8/17 09:26   16  97   


 


4/8/17 09:18  78  141/87    


 


4/8/17 09:16      Room Air  


 


4/8/17 08:00 97.7       


 


4/7/17 12:41       2 














 I&O- Last 24 Hours up to 6 AM 


 


 4/8/17





 06:00


 


Intake Total 200 ml


 


Output Total 1500 ml


 


Balance -1300 ml











Laboratory Data


24H LABS


 Laboratory Tests 2


4/7/17 13:30: 


Aspartate Amino Transf (AST/SGOT) 12L, Alanine Aminotransferase (ALT/SGPT) 14, 

Alkaline Phosphatase 148H, Total Bilirubin 0.4, Direct Bilirubin 0.1, Albumin 

2.5L, Albumin/Globulin Ratio 0.51L, Anion Gap 4L, B-Type Natriuretic Peptide 

2490H, White Blood Count 9.5, Red Blood Count 3.35L, Hemoglobin 9.1L, 

Hematocrit 29.4L, Mean Corpuscular Volume 87.7, Mean Corpuscular Hemoglobin 27.2

, Mean Corpuscular Hemoglobin Concent 31.0L, Red Cell Distribution Width 13.4, 

Platelet Count 259, Neutrophils (%) (Auto) 80.9H, Lymphocytes (%) (Auto) 9.3L, 

Monocytes (%) (Auto) 6.8H, Eosinophils (%) (Auto) 1.4, Basophils (%) (Auto) 0.3

, Neutrophils # (Auto) 7.7, Lymphocytes # (Auto) 1.0L, Monocytes # (Auto) 0.6, 

Eosinophils # (Auto) 0.1, Basophils # (Auto) 0.0, Calcium Level 8.9, Creatine 

Kinase MB 3.0, Creatine Kinase MB Relative Index 3.37, Glomerular Filtration 

Rate 36.8L, Large Unclassified Cells # 0.1, Large Unclassified Cells % 1.4, 

Lipase 67L, Total Creatine Kinase 89, Total Protein 7.4, Troponin I < 0.02


4/7/17 16:48: 


Urine Amorphous Sediment , Urine Appearance HAZY, Urine Color YELLOW, Urine pH 

5.0, Urine Specific Gravity 1.012, Urine Protein 3+H, Urine Glucose (UA) 3+H, 

Urine Ketones NEGATIVE, Urine Urobilinogen 0.2, Urine Bilirubin NEGATIVE, Urine 

Leukocyte Esterase NEGATIVE, Urine Bacteria (Auto) NEGATIVE, Urine Blood 1+H, 

Urine Calcium Carbonate Cryst(Auto) , Urine Calcium Oxalate Cryst (Auto) , 

Urine Calcium Phosphate Rebekah (Auto) , Urine Cellular Casts , Urine Cystine 

Crystals , Urine Granular Casts (Auto) , Urine Hyaline Casts (Auto) 9, Urine 

Leucine Crystals , Urine Mucus (Auto) SMALL, Urine Nitrite NEGATIVE, Urine Oval 

Fat Bodies (Auto) , Urine RBC (Auto) 7H, Urine Renal Epithelial Cells , Urine 

Sperm (Auto) , Urine Squamous Epithelial Cells 0, Urine Transitional Epithelial 

Cells , Urine Trichomonas (Auto) , Urine Triple Phosphate Cryst (Auto) , Urine 

Tyrosine Crystals , Urine Uric Acid Crystals (Auto) , Urine WBC (Auto) 2, Urine 

Waxy Casts (Auto) , Urine Yeast-Like Cells (Auto) 


4/7/17 17:49: 


Anion Gap 7L, Calcium Level 8.6L, Creatine Kinase MB 3.3, Creatine Kinase MB 

Relative Index 3.88, Glomerular Filtration Rate 36.0L, Total Creatine Kinase 85

, Troponin I < 0.02, Blood Urea Nitrogen 26H, Creatinine 1.97H, Sodium Level 138

, Potassium Level 4.4, Chloride Level 102, Carbon Dioxide Level 29, Magnesium 

Level 2.7H


4/7/17 19:31: 


Anion Gap 6L, Calcium Level 8.2L, Creatine Kinase MB 3.4, Creatine Kinase MB 

Relative Index 3.36, Glomerular Filtration Rate 36.8L, Total Creatine Kinase 101

, Troponin I < 0.02, Blood Urea Nitrogen 28H, Creatinine 1.93H, Sodium Level 138

, Potassium Level 4.5, Chloride Level 103, Carbon Dioxide Level 29, Activated 

Partial Thromboplast Time 44.7H, Prothromb Time International Ratio 1.27, 

Prothrombin Time 16.0H


4/7/17 23:49: 


Creatine Kinase MB 2.9, Creatine Kinase MB Relative Index 3.97, Total Creatine 

Kinase 73, Troponin I < 0.02


4/8/17 06:03: 


Creatine Kinase MB 2.3, Creatine Kinase MB Relative Index 3.02, Total Creatine 

Kinase 76, Aspartate Amino Transf (AST/SGOT) 8L, Alanine Aminotransferase (ALT/

SGPT) 13, Alkaline Phosphatase 142H, Total Bilirubin 0.3, Direct Bilirubin 0.1, 

Albumin 2.5L, Albumin/Globulin Ratio 0.64L, Amylase Level 16L, Anion Gap 9, B-

Type Natriuretic Peptide 2510H, Calcium Level 8.1L, Glomerular Filtration Rate 

35.5L, Lipase 80, Total Protein 6.4


CBC/BMP


 Laboratory Tests


4/7/17 13:30








Red Blood Count 3.35 L, Mean Corpuscular Volume 87.7, Mean Corpuscular 

Hemoglobin 27.2, Mean Corpuscular Hemoglobin Concent 31.0 L, Red Cell 

Distribution Width 13.4, Neutrophils (%) (Auto) 80.9 H, Lymphocytes (%) (Auto) 

9.3 L, Monocytes (%) (Auto) 6.8 H, Eosinophils (%) (Auto) 1.4, Basophils (%) (

Auto) 0.3, Neutrophils # (Auto) 7.7, Lymphocytes # (Auto) 1.0 L, Monocytes # (

Auto) 0.6, Eosinophils # (Auto) 0.1, Basophils # (Auto) 0.0





4/7/17 17:49








Calcium Level 8.6 L, Total Creatine Kinase 85





4/7/17 19:31








Calcium Level 8.2 L, Total Creatine Kinase 101





4/8/17 06:03








Red Blood Count 3.34 L, Mean Corpuscular Volume 88.7, Mean Corpuscular 

Hemoglobin 27.2, Mean Corpuscular Hemoglobin Concent 30.7 L, Red Cell 

Distribution Width 13.4


Microbiology





 Microbiology


4/7/17 Urine Culture, Received


         Pending








ANNALISE LAWRENCE MD Apr 8, 2017 12:50

## 2017-04-08 NOTE — CR
DATE OF CONSULTATION: 04/08/2017

 

REASON FOR CONSULTATION:  Abdominal pain, question gallbladder related.

 

HISTORY OF PRESENT ILLNESS: The patient is a 71-year-old male who presents with a

longstanding history of diarrhea for the last year, year and a half who has had

some epigastric discomfort, nausea, vomiting with shortness of breath.  Over the

last month, the patient states that his shortness of breath is worse when he

ambulates, worse when he is lying down.  He notices that he has this pressure in

his upper abdomen.  He has difficulties eating.  He does not have any reflux

symptoms.  No indigestion or heartburn although he states he has had two

colonoscopies in the last year up in [place]. He has not had an upper

endoscopy.  He does not recall if he has had an upper GI performed with

small-bowel follow-through.  He has had a CT scan here in January of this year

and was performed for sepsis workup.  He did have some pleural effusions at that

time but he did not have any contrast given with his CT scan.  No oral, no IV

contrast.  I do think there is some suggestion of a small amount of ascites

around the liver, although given his respiratory issues, this may have been

respiratory variation that caused this haziness present.  His ultrasound of his

gallbladder shows that he has some gallstones.  There is some question of chronic

cholecystitis.  However the criteria do not need the numbers consistent with

acute cholecystitis.  He has a normal white count.  The patient complains of

epigastric pain.

 

PAST MEDICAL HISTORY: Significant for:

History of diabetes mellitus.

History of hypertension.

History of hyperlipidemia.

History of coronary disease.

History of congestive heart failure.

History of chronic kidney disease.

History of coronary stents.

History of pacemaker defibrillator insertion.

History of fem-pop bypass.

History of coronary artery bypass graft (CABG).

 

MEDICATIONS INCLUDE:

- albuterol

- aspirin

- calcium

- Plavix

- Lasix

- guaifenesin

- insulin

- metoprolol

- nitroglycerin

- Prilosec

- Lyrica

- torsemide

 

PHYSICAL EXAM: Reveals a 71-year-old male who looks much older than stated age.

HEENT is unremarkable.

Lungs reveal bilateral crackles as well as rhonchi.  Diminished bilaterally.

Heart is regular with multiple irregular beats.

Abdomen is soft, mildly distended, tympanic all across the upper abdomen and

tender in the epigastric area to deep palpation but no significant guarding or

rebound.  No right upper quadrant pain is appreciated.

 

White count is normal.  LFTs are normal except for slightly elevated alk phos.

 

IMPRESSION AND PLAN:

1.  Gallstones.  The patient does have some gallstones.  I am not convinced he

has any evidence of acute cholecystitis at this time and I am not seeing evidence

of operative intervention needed at this time.  Also given that I feel that this

is very unlikely secondary to acute cholecystitis, percutaneous drainage at this

time is not indicated.  However, if this concern is still present, performing a

HIDA scan is reasonable to rule out acute cholecystitis.  Once again this would

be much lower down on my differential of problems concerning this individual.

2.  Epigastric pain.  The patient has epigastric pain and GI distress issues.  It

may be that the patient has some ongoing gastritis as an issue.  May have a

symptomatic hiatal hernia, etc.  I do feel that we can start him on some Carafate

to see if this makes a difference with his abdominal pain.  However, given his

history of diarrhea, negative colonoscopies, I do feel that an upper GI with

small-bowel follow-through is warranted prior to his discharge.  If this shows

some significant inflammatory process or if his abdominal pain still is

persistent over the ensuing few days, I would recommend an upper endoscopy.

Easily with his congestive heart failure issues and cardiac issues, could have

either hepatic congestion, gastropathy secondary to this, etc., but I do feel

that it is reasonable to advance his diet at this point given a normal white

count and tolerance of clears.

3.  Lower extremity blisters/healing ulcers.  The patient has what appears to the

some significant blisters that occurred and these were relatively superficial on

the majority of his feet, however, the heel ulcers are much deeper and still have

some gangrenous portions that I anticipate will slough as he continues to have

dressing changes. I would recommend that continuing with some Silvadene dressing

changes are warranted at this time with a dry sterile dressing twice daily

basis.

## 2017-04-09 VITALS — DIASTOLIC BLOOD PRESSURE: 65 MMHG | SYSTOLIC BLOOD PRESSURE: 130 MMHG

## 2017-04-09 VITALS — DIASTOLIC BLOOD PRESSURE: 60 MMHG | SYSTOLIC BLOOD PRESSURE: 148 MMHG

## 2017-04-09 VITALS — SYSTOLIC BLOOD PRESSURE: 138 MMHG | DIASTOLIC BLOOD PRESSURE: 52 MMHG

## 2017-04-09 VITALS — SYSTOLIC BLOOD PRESSURE: 140 MMHG | DIASTOLIC BLOOD PRESSURE: 68 MMHG

## 2017-04-09 VITALS — SYSTOLIC BLOOD PRESSURE: 174 MMHG | DIASTOLIC BLOOD PRESSURE: 72 MMHG

## 2017-04-09 VITALS — DIASTOLIC BLOOD PRESSURE: 74 MMHG | SYSTOLIC BLOOD PRESSURE: 146 MMHG

## 2017-04-09 VITALS — SYSTOLIC BLOOD PRESSURE: 165 MMHG | DIASTOLIC BLOOD PRESSURE: 79 MMHG

## 2017-04-09 LAB
ALBUMIN SERPL BCG-MCNC: 2.4 GM/DL (ref 3.2–5.2)
ALBUMIN/GLOB SERPL: 0.63 {RATIO} (ref 1–1.93)
ALP SERPL-CCNC: 130 U/L (ref 45–117)
ALT SERPL W P-5'-P-CCNC: 13 U/L (ref 12–78)
AMYLASE SERPL-CCNC: 17 U/L (ref 25–115)
ANION GAP SERPL CALC-SCNC: 7 MEQ/L (ref 8–16)
AST SERPL-CCNC: 6 U/L (ref 15–37)
BILIRUB CONJ SERPL-MCNC: 0.2 MG/DL (ref 0–0.2)
BILIRUB SERPL-MCNC: 0.2 MG/DL (ref 0.2–1)
BUN SERPL-MCNC: 31 MG/DL (ref 7–18)
CALCIUM SERPL-MCNC: 7.8 MG/DL (ref 8.8–10.2)
CHLORIDE SERPL-SCNC: 102 MEQ/L (ref 98–107)
CO2 SERPL-SCNC: 30 MEQ/L (ref 21–32)
CREAT SERPL-MCNC: 2.16 MG/DL (ref 0.7–1.3)
ERYTHROCYTE [DISTWIDTH] IN BLOOD BY AUTOMATED COUNT: 13.5 % (ref 11.5–14.5)
FERRITIN SERPL-MCNC: 161 NG/ML (ref 26–388)
GFR SERPL CREATININE-BSD FRML MDRD: 32.3 ML/MIN/{1.73_M2} (ref 42–?)
GLUCOSE SERPL-MCNC: 167 MG/DL (ref 83–110)
IRON SATN MFR SERPL: 8.9 % (ref 19.7–37.4)
MAGNESIUM SERPL-MCNC: 2.2 MG/DL (ref 1.8–2.4)
MAGNESIUM SERPL-MCNC: 2.4 MG/DL (ref 1.8–2.4)
MCH RBC QN AUTO: 27.2 PG (ref 27–33)
MCHC RBC AUTO-ENTMCNC: 30.9 G/DL (ref 32–36.5)
MCV RBC AUTO: 88.1 FL (ref 80–96)
PLATELET # BLD AUTO: 253 K/MM3 (ref 150–450)
POTASSIUM SERPL-SCNC: 4.3 MEQ/L (ref 3.5–5.1)
PROT SERPL-MCNC: 6.2 GM/DL (ref 6.4–8.2)
RETIC HEMOGLOBIN CONTENT CHR: 26.2 PG (ref 24–36)
RETICS/RBC NFR AUTO: 68 X10(9)/L (ref 17–77)
RETICS/RBC NFR: 2.1 % (ref 0.5–1.5)
SODIUM SERPL-SCNC: 139 MEQ/L (ref 136–145)
TIBC SERPL-MCNC: 248 UG/DL (ref 250–450)
WBC # BLD AUTO: 6.3 K/MM3 (ref 4–10)

## 2017-04-09 RX ADMIN — FUROSEMIDE SCH MG: 10 INJECTION, SOLUTION INTRAMUSCULAR; INTRAVENOUS at 08:39

## 2017-04-09 RX ADMIN — INSULIN LISPRO SCH UNITS: 100 INJECTION, SOLUTION INTRAVENOUS; SUBCUTANEOUS at 20:04

## 2017-04-09 RX ADMIN — SUCRALFATE SCH GM: 1 TABLET ORAL at 17:47

## 2017-04-09 RX ADMIN — CLOPIDOGREL BISULFATE SCH MG: 75 TABLET ORAL at 08:40

## 2017-04-09 RX ADMIN — SPIRONOLACTONE SCH MG: 25 TABLET, FILM COATED ORAL at 08:42

## 2017-04-09 RX ADMIN — PROBIOTIC PRODUCT - TAB SCH EA: TAB at 08:40

## 2017-04-09 RX ADMIN — FUROSEMIDE SCH MG: 10 INJECTION, SOLUTION INTRAMUSCULAR; INTRAVENOUS at 16:00

## 2017-04-09 RX ADMIN — SILVER SULFADIAZINE SCH GM: 10 CREAM TOPICAL at 08:43

## 2017-04-09 RX ADMIN — SUCRALFATE SCH GM: 1 TABLET ORAL at 08:40

## 2017-04-09 RX ADMIN — FUROSEMIDE SCH MG: 10 INJECTION, SOLUTION INTRAMUSCULAR; INTRAVENOUS at 23:57

## 2017-04-09 RX ADMIN — METOPROLOL SUCCINATE SCH MG: 50 TABLET, EXTENDED RELEASE ORAL at 08:42

## 2017-04-09 RX ADMIN — PROBIOTIC PRODUCT - TAB SCH EA: TAB at 17:47

## 2017-04-09 RX ADMIN — INSULIN LISPRO SCH UNITS: 100 INJECTION, SOLUTION INTRAVENOUS; SUBCUTANEOUS at 08:40

## 2017-04-09 RX ADMIN — SODIUM CHLORIDE SCH UNITS: 4.5 INJECTION, SOLUTION INTRAVENOUS at 05:47

## 2017-04-09 RX ADMIN — SODIUM CHLORIDE SCH UNITS: 4.5 INJECTION, SOLUTION INTRAVENOUS at 21:23

## 2017-04-09 RX ADMIN — ASPIRIN 325 MG ORAL TABLET SCH MG: 325 PILL ORAL at 08:40

## 2017-04-09 RX ADMIN — SUCRALFATE SCH GM: 1 TABLET ORAL at 20:07

## 2017-04-09 RX ADMIN — SODIUM CHLORIDE SCH UNITS: 4.5 INJECTION, SOLUTION INTRAVENOUS at 14:55

## 2017-04-09 RX ADMIN — INSULIN LISPRO SCH UNITS: 100 INJECTION, SOLUTION INTRAVENOUS; SUBCUTANEOUS at 17:48

## 2017-04-09 RX ADMIN — SILVER SULFADIAZINE SCH GM: 10 CREAM TOPICAL at 21:24

## 2017-04-09 RX ADMIN — SUCRALFATE SCH GM: 1 TABLET ORAL at 12:34

## 2017-04-09 RX ADMIN — OMEPRAZOLE SCH MG: 20 CAPSULE, DELAYED RELEASE ORAL at 08:40

## 2017-04-09 RX ADMIN — PROBIOTIC PRODUCT - TAB SCH EA: TAB at 12:34

## 2017-04-09 RX ADMIN — INSULIN LISPRO SCH UNITS: 100 INJECTION, SOLUTION INTRAVENOUS; SUBCUTANEOUS at 12:35

## 2017-04-09 RX ADMIN — ONDANSETRON PRN MG: 2 INJECTION INTRAMUSCULAR; INTRAVENOUS at 23:41

## 2017-04-09 RX ADMIN — FUROSEMIDE SCH MG: 10 INJECTION, SOLUTION INTRAMUSCULAR; INTRAVENOUS at 00:29

## 2017-04-09 NOTE — IPNPDOC
Date Seen


The patient was seen on 4/9/17.





Progress Note


SUBJECTIVE: Patient tells me that his shortness of breath is slightly better, 

he tells me today that his shortness of breath is when he sits up and only at 

funny angles. He is coughing less and having less dry heaves, but they are 

still present. 





OBJECTIVE


PHYSICAL EXAMINATION:


VITAL SIGNS: Please see below. 


GENERAL: Obese elderly  man laying completely flat in bed watching 

television he does not appear to be in any acute distress


HEENT: Pupils equally round reactive to light he has moist mucous membranes 

there is some elevation central venous pressure


CARDIOVASCULAR: S1 S2 regular.


RESPIRATORY: Diminished breath sounds at the bases scattered rales in the lower 

third sounds congested.


ABDOMINAL: Obese bowel sounds present abdomen soft


EXTREMITIES: Bilateral lower heel dressings are clean dry and intact with gauze 

bandages he has 2+ edema bilaterally





LABORATORY DATA: Please see below.





MICROBIOLOGY: Please see below.





IMAGING: Gallbladder U/S:1.  There was sludge and multiple mobile calculi in 

the gallbladder with


thickened wall up to 3 mm suggesting cholelithiasis and chronic cholecystitis.  

I


see no intrahepatic biliary dilatation and the common duct 3.3 mm and normal.


There was a positive sonographic Deras's sign, however.


2.  Liver homogeneous without focal lesion, biliary dilatation or adjacent


ascites.


3.  Pancreas obscured by gas shadowing.  The right kidney unremarkable."





CXR:"Cardiomegaly and vascular congestion.  No change in mild right basilar


atelectasis/infiltrate since 03/09/2017 exam."





Echocardiogram: Feb 2017:" 1.  Mild to moderate global left ventricular 

systolic dysfunction with regional


wall motion abnormalities consistent with probably underlying coronary artery


disease.


2.  Aortic valve sclerosis with mild aortic stenosis.


3.  Mildly enlarged left atrium with mild mitral regurgitation and mitral 

annulus


calcification.


4.  Mild tricuspid regurgitation with mild pulmonary hypertension.


5.  There are findings consistent with elevated central venous pressure, the


inferior vena cava/IVC was mildly enlarged.


6.  Pacemaker/AICD wire artifact noted.".





DVT prophylaxis ordered?: Heparin





ASSESSMENT AND PLAN: This is a 71-year-old man with decompensated congestive 

heart failure with dry heaves and epigastric discomfort.





PROBLEMS:


1. Decompensated congestive heart failure: The patient has reportedly been 

noncompliant with 2 g sodium diet. He's been started on aggressive IV Lasix we'

ll continue with this. Respiratory status is improved, he still has significant 

water weight on him still. He has systolic dysfunction he has an AICD in place 

normally follows with Dr. Snow.  Patient is on a beta blocker Aldactone no 

ACE inhibitor at this time secondary to his progressive renal disease. The 

patient was having NSVT overnight he does have an AICD in place his left lites 

are optimized he is on a beta blocker





2. Epigastric discomfort dry heaves: His dry heaves are only associated with 

coughing bouts which may be related to pulmonary edema will see if this 

continues to resolve with diuresis and volume status optimization Dr. Gosselin'

s help is greatly appreciated. He does not feel that this is related to 

gallstones or acute cholecystitis which I agree with, he's been started on 

Carafate in addition to his PPI we will see if this improves his symptoms and 

suspicion for potential gastritis versus symptomatic hiatal hernia. I agree 

that should he have persistent dry heaves and pain without etiology in the face 

of appropriate treatment of her endoscopy would be indicated his diet has been 

advanced. For the time being we'll hold off on the percutaneous cholecystostomy 

drainage





3. Lower extremity ulcers: And Dr. Gosselin's help is greatly appreciated 

dressing changes as per his recommendations.





4. Chronic kidney disease appears to be relatively stable the last 6 months 

continue monitor closely while we are diuresing him





5. Diarrhea: The patient doesn't that he's had this quite chronically is a 

colonoscopies in the past GI PCR panel is ordered will simply monitor. He is on 

Bacid





6. Coronary artery disease: History of MI history of CABG 2 years ago the 

patient on aspirin Plavix


He is also on a beta blocker he is not on a statin will defer to Dr. Snow





7. Type 2 diabetes: The patient on sliding-scale insulin continue to monitor 

fingersticks adequately controlled at this time





8 hypertension: Continue current medications lisinopril is currently on hold 

blood pressure is adequate





9. Gastric esophageal reflux disease: As outlined above the patient is on a PPI





10. Anemia appears to be iron deficiency I will start the patient on iron 

sulfate he would likely benefit from outpatient colonoscopy





DISPOSITION: We'll continue to monitor the patient closely.





VS, I&O, 24H, Fishbone


Vital Signs/I&O





 Vital Signs








  Date Time  Temp Pulse Resp B/P Pulse Ox O2 Delivery O2 Flow Rate FiO2


 


4/9/17 08:42  75  148/60    


 


4/9/17 08:00 98.5  20  94 Room Air  


 


4/7/17 12:41       2 














 I&O- Last 24 Hours up to 6 AM 


 


 4/9/17





 06:00


 


Intake Total 720 ml


 


Output Total 1325 ml


 


Balance -605 ml











Laboratory Data


24H LABS


 Laboratory Tests 2


4/8/17 17:19: Bedside Glucose (Misc Panel) 299H


4/8/17 18:13: 


Anion Gap 6L, Blood Urea Nitrogen 30H, Creatinine 2.20H, Sodium Level 138, 

Potassium Level 4.7, Chloride Level 100, Carbon Dioxide Level 32, Calcium Level 

8.0L, Glomerular Filtration Rate 31.7L


4/8/17 21:35: Bedside Glucose (Misc Panel) 230H


4/9/17 04:48: 


Anion Gap 7L, Calcium Level 7.8L, Glomerular Filtration Rate 32.3L, Absolute 

Reticulocyte Count 68, Aspartate Amino Transf (AST/SGOT) 6L, Alanine 

Aminotransferase (ALT/SGPT) 13, Alkaline Phosphatase 130H, Total Bilirubin 0.2, 

Direct Bilirubin 0.2, Albumin 2.4L, Albumin/Globulin Ratio 0.63L, Amylase Level 

17L, Ferritin 161, Iron Level 22L, Lipase 61L, Magnesium Level 2.2, Percent 

Reticulocyte Count 2.10H, Reticulocyte Hgb Content (CHr) 26.2, Total Iron 

Binding Capacity 248L, Total Protein 6.2L, Transferrin % Saturation 8.9L


4/9/17 06:53: 


B-Type Natriuretic Peptide 1370H, Whole Blood Ionized Calcium 4.5


CBC/BMP


 Laboratory Tests


4/8/17 18:13








Calcium Level 8.0 L





4/9/17 04:48








Red Blood Count 3.21 L, Mean Corpuscular Volume 88.1, Mean Corpuscular 

Hemoglobin 27.2, Mean Corpuscular Hemoglobin Concent 30.9 L, Red Cell 

Distribution Width 13.5


Microbiology





 Microbiology


4/7/17 Urine Culture - Final, Complete








ANNALISE LAWRENCE MD Apr 9, 2017 10:52

## 2017-04-09 NOTE — ECGEPIP
Stationary ECG Study

                              [facility]

                                       

                                       Test Date:    2017

Pat Name:     VINNY EASLEY           Department:   

Patient ID:   [id number]                 Room:         [place] [address]

Gender:       M                        Technician:   britta

:          1945               Requested By: TORRI TURNER

Order Number: [id number]     Reading MD:   Henrique Snow

                                 Measurements

Intervals                              Axis          

Rate:         72                       P:            61

FL:           198                      QRS:          23

QRSD:         114                      T:            128

QT:           440                                    

QTc:          483                                    

                           Interpretive Statements

SINUS RHYTHM WITH OCCASIONAL VENTRICULAR PREMATURE COMPLEXES

POSSIBLE LEFT ATRIAL ENLARGEMENT

INFERIOR MYOCARDIAL INFARCTION, PROBABLY OLD

MODERATE T-WAVE ABNORMALITY, CONSIDER LATERAL ISCHEMIA, Left ventricular 

hypertrophy

SIMILAR 17

Electronically Signed On 2017 20:26:24 EDT by Henrique Snow

## 2017-04-10 VITALS — SYSTOLIC BLOOD PRESSURE: 161 MMHG | DIASTOLIC BLOOD PRESSURE: 67 MMHG

## 2017-04-10 VITALS — SYSTOLIC BLOOD PRESSURE: 130 MMHG | DIASTOLIC BLOOD PRESSURE: 60 MMHG

## 2017-04-10 VITALS — DIASTOLIC BLOOD PRESSURE: 71 MMHG | SYSTOLIC BLOOD PRESSURE: 163 MMHG

## 2017-04-10 VITALS — SYSTOLIC BLOOD PRESSURE: 158 MMHG

## 2017-04-10 LAB
ALBUMIN SERPL BCG-MCNC: 2.5 GM/DL (ref 3.2–5.2)
ALBUMIN/GLOB SERPL: 0.61 {RATIO} (ref 1–1.93)
ALP SERPL-CCNC: 138 U/L (ref 45–117)
ALT SERPL W P-5'-P-CCNC: 11 U/L (ref 12–78)
AMYLASE SERPL-CCNC: 20 U/L (ref 25–115)
ANION GAP SERPL CALC-SCNC: 7 MEQ/L (ref 8–16)
ANION GAP SERPL CALC-SCNC: 8 MEQ/L (ref 8–16)
AST SERPL-CCNC: 7 U/L (ref 15–37)
BILIRUB CONJ SERPL-MCNC: 0.1 MG/DL (ref 0–0.2)
BILIRUB SERPL-MCNC: 0.2 MG/DL (ref 0.2–1)
BUN SERPL-MCNC: 29 MG/DL (ref 7–18)
BUN SERPL-MCNC: 30 MG/DL (ref 7–18)
CALCIUM SERPL-MCNC: 8.1 MG/DL (ref 8.8–10.2)
CALCIUM SERPL-MCNC: 8.9 MG/DL (ref 8.8–10.2)
CHLORIDE SERPL-SCNC: 95 MEQ/L (ref 98–107)
CHLORIDE SERPL-SCNC: 99 MEQ/L (ref 98–107)
CO2 SERPL-SCNC: 31 MEQ/L (ref 21–32)
CO2 SERPL-SCNC: 35 MEQ/L (ref 21–32)
CREAT SERPL-MCNC: 2.24 MG/DL (ref 0.7–1.3)
CREAT SERPL-MCNC: 2.43 MG/DL (ref 0.7–1.3)
ERYTHROCYTE [DISTWIDTH] IN BLOOD BY AUTOMATED COUNT: 13.5 % (ref 11.5–14.5)
GFR SERPL CREATININE-BSD FRML MDRD: 28.2 ML/MIN/{1.73_M2} (ref 42–?)
GFR SERPL CREATININE-BSD FRML MDRD: 31 ML/MIN/{1.73_M2} (ref 42–?)
GLUCOSE SERPL-MCNC: 156 MG/DL (ref 83–110)
GLUCOSE SERPL-MCNC: 203 MG/DL (ref 83–110)
MAGNESIUM SERPL-MCNC: 2.2 MG/DL (ref 1.8–2.4)
MCH RBC QN AUTO: 27.8 PG (ref 27–33)
MCHC RBC AUTO-ENTMCNC: 31.6 G/DL (ref 32–36.5)
MCV RBC AUTO: 88 FL (ref 80–96)
PLATELET # BLD AUTO: 280 K/MM3 (ref 150–450)
POTASSIUM SERPL-SCNC: 4 MEQ/L (ref 3.5–5.1)
POTASSIUM SERPL-SCNC: 4.3 MEQ/L (ref 3.5–5.1)
PROT SERPL-MCNC: 6.6 GM/DL (ref 6.4–8.2)
SODIUM SERPL-SCNC: 137 MEQ/L (ref 136–145)
SODIUM SERPL-SCNC: 138 MEQ/L (ref 136–145)
WBC # BLD AUTO: 7.5 K/MM3 (ref 4–10)

## 2017-04-10 RX ADMIN — INSULIN LISPRO SCH UNITS: 100 INJECTION, SOLUTION INTRAVENOUS; SUBCUTANEOUS at 18:11

## 2017-04-10 RX ADMIN — PROBIOTIC PRODUCT - TAB SCH EA: TAB at 18:10

## 2017-04-10 RX ADMIN — FUROSEMIDE SCH MG: 10 INJECTION, SOLUTION INTRAMUSCULAR; INTRAVENOUS at 16:07

## 2017-04-10 RX ADMIN — SUCRALFATE SCH GM: 1 TABLET ORAL at 12:19

## 2017-04-10 RX ADMIN — FUROSEMIDE SCH MG: 10 INJECTION, SOLUTION INTRAMUSCULAR; INTRAVENOUS at 09:40

## 2017-04-10 RX ADMIN — SPIRONOLACTONE SCH MG: 25 TABLET, FILM COATED ORAL at 09:38

## 2017-04-10 RX ADMIN — SODIUM CHLORIDE SCH UNITS: 4.5 INJECTION, SOLUTION INTRAVENOUS at 14:42

## 2017-04-10 RX ADMIN — SUCRALFATE SCH GM: 1 TABLET ORAL at 18:10

## 2017-04-10 RX ADMIN — SILVER SULFADIAZINE SCH GM: 10 CREAM TOPICAL at 21:00

## 2017-04-10 RX ADMIN — SILVER SULFADIAZINE SCH GM: 10 CREAM TOPICAL at 09:00

## 2017-04-10 RX ADMIN — INSULIN LISPRO SCH UNITS: 100 INJECTION, SOLUTION INTRAVENOUS; SUBCUTANEOUS at 12:23

## 2017-04-10 RX ADMIN — ONDANSETRON PRN MG: 2 INJECTION INTRAMUSCULAR; INTRAVENOUS at 07:28

## 2017-04-10 RX ADMIN — CLOPIDOGREL BISULFATE SCH MG: 75 TABLET ORAL at 09:39

## 2017-04-10 RX ADMIN — INSULIN LISPRO SCH UNITS: 100 INJECTION, SOLUTION INTRAVENOUS; SUBCUTANEOUS at 21:00

## 2017-04-10 RX ADMIN — SODIUM CHLORIDE SCH UNITS: 4.5 INJECTION, SOLUTION INTRAVENOUS at 06:19

## 2017-04-10 RX ADMIN — SUCRALFATE SCH GM: 1 TABLET ORAL at 22:01

## 2017-04-10 RX ADMIN — SODIUM CHLORIDE SCH UNITS: 4.5 INJECTION, SOLUTION INTRAVENOUS at 22:01

## 2017-04-10 RX ADMIN — OMEPRAZOLE SCH MG: 20 CAPSULE, DELAYED RELEASE ORAL at 09:39

## 2017-04-10 RX ADMIN — PROBIOTIC PRODUCT - TAB SCH EA: TAB at 12:19

## 2017-04-10 RX ADMIN — ASPIRIN 325 MG ORAL TABLET SCH MG: 325 PILL ORAL at 09:39

## 2017-04-10 RX ADMIN — INSULIN LISPRO SCH UNITS: 100 INJECTION, SOLUTION INTRAVENOUS; SUBCUTANEOUS at 07:30

## 2017-04-10 RX ADMIN — PROBIOTIC PRODUCT - TAB SCH EA: TAB at 09:38

## 2017-04-10 RX ADMIN — SUCRALFATE SCH GM: 1 TABLET ORAL at 07:30

## 2017-04-10 RX ADMIN — METOPROLOL SUCCINATE SCH MG: 50 TABLET, EXTENDED RELEASE ORAL at 09:00

## 2017-04-10 NOTE — IPNPDOC
Date Seen


The patient was seen on 4/10/17.





Progress Note


SUBJECTIVE: Patient tells me that his shortness of breath is essentially 

resolved, however he still has some dry heaves and vomitted his breakfast this 

AM. 





OBJECTIVE


PHYSICAL EXAMINATION:


VITAL SIGNS: Please see below. 


GENERAL: Obese elderly  man laying completely flat in bed sleeping nad


HEENT: Pupils equally round reactive to light he has moist mucous membranes 

there is no elevation central venous pressure


CARDIOVASCULAR: S1 S2 regular.


RESPIRATORY: Diminished breath sounds at the bases good air movement


ABDOMINAL: Obese bowel sounds present abdomen soft, epigastric tenderness to 

palpation


EXTREMITIES: Bilateral lower heel dressings are clean dry and intact with gauze 

bandages he has 1+ edema bilaterally, some improvement today





LABORATORY DATA: Please see below.





MICROBIOLOGY: Please see below.





IMAGING: Gallbladder U/S:1.  There was sludge and multiple mobile calculi in 

the gallbladder with


thickened wall up to 3 mm suggesting cholelithiasis and chronic cholecystitis.  

I


see no intrahepatic biliary dilatation and the common duct 3.3 mm and normal.


There was a positive sonographic Deras's sign, however.


2.  Liver homogeneous without focal lesion, biliary dilatation or adjacent


ascites.


3.  Pancreas obscured by gas shadowing.  The right kidney unremarkable."





CXR:"Cardiomegaly and vascular congestion.  No change in mild right basilar


atelectasis/infiltrate since 03/09/2017 exam."





Echocardiogram: Feb 2017:" 1.  Mild to moderate global left ventricular 

systolic dysfunction with regional


wall motion abnormalities consistent with probably underlying coronary artery


disease.


2.  Aortic valve sclerosis with mild aortic stenosis.


3.  Mildly enlarged left atrium with mild mitral regurgitation and mitral 

annulus


calcification.


4.  Mild tricuspid regurgitation with mild pulmonary hypertension.


5.  There are findings consistent with elevated central venous pressure, the


inferior vena cava/IVC was mildly enlarged.


6.  Pacemaker/AICD wire artifact noted.".





DVT prophylaxis ordered?: Heparin





ASSESSMENT AND PLAN: This is a 71-year-old man with decompensated congestive 

heart failure with dry heaves with epigastric discomfort.





PROBLEMS:


1. Decompensated congestive heart failure: The patient has reportedly been 

noncompliant with 2 g sodium diet. He's been started on aggressive IV Lasix I 

did give metolazone 2.5mg 1x dose yesterday with + response. His respiratory 

status is improved, he still has significant water weight on him still but is 

approaching euvolemia. He has systolic dysfunction he has an AICD in place 

normally follows with Dr. Snow.  Patient is on a beta blocker Aldactone no 

ACE inhibitor at this time secondary to his progressive renal disease.





2. Epigastric discomfort dry heaves: His dry heaves are only associated with 

coughing bouts which I initially attributed to decompensated heart failure 

however this has not resolved despite improving his volume status. Dr. Gosselin'

s help is greatly appreciated. He does not feel that this is related to 

gallstones or acute cholecystitis which I agree with, he's been started on 

Carafate in addition to his PPI, plan is for UGI series with small bowel follow 

through, he may need EGD if symptoms persists. 





3. Lower extremity ulcers: Dr. Gosselin's help is greatly appreciated dressing 

changes as per his recommendations.





4. Chronic kidney disease appears to be relatively stable the last 6 months 

continue monitor closely while we are diuresing him, ACIi on hold





5. Diarrhea: The patient does admit he has had this quite chronically and is 

stable. He has several colonoscopies in the past. He is on Bacid





6. Coronary artery disease: History of MI history of CABG 2 years ago the 

patient on aspirin Plavix


He is also on a beta blocker he is not on a statin will defer to Dr. Snow





7. Type 2 diabetes: The patient on sliding-scale insulin continue to monitor 

fingersticks adequately controlled at this time





8 hypertension: Continue current medications lisinopril is currently on hold 

blood pressure is adequate





9. Gastric esophageal reflux disease: As outlined above the patient is on a PPI





10. Anemia appears to be iron deficiency I will start the patient on iron 

sulfate he would likely benefit from outpatient repeat colonoscopy





DISPOSITION: We'll continue to monitor the patient closely.





VS, I&O, 24H, Fishbone


Vital Signs/I&O





 Vital Signs








  Date Time  Temp Pulse Resp B/P Pulse Ox O2 Delivery O2 Flow Rate FiO2


 


4/10/17 09:00  75  161/67    


 


4/10/17 08:00 97.3  18  93   


 


4/10/17 04:45      Room Air  


 


4/7/17 12:41       2 














 I&O- Last 24 Hours up to 6 AM 


 


 4/10/17





 06:00


 


Intake Total 480 ml


 


Output Total 2950 ml


 


Balance -2470 ml











Laboratory Data


24H LABS


 Laboratory Tests 2


4/9/17 12:09: Bedside Glucose (Misc Panel) 245H


4/9/17 17:28: Bedside Glucose (Misc Panel) 173H


4/9/17 20:04: Bedside Glucose (Misc Panel) 219H


4/10/17 04:50: 


Aspartate Amino Transf (AST/SGOT) 7L, Alanine Aminotransferase (ALT/SGPT) 11L, 

Alkaline Phosphatase 138H, Total Bilirubin 0.2, Direct Bilirubin 0.1, Albumin 

2.5L, Albumin/Globulin Ratio 0.61L, Amylase Level 20L, Anion Gap 8, Calcium 

Level 8.1L, Glomerular Filtration Rate 31.0L, Lipase 69L, Magnesium Level 2.2, 

Total Protein 6.6


CBC/BMP


 Laboratory Tests


4/10/17 04:50








Red Blood Count 3.39 L, Mean Corpuscular Volume 88.0, Mean Corpuscular 

Hemoglobin 27.8, Mean Corpuscular Hemoglobin Concent 31.6 L, Red Cell 

Distribution Width 13.5


Microbiology





 Microbiology


4/7/17 Urine Culture - Final, Complete








ANNALISE LAWRENCE MD Apr 10, 2017 09:53

## 2017-04-11 VITALS — SYSTOLIC BLOOD PRESSURE: 142 MMHG | DIASTOLIC BLOOD PRESSURE: 80 MMHG

## 2017-04-11 VITALS — DIASTOLIC BLOOD PRESSURE: 86 MMHG | SYSTOLIC BLOOD PRESSURE: 150 MMHG

## 2017-04-11 VITALS — DIASTOLIC BLOOD PRESSURE: 67 MMHG | SYSTOLIC BLOOD PRESSURE: 151 MMHG

## 2017-04-11 LAB
ALBUMIN SERPL BCG-MCNC: 2.4 GM/DL (ref 3.2–5.2)
ALBUMIN/GLOB SERPL: 0.57 {RATIO} (ref 1–1.93)
ALP SERPL-CCNC: 132 U/L (ref 45–117)
ALT SERPL W P-5'-P-CCNC: 11 U/L (ref 12–78)
AMYLASE SERPL-CCNC: 20 U/L (ref 25–115)
ANION GAP SERPL CALC-SCNC: 10 MEQ/L (ref 8–16)
AST SERPL-CCNC: 7 U/L (ref 15–37)
BILIRUB CONJ SERPL-MCNC: 0.1 MG/DL (ref 0–0.2)
BILIRUB SERPL-MCNC: 0.3 MG/DL (ref 0.2–1)
BUN SERPL-MCNC: 31 MG/DL (ref 7–18)
CALCIUM SERPL-MCNC: 8.5 MG/DL (ref 8.8–10.2)
CHLORIDE SERPL-SCNC: 96 MEQ/L (ref 98–107)
CO2 SERPL-SCNC: 32 MEQ/L (ref 21–32)
CREAT SERPL-MCNC: 2.32 MG/DL (ref 0.7–1.3)
ERYTHROCYTE [DISTWIDTH] IN BLOOD BY AUTOMATED COUNT: 13.4 % (ref 11.5–14.5)
GFR SERPL CREATININE-BSD FRML MDRD: 29.8 ML/MIN/{1.73_M2} (ref 42–?)
GLUCOSE SERPL-MCNC: 147 MG/DL (ref 83–110)
MAGNESIUM SERPL-MCNC: 2.1 MG/DL (ref 1.8–2.4)
MCH RBC QN AUTO: 27.5 PG (ref 27–33)
MCHC RBC AUTO-ENTMCNC: 32.6 G/DL (ref 32–36.5)
MCV RBC AUTO: 84.6 FL (ref 80–96)
PLATELET # BLD AUTO: 302 K/MM3 (ref 150–450)
POTASSIUM SERPL-SCNC: 3.6 MEQ/L (ref 3.5–5.1)
PROT SERPL-MCNC: 6.6 GM/DL (ref 6.4–8.2)
SODIUM SERPL-SCNC: 138 MEQ/L (ref 136–145)
WBC # BLD AUTO: 8.5 K/MM3 (ref 4–10)

## 2017-04-11 RX ADMIN — PROBIOTIC PRODUCT - TAB SCH EA: TAB at 12:21

## 2017-04-11 RX ADMIN — SUCRALFATE SCH GM: 1 TABLET ORAL at 09:30

## 2017-04-11 RX ADMIN — ONDANSETRON PRN MG: 2 INJECTION INTRAMUSCULAR; INTRAVENOUS at 09:49

## 2017-04-11 RX ADMIN — SODIUM CHLORIDE SCH UNITS: 4.5 INJECTION, SOLUTION INTRAVENOUS at 05:29

## 2017-04-11 RX ADMIN — ASPIRIN 325 MG ORAL TABLET SCH MG: 325 PILL ORAL at 09:38

## 2017-04-11 RX ADMIN — INSULIN LISPRO SCH UNITS: 100 INJECTION, SOLUTION INTRAVENOUS; SUBCUTANEOUS at 12:00

## 2017-04-11 RX ADMIN — SODIUM CHLORIDE SCH UNITS: 4.5 INJECTION, SOLUTION INTRAVENOUS at 14:18

## 2017-04-11 RX ADMIN — SUCRALFATE SCH GM: 1 TABLET ORAL at 21:46

## 2017-04-11 RX ADMIN — CLOPIDOGREL BISULFATE SCH MG: 75 TABLET ORAL at 09:36

## 2017-04-11 RX ADMIN — SUCRALFATE SCH GM: 1 TABLET ORAL at 18:35

## 2017-04-11 RX ADMIN — SUCRALFATE SCH GM: 1 TABLET ORAL at 12:00

## 2017-04-11 RX ADMIN — SILVER SULFADIAZINE SCH GM: 10 CREAM TOPICAL at 11:00

## 2017-04-11 RX ADMIN — INSULIN LISPRO SCH UNITS: 100 INJECTION, SOLUTION INTRAVENOUS; SUBCUTANEOUS at 18:36

## 2017-04-11 RX ADMIN — METOPROLOL SUCCINATE SCH MG: 50 TABLET, EXTENDED RELEASE ORAL at 09:37

## 2017-04-11 RX ADMIN — SODIUM CHLORIDE SCH UNITS: 4.5 INJECTION, SOLUTION INTRAVENOUS at 21:46

## 2017-04-11 RX ADMIN — POTASSIUM CHLORIDE SCH MEQ: 750 TABLET, EXTENDED RELEASE ORAL at 12:28

## 2017-04-11 RX ADMIN — PROBIOTIC PRODUCT - TAB SCH EA: TAB at 18:35

## 2017-04-11 RX ADMIN — FUROSEMIDE SCH MG: 10 INJECTION, SOLUTION INTRAMUSCULAR; INTRAVENOUS at 00:33

## 2017-04-11 RX ADMIN — SPIRONOLACTONE SCH MG: 25 TABLET, FILM COATED ORAL at 09:36

## 2017-04-11 RX ADMIN — INSULIN LISPRO SCH UNITS: 100 INJECTION, SOLUTION INTRAVENOUS; SUBCUTANEOUS at 21:47

## 2017-04-11 RX ADMIN — SILVER SULFADIAZINE SCH GM: 10 CREAM TOPICAL at 21:47

## 2017-04-11 RX ADMIN — PROBIOTIC PRODUCT - TAB SCH EA: TAB at 09:30

## 2017-04-11 RX ADMIN — FUROSEMIDE SCH MG: 10 INJECTION, SOLUTION INTRAMUSCULAR; INTRAVENOUS at 09:38

## 2017-04-11 RX ADMIN — INSULIN LISPRO SCH UNITS: 100 INJECTION, SOLUTION INTRAVENOUS; SUBCUTANEOUS at 09:30

## 2017-04-11 RX ADMIN — OMEPRAZOLE SCH MG: 20 CAPSULE, DELAYED RELEASE ORAL at 09:36

## 2017-04-11 NOTE — IPN
DATE:  04/11/2017

 

71-year-old gentleman seen at bedside.  No overnight complications or problems.

He does appear to be diuresing well. Denies chest pain, shortness of breath or

productive sputum.  No abdominal pain.

 

OBJECTIVE:  Temperature is 98.5, pulse 71, respiratory rate 18, blood pressure

(BP) 150/86, SPO2 is 93% on room air.

General:  The patient appears to be in no acute distress.  Is alert and 
oriented.

HEENT:  Unremarkable.

Unable to determine jugular venous distention (JVD) due to body habitus.

Lungs:  Diminished bibasilar breath sounds, otherwise clear.

Heart:  Regular rate and rhythm.

Abdomen is obese, soft.

Extremities:  No edema.  No calf tenderness.

 

LABORATORY DATA:  White count is 8.5, hemoglobin 9.8, platelets 302,000.  Sodium

138, potassium 3.6, chloride 96, bicarb 32, anion gap 10, BUN 31, creatinine

2.32, glucose 147, AST is 7, ALT 11, alkaline phosphatase 132, albumin is 2.4.

 

ASSESSMENT/PLAN:

1.  Gallbladder thickening with multiple mobile calculi with thickening of the

gallbladder wall to 3 mm suggesting cholelithiasis.  Chronic cholecystitis, but

no intrahepatic biliary dilation.  Common duct is 3.3 mm.  Otherwise normal.  
Did

have a positive sonographic Deras sign, this could be worked up as an

outpatient.

2.  Shortness of breath, possibly related to decompensated congestive heart

failure.  He had been noncompliant with fluid restriction and 2 gram sodium 
diet.

Has done well with IV Lasix and metolazone.  Will continue with the fluid

restriction and diet as outlined.  Continue beta blocker, Aldactone and hold ACE

inhibitor.

3.  Epigastric discomfort and dry heaves, this appears to be improved.  He does

have Zofran.  Dr. Gosselin consulted and did not feel that this was acutely

related to the cholecystitis.  Continue on the Carafate, proton pump inhibitor

(PPI).  Scheduled for upper GI small-bowel follow-through.  Will see how those

results look.  He may need further esophagogastroduodenoscopy (EGD) if his

symptoms persist.

4.  Lower extremity ulcers.  Dr. Gosselin's help is appreciated.  Appreciate his

recommendations.

5.  Chronic kidney disease (CKD), appears to be relatively stable the last 6

months.  Will continue to diurese him.  ACE inhibitors are on hold.

6.  Diarrhea, appears to be chronic in nature with several colonoscopies in the

past.  Continue on Bacid

7.  Coronary artery disease, history of myocardial infarction (MI) and coronary

artery bypass graft (CABG) 2 years ago.  Continue on aspirin, Plavix and beta

blocker.  Not on a statin.  Will defer to Dr. Snow.

8.  Type 2 diabetes.  Continue with fingersticks before meals and at bedtime.

Sliding scale coverage.

9.  Hypertension.  Lisinopril currently on hold, again, due to his renal status.

Hold parameters placed on his blood pressure medicines otherwise.

10.  Gastroesophageal reflux disease (GERD).  As outlined, continue on PPI and

Carafate.

11.  Anemia, appears to be iron deficiency.  The patient was started on iron

sulfate and will need outpatient followup with repeat colonoscopies.

 

DISPOSITION:  Will continue to monitor.  Will see how he does with switching to

by mouth Lasix.  He may be tentatively 48-72 hours away from discharge.

 

Correction to the patient's dosing for diuretics.  He does take spironolactone 
50

mg daily at home.  Will go ahead and discontinue the 25 mg dose, as well as the

intravenous (IV) Lasix today, starting him on the 50 mg, giving additional dose

today at noon, and resume the daily dose tomorrow morning.  If he is tolerating

well, as indicated in the previous dictation, we will see if we can get him home

within the next 1-2 days.

 

Edited 04/11/2017 Formerly [facility], 1928–1983

MTDD

## 2017-04-12 VITALS — SYSTOLIC BLOOD PRESSURE: 145 MMHG | DIASTOLIC BLOOD PRESSURE: 63 MMHG

## 2017-04-12 VITALS — DIASTOLIC BLOOD PRESSURE: 75 MMHG | SYSTOLIC BLOOD PRESSURE: 158 MMHG

## 2017-04-12 VITALS — SYSTOLIC BLOOD PRESSURE: 158 MMHG | DIASTOLIC BLOOD PRESSURE: 78 MMHG

## 2017-04-12 LAB
ALBUMIN SERPL BCG-MCNC: 2.4 GM/DL (ref 3.2–5.2)
ALBUMIN/GLOB SERPL: 0.51 {RATIO} (ref 1–1.93)
ALP SERPL-CCNC: 121 U/L (ref 45–117)
ALT SERPL W P-5'-P-CCNC: 10 U/L (ref 12–78)
AMYLASE SERPL-CCNC: 18 U/L (ref 25–115)
ANION GAP SERPL CALC-SCNC: 6 MEQ/L (ref 8–16)
AST SERPL-CCNC: 8 U/L (ref 15–37)
BILIRUB CONJ SERPL-MCNC: < 0.1 MG/DL (ref 0–0.2)
BILIRUB SERPL-MCNC: 0.3 MG/DL (ref 0.2–1)
BUN SERPL-MCNC: 35 MG/DL (ref 7–18)
CALCIUM SERPL-MCNC: 8.9 MG/DL (ref 8.8–10.2)
CHLORIDE SERPL-SCNC: 95 MEQ/L (ref 98–107)
CO2 SERPL-SCNC: 36 MEQ/L (ref 21–32)
CREAT SERPL-MCNC: 2.58 MG/DL (ref 0.7–1.3)
ERYTHROCYTE [DISTWIDTH] IN BLOOD BY AUTOMATED COUNT: 13.4 % (ref 11.5–14.5)
GFR SERPL CREATININE-BSD FRML MDRD: 26.3 ML/MIN/{1.73_M2} (ref 42–?)
GLUCOSE SERPL-MCNC: 165 MG/DL (ref 83–110)
MAGNESIUM SERPL-MCNC: 2.2 MG/DL (ref 1.8–2.4)
MCH RBC QN AUTO: 27.4 PG (ref 27–33)
MCHC RBC AUTO-ENTMCNC: 31.9 G/DL (ref 32–36.5)
MCV RBC AUTO: 85.9 FL (ref 80–96)
PLATELET # BLD AUTO: 299 K/MM3 (ref 150–450)
POTASSIUM SERPL-SCNC: 3.8 MEQ/L (ref 3.5–5.1)
PROT SERPL-MCNC: 7.1 GM/DL (ref 6.4–8.2)
SODIUM SERPL-SCNC: 137 MEQ/L (ref 136–145)
WBC # BLD AUTO: 8.8 K/MM3 (ref 4–10)

## 2017-04-12 RX ADMIN — SODIUM CHLORIDE SCH UNITS: 4.5 INJECTION, SOLUTION INTRAVENOUS at 21:02

## 2017-04-12 RX ADMIN — TORSEMIDE SCH MG: 20 TABLET ORAL at 08:15

## 2017-04-12 RX ADMIN — PROBIOTIC PRODUCT - TAB SCH EA: TAB at 08:14

## 2017-04-12 RX ADMIN — SUCRALFATE SCH GM: 1 TABLET ORAL at 11:45

## 2017-04-12 RX ADMIN — METOPROLOL SUCCINATE SCH MG: 50 TABLET, EXTENDED RELEASE ORAL at 08:13

## 2017-04-12 RX ADMIN — SILVER SULFADIAZINE SCH GM: 10 CREAM TOPICAL at 08:18

## 2017-04-12 RX ADMIN — ASPIRIN 325 MG ORAL TABLET SCH MG: 325 PILL ORAL at 08:14

## 2017-04-12 RX ADMIN — SUCRALFATE SCH GM: 1 TABLET ORAL at 08:12

## 2017-04-12 RX ADMIN — SUCRALFATE SCH GM: 1 TABLET ORAL at 21:02

## 2017-04-12 RX ADMIN — INSULIN LISPRO SCH UNITS: 100 INJECTION, SOLUTION INTRAVENOUS; SUBCUTANEOUS at 11:37

## 2017-04-12 RX ADMIN — INSULIN LISPRO SCH UNITS: 100 INJECTION, SOLUTION INTRAVENOUS; SUBCUTANEOUS at 21:03

## 2017-04-12 RX ADMIN — SPIRONOLACTONE SCH MG: 50 TABLET ORAL at 08:14

## 2017-04-12 RX ADMIN — PROBIOTIC PRODUCT - TAB SCH EA: TAB at 11:45

## 2017-04-12 RX ADMIN — POTASSIUM CHLORIDE SCH MEQ: 750 TABLET, EXTENDED RELEASE ORAL at 08:15

## 2017-04-12 RX ADMIN — PROBIOTIC PRODUCT - TAB SCH EA: TAB at 17:37

## 2017-04-12 RX ADMIN — SILVER SULFADIAZINE SCH GM: 10 CREAM TOPICAL at 21:03

## 2017-04-12 RX ADMIN — SODIUM CHLORIDE SCH UNITS: 4.5 INJECTION, SOLUTION INTRAVENOUS at 14:00

## 2017-04-12 RX ADMIN — INSULIN LISPRO SCH UNITS: 100 INJECTION, SOLUTION INTRAVENOUS; SUBCUTANEOUS at 17:38

## 2017-04-12 RX ADMIN — SUCRALFATE SCH GM: 1 TABLET ORAL at 17:37

## 2017-04-12 RX ADMIN — INSULIN LISPRO SCH UNITS: 100 INJECTION, SOLUTION INTRAVENOUS; SUBCUTANEOUS at 07:30

## 2017-04-12 RX ADMIN — SODIUM CHLORIDE SCH UNITS: 4.5 INJECTION, SOLUTION INTRAVENOUS at 05:50

## 2017-04-12 RX ADMIN — CLOPIDOGREL BISULFATE SCH MG: 75 TABLET ORAL at 08:11

## 2017-04-12 RX ADMIN — OMEPRAZOLE SCH MG: 20 CAPSULE, DELAYED RELEASE ORAL at 08:12

## 2017-04-12 RX ADMIN — METOCLOPRAMIDE SCH MG: 10 TABLET ORAL at 17:37

## 2017-04-12 RX ADMIN — TORSEMIDE SCH MG: 20 TABLET ORAL at 17:36

## 2017-04-12 RX ADMIN — METOCLOPRAMIDE SCH MG: 10 TABLET ORAL at 11:42

## 2017-04-12 RX ADMIN — METOCLOPRAMIDE SCH MG: 10 TABLET ORAL at 21:02

## 2017-04-13 VITALS — DIASTOLIC BLOOD PRESSURE: 72 MMHG | SYSTOLIC BLOOD PRESSURE: 152 MMHG

## 2017-04-13 VITALS — DIASTOLIC BLOOD PRESSURE: 77 MMHG | SYSTOLIC BLOOD PRESSURE: 138 MMHG

## 2017-04-13 VITALS — DIASTOLIC BLOOD PRESSURE: 67 MMHG | SYSTOLIC BLOOD PRESSURE: 141 MMHG

## 2017-04-13 LAB
ALBUMIN SERPL BCG-MCNC: 2.5 GM/DL (ref 3.2–5.2)
ANION GAP SERPL CALC-SCNC: 8 MEQ/L (ref 8–16)
BUN SERPL-MCNC: 36 MG/DL (ref 7–18)
CALCIUM SERPL-MCNC: 9 MG/DL (ref 8.8–10.2)
CHLORIDE SERPL-SCNC: 94 MEQ/L (ref 98–107)
CO2 SERPL-SCNC: 34 MEQ/L (ref 21–32)
CREAT SERPL-MCNC: 2.58 MG/DL (ref 0.7–1.3)
ERYTHROCYTE [DISTWIDTH] IN BLOOD BY AUTOMATED COUNT: 13.4 % (ref 11.5–14.5)
GFR SERPL CREATININE-BSD FRML MDRD: 26.3 ML/MIN/{1.73_M2} (ref 42–?)
GLUCOSE SERPL-MCNC: 192 MG/DL (ref 83–110)
MCH RBC QN AUTO: 27.7 PG (ref 27–33)
MCHC RBC AUTO-ENTMCNC: 32.9 G/DL (ref 32–36.5)
MCV RBC AUTO: 84.3 FL (ref 80–96)
PHOSPHATE SERPL-MCNC: 3.3 MG/DL (ref 2.5–4.9)
PLATELET # BLD AUTO: 310 K/MM3 (ref 150–450)
POTASSIUM SERPL-SCNC: 3.7 MEQ/L (ref 3.5–5.1)
SODIUM SERPL-SCNC: 136 MEQ/L (ref 136–145)
WBC # BLD AUTO: 9.4 K/MM3 (ref 4–10)

## 2017-04-13 RX ADMIN — PROBIOTIC PRODUCT - TAB SCH EA: TAB at 17:05

## 2017-04-13 RX ADMIN — SUCRALFATE SCH GM: 1 TABLET ORAL at 12:18

## 2017-04-13 RX ADMIN — SPIRONOLACTONE SCH MG: 50 TABLET ORAL at 07:43

## 2017-04-13 RX ADMIN — SUCRALFATE SCH GM: 1 TABLET ORAL at 07:44

## 2017-04-13 RX ADMIN — INSULIN LISPRO SCH UNITS: 100 INJECTION, SOLUTION INTRAVENOUS; SUBCUTANEOUS at 07:45

## 2017-04-13 RX ADMIN — TORSEMIDE SCH MG: 20 TABLET ORAL at 17:05

## 2017-04-13 RX ADMIN — SUCRALFATE SCH GM: 1 TABLET ORAL at 17:05

## 2017-04-13 RX ADMIN — SODIUM CHLORIDE SCH UNITS: 4.5 INJECTION, SOLUTION INTRAVENOUS at 05:35

## 2017-04-13 RX ADMIN — SODIUM CHLORIDE SCH UNITS: 4.5 INJECTION, SOLUTION INTRAVENOUS at 21:07

## 2017-04-13 RX ADMIN — METOCLOPRAMIDE SCH MG: 10 TABLET ORAL at 12:18

## 2017-04-13 RX ADMIN — SUCRALFATE SCH GM: 1 TABLET ORAL at 21:06

## 2017-04-13 RX ADMIN — PROBIOTIC PRODUCT - TAB SCH EA: TAB at 07:43

## 2017-04-13 RX ADMIN — ASPIRIN 325 MG ORAL TABLET SCH MG: 325 PILL ORAL at 07:43

## 2017-04-13 RX ADMIN — TORSEMIDE SCH MG: 20 TABLET ORAL at 07:44

## 2017-04-13 RX ADMIN — INSULIN LISPRO SCH UNITS: 100 INJECTION, SOLUTION INTRAVENOUS; SUBCUTANEOUS at 20:21

## 2017-04-13 RX ADMIN — METOCLOPRAMIDE SCH MG: 10 TABLET ORAL at 21:06

## 2017-04-13 RX ADMIN — INSULIN LISPRO SCH UNITS: 100 INJECTION, SOLUTION INTRAVENOUS; SUBCUTANEOUS at 17:05

## 2017-04-13 RX ADMIN — POTASSIUM CHLORIDE SCH MEQ: 750 TABLET, EXTENDED RELEASE ORAL at 07:43

## 2017-04-13 RX ADMIN — SODIUM CHLORIDE SCH UNITS: 4.5 INJECTION, SOLUTION INTRAVENOUS at 14:31

## 2017-04-13 RX ADMIN — METOCLOPRAMIDE SCH MG: 10 TABLET ORAL at 07:43

## 2017-04-13 RX ADMIN — PROBIOTIC PRODUCT - TAB SCH EA: TAB at 12:18

## 2017-04-13 RX ADMIN — SILVER SULFADIAZINE SCH GM: 10 CREAM TOPICAL at 21:07

## 2017-04-13 RX ADMIN — OMEPRAZOLE SCH MG: 20 CAPSULE, DELAYED RELEASE ORAL at 07:44

## 2017-04-13 RX ADMIN — INSULIN LISPRO SCH UNITS: 100 INJECTION, SOLUTION INTRAVENOUS; SUBCUTANEOUS at 12:18

## 2017-04-13 RX ADMIN — CLOPIDOGREL BISULFATE SCH MG: 75 TABLET ORAL at 07:44

## 2017-04-13 RX ADMIN — METOCLOPRAMIDE SCH MG: 10 TABLET ORAL at 17:05

## 2017-04-13 RX ADMIN — SILVER SULFADIAZINE SCH GM: 10 CREAM TOPICAL at 07:45

## 2017-04-13 NOTE — IPN
DATE:  04/13/2017

 

71-year-old gentleman seen at bedside.  No overnight issues reported.  He is

resting comfortably.  He is planning on ordering breakfast this morning.  Denies

chest pain, shortness of breath.  No nausea, vomiting, no abdominal pain

currently.

 

OBJECTIVE:  Temperature 98.7, pulse 76, respiratory rate is 20.  /67, SPO2

is 93% on room air.

General:  The patient appears to be in no acute distress.  He is alert, pleasant.

HEENT:  Unremarkable.

Lungs:  Clear.

Heart:  Regular rate and rhythm.

Abdomen:  Soft.  Positive bowel sounds.  He did have some very mild vague

tenderness of the upper abdomen but this does not demonstrate any rebound

tenderness or signs of peritonitis.

Extremities:  No edema.  No calf tenderness.

 

LABORATORY DATA: White count 9.4, hemoglobin 11.4, platelets 310,000.  Sodium

136, potassium 3.7, chloride 94, bicarb 34, anion gap 8, BUN 36, creatinine 2.58,

glucose 112, albumin 2.5.

 

Upper GI with small-bowel follow-through:  Gastroesophageal reflux (GERD)

demonstrated at the level of the vincent.  He did have some thick folds of the

duodenal bulb and proximal second portion of the duodenum.  No kellen ulceration

demonstrated likely representing duodenitis. Otherwise good small-bowel

follow-through.

 

ASSESSMENT/PLAN:

1.  Abdominal discomfort.  He does have some gastroesophageal reflux disease as

well as duodenitis noted on upper GI.  No active signs of bleeding with stable

hemoglobin and hematocrit which has gradually improved each day.  He has required

no blood transfusions.  Will continue with Carafate.  On ultrasound of the

abdomen, he did demonstrate some chronic cholecystitis.  No intrahepatic or

biliary dilation appreciated, therefore no immediate surgical need.  He can

followup outpatient for this.

2.  Shortness of breath, possibly related to decompensated congestive heart

failure (CHF) which appears to be compensated well.  Will continue with 2 grams

sodium restriction, fluid restriction.  He is currently back on his oral

diuretics, ACE inhibitors are on hold due to his chronic kidney disease.

3.  Chronic kidney disease (CKD) appears to be at baseline for 6 months.  Hold

ACE inhibitor and he can followup outpatient.

4.  Lower extremity ulcers.  Appreciate Dr. Gosselin recommendations.  These

appear to be stable.  No signs of infection.

5.  Diarrhea chronic in nature, stable.  Continue on Bacid.  He has had several

colonoscopies and should continue followup outpatient.

6.  Coronary artery disease, prior history of myocardial infarction, coronary

artery bypass graft (CABG) two years ago.  Continue on aspirin, Plavix, beta

blocker.  Not currently on statin.  Defer to Dr. Snow for followup.  Again, he

does not demonstrate any signs of GI bleed.  No symptomatic anemia.

7.  Type 2 diabetes.  Continue with fingersticks, sliding scale coverage.

8.  Hypertension.  Lisinopril was on hold due to his renal status.  Continue his

other antihypertensives with hold parameters.

9.  Gastroesophageal reflux disease (GERD) as outlined.  Continue PPI and

Carafate.

10.  Anemia likely related iron deficiency, hemoglobin/hematocrit have continued

to trend upward.  He can followup outpatient when he is due for his next

colonoscopy.

 

DISPOSITION: He does appear to be doing well.  Did anticipate trying to send him

home today.  He is marginal with physical therapy. We will see how he does with

another treatment today and see if he is safe for discharge tomorrow.  He may

need discharge home with services due to his medical noncompliance.  However, we

have attempted to streamline his medications and he will need follow-up to make

sure that he is doing well and staying with the program at home.

## 2017-04-14 VITALS — DIASTOLIC BLOOD PRESSURE: 65 MMHG | SYSTOLIC BLOOD PRESSURE: 147 MMHG

## 2017-04-14 LAB
ALBUMIN SERPL BCG-MCNC: 2.5 GM/DL (ref 3.2–5.2)
ANION GAP SERPL CALC-SCNC: 9 MEQ/L (ref 8–16)
BUN SERPL-MCNC: 43 MG/DL (ref 7–18)
CALCIUM SERPL-MCNC: 8.9 MG/DL (ref 8.8–10.2)
CHLORIDE SERPL-SCNC: 92 MEQ/L (ref 98–107)
CO2 SERPL-SCNC: 35 MEQ/L (ref 21–32)
CREAT SERPL-MCNC: 2.72 MG/DL (ref 0.7–1.3)
ERYTHROCYTE [DISTWIDTH] IN BLOOD BY AUTOMATED COUNT: 13.4 % (ref 11.5–14.5)
GFR SERPL CREATININE-BSD FRML MDRD: 24.8 ML/MIN/{1.73_M2} (ref 42–?)
GLUCOSE SERPL-MCNC: 250 MG/DL (ref 83–110)
MCH RBC QN AUTO: 27.4 PG (ref 27–33)
MCHC RBC AUTO-ENTMCNC: 32 G/DL (ref 32–36.5)
MCV RBC AUTO: 85.5 FL (ref 80–96)
PHOSPHATE SERPL-MCNC: 3.3 MG/DL (ref 2.5–4.9)
PLATELET # BLD AUTO: 311 K/MM3 (ref 150–450)
POTASSIUM SERPL-SCNC: 3.6 MEQ/L (ref 3.5–5.1)
SODIUM SERPL-SCNC: 136 MEQ/L (ref 136–145)
WBC # BLD AUTO: 8.8 K/MM3 (ref 4–10)

## 2017-04-14 RX ADMIN — ASPIRIN 325 MG ORAL TABLET SCH MG: 325 PILL ORAL at 08:10

## 2017-04-14 RX ADMIN — SILVER SULFADIAZINE SCH GM: 10 CREAM TOPICAL at 08:11

## 2017-04-14 RX ADMIN — OMEPRAZOLE SCH MG: 20 CAPSULE, DELAYED RELEASE ORAL at 08:10

## 2017-04-14 RX ADMIN — METOCLOPRAMIDE SCH MG: 10 TABLET ORAL at 08:11

## 2017-04-14 RX ADMIN — SPIRONOLACTONE SCH MG: 50 TABLET ORAL at 08:11

## 2017-04-14 RX ADMIN — POTASSIUM CHLORIDE SCH MEQ: 750 TABLET, EXTENDED RELEASE ORAL at 08:11

## 2017-04-14 RX ADMIN — SUCRALFATE SCH GM: 1 TABLET ORAL at 08:10

## 2017-04-14 RX ADMIN — TORSEMIDE SCH MG: 20 TABLET ORAL at 08:10

## 2017-04-14 RX ADMIN — CLOPIDOGREL BISULFATE SCH MG: 75 TABLET ORAL at 08:10

## 2017-04-14 RX ADMIN — PROBIOTIC PRODUCT - TAB SCH EA: TAB at 08:10

## 2017-04-14 RX ADMIN — INSULIN LISPRO SCH UNITS: 100 INJECTION, SOLUTION INTRAVENOUS; SUBCUTANEOUS at 08:10

## 2017-04-14 RX ADMIN — SODIUM CHLORIDE SCH UNITS: 4.5 INJECTION, SOLUTION INTRAVENOUS at 05:14

## 2017-04-14 NOTE — DSES
DATE OF ADMISSION:   04/07/2017

DATE OF DISCHARGE:  04/14/2017

 

PRIMARY CARE PROVIDER:  Ghislaine Segovia

 

CARDIOLOGIST:  Dr. Snow

 

CONSULTANT:  Dr. Gosselin

 

PROCEDURES PERFORMED:  None.

 

COMPLICATIONS:  None.

 

ADMISSION AND DISCHARGE DIAGNOSES

1.  Abdominal discomfort with symptoms of gastroesophageal reflux disease (GERD)

and duodenitis, now resolved.

2.  Chronic cholecystitis.

3.  Shortness of breath, possibly related to mild decompensated congestive heart

failure (CHF), which is now currently compensated.

4.  Chronic kidney disease.

5.  Lower extremity ulcers with recommendations per Dr. Gosselin.

6.  Chronic diarrhea with negative workup with colonoscopies.

7.  Coronary artery disease, status post coronary artery bypass graft (CABG)

procedure two years ago.

8.   Type 2 diabetes.

9.  Hypertension.

10.  GERD

11.  Iron deficiency anemia.

 

BRIEF HOSPITAL COURSE:  Mr. Dunlap is a pleasant 71-year-old gentleman seen and

admitted for vague abdominal pain, epigastric pain, nausea with no vomiting,

difficulty maintaining good oral intake.  This has been superimposed o some

long-standing chronic diarrhea with negative workup with negative colonoscopies.

He did complain of some shortness of breath and chest tightness, which did

resolve.  He has been able to diurese well.  We will also additionally try to

avoid nephrotoxins.  For further information regarding intake, physical,

laboratories, diagnostics please refer to the history and physical and refer to

Dr. Gosselin's consult.  He did have an upper GI performed on 04/12/2017.  GERD

was demonstrated above the level of the vincent, thickened folds of the duodenal

bulb suggesting duodenitis.  Otherwise unremarkable with no filling defects.  His

gallbladder ultrasound done on 04/07/2017 did suggest some sludge with multiple

metabolic calculi in the gallbladder, thickened gallbladder wall of 3 mm with no

signs of acute cholecystitis.  No biliary dilation.  Liver did appear to be

homogenous.  Followup as an outpatient for his gallbladder with Dr. Gosselin.

 

PHYSICAL EXAMINATION:

Today, temperature is 98.4, pulse 70, respiratory rate 20, blood pressure 147/65,

SpO2 is 97% on room air.

GENERAL:  The patient appears to be in no acute distress.  Is alert and oriented.

HEENT:  Unremarkable.

LUNGS:  Clear.

HEART:  Regular rhythm.

ABDOMEN:  Soft.

EXTREMITIES:  No edema.  No calf tenderness.

 

LABORATORY DATA:

White count 8.8, hemoglobin 10.8, platelets 311.  Sodium is 136, potassium 2.6,

chloride 92, bicarbonate 35, anion gap 9, BUN is 43, creatinine 2.72.

 

Discharge condition is good.

 

DISPOSITION: Discharge to home.

 

DISCHARGE MEDICATIONS:

- Reglan 10 mg before food

- Carafate 1 gram before food and nightly

- albuterol nebulizer as needed every 4 hours as needed

- aspirin 325 mg daily

- calcium with vitamin D 1 tablet daily

- Carvedilol 12.5 mg twice a day

- Plavix 75 mg daily

- Lantus 100 units daily and 85 units at night

- losartan 50 mg daily

- metoprolol tartrate 50 mg daily

- Nitrostat 0.4 mg sublingually every 5 minutes as needed for chest pain

- omeprazole 40 mg daily

- Percocet one to two tablets every 8 hours as needed for pain

- potassium chloride 10 mEq daily

- Lyrica 150 mg twice a day

- spironolactone 50 mg daily

- furosemide 60 mg twice a day

 

DISCHARGE INSTRUCTIONS:   Discharge to home.  Activity as tolerated.  Consistent

carbohydrate diet.  Followup with his primary care provider in a week and

followup with Dr. Gosselin as needed for followup to his gallbladder.

 

He should seek medical attention if symptoms should worsen or progress.  He

voices understanding.

 

Discharge took 35 minutes.

## 2017-04-20 ENCOUNTER — HOSPITAL ENCOUNTER (OUTPATIENT)
Dept: HOSPITAL 53 - M LAB REF | Age: 72
End: 2017-04-20
Attending: PODIATRIST
Payer: MEDICARE

## 2017-04-20 DIAGNOSIS — L89.612: Primary | ICD-10-CM

## 2017-04-20 DIAGNOSIS — E11.621: ICD-10-CM

## 2017-05-10 ENCOUNTER — HOSPITAL ENCOUNTER (INPATIENT)
Dept: HOSPITAL 53 - M ED | Age: 72
LOS: 8 days | Discharge: HOME HEALTH | DRG: 291 | End: 2017-05-18
Attending: INTERNAL MEDICINE | Admitting: HOSPITALIST
Payer: MEDICAID

## 2017-05-10 VITALS — WEIGHT: 205.03 LBS | BODY MASS INDEX: 32.95 KG/M2 | HEIGHT: 66 IN

## 2017-05-10 DIAGNOSIS — K81.1: ICD-10-CM

## 2017-05-10 DIAGNOSIS — J81.0: ICD-10-CM

## 2017-05-10 DIAGNOSIS — Z79.4: ICD-10-CM

## 2017-05-10 DIAGNOSIS — E78.5: ICD-10-CM

## 2017-05-10 DIAGNOSIS — N17.9: ICD-10-CM

## 2017-05-10 DIAGNOSIS — G93.40: ICD-10-CM

## 2017-05-10 DIAGNOSIS — E11.40: ICD-10-CM

## 2017-05-10 DIAGNOSIS — Z95.810: ICD-10-CM

## 2017-05-10 DIAGNOSIS — Z87.891: ICD-10-CM

## 2017-05-10 DIAGNOSIS — I25.10: ICD-10-CM

## 2017-05-10 DIAGNOSIS — L97.429: ICD-10-CM

## 2017-05-10 DIAGNOSIS — Z95.5: ICD-10-CM

## 2017-05-10 DIAGNOSIS — Z82.49: ICD-10-CM

## 2017-05-10 DIAGNOSIS — E11.649: ICD-10-CM

## 2017-05-10 DIAGNOSIS — D63.8: ICD-10-CM

## 2017-05-10 DIAGNOSIS — E87.5: ICD-10-CM

## 2017-05-10 DIAGNOSIS — I13.0: Primary | ICD-10-CM

## 2017-05-10 DIAGNOSIS — I50.23: ICD-10-CM

## 2017-05-10 DIAGNOSIS — Z91.018: ICD-10-CM

## 2017-05-10 DIAGNOSIS — L97.419: ICD-10-CM

## 2017-05-10 DIAGNOSIS — N18.3: ICD-10-CM

## 2017-05-10 DIAGNOSIS — E11.621: ICD-10-CM

## 2017-05-10 DIAGNOSIS — Z79.899: ICD-10-CM

## 2017-05-10 DIAGNOSIS — I73.9: ICD-10-CM

## 2017-05-10 DIAGNOSIS — I25.2: ICD-10-CM

## 2017-05-10 DIAGNOSIS — Z66: ICD-10-CM

## 2017-05-10 LAB
ALBUMIN SERPL BCG-MCNC: 2.8 GM/DL (ref 3.2–5.2)
ALBUMIN/GLOB SERPL: 0.67 {RATIO} (ref 1–1.93)
ALP SERPL-CCNC: 183 U/L (ref 45–117)
ALT SERPL W P-5'-P-CCNC: 22 U/L (ref 12–78)
ANION GAP SERPL CALC-SCNC: 3 MEQ/L (ref 8–16)
AST SERPL-CCNC: 14 U/L (ref 15–37)
BASOPHILS # BLD AUTO: 0 K/MM3 (ref 0–0.2)
BASOPHILS NFR BLD AUTO: 0.4 % (ref 0–1)
BILIRUB CONJ SERPL-MCNC: < 0.1 MG/DL (ref 0–0.2)
BILIRUB SERPL-MCNC: 0.2 MG/DL (ref 0.2–1)
BUN SERPL-MCNC: 57 MG/DL (ref 7–18)
CALCIUM SERPL-MCNC: 8.3 MG/DL (ref 8.8–10.2)
CHLORIDE SERPL-SCNC: 106 MEQ/L (ref 98–107)
CO2 SERPL-SCNC: 26 MEQ/L (ref 21–32)
CREAT SERPL-MCNC: 2.39 MG/DL (ref 0.7–1.3)
EOSINOPHIL # BLD AUTO: 0.1 K/MM3 (ref 0–0.5)
EOSINOPHIL NFR BLD AUTO: 1.2 % (ref 0–3)
ERYTHROCYTE [DISTWIDTH] IN BLOOD BY AUTOMATED COUNT: 14.1 % (ref 11.5–14.5)
GFR SERPL CREATININE-BSD FRML MDRD: 28.7 ML/MIN/{1.73_M2} (ref 42–?)
GLUCOSE SERPL-MCNC: 282 MG/DL (ref 83–110)
LARGE UNSTAINED CELL #: 0.2 K/MM3 (ref 0–0.4)
LARGE UNSTAINED CELL %: 2.1 % (ref 0–4)
LYMPHOCYTES # BLD AUTO: 0.9 K/MM3 (ref 1.5–4.5)
LYMPHOCYTES NFR BLD AUTO: 11.8 % (ref 24–44)
MCH RBC QN AUTO: 27.4 PG (ref 27–33)
MCHC RBC AUTO-ENTMCNC: 31 G/DL (ref 32–36.5)
MCV RBC AUTO: 88.3 FL (ref 80–96)
MONOCYTES # BLD AUTO: 0.4 K/MM3 (ref 0–0.8)
MONOCYTES NFR BLD AUTO: 5.5 % (ref 0–5)
NEUTROPHILS # BLD AUTO: 6.2 K/MM3 (ref 1.8–7.7)
NEUTROPHILS NFR BLD AUTO: 78.9 % (ref 36–66)
PLATELET # BLD AUTO: 254 K/MM3 (ref 150–450)
POTASSIUM SERPL-SCNC: 5.5 MEQ/L (ref 3.5–5.1)
POTASSIUM SERPL-SCNC: 6.3 MEQ/L (ref 3.5–5.1)
PROT SERPL-MCNC: 7 GM/DL (ref 6.4–8.2)
SODIUM SERPL-SCNC: 135 MEQ/L (ref 136–145)
T4 FREE SERPL-MCNC: 0.94 NG/DL (ref 0.76–1.46)
WBC # BLD AUTO: 7.9 K/MM3 (ref 4–10)

## 2017-05-10 RX ADMIN — INSULIN LISPRO SCH UNITS: 100 INJECTION, SOLUTION INTRAVENOUS; SUBCUTANEOUS at 21:00

## 2017-05-10 NOTE — REPUSA
CLINICAL HISTORY:  Right upper quadrant pain.

 

TECHNIQUE:  Realtime sonographic images were obtained in multiple projections.

 

FINDINGS:  

The liver is of uniform echo texture without evidence of mass or defect.  There is no intra or extrah
epatic biliary ductal dilatation.  The common bile duct measures 2.7 mm.  The gallbladder has gallsto
[place] with contracted gallbladder. The gallbladder wall is thickened measuring 3.3 mm. There is no gabby
cholecystic fluid.  There is no abdominal ascites.

 

The visualized portions of abdominal aorta present no abnormalities.

 

The pancreas is not visualized due to bowel gas. The right kidney measures 10.7 x 5.0 x 5.04 cm. Ther
e is echogenic sinus fat with calcified vessels.

 

IMPRESSION:  

1. Gallstones with contracted gallbladder.

2. Thickened gallbladder wall measuring 3.3 mm.

3. The findings are compatible with chronic cholecystitis.  Consider correlation with hepatobiliary s
can.

 

 

Thank you for your kind referral of this patient. We appreciate the opportunity to participate in thi
s patient's care. 

 

 

 

     Electronically signed by RAFI SMITH MD on 05/10/2017 07:12:05 PM ET

## 2017-05-10 NOTE — HPEPDOC
Medical History and Physical


Date of Admission


May 10, 2017 at 20:14





History and Physical


HISTORY AND PHYSICAL





Date of admission: 05/10/2017





PCP: AURA Bradshaw 





Chief complaint: shortness of breath and central chest pain





HPI: 72-year-old male with diabetes mellitus type 2, hypertension, 

hyperlipidemia, chronic systolic CHF, AICD, coronary artery disease with 

history of MI and CABG, PVD with history of bilateral femoropopliteal, CKD 

stage III who presented to the emergency department as he was experiencing 

shortness of breath and central chest pain. He states that both of these 

started approximately 2 days ago, and has been constant in nature. He states 

that sleeping helps it, but moving makes it worse. He describes it as feeling 

like a horse is standing on his chest, but he denies any radiation from the 

central location. He also denies any difficulty breathing when he lays flat. He 

does report that he noticed he has tired very easily recently. He states that 

he did not use any of his home nitroglycerin.








Past medical history: diabetes mellitus type 2, hypertension, hyperlipidemia, 

chronic systolic CHF, AICD, coronary artery disease with history of MI and CABG

, PVD with history of bilateral femoropopliteal, CKD stage III





Past surgical history: Coronary stents, bilateral femoropopliteal, CABG, AICD 

placement





Family history: Hypertension, coronary artery disease





Social history: The patient quit smoking approximately 4 years ago. He denies 

drug and alcohol use. He currently resides by himself but has local family for 

support





Allergies: Turkey





Review of systems:


General: Positive for chills, negative for fever


Eyes: Negative for vision changes and discharge


ENT: Negative for sore throat and nose bleed


Cardiovascular: Positive for chest pain, negative for palpitations


Respiratory: Positive for cough and shortness of breath


GI: Positive for nausea, negative for vomiting and diarrhea


Musculoskeletal: Negative for neck and back pain


Skin: Negative for rash


Neuro: Negative for headache and dizziness. Positive for numbness in his feet 

and legs ever since his surgery


Psych: Negative for depression and suicidal ideation


Endocrine: Negative for polyuria


: Negative for dysuria


Heme: Negative for bruising and bleeding





Home meds: See below 





Physical exam:


Vital signs:  


Vital Signs








  Date Time  Temp Pulse Resp B/P (MAP) Pulse Ox O2 Delivery O2 Flow Rate FiO2


 


5/10/17 21:46  80  159/63 (95) 85   


 


5/10/17 18:48   18   Room Air  


 


5/10/17 16:04 97.4       








Gen.: awake, alert, no acute distress


Eyes: Extraocular movements intact, normal sclera


ENT: Moist mucous membranes


Cardiovascular: RRR, no murmurs rubs or gallops


Lungs: crackles in bilateral bases


Abdomen: Soft, TTP of RUQ, umbilicus, and LUQ


Extremities: 1+ peripheral edema, open wound of left heal


Neuro: alert and oriented 3, normal speech, no focal deficits


Psych: Normal mood with congruent affect





Labs and radiology:


See below


Potassium 6.3


Creatinine 2.39


Troponin negative


Alkaline phosphatase 183


Chest x-ray shows interstitial edema and left pleural effusion


Abdominal ultrasound shows chronic cholecystitis


BNP greater than 1500





Assessment and plan:





72-year-old male with diabetes mellitus type 2, hypertension, hyperlipidemia, 

chronic systolic CHF, AICD, coronary artery disease with history of MI and CABG

, PVD with history of bilateral femoropopliteal, CKD stage III who presented to 

the emergency department as he was experiencing shortness of breath and central 

chest pain. He is admitted with acute on chronic systolic CHF, hyperkalemia, 

and chronic cholecystitis.





1. Acute on chronic systolic CHF: The patient's initial troponin is negative. 

We will continue to trend these. We will follow his daily weights and I's and O'

s, as we diurese him with IV Lasix. We will hold his home torsemide, as well as 

home spironolactone.





2. Chest pain: The patient's EKG does not appear grossly unchanged from prior. 

His initial troponin is negative, but we'll continue to trend these. I suspect 

that this is secondary to his acute CHF exacerbation.





3. Chronic cholecystitis: Patient reports some right upper quadrant pain and is 

tender upon exam. Imaging is consistent with chronic cholecystitis, and a HIDA 

scan is recommended. We will plan for a HIDA scan tomorrow morning.





4. Hyperkalemia: The patient's potassium was noted to be 6.3. He has already 

received insulin with dextrose, calcium gluconate, and Lasix in the emergency 

department. We will hold his home ARB, as well as home spironolactone. We will 

also give him a dose of Kayexalate and recheck his potassium later this 

evening. We will monitor him on telemetry.





5. Wound on left heel: Patient states that he follows with Dr. Johnson for this 

and his home medication list notes that he is currently taking Bactrim for this

, as well as using Silvadene. We will continue these. It may be of value to 

contact Dr. Johnson in the morning to consider consultation or further 

instructions on management of this wound.





6. Diabetes mellitus type 2: Continue home morning and evening long-acting 

insulin. Sliding scale insulin while in-house.





7. Hypertension: We will continue the patient on his home Coreg. We are 

currently holding his home ARB given his hyperkalemia. We are also holding his 

home metoprolol, as it seems unusual that he would be on 2 beta blockers.





8. Hyperlipidemia, coronary artery disease with history of MI and CABG: We will 

continue the patient on his home aspirin and Plavix, as well as one of the 2 

beta blockers he reports. The patient does not report taking a statin at home. 

Should be further evaluated by his primary physicians as an outpatient.





9. PVD with history of bilateral femoropopliteal: We will continue the patient'

s home aspirin and Plavix. As mentioned above, the patient does not report 

taking a statin at home. This should be further evaluated by his primary 

physicians as an outpatient.





10. CK D stage III: The patient's baseline creatinine appears to be between 1.9 

and 2.2. His creatinine upon admission is 2.39. We will monitor this closely 

while diuresing him.





DVT prophylaxis: Heparin





Dispo: admit as an inpatient to the service of Dr. Emanuel





CODE STATUS: DNR/DNI





Vital Signs





Vital Signs








  Date Time  Temp Pulse Resp B/P (MAP) Pulse Ox O2 Delivery O2 Flow Rate FiO2


 


5/10/17 21:46  80  159/63 (95) 85   


 


5/10/17 18:48   18   Room Air  


 


5/10/17 16:04 97.4       











Laboratory Data


Labs 24H


Laboratory Tests 2


5/10/17 16:31: 


White Blood Count 7.9, Red Blood Count 3.29L, Hemoglobin 9.0L, Hematocrit 29.1L

, Mean Corpuscular Volume 88.3, Mean Corpuscular Hemoglobin 27.4, Mean 

Corpuscular Hemoglobin Concent 31.0L, Red Cell Distribution Width 14.1, 

Platelet Count 254, Neutrophils (%) (Auto) 78.9H, Lymphocytes (%) (Auto) 11.8L, 

Monocytes (%) (Auto) 5.5H, Eosinophils (%) (Auto) 1.2, Basophils (%) (Auto) 0.4

, Neutrophils # (Auto) 6.2, Lymphocytes # (Auto) 0.9L, Monocytes # (Auto) 0.4, 

Eosinophils # (Auto) 0.1, Basophils # (Auto) 0.0, Large Unclassified Cells % 2.1

, Large Unclassified Cells # 0.2, Anion Gap 3L, Glomerular Filtration Rate 28.7L

, Blood Urea Nitrogen 57H, Creatinine 2.39H, Sodium Level 135L, Potassium Level 

6.3*H, Chloride Level 106, Carbon Dioxide Level 26, Calcium Level 8.3L, Total 

Creatine Kinase 110, Aspartate Amino Transf (AST/SGOT) 14L, Alanine 

Aminotransferase (ALT/SGPT) 22, Alkaline Phosphatase 183H, Total Bilirubin 0.2, 

Direct Bilirubin < 0.1, Creatine Kinase MB 5.0H, Creatine Kinase MB Relative 

Index 4.54H, Troponin I 0.02, B-Type Natriuretic Peptide 1560H, Total Protein 

7.0, Albumin 2.8L, Albumin/Globulin Ratio 0.67L, Lipase 268, Thyroid 

Stimulating Hormone (TSH) 5.630H, Free Thyroxine 0.94


5/10/17 20:53: 


Potassium Level 5.5H, Total Creatine Kinase 94, Creatine Kinase MB 3.9H, 

Creatine Kinase MB Relative Index 4.14H, Troponin I 0.03#


CBC/BMP


Laboratory Tests


5/10/17 16:31








Red Blood Count 3.29 L, Mean Corpuscular Volume 88.3, Mean Corpuscular 

Hemoglobin 27.4, Mean Corpuscular Hemoglobin Concent 31.0 L, Red Cell 

Distribution Width 14.1, Neutrophils (%) (Auto) 78.9 H, Lymphocytes (%) (Auto) 

11.8 L, Monocytes (%) (Auto) 5.5 H, Eosinophils (%) (Auto) 1.2, Basophils (%) (

Auto) 0.4, Neutrophils # (Auto) 6.2, Lymphocytes # (Auto) 0.9 L, Monocytes # (

Auto) 0.4, Eosinophils # (Auto) 0.1, Basophils # (Auto) 0.0, Calcium Level 8.3 L

, Total Creatine Kinase 110





5/10/17 20:53








Total Creatine Kinase 94





Home Medications


Scheduled


Aspirin (Aspirin) 325 Mg Tab, 325 MG PO DAILY


Calcium/Vitamin D (Oyster Shell Calcium 250+ 250-125 mg-Unit) 1 Tab Tab, 1 TAB 

PO BID


Carvedilol (Carvedilol) 12.5 Mg Tab, 12.5 MG PO BID


Clopidogrel Bisulfate (Clopidogrel) 75 Mg Tab, 75 MG PO DAILY


Insulin Glargine (Lantus) 1 Units/0.01 Ml Susp, 100 UNITS SC QAM


Insulin Glargine (Lantus) 1 Units/0.01 Ml Susp, 85 UNITS SC QHS


Losartan Potassium (Losartan Potassium) 50 Mg Tab, 50 MG PO DAILY


Metoprolol Succinate (Metoprolol Succinate ER) 50 Mg Tab, 50 MG PO DAILY


Omeprazole (Omeprazole) 40 Mg Cap, 40 MG PO DAILY


Pregabalin (Lyrica) 150 Mg Cap, 150 MG PO BID


Silver Sulfadiazine (Ssd) 1 % Cre, 1 DOSE TOP DAILY


   APPLY TO FEET 


Spironolactone (Spironolactone) 50 Mg Tab, 50 MG PO DAILY


Sucralfate (Sucralfate) 1 Gm Tab, 1 GM PO ACHS


Torsemide (Torsemide) 20 Mg Tab, 60 MG PO BID


Trimethoprim/Sulfamethoxazole (Bactrim Ds 800-160 mg) 1 Tab Tab, 1 TAB PO BID


   STARTED 5/2/17 FOR 10 DAYS 





Scheduled PRN


Albuterol Sulfate (Albuterol Sulfate) 2.5 Mg/3 Ml Nebu, 2.5 MG INH QID PRN for 

SHORTNESS OF BREATH


Metoclopramide HCl (Metoclopramide HCl) 10 Mg Tab, 10 MG PO AC PRN for NAUSEA


Nitroglycerin (Nitrostat) 0.4 Mg Subl, 0.4 MG SL Q5MP PRN for CHEST PAIN


Oxycodone/Acetaminophen (Oxycodone/Acetaminophen 5-325 mg) 1 Tab Tab, 2 TAB PO 

Q8H PRN for PAIN





Allergies


Coded Allergies:  


     TURKEY (Verified  Adverse Reaction, Mild, VOMITING, 4/8/17)











JANA ACKERMAN May 10, 2017 22:59

## 2017-05-11 VITALS — DIASTOLIC BLOOD PRESSURE: 69 MMHG | SYSTOLIC BLOOD PRESSURE: 159 MMHG

## 2017-05-11 VITALS — SYSTOLIC BLOOD PRESSURE: 110 MMHG | DIASTOLIC BLOOD PRESSURE: 68 MMHG

## 2017-05-11 VITALS — SYSTOLIC BLOOD PRESSURE: 138 MMHG | DIASTOLIC BLOOD PRESSURE: 61 MMHG

## 2017-05-11 VITALS — SYSTOLIC BLOOD PRESSURE: 125 MMHG | DIASTOLIC BLOOD PRESSURE: 59 MMHG

## 2017-05-11 VITALS — DIASTOLIC BLOOD PRESSURE: 62 MMHG | SYSTOLIC BLOOD PRESSURE: 141 MMHG

## 2017-05-11 VITALS — DIASTOLIC BLOOD PRESSURE: 63 MMHG | SYSTOLIC BLOOD PRESSURE: 142 MMHG

## 2017-05-11 LAB
ALBUMIN SERPL BCG-MCNC: 2.5 GM/DL (ref 3.2–5.2)
ALBUMIN/GLOB SERPL: 0.54 {RATIO} (ref 1–1.93)
ALP SERPL-CCNC: 162 U/L (ref 45–117)
ALT SERPL W P-5'-P-CCNC: 17 U/L (ref 12–78)
ANION GAP SERPL CALC-SCNC: 8 MEQ/L (ref 8–16)
AST SERPL-CCNC: 9 U/L (ref 15–37)
BASOPHILS # BLD AUTO: 0 K/MM3 (ref 0–0.2)
BASOPHILS NFR BLD AUTO: 0.5 % (ref 0–1)
BILIRUB SERPL-MCNC: 0.2 MG/DL (ref 0.2–1)
BUN SERPL-MCNC: 51 MG/DL (ref 7–18)
CALCIUM SERPL-MCNC: 8.1 MG/DL (ref 8.8–10.2)
CHLORIDE SERPL-SCNC: 109 MEQ/L (ref 98–107)
CO2 SERPL-SCNC: 23 MEQ/L (ref 21–32)
CREAT SERPL-MCNC: 2.39 MG/DL (ref 0.7–1.3)
EOSINOPHIL # BLD AUTO: 0.2 K/MM3 (ref 0–0.5)
EOSINOPHIL NFR BLD AUTO: 2.6 % (ref 0–3)
ERYTHROCYTE [DISTWIDTH] IN BLOOD BY AUTOMATED COUNT: 14.1 % (ref 11.5–14.5)
FERRITIN SERPL-MCNC: 142 NG/ML (ref 26–388)
GFR SERPL CREATININE-BSD FRML MDRD: 28.7 ML/MIN/{1.73_M2} (ref 42–?)
GLUCOSE SERPL-MCNC: 126 MG/DL (ref 83–110)
IRON SATN MFR SERPL: 15.5 % (ref 19.7–37.4)
LARGE UNSTAINED CELL #: 0.1 K/MM3 (ref 0–0.4)
LARGE UNSTAINED CELL %: 2 % (ref 0–4)
LYMPHOCYTES # BLD AUTO: 1.2 K/MM3 (ref 1.5–4.5)
LYMPHOCYTES NFR BLD AUTO: 16.9 % (ref 24–44)
MAGNESIUM SERPL-MCNC: 2.3 MG/DL (ref 1.8–2.4)
MCH RBC QN AUTO: 27.5 PG (ref 27–33)
MCHC RBC AUTO-ENTMCNC: 31 G/DL (ref 32–36.5)
MCV RBC AUTO: 88.9 FL (ref 80–96)
MONOCYTES # BLD AUTO: 0.4 K/MM3 (ref 0–0.8)
MONOCYTES NFR BLD AUTO: 5.7 % (ref 0–5)
NEUTROPHILS # BLD AUTO: 4.9 K/MM3 (ref 1.8–7.7)
NEUTROPHILS NFR BLD AUTO: 72.3 % (ref 36–66)
PLATELET # BLD AUTO: 234 K/MM3 (ref 150–450)
POTASSIUM SERPL-SCNC: 5.5 MEQ/L (ref 3.5–5.1)
PROT SERPL-MCNC: 7.1 GM/DL (ref 6.4–8.2)
SODIUM SERPL-SCNC: 140 MEQ/L (ref 136–145)
TIBC SERPL-MCNC: 297 UG/DL (ref 250–450)
WBC # BLD AUTO: 6.8 K/MM3 (ref 4–10)

## 2017-05-11 RX ADMIN — INSULIN DETEMIR SCH UNITS: 100 INJECTION, SOLUTION SUBCUTANEOUS at 21:06

## 2017-05-11 RX ADMIN — INSULIN DETEMIR SCH UNITS: 100 INJECTION, SOLUTION SUBCUTANEOUS at 00:11

## 2017-05-11 RX ADMIN — SUCRALFATE SCH GM: 1 TABLET ORAL at 17:09

## 2017-05-11 RX ADMIN — DEXTROSE MONOHYDRATE SCH MG: 50 INJECTION, SOLUTION INTRAVENOUS at 19:56

## 2017-05-11 RX ADMIN — SUCRALFATE SCH GM: 1 TABLET ORAL at 11:29

## 2017-05-11 RX ADMIN — INSULIN LISPRO SCH UNITS: 100 INJECTION, SOLUTION INTRAVENOUS; SUBCUTANEOUS at 20:18

## 2017-05-11 RX ADMIN — DEXTROSE MONOHYDRATE SCH MG: 50 INJECTION, SOLUTION INTRAVENOUS at 11:35

## 2017-05-11 RX ADMIN — SODIUM CHLORIDE SCH UNITS: 4.5 INJECTION, SOLUTION INTRAVENOUS at 21:05

## 2017-05-11 RX ADMIN — INSULIN LISPRO SCH UNITS: 100 INJECTION, SOLUTION INTRAVENOUS; SUBCUTANEOUS at 17:04

## 2017-05-11 RX ADMIN — SODIUM CHLORIDE SCH UNITS: 4.5 INJECTION, SOLUTION INTRAVENOUS at 05:11

## 2017-05-11 RX ADMIN — SUCRALFATE SCH GM: 1 TABLET ORAL at 08:30

## 2017-05-11 RX ADMIN — INSULIN LISPRO SCH UNITS: 100 INJECTION, SOLUTION INTRAVENOUS; SUBCUTANEOUS at 11:29

## 2017-05-11 RX ADMIN — SUCRALFATE SCH GM: 1 TABLET ORAL at 21:05

## 2017-05-11 RX ADMIN — SUCRALFATE SCH GM: 1 TABLET ORAL at 00:12

## 2017-05-11 RX ADMIN — SODIUM CHLORIDE SCH UNITS: 4.5 INJECTION, SOLUTION INTRAVENOUS at 00:11

## 2017-05-11 RX ADMIN — DEXTROSE MONOHYDRATE PRN ML: 25 INJECTION, SOLUTION INTRAVENOUS at 15:13

## 2017-05-11 RX ADMIN — CLOPIDOGREL BISULFATE SCH MG: 75 TABLET ORAL at 08:30

## 2017-05-11 RX ADMIN — PREGABALIN SCH MG: 75 CAPSULE ORAL at 21:05

## 2017-05-11 RX ADMIN — ALBUTEROL SULFATE PRN MG: 2.5 SOLUTION RESPIRATORY (INHALATION) at 20:19

## 2017-05-11 RX ADMIN — PREGABALIN SCH MG: 75 CAPSULE ORAL at 00:13

## 2017-05-11 RX ADMIN — ASPIRIN 325 MG ORAL TABLET SCH MG: 325 PILL ORAL at 08:30

## 2017-05-11 RX ADMIN — SULFAMETHOXAZOLE AND TRIMETHOPRIM SCH TAB: 800; 160 TABLET ORAL at 08:30

## 2017-05-11 RX ADMIN — OMEPRAZOLE SCH MG: 20 CAPSULE, DELAYED RELEASE ORAL at 08:30

## 2017-05-11 RX ADMIN — SODIUM CHLORIDE SCH UNITS: 4.5 INJECTION, SOLUTION INTRAVENOUS at 16:28

## 2017-05-11 RX ADMIN — SILVER SULFADIAZINE SCH GM: 10 CREAM TOPICAL at 08:31

## 2017-05-11 RX ADMIN — INSULIN LISPRO SCH UNITS: 100 INJECTION, SOLUTION INTRAVENOUS; SUBCUTANEOUS at 08:32

## 2017-05-11 RX ADMIN — DEXTROSE MONOHYDRATE SCH MG: 50 INJECTION, SOLUTION INTRAVENOUS at 04:32

## 2017-05-11 RX ADMIN — SULFAMETHOXAZOLE AND TRIMETHOPRIM SCH TAB: 800; 160 TABLET ORAL at 00:12

## 2017-05-11 RX ADMIN — SULFAMETHOXAZOLE AND TRIMETHOPRIM SCH TAB: 800; 160 TABLET ORAL at 21:05

## 2017-05-11 RX ADMIN — ALBUTEROL SULFATE PRN MG: 2.5 SOLUTION RESPIRATORY (INHALATION) at 07:06

## 2017-05-11 NOTE — REP
HEPATOBILIARY SCAN:

 

HISTORY:  Chronic cholecystitis.  Abdominal pain.  Nausea and vomiting.

 

TECHNIQUE:  6.2 mCi technetium 99m mebrofenin is injected and sequential anterior

abdominal images are acquired.

 

SCINTIGRAPHIC FINDINGS:  The initial hepatocellular parenchymal uptake phase is

normal and homogeneous.  The gallbladder is first visualized along with

intrahepatic bile ducts at the 10-minute image.  Subsequent images demonstrate

normal washout from the liver parenchyma into the gallbladder.  There is delayed

biliary to bowel transit time as the bowel is not labeled by 60 minutes.  This is

nonspecific.

 

IMPRESSION:

 

1.  The cystic duct is patent.  Gallbladder is visualized normally.

 

2.  Normal liver washout.

 

3.  Mildly delayed biliary to bowel transit time.  Nonspecific.

 

 

Signed by

Christian Doll MD 05/11/2017 04:00 P

## 2017-05-11 NOTE — REP
REASON:  Dyspnea, cough.

 

COMPARISON:  04/07/2017.

 

AP and lateral views were obtained.

 

There is cardiomegaly.  There has been previous median sternotomy status quo.

Single chamber bipolar pacemaker device status quo.  There is new left

costophrenic angle blunting.  Increased interstitial markings are seen throughout

the lung fields.

 

IMPRESSION:

 

Left pleural effusion and suspected interstitial edema with cardiomegaly.

 

 

Signed by

Mauricio Rodriguez DO 05/11/2017 03:36 P

## 2017-05-11 NOTE — IPNPDOC
Date Seen


The patient was seen on 5/11/17.





Progress Note


SUBJECTIVE: Mr. Dunlap is evaluated bedside this morning. He is laying on his 

left side during evaluation. He reports no more chest pain or shortness of 

breath. Denies continued nausea and vomiting. Blood pressure remains elevated 

and antihypertensive has been increased. Mild worsening of anemia. Iron studies 

and stool for occult blood being obtained. Potassium remains high. Another dose 

of Kayexalate is being provided. HIDA scan for chronic cholecystitis will be 

obtained at 2 PM. Discussed bilateral foot wounds with wound care and 

recommendations outlined in assessment and plan. 





OBJECTIVE


PHYSICAL EXAMINATION:


VITAL SIGNS: Please see below. 


GENERAL: Well-nourished, well-developed  male laying on his left side 

during evaluation this morning and in no apparent distress


HEENT: Atraumatic, normocephalic, PERRL, EOMI, oral mucosa appears pink and 

moist, nasal septum appears midline, nares are patent


CARDIOVASCULAR: Regular rate and rhythm, normal S1 and S2, no murmur, rub, 

click appreciated


RESPIRATORY: 


ABDOMINAL: Tender to palpation in the pubic region, soft throughout the rest of 

the abdomen without tenderness


EXTREMITIES: Category 2 wound ulcer noted on the entire heel of the left foot 

without drainage or bleeding, grade 3 wound ulcer noted on the right medial 

side of the heel with granulation tissue, trace edema noted bilateral lower 

extremities


NEUROLOGICAL: Cranial nerves II through XII grossly intact


PSYCHOLOGICAL: Mood and affect appear appropriate, somewhat sleepy





LABORATORY DATA: Please see below.





MICROBIOLOGY: Please see below.





IMAGING:


HIDA scan


IMPRESSION:


1.  The cystic duct is patent.  Gallbladder is visualized normally.


2.  Normal liver washout.


3.  Mildly delayed biliary to bowel transit time.  Nonspecific.





DVT prophylaxis ordered?: Heparin 5000 units subcutaneous every 8 hours





ASSESSMENT AND PLAN: Mr. Dunlap is a 72-year-old  male with acute on 

chronic systolic congestive heart failure, hyperkalemia, and chronic 

cholecystitis. 





PROBLEMS:


1. Acute on chronic systolic congestive heart failure: Remains on Lasix. 

Continue to hold metoprolol secondary to acute exacerbation..





2. Hyperkalemia: Received another dose of Kayexalate. Obtained EKG.





3. Hypertension. Increased patient's Coreg to 25 mg twice a day. Blood 

pressures appear better controlled. We'll continue to monitor.





4. Chronic cholecystitis: HIDA scan report noted above. Likely that no 

intervention will be planned at this time.





5. Anemia: Denies active bleeding. Iron studies reveal possible iron deficiency 

anemia or anemia of chronic disease. Obtaining stool for occult blood.





6. Bilateral lower extremity wound ulcers: Verbally discussed case with wound 

specialist. Recommended continued Silvadene to wounds on forefeet and wet-to-

dry dressings for bilateral heels. Continue with Bactrim.





7. Nausea: Appears improved. Continue with Zofran and Reglan as needed.





8. Diabetes mellitus: Continue with long-acting insulin and sliding scale with 

hyperglycemic protocol.





9. Coronary artery disease: Continue with aspirin and Plavix.





10. Gastric esophageal reflux disease: Continue with Prilosec and Carafate.





11. Neuropathy. Continue Lyrica.





12. Chronic kidney disease stage III: Continue to hold spironolactone and 

torsemide. Continue to monitor with daily BMP.





DISPOSITION: Continue to monitor for signs of clinical improvement. Anticipate 

discharge in the next 48-72 hours.





VS, I&O, 24H, Fishbone


Vital Signs/I&O





Vital Signs








  Date Time  Temp Pulse Resp B/P (MAP) Pulse Ox O2 Delivery O2 Flow Rate FiO2


 


5/11/17 16:00 97.2 64 19 142/63 (89) 96 Room Air  














I&O- Last 24 Hours up to 6 AM 


 


 5/11/17





 06:00


 


Intake Total 240 ml


 


Balance 240 ml











Laboratory Data


24H LABS


Laboratory Tests 2


5/10/17 20:53: 


Potassium Level 5.5H, Total Creatine Kinase 94, Creatine Kinase MB 3.9H, 

Creatine Kinase MB Relative Index 4.14H, Troponin I 0.03#


5/10/17 23:50: Bedside Glucose (Misc Panel) 163H


5/11/17 05:29: 


Potassium Level 5.5H, Total Creatine Kinase 92, Creatine Kinase MB 4.1H, 

Creatine Kinase MB Relative Index 4.45H, Troponin I 0.02#, White Blood Count 6.8

, Red Blood Count 3.11L, Hemoglobin 8.6L, Hematocrit 27.7L, Mean Corpuscular 

Volume 88.9, Mean Corpuscular Hemoglobin 27.5, Mean Corpuscular Hemoglobin 

Concent 31.0L, Red Cell Distribution Width 14.1, Platelet Count 234, 

Neutrophils (%) (Auto) 72.3H, Lymphocytes (%) (Auto) 16.9L, Monocytes (%) (Auto

) 5.7H, Eosinophils (%) (Auto) 2.6, Basophils (%) (Auto) 0.5, Neutrophils # (

Auto) 4.9, Lymphocytes # (Auto) 1.2L, Monocytes # (Auto) 0.4, Eosinophils # (

Auto) 0.2, Basophils # (Auto) 0.0, Large Unclassified Cells % 2.0, Large 

Unclassified Cells # 0.1, Anion Gap 8, Glomerular Filtration Rate 28.7L, Blood 

Urea Nitrogen 51H, Creatinine 2.39H, Sodium Level 140, Chloride Level 109H, 

Carbon Dioxide Level 23, Calcium Level 8.1L, Aspartate Amino Transf (AST/SGOT) 

9L, Alanine Aminotransferase (ALT/SGPT) 17, Alkaline Phosphatase 162H, Total 

Bilirubin 0.2, Total Protein 7.1, Albumin 2.5L, Magnesium Level 2.3, Iron Level 

46L, Total Iron Binding Capacity 297, Transferrin % Saturation 15.5L, Ferritin 

142, Albumin/Globulin Ratio 0.54L


5/11/17 09:06: 


5/11/17 11:28: Bedside Glucose (Misc Panel) 108


5/11/17 12:58: 


Total Creatine Kinase 92, Creatine Kinase MB 4.8H, Creatine Kinase MB Relative 

Index 5.21H, Troponin I 0.03#


5/11/17 15:07: Bedside Glucose (Misc Panel) 27*L


5/11/17 15:24: Bedside Glucose Confirm (Misc) 75


5/11/17 16:03: Bedside Glucose (Misc Panel) 138H


5/11/17 17:02: Bedside Glucose (Misc Panel) 197H


CBC/BMP


Laboratory Tests


5/10/17 20:53








Total Creatine Kinase 94





5/11/17 05:29








Total Creatine Kinase 92, Red Blood Count 3.11 L, Mean Corpuscular Volume 88.9, 

Mean Corpuscular Hemoglobin 27.5, Mean Corpuscular Hemoglobin Concent 31.0 L, 

Red Cell Distribution Width 14.1, Neutrophils (%) (Auto) 72.3 H, Lymphocytes (%

) (Auto) 16.9 L, Monocytes (%) (Auto) 5.7 H, Eosinophils (%) (Auto) 2.6, 

Basophils (%) (Auto) 0.5, Neutrophils # (Auto) 4.9, Lymphocytes # (Auto) 1.2 L, 

Monocytes # (Auto) 0.4, Eosinophils # (Auto) 0.2, Basophils # (Auto) 0.0, 

Calcium Level 8.1 L, Aspartate Amino Transf (AST/SGOT) 9 L, Alanine 

Aminotransferase (ALT/SGPT) 17, Alkaline Phosphatase 162 H, Total Bilirubin 0.2

, Total Protein 7.1, Albumin 2.5 L





GME ATTESTATION


GME ATTESTATION


My preceptor for this patient encounter was physically present in the building 

during the encounter and was fully available. As needed, all aspects of the 

patient interview, examination, medical decision making process, and medical 

care plan development were reviewed and approved by the preceptor. Preceptor is 

aware and concurs with the plan as stated in the body of this note and will 

attest to such by his/her cosignature.





ATTENDING NOTE


I, Anh Mccrary, have both independently examined this patient as well as 

reviewed the documentation.  I have discussed in detail with the resident the 

findings and plan of treatment as documented in the residents documentation. I 

will continue to follow the patient and offer further guidance to the patients 

care as necessary during this hospital stay.











RADHA LOBO May 11, 2017 18:26


ANH MCCRARY MD May 23, 2017 14:18

## 2017-05-12 VITALS — SYSTOLIC BLOOD PRESSURE: 134 MMHG | DIASTOLIC BLOOD PRESSURE: 58 MMHG

## 2017-05-12 VITALS — DIASTOLIC BLOOD PRESSURE: 52 MMHG | SYSTOLIC BLOOD PRESSURE: 128 MMHG

## 2017-05-12 VITALS — DIASTOLIC BLOOD PRESSURE: 56 MMHG | SYSTOLIC BLOOD PRESSURE: 115 MMHG

## 2017-05-12 VITALS — DIASTOLIC BLOOD PRESSURE: 53 MMHG | SYSTOLIC BLOOD PRESSURE: 104 MMHG

## 2017-05-12 VITALS — DIASTOLIC BLOOD PRESSURE: 76 MMHG | SYSTOLIC BLOOD PRESSURE: 111 MMHG

## 2017-05-12 VITALS — DIASTOLIC BLOOD PRESSURE: 59 MMHG | SYSTOLIC BLOOD PRESSURE: 135 MMHG

## 2017-05-12 VITALS — DIASTOLIC BLOOD PRESSURE: 56 MMHG | SYSTOLIC BLOOD PRESSURE: 112 MMHG

## 2017-05-12 VITALS — SYSTOLIC BLOOD PRESSURE: 128 MMHG | DIASTOLIC BLOOD PRESSURE: 59 MMHG

## 2017-05-12 VITALS — DIASTOLIC BLOOD PRESSURE: 56 MMHG | SYSTOLIC BLOOD PRESSURE: 116 MMHG

## 2017-05-12 VITALS — SYSTOLIC BLOOD PRESSURE: 134 MMHG | DIASTOLIC BLOOD PRESSURE: 60 MMHG

## 2017-05-12 VITALS — SYSTOLIC BLOOD PRESSURE: 113 MMHG | DIASTOLIC BLOOD PRESSURE: 56 MMHG

## 2017-05-12 VITALS — DIASTOLIC BLOOD PRESSURE: 53 MMHG | SYSTOLIC BLOOD PRESSURE: 113 MMHG

## 2017-05-12 VITALS — OXYGEN SATURATION: 99 %

## 2017-05-12 VITALS — DIASTOLIC BLOOD PRESSURE: 56 MMHG | SYSTOLIC BLOOD PRESSURE: 109 MMHG

## 2017-05-12 VITALS — DIASTOLIC BLOOD PRESSURE: 51 MMHG | SYSTOLIC BLOOD PRESSURE: 128 MMHG

## 2017-05-12 VITALS — DIASTOLIC BLOOD PRESSURE: 115 MMHG | SYSTOLIC BLOOD PRESSURE: 155 MMHG

## 2017-05-12 VITALS — SYSTOLIC BLOOD PRESSURE: 114 MMHG | DIASTOLIC BLOOD PRESSURE: 56 MMHG

## 2017-05-12 VITALS — SYSTOLIC BLOOD PRESSURE: 123 MMHG | DIASTOLIC BLOOD PRESSURE: 89 MMHG

## 2017-05-12 VITALS — SYSTOLIC BLOOD PRESSURE: 107 MMHG | DIASTOLIC BLOOD PRESSURE: 54 MMHG

## 2017-05-12 VITALS — SYSTOLIC BLOOD PRESSURE: 95 MMHG | DIASTOLIC BLOOD PRESSURE: 54 MMHG

## 2017-05-12 VITALS — DIASTOLIC BLOOD PRESSURE: 73 MMHG | SYSTOLIC BLOOD PRESSURE: 147 MMHG

## 2017-05-12 VITALS — SYSTOLIC BLOOD PRESSURE: 117 MMHG | DIASTOLIC BLOOD PRESSURE: 58 MMHG

## 2017-05-12 VITALS — DIASTOLIC BLOOD PRESSURE: 60 MMHG | SYSTOLIC BLOOD PRESSURE: 135 MMHG

## 2017-05-12 VITALS — DIASTOLIC BLOOD PRESSURE: 51 MMHG | SYSTOLIC BLOOD PRESSURE: 114 MMHG

## 2017-05-12 LAB
ALBUMIN SERPL BCG-MCNC: 2.4 GM/DL (ref 3.2–5.2)
ALBUMIN SERPL BCG-MCNC: 2.4 GM/DL (ref 3.2–5.2)
ALBUMIN SERPL BCG-MCNC: 2.5 GM/DL (ref 3.2–5.2)
ALBUMIN/GLOB SERPL: 0.55 {RATIO} (ref 1–1.93)
ALBUMIN/GLOB SERPL: 0.58 {RATIO} (ref 1–1.93)
ALBUMIN/GLOB SERPL: 0.59 {RATIO} (ref 1–1.93)
ALP SERPL-CCNC: 128 U/L (ref 45–117)
ALP SERPL-CCNC: 134 U/L (ref 45–117)
ALP SERPL-CCNC: 136 U/L (ref 45–117)
ALT SERPL W P-5'-P-CCNC: 16 U/L (ref 12–78)
ALT SERPL W P-5'-P-CCNC: 17 U/L (ref 12–78)
ALT SERPL W P-5'-P-CCNC: 18 U/L (ref 12–78)
ANION GAP SERPL CALC-SCNC: 7 MEQ/L (ref 8–16)
ANION GAP SERPL CALC-SCNC: 8 MEQ/L (ref 8–16)
ANION GAP SERPL CALC-SCNC: 8 MEQ/L (ref 8–16)
AST SERPL-CCNC: 10 U/L (ref 15–37)
AST SERPL-CCNC: 12 U/L (ref 15–37)
AST SERPL-CCNC: 14 U/L (ref 15–37)
BASE EXCESS BLDA CALC-SCNC: -4.2 MMOL/L (ref -2–2)
BASE EXCESS BLDA CALC-SCNC: -6.6 MMOL/L (ref -2–2)
BASOPHILS # BLD AUTO: 0 K/MM3 (ref 0–0.2)
BASOPHILS # BLD AUTO: 0 K/MM3 (ref 0–0.2)
BASOPHILS NFR BLD AUTO: 0.6 % (ref 0–1)
BASOPHILS NFR BLD AUTO: 0.8 % (ref 0–1)
BILIRUB SERPL-MCNC: 0.1 MG/DL (ref 0.2–1)
BILIRUB SERPL-MCNC: 0.2 MG/DL (ref 0.2–1)
BILIRUB SERPL-MCNC: < 0.1 MG/DL (ref 0.2–1)
BUN SERPL-MCNC: 58 MG/DL (ref 7–18)
BUN SERPL-MCNC: 62 MG/DL (ref 7–18)
BUN SERPL-MCNC: 63 MG/DL (ref 7–18)
CALCIUM SERPL-MCNC: 7.6 MG/DL (ref 8.8–10.2)
CALCIUM SERPL-MCNC: 7.7 MG/DL (ref 8.8–10.2)
CALCIUM SERPL-MCNC: 8 MG/DL (ref 8.8–10.2)
CHLORIDE SERPL-SCNC: 105 MEQ/L (ref 98–107)
CHLORIDE SERPL-SCNC: 106 MEQ/L (ref 98–107)
CHLORIDE SERPL-SCNC: 107 MEQ/L (ref 98–107)
CO2 BLDA CALC-SCNC: 22 MEQ/L (ref 23–31)
CO2 BLDA CALC-SCNC: 23.2 MEQ/L (ref 23–31)
CO2 SERPL-SCNC: 22 MEQ/L (ref 21–32)
CO2 SERPL-SCNC: 25 MEQ/L (ref 21–32)
CO2 SERPL-SCNC: 25 MEQ/L (ref 21–32)
CREAT SERPL-MCNC: 2.83 MG/DL (ref 0.7–1.3)
CREAT SERPL-MCNC: 3.33 MG/DL (ref 0.7–1.3)
CREAT SERPL-MCNC: 3.65 MG/DL (ref 0.7–1.3)
EOSINOPHIL # BLD AUTO: 0.1 K/MM3 (ref 0–0.5)
EOSINOPHIL # BLD AUTO: 0.3 K/MM3 (ref 0–0.5)
EOSINOPHIL NFR BLD AUTO: 1.9 % (ref 0–3)
EOSINOPHIL NFR BLD AUTO: 4.8 % (ref 0–3)
ERYTHROCYTE [DISTWIDTH] IN BLOOD BY AUTOMATED COUNT: 14.2 % (ref 11.5–14.5)
ERYTHROCYTE [DISTWIDTH] IN BLOOD BY AUTOMATED COUNT: 14.3 % (ref 11.5–14.5)
FOLATE SERPL-MCNC: 5.3 NG/ML (ref 5.4–?)
GFR SERPL CREATININE-BSD FRML MDRD: 17.6 ML/MIN/{1.73_M2} (ref 42–?)
GFR SERPL CREATININE-BSD FRML MDRD: 19.6 ML/MIN/{1.73_M2} (ref 42–?)
GFR SERPL CREATININE-BSD FRML MDRD: 23.6 ML/MIN/{1.73_M2} (ref 42–?)
GLUCOSE SERPL-MCNC: 51 MG/DL (ref 83–110)
GLUCOSE SERPL-MCNC: 79 MG/DL (ref 83–110)
GLUCOSE SERPL-MCNC: 92 MG/DL (ref 83–110)
HCO3 BLDA-SCNC: 20.6 MEQ/L (ref 22–26)
HCO3 BLDA-SCNC: 21.9 MEQ/L (ref 22–26)
HCO3 STD BLDA-SCNC: 19 MEQ/L (ref 22–26)
HCO3 STD BLDA-SCNC: 21 MEQ/L (ref 22–26)
LARGE UNSTAINED CELL #: 0.1 K/MM3 (ref 0–0.4)
LARGE UNSTAINED CELL #: 0.1 K/MM3 (ref 0–0.4)
LARGE UNSTAINED CELL %: 1.6 % (ref 0–4)
LARGE UNSTAINED CELL %: 2.3 % (ref 0–4)
LYMPHOCYTES # BLD AUTO: 1.1 K/MM3 (ref 1.5–4.5)
LYMPHOCYTES # BLD AUTO: 1.4 K/MM3 (ref 1.5–4.5)
LYMPHOCYTES NFR BLD AUTO: 14.6 % (ref 24–44)
LYMPHOCYTES NFR BLD AUTO: 25.1 % (ref 24–44)
MAGNESIUM SERPL-MCNC: 2.4 MG/DL (ref 1.8–2.4)
MCH RBC QN AUTO: 27.5 PG (ref 27–33)
MCH RBC QN AUTO: 28.1 PG (ref 27–33)
MCHC RBC AUTO-ENTMCNC: 30.8 G/DL (ref 32–36.5)
MCHC RBC AUTO-ENTMCNC: 31.2 G/DL (ref 32–36.5)
MCV RBC AUTO: 89.3 FL (ref 80–96)
MCV RBC AUTO: 90 FL (ref 80–96)
MONOCYTES # BLD AUTO: 0.4 K/MM3 (ref 0–0.8)
MONOCYTES # BLD AUTO: 0.5 K/MM3 (ref 0–0.8)
MONOCYTES NFR BLD AUTO: 6.7 % (ref 0–5)
MONOCYTES NFR BLD AUTO: 7.2 % (ref 0–5)
NEUTROPHILS # BLD AUTO: 3.4 K/MM3 (ref 1.8–7.7)
NEUTROPHILS # BLD AUTO: 5.4 K/MM3 (ref 1.8–7.7)
NEUTROPHILS NFR BLD AUTO: 59.8 % (ref 36–66)
NEUTROPHILS NFR BLD AUTO: 74.5 % (ref 36–66)
PCO2 BLDA: 44.2 MMHG (ref 35–45)
PCO2 BLDA: 48.5 MMHG (ref 35–45)
PH BLDA: 7.25 UNITS (ref 7.35–7.45)
PH BLDA: 7.31 UNITS (ref 7.35–7.45)
PHOSPHATE SERPL-MCNC: 6.6 MG/DL (ref 2.5–4.9)
PLATELET # BLD AUTO: 211 K/MM3 (ref 150–450)
PLATELET # BLD AUTO: 220 K/MM3 (ref 150–450)
PO2 BLDA: 104.7 MMHG (ref 75–100)
PO2 BLDA: 89.8 MMHG (ref 75–100)
POTASSIUM SERPL-SCNC: 4.8 MEQ/L (ref 3.5–5.1)
POTASSIUM SERPL-SCNC: 5.9 MEQ/L (ref 3.5–5.1)
POTASSIUM SERPL-SCNC: 5.9 MEQ/L (ref 3.5–5.1)
PROT SERPL-MCNC: 6.5 GM/DL (ref 6.4–8.2)
PROT SERPL-MCNC: 6.8 GM/DL (ref 6.4–8.2)
PROT SERPL-MCNC: 6.8 GM/DL (ref 6.4–8.2)
RETIC HEMOGLOBIN CONTENT CHR: 28.9 PG (ref 24–36)
RETICS/RBC NFR AUTO: 56 X10(9)/L (ref 17–77)
RETICS/RBC NFR: 1.9 % (ref 0.5–1.5)
SODIUM SERPL-SCNC: 136 MEQ/L (ref 136–145)
SODIUM SERPL-SCNC: 137 MEQ/L (ref 136–145)
SODIUM SERPL-SCNC: 140 MEQ/L (ref 136–145)
VIT B12 SERPL-MCNC: 618 PG/ML (ref 247–911)
WBC # BLD AUTO: 5.6 K/MM3 (ref 4–10)
WBC # BLD AUTO: 7.2 K/MM3 (ref 4–10)

## 2017-05-12 PROCEDURE — 02HV33Z INSERTION OF INFUSION DEVICE INTO SUPERIOR VENA CAVA, PERCUTANEOUS APPROACH: ICD-10-PCS | Performed by: HOSPITALIST

## 2017-05-12 RX ADMIN — SULFAMETHOXAZOLE AND TRIMETHOPRIM SCH TAB: 800; 160 TABLET ORAL at 09:29

## 2017-05-12 RX ADMIN — DEXTROSE MONOHYDRATE SCH MLS/HR: 5 INJECTION INTRAVENOUS at 18:56

## 2017-05-12 RX ADMIN — PREGABALIN SCH MG: 75 CAPSULE ORAL at 21:09

## 2017-05-12 RX ADMIN — SODIUM CHLORIDE SCH ML: 9 INJECTION, SOLUTION INTRAMUSCULAR; INTRAVENOUS; SUBCUTANEOUS at 21:09

## 2017-05-12 RX ADMIN — PREGABALIN SCH MG: 75 CAPSULE ORAL at 21:00

## 2017-05-12 RX ADMIN — DEXTROSE MONOHYDRATE PRN ML: 25 INJECTION, SOLUTION INTRAVENOUS at 18:06

## 2017-05-12 RX ADMIN — DEXTROSE MONOHYDRATE PRN ML: 25 INJECTION, SOLUTION INTRAVENOUS at 08:10

## 2017-05-12 RX ADMIN — ACETAMINOPHEN PRN MG: 325 TABLET ORAL at 00:53

## 2017-05-12 RX ADMIN — INSULIN LISPRO SCH UNITS: 100 INJECTION, SOLUTION INTRAVENOUS; SUBCUTANEOUS at 12:00

## 2017-05-12 RX ADMIN — DEXTROSE MONOHYDRATE PRN ML: 25 INJECTION, SOLUTION INTRAVENOUS at 08:28

## 2017-05-12 RX ADMIN — SODIUM CHLORIDE, PRESERVATIVE FREE SCH ML: 5 INJECTION INTRAVENOUS at 21:09

## 2017-05-12 RX ADMIN — CLOPIDOGREL BISULFATE SCH MG: 75 TABLET ORAL at 09:30

## 2017-05-12 RX ADMIN — INSULIN LISPRO SCH UNITS: 100 INJECTION, SOLUTION INTRAVENOUS; SUBCUTANEOUS at 17:30

## 2017-05-12 RX ADMIN — DEXTROSE MONOHYDRATE SCH MG: 50 INJECTION, SOLUTION INTRAVENOUS at 14:39

## 2017-05-12 RX ADMIN — INSULIN LISPRO SCH UNITS: 100 INJECTION, SOLUTION INTRAVENOUS; SUBCUTANEOUS at 05:50

## 2017-05-12 RX ADMIN — DEXTROSE MONOHYDRATE PRN ML: 25 INJECTION, SOLUTION INTRAVENOUS at 23:45

## 2017-05-12 RX ADMIN — DEXTROSE MONOHYDRATE PRN ML: 25 INJECTION, SOLUTION INTRAVENOUS at 13:23

## 2017-05-12 RX ADMIN — SODIUM CHLORIDE SCH UNITS: 4.5 INJECTION, SOLUTION INTRAVENOUS at 14:00

## 2017-05-12 RX ADMIN — OMEPRAZOLE SCH MG: 20 CAPSULE, DELAYED RELEASE ORAL at 09:29

## 2017-05-12 RX ADMIN — FERROUS SULFATE TAB 325 MG (65 MG ELEMENTAL FE) SCH MG: 325 (65 FE) TAB at 09:29

## 2017-05-12 RX ADMIN — INSULIN LISPRO SCH UNITS: 100 INJECTION, SOLUTION INTRAVENOUS; SUBCUTANEOUS at 20:48

## 2017-05-12 RX ADMIN — DEXTROSE MONOHYDRATE PRN ML: 25 INJECTION, SOLUTION INTRAVENOUS at 05:43

## 2017-05-12 RX ADMIN — SUCRALFATE SCH GM: 1 TABLET ORAL at 12:00

## 2017-05-12 RX ADMIN — DEXTROSE MONOHYDRATE SCH MG: 50 INJECTION, SOLUTION INTRAVENOUS at 03:39

## 2017-05-12 RX ADMIN — DEXTROSE MONOHYDRATE PRN ML: 25 INJECTION, SOLUTION INTRAVENOUS at 12:40

## 2017-05-12 RX ADMIN — ASPIRIN 325 MG ORAL TABLET SCH MG: 325 PILL ORAL at 09:29

## 2017-05-12 RX ADMIN — DEXTROSE MONOHYDRATE PRN ML: 25 INJECTION, SOLUTION INTRAVENOUS at 17:30

## 2017-05-12 RX ADMIN — SODIUM CHLORIDE SCH UNITS: 4.5 INJECTION, SOLUTION INTRAVENOUS at 05:26

## 2017-05-12 RX ADMIN — PREGABALIN SCH MG: 75 CAPSULE ORAL at 09:29

## 2017-05-12 RX ADMIN — SUCRALFATE SCH GM: 1 TABLET ORAL at 09:30

## 2017-05-12 RX ADMIN — SILVER SULFADIAZINE SCH GM: 10 CREAM TOPICAL at 15:30

## 2017-05-12 NOTE — ECGEPIP
Stationary ECG Study

                           [facility] - ED

                                       

                                       Test Date:    2017-05-10

Pat Name:     VINNY EASLEY           Department:   

Patient ID:   [id number]                 Room:         -

Gender:       M                        Technician:   aide

:          1945               Requested By: JUANA HERNANDEZ

Order Number: [id number]     Reading MD:   Jaleesa Roman

                                 Measurements

Intervals                              Axis          

Rate:         80                       P:            60

MA:           204                      QRS:          15

QRSD:         106                      T:            142

QT:           392                                    

QTc:          452                                    

                           Interpretive Statements

SINUS RHYTHM WITH OCCASIONAL VENTRICULAR PREMATURE COMPLEXES

POSSIBLE LEFT ATRIAL ENLARGEMENT

PROBABLE INFERIOR MYOCARDIAL INFARCTION, PROBABLY OLD

MODERATE T-WAVE ABNORMALITY, CONSIDER LATERAL ISCHEMIA/LVH

SIMILAR 17

Electronically Signed On 2017 8:27:24 EDT by Jaleesa Roman

## 2017-05-12 NOTE — IPNPDOC
Date Seen


The patient was seen on 5/12/17.





Progress Note


SUBJECTIVE: Mr. Dunlap is a 72-year-old  male who was evaluated at 

bedside this morning. He was quite lethargic this morning and did not respond 

to sternal rub or fingernail manipulation. Blood glucose this morning was 51. 

Obtained a follow-up bedside fingerstick blood glucose and was 119. 

Administered half ampule D5. Follow-up fingerstick blood glucose was 151. 

Patient's clinical picture improved somewhat in that he was able to open his 

eyes, but was not able to respond effectively when asked to squeeze fingers. 

Pupils were minimally responsive. Obtained a head CT without contrast. 

Administered 1 more half ampule D50. Upon follow-up evaluation patient was 

sitting in bed attempting to eat breakfast. Appeared to have difficulty in 

maintaining muscle strength in upper extremities, however when asked to squeeze 

fingers he was able to do so. Patient's responsiveness continues to fluctuate. 

He became hypotensive and has more consistently remained bradycardic. Ordered 

an EKG which showed T-wave inversions in V4-6. Prior EKGs have shown T-wave 

inversions in V5-6. Cardiac markers were ordered, but remained negative. ABG 

was ordered and was normal. Patient's hemoglobin and hematocrit have continued 

to decline and for that reason 2 units of blood was ordered for transfusion. 

Patient unable to sign consent form and consent was obtained from Whitney Lewis, 

daughter. Consent confirmed with Lalo, charge nurse. Initiated a 250 normal 

saline bolus for hypotension. Obtained a chest x-ray secondary to patient 

becoming hypoxic and it revealed worsening pulmonary edema. Discontinued the 

fluid bolus.





OBJECTIVE


PHYSICAL EXAMINATION:


VITAL SIGNS: Please see below. 


GENERAL: Obese  male lying in the hospital bed, lethargic, not 

responsive to sternal rub


HEENT: Atraumatic, normocephalic, minimal pupillary response, unable to follow 

commands, edentulous


CARDIOVASCULAR: Grade 2/6 systolic murmur heard best over the second 

intercostal space on the right and the left, normal S1 and S2


RESPIRATORY: Clear to auscultation bilaterally, no accessory muscle use, no 

wheeze, rhonchi, crackles appreciated


ABDOMINAL: Distended, involuntary guarding in the hypogastric region with deep 

palpation, diminished bowel sounds


EXTREMITIES: Peripheral pulses appreciated bilaterally in upper and lower 

extremities, wound ulcers on bilateral lower feet are bandaged, +1 in the noted 

in the bilateral lower extremities


NEUROLOGICAL: Minimally responsive at this time


PSYCHOLOGICAL: Lethargic at times





LABORATORY DATA: Please see below.





MICROBIOLOGY: Please see below.





IMAGING:


CT of the head without contrast


IMPRESSION: 


Diffuse atrophy and vascular calcification.  No acute intracranial lesion.


 


Portable chest x-ray


IMPRESSION:


1.  Cardiomegaly with left atrial and ventricular enlargement, single lead 

pacer and venous hypertension.  Underlying fibrosis makes interstitial edema 

difficult to exclude.


2.  Retrocardiac left lower lobe atelectasis or infiltrate with effusion.  Left 

pleural effusion noted, a small right effusion difficult to exclude.


 


Renal ultrasound


Pending





Echocardiogram: Pending





DVT prophylaxis ordered?: Plavix 75 mg daily





ASSESSMENT AND PLAN: Mr. Dunlap is a 72-year-old  male who presents 

with acute on chronic systolic congestive heart failure. Now appears to have 

some evidence of encephalopathy and hypoglycemia with differentials being 

evaluated at this time. 





PROBLEMS:


1. Encephalopathy: Likely multifactorial, including cardiorenal syndrome, beta 

blocker toxicity, hypoglycemia. Follow-up with repeat CMP, discontinued beta 

blockers, discontinued basal insulin. Ammonia and lactic acid negative. CT of 

the head negative.





2. Hypoglycemia: Discontinued basal insulin. Managed with sliding scale and 

hypoglycemic protocol. Fingerstick blood glucoses every 1 hour.





3. Acute hypoxic respiratory failure: Likely multifactorial, including acute 

exacerbation of chronic systolic congestive heart failure pushing patient into 

acute pulmonary edema. Ordered oxygen with orders to remain between 88-92%. 

Continue Lasix 60 mg every 8 hours. ABG was normal.





4. Acute kidney injury superimposed on chronic kidney disease: Considering 

prerenal, intrinsic, or postrenal causes. Consult nephrology. Obtain CMP. 

Obtain renal ultrasound. Urinalysis obtained. Jimenez catheter in place. Hold 

torsemide and spironolactone.





5. Bradycardia: Obtained EKG which showed T-wave inversions in V4-6. Denies 

chest pain. Cardiac markers thus far been negative. Discontinued Coreg. 

Consulted cardiology.





6. Hypotension: Multifactorial, including beta blocker toxicity and acute 

pulmonary edema secondary to acute on chronic systolic congestive heart 

failure. Initially provided normal saline fluid bolus, but discontinued after 

blood transfusion began.





7. Anemia: Appears to be worsening. Iron studies obtained. Pending stool for 

occult blood. Transfusing 2 units of blood.





8. Hyperkalemia: Received Kayexalate. Normalized today. EKG shows T-wave 

inversions in V4-6. Prior EKG shows T-wave inversions in V5-6 only.





9. Chronic cholecystitis: Likely that no intervention will be planned at this 

time.





10. Bilateral lower extremity wound ulcers: Continued Silvadene to wounds on 

forefeet and wet-to-dry dressings for bilateral heels. Continue with Bactrim.





11. Coronary artery disease: Continue with aspirin and Plavix.





12. Gastric esophageal reflux disease: Continue with Prilosec and Carafate.





13. Neuropathy. Continue Lyrica.





DISPOSITION: Transferred to the ICU. Cardiology and nephrology consult. 

Prognosis guarded at this time. CODE STATUS DNR/DNI.





VS, I&O, 24H, Fishbone


Vital Signs/I&O





Vital Signs








  Date Time  Temp Pulse Resp B/P (MAP) Pulse Ox O2 Delivery O2 Flow Rate FiO2


 


5/12/17 09:00  54  109/56    


 


5/12/17 08:00 97.6  18  97 Room Air  


 


5/12/17 08:00       25.0 100














I&O- Last 24 Hours up to 6 AM 


 


 5/12/17





 06:00


 


Intake Total 2160 ml


 


Output Total 1200 ml


 


Balance 960 ml











Laboratory Data


24H LABS


Laboratory Tests 2


5/11/17 12:58: 


Total Creatine Kinase 92, Creatine Kinase MB 4.8H, Creatine Kinase MB Relative 

Index 5.21H, Troponin I 0.03#


5/11/17 15:07: Bedside Glucose (Misc Panel) 27*L


5/11/17 15:24: Bedside Glucose Confirm (Misc) 75


5/11/17 16:03: Bedside Glucose (Misc Panel) 138H


5/11/17 17:02: Bedside Glucose (Misc Panel) 197H


5/11/17 20:13: Bedside Glucose (Misc Panel) 108


5/12/17 03:44: Bedside Glucose (Misc Panel) 92


5/12/17 04:51: 


White Blood Count 5.6, Red Blood Count 2.90L, Hemoglobin 8.0L, Hematocrit 25.9L

, Mean Corpuscular Volume 89.3, Mean Corpuscular Hemoglobin 27.5, Mean 

Corpuscular Hemoglobin Concent 30.8L, Red Cell Distribution Width 14.2, 

Platelet Count 220, Neutrophils (%) (Auto) 59.8, Lymphocytes (%) (Auto) 25.1, 

Monocytes (%) (Auto) 7.2H, Eosinophils (%) (Auto) 4.8H, Basophils (%) (Auto) 0.8

, Neutrophils # (Auto) 3.4, Lymphocytes # (Auto) 1.4L, Monocytes # (Auto) 0.4, 

Eosinophils # (Auto) 0.3, Basophils # (Auto) 0.0, Large Unclassified Cells % 2.3

, Large Unclassified Cells # 0.1, Absolute Reticulocyte Count 56, Percent 

Reticulocyte Count 1.90H, Reticulocyte Hgb Content (CHr) 28.9, Anion Gap 8, 

Glomerular Filtration Rate 23.6L, Blood Urea Nitrogen 58H, Creatinine 2.83H, 

Sodium Level 140, Potassium Level 4.8, Chloride Level 107, Carbon Dioxide Level 

25, Calcium Level 7.6L, Aspartate Amino Transf (AST/SGOT) 14L, Alanine 

Aminotransferase (ALT/SGPT) 16, Alkaline Phosphatase 136H, Total Bilirubin 0.1L

, Total Protein 6.5, Albumin 2.4L, Magnesium Level 2.4, Albumin/Globulin Ratio 

0.59L


5/12/17 06:11: Bedside Glucose (Misc Panel) 105


5/12/17 08:02: Bedside Glucose (Misc Panel) 119H


5/12/17 08:28: Bedside Glucose (Misc Panel) 151H


5/12/17 08:59: Bedside Glucose (Misc Panel) 123H


5/12/17 09:07: 


Blood Gas Bicarbonate Standard 21.0L, Arterial Blood pH 7.312L, Arterial Blood 

Partial Pressure CO2 44.2, Arterial Blood Partial Pressure O2 89.8, Arterial 

Blood Total CO2 23.2, Arterial Blood HCO3 21.9L, Arterial Blood Base Excess -

4.2L, Arterial Blood Oxygen Saturation 97.1, Arterial Blood Gas Puncture Site 

RT RADIAL


5/12/17 10:32: 


Lactic Acid Level 0.8, Phosphorus Level 6.6H, Total Creatine Kinase 89, 

Creatine Kinase MB 6.5H, Creatine Kinase MB Relative Index 7.30H, Troponin I 

0.02#, Vitamin B12 Level 618, Folate 5.3L


CBC/BMP


Laboratory Tests


5/12/17 04:51








Red Blood Count 2.90 L, Mean Corpuscular Volume 89.3, Mean Corpuscular 

Hemoglobin 27.5, Mean Corpuscular Hemoglobin Concent 30.8 L, Red Cell 

Distribution Width 14.2, Neutrophils (%) (Auto) 59.8, Lymphocytes (%) (Auto) 

25.1, Monocytes (%) (Auto) 7.2 H, Eosinophils (%) (Auto) 4.8 H, Basophils (%) (

Auto) 0.8, Neutrophils # (Auto) 3.4, Lymphocytes # (Auto) 1.4 L, Monocytes # (

Auto) 0.4, Eosinophils # (Auto) 0.3, Basophils # (Auto) 0.0, Calcium Level 7.6 L

, Aspartate Amino Transf (AST/SGOT) 14 L, Alanine Aminotransferase (ALT/SGPT) 16

, Alkaline Phosphatase 136 H, Total Bilirubin 0.1 L, Total Protein 6.5, Albumin 

2.4 L





GME ATTESTATION


GME ATTESTATION


My preceptor for this patient encounter was physically present in the building 

during the encounter and was fully available. As needed, all aspects of the 

patient interview, examination, medical decision making process, and medical 

care plan development were reviewed and approved by the preceptor. Preceptor is 

aware and concurs with the plan as stated in the body of this note and will 

attest to such by his/her cosignature.





ATTENDING NOTE


I, Anh Emanuel, have both independently examined this patient as well as 

reviewed the documentation.  I have discussed in detail with the resident the 

findings and plan of treatment as documented in the residents documentation. I 

will continue to follow the patient and offer further guidance to the patients 

care as necessary during this hospital stay.











RADHA LOBO May 12, 2017 11:47


ANH EMANUEL MD May 23, 2017 14:17

## 2017-05-12 NOTE — REP
AP PORTABLE CHEST:  05/12/2017

 

COMPARISON:  05/10/2017, 04/07/2017 chest x-rays.

 

CLINICAL HISTORY:  Apnea.

 

Single lead pacer over the left upper chest with lead terminating in the right

ventricle again seen.  The sternotomy wires are unchanged.  There is cardiomegaly

with left atrial and left ventricular enlargement.  Small left effusion suspected

with retrocardiac opacity representing effusion or atelectasis/infiltrate.  I

cannot see a definite right effusion but on lordotic projections, this is limited

for evaluation.  There is venous hypertension present with underlying fibrosis

making interstitial edema difficult to exclude calcified tortuous aorta.  There

are degenerative changes in the spine.

 

IMPRESSION:

 

1.  Cardiomegaly with left atrial and ventricular enlargement, single lead pacer

and venous hypertension.  Underlying fibrosis makes interstitial edema difficult

to exclude.

 

2.  Retrocardiac left lower lobe atelectasis or infiltrate with effusion.  Left

pleural effusion noted, a small right effusion difficult to exclude.

 

 

Signed by

Emmanuel Cullen MD 05/12/2017 05:28 P

## 2017-05-12 NOTE — REP
NONCONTRAST HEAD CT:

 

HISTORY:  Left-sided weakness.

 

Comparison head CT study January 22, 2017.

 

FINDINGS:  Preliminary digital lateral  radiograph is unremarkable.  Bone

window settings demonstrate an intact bony calvarium.  There is moderate vascular

calcification in the distal vertebral and carotid arteries.  The visualized

paranasal sinuses are clear.  No intraorbital abnormality is seen.  There is mild

diffuse cerebral atrophy.  Gray-white differentiation pattern is normal above and

below the tentorium.  There is no evidence of hemorrhage or infarction.  No mass,

extra-axial fluid collection or midline shift is seen.

 

IMPRESSION: Diffuse atrophy and vascular calcification.  No acute intracranial

lesion.

 

 

Signed by

Christian Doll MD 05/12/2017 10:26 A

## 2017-05-12 NOTE — ECHO
DATE OF PROCEDURE:  05/12/2017

 

YOB: 1945

AGE: 72

 

REFERRING PROVIDER: Dr. Anh Emanuel

 

PRIMARY CARDIOLOGIST: Dr. Henrique Snow

 

PATIENT LOCATION: Room 3205

 

REASON FOR THE ECHOCARDIOGRAM: Shortness of breath.

 

2D MEASUREMENTS:

IVS:  1.6 cm

LV:  5.5 cm

LVPW:  1.4 cm

LA:  4.7 cm

Aorta:  2.8 cm

IVC:  2.2 cm

 

DOPPLER MEASUREMENT:

Peak velocity across the aortic valve:  2.3 m/s

Peak velocity across the LVOT:  0.84 m/s

Mitral E:  1.3,  Mitral A:  0.84 with a ratio of 1.5

 

2D COMMENTS:

1. Mildly increased left ventricular size with mild to moderately increased left

ventricular wall thickness but left ventricular systolic function is moderately

depressed. The estimated global left ventricular systolic ejection fraction is

30-35%.

2. Mildly enlarged left atrium. Normal right atrium and right ventricle.

3. The atrial septum appeared to be normal without evidence of defect or shunt.

4. Normal aortic root.

5. No pericardial effusion seen.

6. Mildly calcified aortic valve with normal leaflet excursion. Mildly calcified

mitral annulus with normal anterior mitral valve leaflet motion. Normal tricuspid

valve and pulmonic valve. The proximal pulmonary artery branches also appear to

be normal.

7. The inferior vena cava was mildly enlarged, central venous pressure might be

elevated.

8. Pacemaker/automatic implantable cardioverter defibrillator (AICD) wire

artifact noted in the right heart chambers.

 

DOPPLER: It detects trace aortic regurgitation, mild tricuspid regurgitation,

mild pulmonic regurgitation. Pulmonary artery systolic pressure appeared to be

mildly to moderately elevated. Abnormal relaxation pattern was noted across the

septal and lateral mitral valve annulus consistent with a pseudonormal pattern,

left ventricular end-diastolic pressure might be elevated.

 

IMPRESSION:

1. Moderately severe global left ventricular systolic dysfunction with mildly

enlarged left ventricle but preserved left ventricular wall thickness. There are

some features of left ventricular diastolic dysfunction as mentioned above.

2. Aortic valve sclerosis with mild aortic stenosis but no aortic regurgitation.

Could not rule out more severe aortic stenosis in view of the depressed left

ventricular ejection fraction (LVEF).

3. Mitral annulus calcification with mildly enlarged left atrium and trace mitral

regurgitation.

4. Mild tricuspid regurgitation. Probably moderate pulmonary hypertension.

5. Pacemaker/automatic implantable cardioverter defibrillator (AICD) wire

artifacts noted in the right heart chambers.

## 2017-05-13 VITALS — SYSTOLIC BLOOD PRESSURE: 137 MMHG | DIASTOLIC BLOOD PRESSURE: 62 MMHG

## 2017-05-13 VITALS — DIASTOLIC BLOOD PRESSURE: 62 MMHG | SYSTOLIC BLOOD PRESSURE: 145 MMHG

## 2017-05-13 VITALS — DIASTOLIC BLOOD PRESSURE: 63 MMHG | SYSTOLIC BLOOD PRESSURE: 142 MMHG

## 2017-05-13 VITALS — SYSTOLIC BLOOD PRESSURE: 121 MMHG | DIASTOLIC BLOOD PRESSURE: 54 MMHG

## 2017-05-13 VITALS — DIASTOLIC BLOOD PRESSURE: 62 MMHG | SYSTOLIC BLOOD PRESSURE: 129 MMHG

## 2017-05-13 VITALS — DIASTOLIC BLOOD PRESSURE: 65 MMHG | SYSTOLIC BLOOD PRESSURE: 152 MMHG

## 2017-05-13 VITALS — DIASTOLIC BLOOD PRESSURE: 54 MMHG | SYSTOLIC BLOOD PRESSURE: 121 MMHG

## 2017-05-13 VITALS — DIASTOLIC BLOOD PRESSURE: 78 MMHG | SYSTOLIC BLOOD PRESSURE: 133 MMHG

## 2017-05-13 VITALS — OXYGEN SATURATION: 97 %

## 2017-05-13 VITALS — DIASTOLIC BLOOD PRESSURE: 64 MMHG | SYSTOLIC BLOOD PRESSURE: 147 MMHG

## 2017-05-13 VITALS — SYSTOLIC BLOOD PRESSURE: 139 MMHG | DIASTOLIC BLOOD PRESSURE: 65 MMHG

## 2017-05-13 VITALS — DIASTOLIC BLOOD PRESSURE: 58 MMHG | SYSTOLIC BLOOD PRESSURE: 125 MMHG

## 2017-05-13 VITALS — SYSTOLIC BLOOD PRESSURE: 110 MMHG | DIASTOLIC BLOOD PRESSURE: 53 MMHG

## 2017-05-13 VITALS — SYSTOLIC BLOOD PRESSURE: 126 MMHG | DIASTOLIC BLOOD PRESSURE: 56 MMHG

## 2017-05-13 VITALS — SYSTOLIC BLOOD PRESSURE: 135 MMHG | DIASTOLIC BLOOD PRESSURE: 62 MMHG

## 2017-05-13 VITALS — SYSTOLIC BLOOD PRESSURE: 141 MMHG | DIASTOLIC BLOOD PRESSURE: 71 MMHG

## 2017-05-13 VITALS — SYSTOLIC BLOOD PRESSURE: 111 MMHG | DIASTOLIC BLOOD PRESSURE: 53 MMHG

## 2017-05-13 VITALS — DIASTOLIC BLOOD PRESSURE: 53 MMHG | SYSTOLIC BLOOD PRESSURE: 122 MMHG

## 2017-05-13 VITALS — DIASTOLIC BLOOD PRESSURE: 71 MMHG | SYSTOLIC BLOOD PRESSURE: 145 MMHG

## 2017-05-13 VITALS — SYSTOLIC BLOOD PRESSURE: 130 MMHG | DIASTOLIC BLOOD PRESSURE: 58 MMHG

## 2017-05-13 VITALS — DIASTOLIC BLOOD PRESSURE: 54 MMHG | SYSTOLIC BLOOD PRESSURE: 120 MMHG

## 2017-05-13 VITALS — SYSTOLIC BLOOD PRESSURE: 118 MMHG | DIASTOLIC BLOOD PRESSURE: 54 MMHG

## 2017-05-13 LAB
ALBUMIN SERPL BCG-MCNC: 2.4 GM/DL (ref 3.2–5.2)
ALBUMIN/GLOB SERPL: 0.56 {RATIO} (ref 1–1.93)
ALP SERPL-CCNC: 129 U/L (ref 45–117)
ALT SERPL W P-5'-P-CCNC: 16 U/L (ref 12–78)
ANION GAP SERPL CALC-SCNC: 10 MEQ/L (ref 8–16)
AST SERPL-CCNC: 19 U/L (ref 15–37)
BASE EXCESS BLDA CALC-SCNC: -5.3 MMOL/L (ref -2–2)
BASE EXCESS BLDA CALC-SCNC: -5.5 MMOL/L (ref -2–2)
BASOPHILS # BLD AUTO: 0 K/MM3 (ref 0–0.2)
BASOPHILS NFR BLD AUTO: 0.5 % (ref 0–1)
BILIRUB SERPL-MCNC: 0.1 MG/DL (ref 0.2–1)
BUN SERPL-MCNC: 65 MG/DL (ref 7–18)
C3 SERPL-MCNC: 129 MG/DL (ref 90–180)
C4 SERPL-MCNC: 36.9 MG/DL (ref 10–40)
CALCIUM SERPL-MCNC: 7.9 MG/DL (ref 8.8–10.2)
CHLORIDE SERPL-SCNC: 105 MEQ/L (ref 98–107)
CO2 BLDA CALC-SCNC: 23.3 MEQ/L (ref 23–31)
CO2 BLDA CALC-SCNC: 23.4 MEQ/L (ref 23–31)
CO2 SERPL-SCNC: 24 MEQ/L (ref 21–32)
CREAT SERPL-MCNC: 3.82 MG/DL (ref 0.7–1.3)
EOSINOPHIL # BLD AUTO: 0.2 K/MM3 (ref 0–0.5)
EOSINOPHIL NFR BLD AUTO: 2.8 % (ref 0–3)
ERYTHROCYTE [DISTWIDTH] IN BLOOD BY AUTOMATED COUNT: 14.5 % (ref 11.5–14.5)
GFR SERPL CREATININE-BSD FRML MDRD: 16.7 ML/MIN/{1.73_M2} (ref 42–?)
GLUCOSE SERPL-MCNC: 108 MG/DL (ref 83–110)
HCO3 BLDA-SCNC: 21.7 MEQ/L (ref 22–26)
HCO3 BLDA-SCNC: 21.9 MEQ/L (ref 22–26)
HCO3 STD BLDA-SCNC: 20 MEQ/L (ref 22–26)
HCO3 STD BLDA-SCNC: 20.1 MEQ/L (ref 22–26)
LARGE UNSTAINED CELL #: 0.1 K/MM3 (ref 0–0.4)
LARGE UNSTAINED CELL %: 1.6 % (ref 0–4)
LYMPHOCYTES # BLD AUTO: 1.1 K/MM3 (ref 1.5–4.5)
LYMPHOCYTES NFR BLD AUTO: 11.6 % (ref 24–44)
MAGNESIUM SERPL-MCNC: 2.4 MG/DL (ref 1.8–2.4)
MCH RBC QN AUTO: 28.2 PG (ref 27–33)
MCHC RBC AUTO-ENTMCNC: 32 G/DL (ref 32–36.5)
MCV RBC AUTO: 88.1 FL (ref 80–96)
MONOCYTES # BLD AUTO: 0.7 K/MM3 (ref 0–0.8)
MONOCYTES NFR BLD AUTO: 9 % (ref 0–5)
NEUTROPHILS # BLD AUTO: 6.1 K/MM3 (ref 1.8–7.7)
NEUTROPHILS NFR BLD AUTO: 74.5 % (ref 36–66)
PCO2 BLDA: 50.4 MMHG (ref 35–45)
PCO2 BLDA: 50.5 MMHG (ref 35–45)
PH BLDA: 7.25 UNITS (ref 7.35–7.45)
PH BLDA: 7.25 UNITS (ref 7.35–7.45)
PLATELET # BLD AUTO: 209 K/MM3 (ref 150–450)
PO2 BLDA: 116.7 MMHG (ref 75–100)
PO2 BLDA: 97.2 MMHG (ref 75–100)
POTASSIUM SERPL-SCNC: 5.4 MEQ/L (ref 3.5–5.1)
PROT SERPL-MCNC: 6.7 GM/DL (ref 6.4–8.2)
SODIUM SERPL-SCNC: 139 MEQ/L (ref 136–145)
WBC # BLD AUTO: 8.2 K/MM3 (ref 4–10)

## 2017-05-13 RX ADMIN — SODIUM CHLORIDE SCH ML: 9 INJECTION, SOLUTION INTRAMUSCULAR; INTRAVENOUS; SUBCUTANEOUS at 12:20

## 2017-05-13 RX ADMIN — INSULIN LISPRO SCH UNITS: 100 INJECTION, SOLUTION INTRAVENOUS; SUBCUTANEOUS at 16:07

## 2017-05-13 RX ADMIN — CLOPIDOGREL BISULFATE SCH MG: 75 TABLET ORAL at 08:38

## 2017-05-13 RX ADMIN — DEXTROSE MONOHYDRATE PRN ML: 25 INJECTION, SOLUTION INTRAVENOUS at 05:16

## 2017-05-13 RX ADMIN — OMEPRAZOLE SCH MG: 20 CAPSULE, DELAYED RELEASE ORAL at 08:38

## 2017-05-13 RX ADMIN — SODIUM CHLORIDE SCH ML: 9 INJECTION, SOLUTION INTRAMUSCULAR; INTRAVENOUS; SUBCUTANEOUS at 06:26

## 2017-05-13 RX ADMIN — SODIUM CHLORIDE, PRESERVATIVE FREE SCH ML: 5 INJECTION INTRAVENOUS at 21:15

## 2017-05-13 RX ADMIN — DEXTROSE MONOHYDRATE PRN ML: 25 INJECTION, SOLUTION INTRAVENOUS at 17:21

## 2017-05-13 RX ADMIN — SODIUM CHLORIDE, PRESERVATIVE FREE SCH ML: 5 INJECTION INTRAVENOUS at 12:20

## 2017-05-13 RX ADMIN — ASPIRIN 325 MG ORAL TABLET SCH MG: 325 PILL ORAL at 08:37

## 2017-05-13 RX ADMIN — DEXTROSE MONOHYDRATE PRN ML: 25 INJECTION, SOLUTION INTRAVENOUS at 12:37

## 2017-05-13 RX ADMIN — INSULIN LISPRO SCH UNITS: 100 INJECTION, SOLUTION INTRAVENOUS; SUBCUTANEOUS at 12:00

## 2017-05-13 RX ADMIN — DEXTROSE MONOHYDRATE SCH MLS/HR: 5 INJECTION INTRAVENOUS at 01:53

## 2017-05-13 RX ADMIN — SILVER SULFADIAZINE SCH GM: 10 CREAM TOPICAL at 07:48

## 2017-05-13 RX ADMIN — DEXTROSE MONOHYDRATE PRN ML: 25 INJECTION, SOLUTION INTRAVENOUS at 21:07

## 2017-05-13 RX ADMIN — FERROUS SULFATE TAB 325 MG (65 MG ELEMENTAL FE) SCH MG: 325 (65 FE) TAB at 08:38

## 2017-05-13 RX ADMIN — DEXTROSE MONOHYDRATE SCH MLS/HR: 50 INJECTION, SOLUTION INTRAVENOUS at 16:40

## 2017-05-13 RX ADMIN — INSULIN LISPRO SCH UNITS: 100 INJECTION, SOLUTION INTRAVENOUS; SUBCUTANEOUS at 21:00

## 2017-05-13 RX ADMIN — DEXTROSE MONOHYDRATE PRN ML: 25 INJECTION, SOLUTION INTRAVENOUS at 07:47

## 2017-05-13 RX ADMIN — DEXTROSE MONOHYDRATE PRN ML: 25 INJECTION, SOLUTION INTRAVENOUS at 10:38

## 2017-05-13 RX ADMIN — SODIUM CHLORIDE, PRESERVATIVE FREE SCH ML: 5 INJECTION INTRAVENOUS at 06:26

## 2017-05-13 RX ADMIN — SODIUM CHLORIDE SCH ML: 9 INJECTION, SOLUTION INTRAMUSCULAR; INTRAVENOUS; SUBCUTANEOUS at 21:15

## 2017-05-13 RX ADMIN — INSULIN LISPRO SCH UNITS: 100 INJECTION, SOLUTION INTRAVENOUS; SUBCUTANEOUS at 07:30

## 2017-05-13 NOTE — REP
PORTABLE CHEST:

 

AP portable view of the chest is performed and compared to prior study of

05/12/2017.

 

There is cardiomegaly and vascular congestion with diffuse interstitial edema

essentially unchanged.  Dense infiltrate or atelectasis is seen in the left

retrocardiac region inferiorly.  There may be small effusions as well.  Right

central venous catheter is seen with the tip at the junction of the superior vena

cava and right atrium.  Left pacemaker is again noted as well as multiple sternal

wires.

 

IMPRESSION:

 

Cardiomegaly with CHF and interstitial edema, unchanged.  There is dense

atelectasis or infiltrate in the left lung base.  I cannot exclude small

effusions.

 

 

Signed by

Canelo Nicole MD 05/13/2017 07:45 P

## 2017-05-13 NOTE — REP
PORTABLE CHEST:

 

AP portable view of the chest is performed and compared to prior study of the

same day.  There is cardiomegaly and vascular congestion with bibasilar

infiltrates and effusions, unchanged.  There has been placement of a right

central venous catheter with the tip in the superior vena cava.  There is no

pneumothorax.

 

IMPRESSION:

 

Placement of right central venous catheter with tip in the superior vena cava.

No pneumothorax.  Otherwise stable.

 

 

Signed by

Canelo Nicole MD 05/13/2017 07:43 P

## 2017-05-13 NOTE — REP
RENAL ULTRASOUND:

 

Real-time sonographic evaluation of the kidneys performed.  Right kidney is

echogenic suggesting medical renal disease.  Left kidney appears relatively

normal in echotexture.  Right kidney measures 10.3 x 4.8 x 4.8 cm and left kidney

12.0 x 5.9 x 4.9 cm.  There is no hydronephrosis bilaterally.  There is a cyst in

the upper pole of the left kidney measuring 2.1 x 1.9 x 1.4 cm.

 

IMPRESSION:

 

No hydronephrosis.  Echogenic appearance of the right kidney suggests medical

renal disease.  Left renal cyst.

 

 

Signed by

Canelo Nicole MD 05/13/2017 07:44 P

## 2017-05-13 NOTE — CR
DATE OF CONSULTATION:  05/13/2017

 

REFERRING PROVIDER:  Dr. Trujillo.

 

REASON FOR CONSULTATION:  Bradycardia.

 

HISTORY OF PRESENT ILLNESS:

A 72-year-old  male well known by the office, and he usually sees Dr. Snow. He came to the emergency room (ER) for further evaluation because he was

having a two-day history of agitation and increasing shortness of breath. He was

admitted with congestive heart failure, worsening renal failure, anemia. This

morning, he was unresponsive, and he was found to be hypoglycemic. Further workup

revealed findings consistent with worsening congestive heart failure, worsening

renal failure and anemia. During the day, he also was bradycardic and cardiology

consult was called. Case was discussed earlier in the day with his hospitalist

and the carvedilol was discontinued.

 

When I saw Mr. Sánchez Dunlap in the intensive care unit (ICU)/progressive

care unit (PCU), he was lying supine in bed in no acute distress at rest, and his

daughter was at bedside, as well as one of his granddaughters. There is no report

of chest pain, palpitations. There is no focal manifestation. He was eating and

was asking for food because he was hungry at that time. There is no report of

fever or chills. There is no report of bleeding. He has no nausea, vomiting,

diarrhea, melena or hematemesis.

 

He has a past medical history positive for coronary artery disease with coronary

artery bypass graft, severe peripheral artery disease with revascularization in

the past, bilateral wound infection at the level of the feet, hypertension,

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

dysfunction, and for which an automatic implantable cardioverter defibrillator

(AICD) was implanted in the past, chronic kidney disease stage III, diabetes

mellitus. There is no history of atrial fibrillation, cerebrovascular accident

(CVA), sudden cardiac death.

 

Past surgical history is positive for coronary artery bypass graft, bilateral

femoral-popliteal artery bypass, history of percutaneous transluminal angioplasty

(PTA) in the lower extremities, AICD implantation.

 

Family history is positive for heart disease, hypertension.

 

SOCIAL HISTORY:

The patient lives by himself, but he has good support in the area, his daughter,

who is very supportive. He does not smoke; he quit about four years ago. There no

report of ethyl alcohol (EtOH) abuse or illicit drugs.

 

ALLERGIES: TURKEY.

ADVANCE DIRECTIVES:

The patient has a DO NOT RESUSCITATE and DO NOT INTUBATE order.

 

CURRENT MEDICATIONS:

- doxycycline 100 mg by mouth twice a day

-  piperacillin 2.25 grams intravenous (IV) every eight hours

- ferrous sulfate 325 mg by mouth daily

- Senokot one tablet by mouth daily as needed for constipation

- Dulcolax 10 mg by mouth daily as needed for constipation

- aspirin 325 mg by mouth daily

- Plavix 75 mg by mouth daily

- omeprazole 40 mg by mouth daily

- Silvadene 1% cream applied daily to the affected area

- regular insulin coverage

- Tylenol 650 mg every four hours as needed for mild pain or fever

- Zofran 4 mg by mouth for nausea or vomiting every six hours as needed

- Zofran 4 mg IV every six hours as needed for nausea or vomiting

- D50 as well as glucose tablet and glucagon for hypoglycemia

- Proventil nebulizer four times a day as needed for shortness of breath

- metoclopramide 10 mg as needed before meals

- Percocet 5/325 mg tablet, two tablets every eight hours as needed for pain

 

PHYSICAL EXAMINATION:

GENERAL: The patient is alert and awake, in no acute distress at rest.

VITAL SIGNS: His vital signs when I saw him revealed a blood pressure of 135/60

with a pulse of 57, respirations 16, and his temperature was 97.9 degrees

Fahrenheit with oxygen saturation of 96% on two liters nasal cannula.

HEENT: Atraumatic.

NECK: Supple. I could not appreciate any jugular venous distention (JVD).

LUNGS: Reveal minimal crackles at the bases, but no wheezing.

CARDIAC: The heart examination revealed normal S1, S2, without gallops. The point

of maximum impulse (PMI) is displaced inferiorly and laterally. There is no rub.

There is a systolic murmur grade 1-2 over six at the lower left sternal border

and at the apex with some radiation to the axilla.

ABDOMEN: Soft and nontender.

EXTREMITIES: Reveal +1 bilateral lower leg edema.

NEUROLOGIC: Examination is negative for focal deficit.

 

LABORATORY DATA:

CBC done on 05/12/2017, revealed a WBC of 7.2, hemoglobin 9.7, hematocrit 31.8,

platelets 211. In the morning, seen on 05/12/2017, hemoglobin and hematocrit were

8.0 and 25.9 respectively.

BMP revealed a sodium of 137, potassium 5.9, and chloride 105, CO2 25, BUN 63,

creatinine 3.65, GFR 17.6, fasting glucose 79, calcium 7.6.

Liver enzymes revealed a total bilirubin of 0.2, AST 12, ALT 17, alkaline

phosphatase 134. Total protein 6.8, and albumin 2.5.

Troponin is 0.03 and 0.02.

TSH on 05/10/2017, was 5.63.

Serum BNP on admission was 1560.

 

IMAGING:

Chest x-ray on admission revealed left pleural effusion and suspected

interstitial pulmonary edema with cardiomegaly. Repeat chest x-ray on 05/12/2017,

revealed cardiomegaly with underlying fibrosis, left pleural effusion and a small

right pleural effusion.

 

EKG on 05/11/2017, revealed normal sinus rhythm with IVCD, left atrial

abnormality, and STT abnormalities noted throughout, but more pronounced in leads

V4, V5, and V6.

 

ADDENDUM:  05/13/2017, slm

 

This was compared with EKG and no remarkable changes.  There are also findings

consistent with prior inferior wall infarct.

 

IMPRESSION:   72-year-old male with multiple comorbid conditions who was admitted

with acute kidney injury on chronic kidney disease, decompensated congestive

heart failure (CHF) secondary to left ventricular systolic dysfunction, acute on

chronic.  Also, he has a history of coronary artery disease with underlying

coronary artery bypass graft (CABG), severe peripheral artery disease with

probably bilateral wound infections involving the feet, hypertension,

hyperlipidemia, and diabetes mellitus.  The patient seems to be stable and his

heart rate has improved and he is currently being transfused.  I am concerned

that he might end up on hemodialysis this time while in the hospital and I will

recommend to have nephrology manage his diuretic.  In the meantime, we can hold

the carvedilol and once his heart rate improves, he can be restarted at the lower

dose. Once again, he can be started at a lower dose.  I have noticed that he is

not on a statin and his office note will be checked and further recommendations

will be given.

 

It was a pleasure to participate in the care of Mr. Sánchez Dunlap for his

cardiac condition.  Once again, his prognosis from a cardiac standpoint is

guarded in view of his multiple comorbid conditions and I am concerned that he

might end up in hemodialysis this hospitalization.  I will continue to see the

patient as needed.  Please do not hesitate to call if there are any questions.

## 2017-05-13 NOTE — IPNPDOC
Date Seen


The patient was seen on 5/13/17.





Progress Note


SUBJECTIVE: Mr. Dunlap is a 72-year-old  male evaluated bedside this 

morning. He is encephalopathic. His eyes are open and he is alert, however he 

is not responsive nor does he follow commands.





OBJECTIVE


PHYSICAL EXAMINATION:


VITAL SIGNS: Please see below. 


GENERAL: Well-nourished, well-developed  male lying on his left side 

to evaluation this morning, alert, but not responsive, not oriented


HEENT: Atraumatic, normocephalic, pupils minimally responsive, edentulous


CARDIOVASCULAR: Regular rate and rhythm, normal S1 and S2, no murmur, rub, click


RESPIRATORY: Crackles appreciated in the right middle and lower lobes


ABDOMINAL: Involuntary guarding throughout, tender to palpation


EXTREMITIES: Peripheral pulses appreciated bilaterally in upper and lower 

extremities, feet are bandaged with wet-to-dry dressings secondary to wound 

ulcers


NEUROLOGICAL: Encephalopathic


PSYCHOLOGICAL: Encephalopathic





LABORATORY DATA: Please see below.





MICROBIOLOGY: Please see below.





IMAGING:


Chest x-ray


Placement of right femoral catheter with tip in the superior vena cava.


No pneumothorax.


Otherwise stable.





Echocardiogram:


IMPRESSION:


1. Moderately severe global left ventricular systolic dysfunction with mildly 

enlarged left ventricle but preserved left ventricular wall thickness. There 

are some features of left ventricular diastolic dysfunction as mentioned above.


2. Aortic valve sclerosis with mild aortic stenosis but no aortic 

regurgitation. Could not rule out more severe aortic stenosis in view of the 

depressed left ventricular ejection fraction (LVEF).


3. Mitral annulus calcification with mildly enlarged left atrium and trace 

mitral regurgitation.


4. Mild tricuspid regurgitation. Probably moderate pulmonary hypertension.


5. Pacemaker/automatic implantable cardioverter defibrillator (AICD) wire 

artifacts noted in the right heart chambers.





DVT prophylaxis ordered?: Plavix 75 mg daily





ASSESSMENT AND PLAN: Mr. Dunlap is a 72-year-old  male who presents 

with acute on chronic systolic congestive heart failure with progression to 

metabolic encephalopathy and acute renal failure superimposed on chronic kidney 

disease. Etiology still being determined.  





PROBLEMS:


1. Metabolic encephalopathy: Likely multifactorial, including cardiorenal 

syndrome, electrolyte abnormalities such as hypomagnesemia or hypocalcemia, 

hypoglycemia. Echocardiogram reports worsening left ventricular ejection 

fraction (30-35%). Aspiration precautions. Right IJ in place.





2. Acute kidney injury superimposed on chronic kidney disease: Considering 

prerenal, intrinsic, or postrenal causes. Consult nephrology. Creatinine has 

worsened. Renal ultrasound was negative. Patient was initially oliguric, but 

produced minimal urine over the last 24 hours. Urinary catheter in place for 

critical monitoring. Nephrotoxins have been held. Obtaining KING, C3, C4 to 

evaluate for glomerulonephritis and urine creatinine, urine sodium to evaluate 

for renal tubular acidosis. Continue monitoring with labs. 





3. Hyperkalemia: Causes include metabolic acidosis, acute renal failure. Given 

another dose of Kayexalate. Obtain EKG. 





4. Acute hypoxic respiratory failure: Likely multifactorial, including acute 

exacerbation of chronic systolic congestive heart failure pushing patient into 

acute pulmonary edema. Ordered oxygen with orders to remain between 88-92%. 

Continue Lasix 60 mg every 8 hours. ABG was normal.





5. Hypoglycemia: Discontinued basal insulin. Managed with sliding scale and 

adjustments made to hypoglycemic protocol. Fingerstick blood glucoses every 1 

hour.





6. Abnormal urinalysis: Likely related to traumatic Jimenez insertion. Continue 

critical monitoring.





7. Bradycardia: Normal sinus rhythm. Obtaining EKG. 





8. Hypotension: Appears improved. 





9. Anemia: Received 2 units of packed red blood cells. H&H appears improved. 

Awaiting the stool for occult blood. Anemia likely secondary to chronic 

disease. No active bleeding at this time. Ferrous sulfate 325 mg by mouth.





10. Chronic cholecystitis: Likely that no intervention will be planned at this 

time.





11. Bilateral lower extremity wound ulcers: Continue with Silvadene to wounds 

on forefeet and wet-to-dry dressings for bilateral heels. Discontinued Bactrim 

secondary to being nephrotoxic agent. Transitioned to Zosyn and doxycycline. 

Patient received ten-day course of antibiotics and thus all antibiotics have 

been discontinued at this time. Continue to dress wounds daily.





12. Coronary artery disease: Continue with aspirin and Plavix.





13. Gastric esophageal reflux disease: Continue with Prilosec and Carafate.





14. Neuropathy. Discontinued Lyrica secondary to being nephrotoxic agent. 





DISPOSITION: Admitted to the ICU. Cardiology and nephrology consult. Prognosis 

guarded at this time. 





CODE STATUS DNR/DNI.





VS, I&O, 24H, Fishbone


Vital Signs/I&O





Vital Signs








  Date Time  Temp Pulse Resp B/P (MAP) Pulse Ox O2 Delivery O2 Flow Rate FiO2


 


5/13/17 06:00  72 22 130/58 (82) 99 Nasal Cannula 2.0 


 


5/13/17 04:00 98.9       


 


5/12/17 08:00        100














I&O- Last 24 Hours up to 6 AM 


 


 5/13/17





 06:00


 


Intake Total 815 ml


 


Output Total 716 ml


 


Balance 99 ml











Laboratory Data


24H LABS


Laboratory Tests 2


5/12/17 08:28: Bedside Glucose (Misc Panel) 151H


5/12/17 08:59: Bedside Glucose (Misc Panel) 123H


5/12/17 09:07: 


Blood Gas Bicarbonate Standard 21.0L, Arterial Blood pH 7.312L, Arterial Blood 

Partial Pressure CO2 44.2, Arterial Blood Partial Pressure O2 89.8, Arterial 

Blood Total CO2 23.2, Arterial Blood HCO3 21.9L, Arterial Blood Base Excess -

4.2L, Arterial Blood Oxygen Saturation 97.1, Arterial Blood Gas Puncture Site 

RT RADIAL


5/12/17 10:32: 


Lactic Acid Level 0.8, Phosphorus Level 6.6H, Total Creatine Kinase 89, 

Creatine Kinase MB 6.5H, Creatine Kinase MB Relative Index 7.30H, Troponin I 

0.02#, Vitamin B12 Level 618, Folate 5.3L


5/12/17 11:58: Bedside Glucose (Misc Panel) 96


5/12/17 13:00: 


Ammonia 20, Lipase 119, Parathyroid Hormone (Intact) 146.7H


5/12/17 13:15: Bedside Glucose (Misc Panel) 114H


5/12/17 13:38: 


5/12/17 13:51: Bedside Glucose (Misc Panel) 159H


5/12/17 13:58: Bedside Glucose (Misc Panel) 213H


5/12/17 14:42: Whole Blood Ionized Calcium 4.4L


5/12/17 14:43: 


Anion Gap 8, Glomerular Filtration Rate 19.6L, Blood Urea Nitrogen 62H, 

Creatinine 3.33H, Sodium Level 136, Potassium Level 5.9H, Chloride Level 106, 

Carbon Dioxide Level 22, Calcium Level 8.0L, Aspartate Amino Transf (AST/SGOT) 

10L, Alanine Aminotransferase (ALT/SGPT) 18, Alkaline Phosphatase 128H, Total 

Bilirubin < 0.1L, Total Protein 6.8, Albumin 2.4L, Albumin/Globulin Ratio 0.55L


5/12/17 14:50: Bedside Glucose (Misc Panel) 85


5/12/17 17:29: Bedside Glucose (Misc Panel) 61L


5/12/17 17:49: 


Total Creatine Kinase 88, Creatine Kinase MB 5.9H, Creatine Kinase MB Relative 

Index 6.70H, Troponin I 0.03#


5/12/17 18:04: Bedside Glucose (Misc Panel) 85


5/12/17 20:37: Bedside Glucose (Misc Panel) 89


5/12/17 21:23: Bedside Glucose (Misc Panel) 86


5/12/17 22:04: 


White Blood Count 7.2, Red Blood Count 3.53L, Hemoglobin 9.9L, Hematocrit 31.8L

, Mean Corpuscular Volume 90.0, Mean Corpuscular Hemoglobin 28.1, Mean 

Corpuscular Hemoglobin Concent 31.2L, Red Cell Distribution Width 14.3, 

Platelet Count 211, Neutrophils (%) (Auto) 74.5H, Lymphocytes (%) (Auto) 14.6L, 

Monocytes (%) (Auto) 6.7H, Eosinophils (%) (Auto) 1.9, Basophils (%) (Auto) 0.6

, Neutrophils # (Auto) 5.4, Lymphocytes # (Auto) 1.1L, Monocytes # (Auto) 0.5, 

Eosinophils # (Auto) 0.1, Basophils # (Auto) 0.0, Large Unclassified Cells % 1.6

, Large Unclassified Cells # 0.1, Anion Gap 7L, Glomerular Filtration Rate 17.6L

, Blood Urea Nitrogen 63H, Creatinine 3.65H, Sodium Level 137, Potassium Level 

5.9H, Chloride Level 105, Carbon Dioxide Level 25, Calcium Level 7.7L, 

Aspartate Amino Transf (AST/SGOT) 12L, Alanine Aminotransferase (ALT/SGPT) 17, 

Alkaline Phosphatase 134H, Total Bilirubin 0.2#, Total Protein 6.8, Albumin 2.5L

, Albumin/Globulin Ratio 0.58L


5/12/17 22:51: 


Blood Gas Bicarbonate Standard 19.0L, Arterial Blood pH 7.245*L, Arterial Blood 

Partial Pressure CO2 48.5H, Arterial Blood Partial Pressure O2 104.7H, Arterial 

Blood Total CO2 22.0L, Arterial Blood HCO3 20.6L, Arterial Blood Base Excess -

6.6L, Arterial Blood Oxygen Saturation 98.0


5/12/17 23:28: Bedside Glucose (Misc Panel) 78L


5/13/17 01:48: Bedside Glucose (Misc Panel) 77L


5/13/17 02:00: 


Urine Appearance CLOUDYH, Urine Color YELLOW, Urine pH 5.0, Urine Specific 

Gravity 1.014, Urine Protein 2+H, Urine Glucose (UA) NEGATIVE, Urine Ketones 

NEGATIVE, Urine Urobilinogen 0.2, Urine Bilirubin NEGATIVE, Urine Leukocyte 

Esterase NEGATIVE, Urine Blood 3+H, Urine Nitrite NEGATIVE, Urine WBC (Auto) 8H

, Urine RBC (Auto) TNTCH, Urine Hyaline Casts (Auto) 0, Urine Bacteria (Auto) 

NEGATIVE, Urine Squamous Epithelial Cells 0, Urine Amorphous Sediment SMALLH, 

Urine Mucus (Auto) SMALL, Urine Sperm (Auto) 


5/13/17 02:48: 


Blood Gas Bicarbonate Standard 20.1L, Arterial Blood pH 7.255L, Arterial Blood 

Partial Pressure CO2 50.4H, Arterial Blood Partial Pressure O2 97.2, Arterial 

Blood Total CO2 23.4, Arterial Blood HCO3 21.9L, Arterial Blood Base Excess -

5.3L, Arterial Blood Oxygen Saturation 97.5


5/13/17 02:53: Bedside Glucose (Misc Panel) 73L


5/13/17 04:01: Bedside Glucose (Misc Panel) 74L


5/13/17 05:07: Bedside Glucose (Misc Panel) 64L


5/13/17 06:14: Bedside Glucose (Misc Panel) 118H


5/13/17 06:15: 


White Blood Count 8.2, Red Blood Count 3.38L, Hemoglobin 9.5L, Hematocrit 29.8L

, Mean Corpuscular Volume 88.1, Mean Corpuscular Hemoglobin 28.2, Mean 

Corpuscular Hemoglobin Concent 32.0, Red Cell Distribution Width 14.5, Platelet 

Count 209, Neutrophils (%) (Auto) 74.5H, Lymphocytes (%) (Auto) 11.6L, 

Monocytes (%) (Auto) 9.0H, Eosinophils (%) (Auto) 2.8, Basophils (%) (Auto) 0.5

, Neutrophils # (Auto) 6.1, Lymphocytes # (Auto) 1.1L, Monocytes # (Auto) 0.7, 

Eosinophils # (Auto) 0.2, Basophils # (Auto) 0.0, Large Unclassified Cells % 1.6

, Large Unclassified Cells # 0.1, Anion Gap 10, Glomerular Filtration Rate 16.7L

, Blood Urea Nitrogen 65H, Creatinine 3.82H, Sodium Level 139, Potassium Level 

5.4H, Chloride Level 105, Carbon Dioxide Level 24, Calcium Level 7.9L, 

Aspartate Amino Transf (AST/SGOT) 19, Alanine Aminotransferase (ALT/SGPT) 16, 

Alkaline Phosphatase 129H, Total Bilirubin 0.1L, Total Protein 6.7, Albumin 2.4L

, Magnesium Level 2.4, Albumin/Globulin Ratio 0.56L


5/13/17 07:44: Bedside Glucose (Misc Panel) 100


CBC/BMP


Laboratory Tests


5/12/17 14:43








Calcium Level 8.0 L, Aspartate Amino Transf (AST/SGOT) 10 L, Alanine 

Aminotransferase (ALT/SGPT) 18, Alkaline Phosphatase 128 H, Total Bilirubin < 

0.1 L, Total Protein 6.8, Albumin 2.4 L





5/12/17 22:04








Calcium Level 7.7 L, Aspartate Amino Transf (AST/SGOT) 12 L, Alanine 

Aminotransferase (ALT/SGPT) 17, Alkaline Phosphatase 134 H, Total Bilirubin 0.2 

#, Total Protein 6.8, Albumin 2.5 L, Red Blood Count 3.53 L, Mean Corpuscular 

Volume 90.0, Mean Corpuscular Hemoglobin 28.1, Mean Corpuscular Hemoglobin 

Concent 31.2 L, Red Cell Distribution Width 14.3, Neutrophils (%) (Auto) 74.5 H

, Lymphocytes (%) (Auto) 14.6 L, Monocytes (%) (Auto) 6.7 H, Eosinophils (%) (

Auto) 1.9, Basophils (%) (Auto) 0.6, Neutrophils # (Auto) 5.4, Lymphocytes # (

Auto) 1.1 L, Monocytes # (Auto) 0.5, Eosinophils # (Auto) 0.1, Basophils # (Auto

) 0.0





5/13/17 06:15








Calcium Level 7.9 L, Aspartate Amino Transf (AST/SGOT) 19, Alanine 

Aminotransferase (ALT/SGPT) 16, Alkaline Phosphatase 129 H, Total Bilirubin 0.1 

L, Total Protein 6.7, Albumin 2.4 L, Red Blood Count 3.38 L, Mean Corpuscular 

Volume 88.1, Mean Corpuscular Hemoglobin 28.2, Mean Corpuscular Hemoglobin 

Concent 32.0, Red Cell Distribution Width 14.5, Neutrophils (%) (Auto) 74.5 H, 

Lymphocytes (%) (Auto) 11.6 L, Monocytes (%) (Auto) 9.0 H, Eosinophils (%) (Auto

) 2.8, Basophils (%) (Auto) 0.5, Neutrophils # (Auto) 6.1, Lymphocytes # (Auto) 

1.1 L, Monocytes # (Auto) 0.7, Eosinophils # (Auto) 0.2, Basophils # (Auto) 0.0





GME ATTESTATION


GME ATTESTATION


My preceptor for this patient encounter was physically present in the building 

during the encounter and was fully available. As needed, all aspects of the 

patient interview, examination, medical decision making process, and medical 

care plan development were reviewed and approved by the preceptor. Preceptor is 

aware and concurs with the plan as stated in the body of this note and will 

attest to such by his/her cosignature.





ATTENDING NOTE


I, Anh Emanuel, have both independently examined this patient as well as 

reviewed the documentation.  I have discussed in detail with the resident the 

findings and plan of treatment as documented in the residents documentation. I 

will continue to follow the patient and offer further guidance to the patients 

care as necessary during this hospital stay.











RADHA LOBO May 13, 2017 08:44


ANH EMANUEL MD May 23, 2017 14:17

## 2017-05-13 NOTE — ECGEPIP
Stationary ECG Study

                              [facility]

                                       

                                       Test Date:    2017

Pat Name:     VINNY EASLEY           Department:   

Patient ID:   [id number]                 Room:         [place] [address]

Gender:       M                        Technician:   CHAZ

:          1945               Requested By: HEMA GARIBAY 

Order Number: [id number]     Reading MD:   Jimmy Couch

                                 Measurements

Intervals                              Axis          

Rate:         63                       P:            52

DE:           206                      QRS:          29

QRSD:         109                      T:            138

QT:           438                                    

QTc:          449                                    

                           Interpretive Statements

SINUS RHYTHM

POSSIBLE INFERIOR MYOCARDIAL INFARCTION, PROBABLY OLD

MODERATE T-WAVE ABNORMALITY, CONSIDER LATERAL ISCHEMIA

No significant change compared with 2017 at 10:37 AM.

Electronically Signed On 2017 17:22:07 EDT by Jimmy Couch

## 2017-05-13 NOTE — CR
DATE OF CONSULTATION:  05/12/2017

 

REQUESTING PHYSICIAN:  Anh Emanuel MD.

 

CONSULTING PHYSICIAN:  Isac Guardado MD.

 

REASON FOR CONSULTATION:  Management of acute kidney injury and oliguria.

 

CHIEF COMPLAINT:  Patient was admitted on 05/10/2017, with progressive shortness

of breath and central chest pain.

 

HISTORY OF PRESENT ILLNESS:  Mr. Sánchez Dunlap is a 72-year-old male with a past

medical history of chronic kidney disease most likely stage III with a baseline

creatinine of around 2.2, diabetes mellitus type 2, hypertension, and congestive

heart failure (CHF) with multiple other comorbidities, admitted this time on

05/10/2017, with shortness of breath and central chest pain, which was pressure

like.  He was initially admitted with a diagnosis of congestive heart failure.

He was given intravenous (IV) diuretics for CHF exacerbation.  Of note, patient

also had diabetic wound on the left foot.  He was being treated as outpatient by

podiatrist with Bactrim.  When patient was admitted, his creatinine on admission

was 2.3, which kept on getting worse.  His creatinine is at 3.3 today.  Patient

became confused, obtunded and hypotensive today.  He was brought to the emergency

room.  Jimenez catheter was placed and because of his encephalopathy and acute

renal failure, nephrology service was also called on board.  Patient is oliguric

at this time.  He has not made urine for the last 3-4 hours.  Patient is also

hyperkalemic with a potassium of 5.9.  He himself is unable to provide any

reliable history.  History was obtained from patient's chart and from patient's

primary medical team.

 

PAST MEDICAL HISTORY:

1.  Chronic kidney disease stage III with a baseline creatinine of around 2.2.

2.  Diabetes mellitus type 2.

3.  Hypertension.

4.  Hyperlipidemia.

5.  Chronic systolic congestive heart failure with a left ventricular (LV)

ejection fraction of around 40%.

6.  Coronary artery disease.

7.  History of myocardial infarction (MI) in the past.

8.  Peripheral vascular disease status post bilateral femoral-popliteal (fem-pop)

bypass.

9.  Diabetic foot ulcers.

 

PAST SURGICAL HISTORY:

1.  Status post coronary stents placement.

2.  Status post coronary artery bypass graft.

3.  Status post automatic implantable cardioverter-defibrillator (AICD)

placement.

4.  Status post bilateral femoral-popliteal bypass.

 

ALLERGIES:  Patient is allergic to TURKEY.

 

FAMILY HISTORY:  There is a significant family history of coronary artery disease

and hypertension.

 

SOCIAL HISTORY:  Patient is a former smoker.  He quit about 4 years ago.  There

is no history of alcohol abuse or recreational drug use.

 

REVIEW OF SYSTEMS:  Patient is unable to provide any reliable review of systems

to me.  He is very confused and obtunded at this time.

 

PHYSICAL EXAMINATION:

General:  Patient is awake, alert, oriented times two lying in bed.  Follows some

commands.

Vital signs:  Temperature is 96.8 degrees Fahrenheit.  Blood pressure is 116/56.

Pulse is 54, respiratory rate of 14, saturating 98% on nasal cannula at 2

liters.

Intake and output:  Urine output recorded as 600 mL yesterday, 1 liter so far

today.  He is 880 mL negative so far since overnight.  Weight on the bed scale is

not available.

Head and neck exam:  Extraocular muscles intact.  Pupils equally round and

reactive to light.  Mucous membranes are slightly dry.  Neck is supple.  There is

mildly elevated jugular venous distention (JVD) at this time.

Cardiovascular:  S1, S2, regular rate.  No murmur, rub or gallop.

Respiratory:  Decreased breath sounds at the bases.  Otherwise, no rales or

rhonchi.

Abdomen:  Soft, distended, positive bowel sounds, nontender.  No ascites.  No

organomegaly.  Patient has an indwelling Jimenez catheter and there is no urine in

the bag at this time.

Extremities:  Patient has trace edema of the bilateral lower extremities.  He has

dressing on the bilateral feet and as reported by nursing staff, patient has a

wound on the left foot.

Central nervous system:  Patient has positive asterixis.  He is oriented times

two. Otherwise, he is able to move his extremities and follow commands.

Skin:  No rashes.  Ulcers on the feet as mentioned above.

Psychiatric:  Patient is confused and obtunded at this time.

 

LABORATORY REVIEW:  CBC showed a WBC of 7.2, hemoglobin 9.9, platelets are 211.

 

ABG showed a pH of 7.31, pCO2 44, pO2 89, bicarbonate is 21.9, oxygen saturation

is 97%.

 

BMP showed sodium 136, potassium 5.9, chloride 106, bicarbonate 22, BUN 62,

creatinine 3.3, GFR is 19.6, calcium 8, albumin 2.4.

 

IMAGING:  A renal ultrasound is done.  Official report is pending.

 

Chest x-ray done today in the afternoon showed cardiomegaly with left atrial and

ventricular enlargement and retrocardiac left lower lobe atelectasis or

infiltrate with effusion.

 

CURRENT MEDICATIONS:  Patient's medications were all reviewed by me.  He got a

dose of calcium gluconate IV.  I have started him on Zosyn 2.25 grams IV every 8

hours.  He is on Tylenol as needed.  He is on Proventil as needed.  Vitamin C was

stopped.  He is on aspirin 325 mg by mouth daily.  He is on Dulcolax as needed.

Coreg was stopped because of hypotension.  He is on Plavix 75 mg by mouth daily.

He has been started on doxycycline 100 mg by mouth twice a day.  He is on ferrous

sulfate 325 mg by mouth daily.  He was on Lasix, which was stopped by me.

Insulin Levemir has also been stopped.  He is on omeprazole 40 mg by mouth daily,

Zofran as needed, Percocet as needed.  I have stopped the Lyrica.  He was given a

dose of Kayexalate 15 grams by mouth times one dose.  I have stopped Carafate

because of acute renal failure and patient was on Bactrim one tablet by mouth

twice a day since admission and I have stopped the Bactrim as well.

 

ASSESSMENT:  72-year-old male with past medical history of diabetes, chronic

kidney disease (CKD) III, congestive heart failure, and left diabetic foot ulcer,

admitted this time because of acute decompensated congestive heart failure.

Nephrology service following the patient for acute renal failure.

 

PLAN:

1.  Acute kidney injury superimposed on chronic kidney disease stage III.  It is

most likely secondary to a combination of use of Bactrim, high-dose diuretic,

also possible intravascular volume depletion.  Patient got central line placed

today.  His CVP is reading as 22, but patient just got 2 units of packed red

blood cells (PRBCs) transfusion as well.  I have stopped the intravenous (IV)

diuretics at this time.  Patient is in oliguric acute renal failure.  I have

stopped Bactrim as well.  I will wait for patient's renal function to improve.

There is no urgent need to do hemodialysis at this time.  I would monitor the

patient every day for any need to start renal replacement therapy.

 

2.  Hyperkalemia.  Hyperkalemia is secondary to acute renal failure.  Potassium

is 5.9 at this time.  Patient was already given a dose of Kayexalate.  Repeat

basic metabolic panel (BMP) level is pending.

 

3.  Hypotension.  It might be secondary to sepsis, use of beta blockers,

antihypertensives.  The patient already got a dose of normal saline bolus.  He

also got 2 units of PRBC transfusion.  His blood pressure is acceptable at this

time.  Hold antihypertensive medications at this time.

4.  Hypocalcemia.  Patient was already given a dose of calcium gluconate 1 gram

IV.  Repeat ionized calcium level in the morning.

 

5.  Metabolic encephalopathy.  It is multifactorial, may be associated with

hypoglycemia, use of pain medications, Lyrica, hypotension, acute renal failure.

Patient's ammonia level is normal at this time.  CT scan of the head is negative.

I have stopped most of the medications that can cause encephalopathy.  Monitor

the patient in the intensive care unit (ICU) at this time.  Patient's BUN level

is only 62.  I do not believe his encephalopathy is being caused by uremia at

this time.  I will continue to monitor the patient for any need to start

hemodialysis.

 

6.  Congestive heart failure.  Patient's CVP was 22 after insertion of central

line; however, I would not diurese the patient at this time in acute kidney

injury.  He is oliguric.  He would probably not respond to diuretics.  I have

held the diuretics at this time.  No need of IV fluid administration at this

time.  Patient would only be given diuretics after he starts making urine.  If

you are not able to manage his volume, then patient will likely need

hemodialysis.

 

Thank you for involving us in the care of this patient.  We shall be happy to

follow the patient along with you tomorrow morning.  Plan of care was discussed

with the hospitalist team, Dr. Anh Emanuel.

## 2017-05-13 NOTE — ECGEPIP
Stationary ECG Study

                              [facility]

                                       

                                       Test Date:    2017

Pat Name:     VINNY EASLEY           Department:   

Patient ID:   [id number]                 Room:         [place] [address]

Gender:       M                        Technician:   CEASAR

:          1945               Requested By: RADHA MIDDLETON

Order Number: [id number]     Reading MD:   Jimmy Couch

                                 Measurements

Intervals                              Axis          

Rate:         70                       P:            60

AK:           204                      QRS:          34

QRSD:         105                      T:            139

QT:           429                                    

QTc:          463                                    

                           Interpretive Statements

SINUS RHYTHM

POSSIBLE INFERIOR MYOCARDIAL INFARCTION, PROBABLY OLD.

Poor R-wave progression.

MODERATE T-WAVE ABNORMALITY, CONSIDER LATERAL ISCHEMIA

No significant change compared with 2017 at 2226.

 

Electronically Signed On 2017 18:48:37 EDT by Jimmy Couch

## 2017-05-13 NOTE — ECGEPIP
Stationary ECG Study

                              [facility]

                                       

                                       Test Date:    2017

Pat Name:     VINNY EASLEY           Department:   

Patient ID:   [id number]                 Room:         [place] [address]

Gender:       M                        Technician:   JACKSON

:          1945               Requested By: RADHA MCKEONI

Order Number: [id number]     Reading MD:   Jimmy Couch

                                 Measurements

Intervals                              Axis          

Rate:         54                       P:            37

WI:           209                      QRS:          6

QRSD:         104                      T:            138

QT:           459                                    

QTc:          438                                    

                           Interpretive Statements

SINUS BRADYCARDIA, Possible old inferior wall  Infarct.

ST DEVIATION AND MODERATE T-WAVE ABNORMALITY, CONSIDER ANTEROLATERAL ISCHEMIA

Decreased heart rate compared with 2017.

Electronically Signed On 2017 17:10:34 EDT by Jimmy Couch

## 2017-05-13 NOTE — RO
DATE OF PROCEDURE:  05/12/2017

 

PREOPERATIVE DIAGNOSES:  Poor venous access.  Congestive heart failure.

 

POSTOPERATIVE DIAGNOSES:  Poor venous access.  Congestive heart failure.  
Anuric.

 

 

PROCEDURE:  Right internal jugular (IJ) triple lumen central line.

 

SURGEON:  Dr. Ann Tee

 

ASSISTANT:  Dr. Anh Emanuel and Dr. Brown

 

ANESTHESIA:  Local 1% lidocaine.

 

SEDATION:  None.

 

VENTILATION:  45% Ventimask.

 

ESTIMATED BLOOD LOSS:  15 mL.

 

The patient was placed in supine position and cleaned with ChloraPrep and 
prepped

in the usual manner.  Ultrasound with sterile probe was used to locate the right

internal jugular and 1% lidocaine was injected with ultrasound guidance.

Subsequently, needle was inserted and flash of blood was obtained.  Subsequently

a wire was advanced through the needle and needle was removed.  The wire 
position

was confirmed with ultrasound.  Subsequently the site was dilated and a triple

lumen central line was placed with Seldinger technique.  The wire was removed.

All three ports were flushed.  The port was secured with clamp and

stitches.  Dressing was placed.  X-ray was ordered for confirmation.  The 
patient

tolerated the procedure with no complication.

OBED

## 2017-05-14 VITALS — SYSTOLIC BLOOD PRESSURE: 182 MMHG | DIASTOLIC BLOOD PRESSURE: 77 MMHG

## 2017-05-14 VITALS — DIASTOLIC BLOOD PRESSURE: 71 MMHG | SYSTOLIC BLOOD PRESSURE: 170 MMHG

## 2017-05-14 VITALS — SYSTOLIC BLOOD PRESSURE: 135 MMHG | DIASTOLIC BLOOD PRESSURE: 61 MMHG

## 2017-05-14 VITALS — SYSTOLIC BLOOD PRESSURE: 158 MMHG | DIASTOLIC BLOOD PRESSURE: 69 MMHG

## 2017-05-14 VITALS — SYSTOLIC BLOOD PRESSURE: 171 MMHG | DIASTOLIC BLOOD PRESSURE: 72 MMHG

## 2017-05-14 VITALS — SYSTOLIC BLOOD PRESSURE: 171 MMHG | DIASTOLIC BLOOD PRESSURE: 74 MMHG

## 2017-05-14 VITALS — SYSTOLIC BLOOD PRESSURE: 189 MMHG | DIASTOLIC BLOOD PRESSURE: 70 MMHG

## 2017-05-14 VITALS — DIASTOLIC BLOOD PRESSURE: 79 MMHG | SYSTOLIC BLOOD PRESSURE: 191 MMHG

## 2017-05-14 VITALS — SYSTOLIC BLOOD PRESSURE: 159 MMHG | DIASTOLIC BLOOD PRESSURE: 69 MMHG

## 2017-05-14 VITALS — SYSTOLIC BLOOD PRESSURE: 170 MMHG | DIASTOLIC BLOOD PRESSURE: 72 MMHG

## 2017-05-14 LAB
ALBUMIN SERPL BCG-MCNC: 1.9 GM/DL (ref 3.2–5.2)
ALBUMIN/GLOB SERPL: 0.54 {RATIO} (ref 1–1.93)
ALP SERPL-CCNC: 100 U/L (ref 45–117)
ALT SERPL W P-5'-P-CCNC: 12 U/L (ref 12–78)
ANION GAP SERPL CALC-SCNC: 8 MEQ/L (ref 8–16)
AST SERPL-CCNC: 9 U/L (ref 15–37)
BASOPHILS # BLD AUTO: 0 K/MM3 (ref 0–0.2)
BASOPHILS NFR BLD AUTO: 0.7 % (ref 0–1)
BILIRUB SERPL-MCNC: 0.2 MG/DL (ref 0.2–1)
BUN SERPL-MCNC: 50 MG/DL (ref 7–18)
CALCIUM SERPL-MCNC: 6.7 MG/DL (ref 8.8–10.2)
CHLORIDE SERPL-SCNC: 116 MEQ/L (ref 98–107)
CO2 SERPL-SCNC: 22 MEQ/L (ref 21–32)
CREAT SERPL-MCNC: 2.76 MG/DL (ref 0.7–1.3)
EOSINOPHIL # BLD AUTO: 0.3 K/MM3 (ref 0–0.5)
EOSINOPHIL NFR BLD AUTO: 4.6 % (ref 0–3)
ERYTHROCYTE [DISTWIDTH] IN BLOOD BY AUTOMATED COUNT: 14.6 % (ref 11.5–14.5)
GFR SERPL CREATININE-BSD FRML MDRD: 24.3 ML/MIN/{1.73_M2} (ref 42–?)
GLUCOSE SERPL-MCNC: 123 MG/DL (ref 83–110)
LARGE UNSTAINED CELL #: 0.1 K/MM3 (ref 0–0.4)
LARGE UNSTAINED CELL %: 2.1 % (ref 0–4)
LYMPHOCYTES # BLD AUTO: 0.9 K/MM3 (ref 1.5–4.5)
LYMPHOCYTES NFR BLD AUTO: 13 % (ref 24–44)
MAGNESIUM SERPL-MCNC: 2 MG/DL (ref 1.8–2.4)
MCH RBC QN AUTO: 27.8 PG (ref 27–33)
MCHC RBC AUTO-ENTMCNC: 31.9 G/DL (ref 32–36.5)
MCV RBC AUTO: 87.2 FL (ref 80–96)
MONOCYTES # BLD AUTO: 0.6 K/MM3 (ref 0–0.8)
MONOCYTES NFR BLD AUTO: 9.2 % (ref 0–5)
NEUTROPHILS # BLD AUTO: 4.2 K/MM3 (ref 1.8–7.7)
NEUTROPHILS NFR BLD AUTO: 70.3 % (ref 36–66)
OSMOLALITY UR: 425 MOSM/KG (ref 500–800)
PHOSPHATE SERPL-MCNC: 3.7 MG/DL (ref 2.5–4.9)
PLATELET # BLD AUTO: 180 K/MM3 (ref 150–450)
POTASSIUM SERPL-SCNC: 3.9 MEQ/L (ref 3.5–5.1)
PROT SERPL-MCNC: 5.4 GM/DL (ref 6.4–8.2)
SODIUM SERPL-SCNC: 146 MEQ/L (ref 136–145)
WBC # BLD AUTO: 6 K/MM3 (ref 4–10)

## 2017-05-14 RX ADMIN — CLOPIDOGREL BISULFATE SCH MG: 75 TABLET ORAL at 08:20

## 2017-05-14 RX ADMIN — OMEPRAZOLE SCH MG: 20 CAPSULE, DELAYED RELEASE ORAL at 08:20

## 2017-05-14 RX ADMIN — DEXTROSE MONOHYDRATE SCH MLS/HR: 50 INJECTION, SOLUTION INTRAVENOUS at 04:07

## 2017-05-14 RX ADMIN — INSULIN LISPRO SCH UNITS: 100 INJECTION, SOLUTION INTRAVENOUS; SUBCUTANEOUS at 17:28

## 2017-05-14 RX ADMIN — SODIUM CHLORIDE, PRESERVATIVE FREE SCH ML: 5 INJECTION INTRAVENOUS at 21:17

## 2017-05-14 RX ADMIN — DEXTROSE MONOHYDRATE PRN ML: 25 INJECTION, SOLUTION INTRAVENOUS at 06:20

## 2017-05-14 RX ADMIN — FERROUS SULFATE TAB 325 MG (65 MG ELEMENTAL FE) SCH MG: 325 (65 FE) TAB at 08:20

## 2017-05-14 RX ADMIN — SODIUM CHLORIDE, PRESERVATIVE FREE SCH ML: 5 INJECTION INTRAVENOUS at 15:30

## 2017-05-14 RX ADMIN — SODIUM CHLORIDE SCH ML: 9 INJECTION, SOLUTION INTRAMUSCULAR; INTRAVENOUS; SUBCUTANEOUS at 06:00

## 2017-05-14 RX ADMIN — SODIUM CHLORIDE SCH ML: 9 INJECTION, SOLUTION INTRAMUSCULAR; INTRAVENOUS; SUBCUTANEOUS at 15:30

## 2017-05-14 RX ADMIN — SODIUM CHLORIDE SCH ML: 9 INJECTION, SOLUTION INTRAMUSCULAR; INTRAVENOUS; SUBCUTANEOUS at 21:17

## 2017-05-14 RX ADMIN — ASPIRIN 325 MG ORAL TABLET SCH MG: 325 PILL ORAL at 08:20

## 2017-05-14 RX ADMIN — NYSTATIN SCH DOSE: 100000 CREAM TOPICAL at 21:16

## 2017-05-14 RX ADMIN — INSULIN LISPRO SCH UNITS: 100 INJECTION, SOLUTION INTRAVENOUS; SUBCUTANEOUS at 08:21

## 2017-05-14 RX ADMIN — DEXTROSE MONOHYDRATE PRN ML: 25 INJECTION, SOLUTION INTRAVENOUS at 01:01

## 2017-05-14 RX ADMIN — SILVER SULFADIAZINE SCH GM: 10 CREAM TOPICAL at 08:21

## 2017-05-14 RX ADMIN — NYSTATIN SCH DOSE: 100000 CREAM TOPICAL at 18:23

## 2017-05-14 RX ADMIN — INSULIN LISPRO SCH UNITS: 100 INJECTION, SOLUTION INTRAVENOUS; SUBCUTANEOUS at 12:21

## 2017-05-14 RX ADMIN — ACETAMINOPHEN PRN MG: 325 TABLET ORAL at 15:35

## 2017-05-14 RX ADMIN — SODIUM CHLORIDE, PRESERVATIVE FREE SCH ML: 5 INJECTION INTRAVENOUS at 06:00

## 2017-05-14 RX ADMIN — DEXTROSE MONOHYDRATE SCH MLS/HR: 50 INJECTION, SOLUTION INTRAVENOUS at 15:29

## 2017-05-14 RX ADMIN — INSULIN LISPRO SCH UNITS: 100 INJECTION, SOLUTION INTRAVENOUS; SUBCUTANEOUS at 21:00

## 2017-05-14 NOTE — IPNPDOC
Text Note


Date of Service


The patient was seen on 5/14/17.





NOTE


Subjective:


Patient is a 72 year old  male with a PMHx of DM2, HTN, DLP, Systolic 

CHF, AICD, CAD s/p CABG (Hx of MI),  PVD s/p b/l FemPop, and CKD3 who presented 

to the ER with SOB / CP. Was found to be in acute heart failure and was 

admitted to telemetry. 





Patient was subsequently upgraded to ICU on 5/12 because of encephalopathy and 

acute renal failure. 





Patient was seen and examined at the bedside today. He was awake and alert. He 

denied any problems today. 





Objective:


Vitals (See below)


General: Lying in bed, no acute distress, comfortable, AAOx3


HEENT: NC, AT


CVS: RRR, +S1S2


Lungs: Fair air entry b/l, no appreciable crackles


Abdomen: Soft, ND, NT, +BSx4


Extremities: +PPx4, - Edema, - Calf tenderness





Assessment and plan:


1. s/p Acute metabolic encephalopathy - likely 2/2 hypoglycemia, exacerbated by 

worsening renal function


- Patient had received a large dose of Levemir the night prior, was 

persistently hypoglycemic over the next 24 hours


- Currently he is awake and alert and conversive


- s/p Glucose checks 1 hour and s/p adjusted D50 schedule


- Will start diet and glucose checks AC / HS now


- Will cover with ISS at this time


- Will hold off on levemir for now





2. Acute hypoxic respiratory failure - possibly 2/2 aspiration, possibly 2/2 

acute decompensated CHF and pleural effusion


- No reported shortness of breath, cough


- No reported fevers or leukocytosis 


- No elevation in lactic acid


- CXR 5/14: Worsening infiltrate / effusion on Right lower lung field


- c/w Ceftaroline (Day #2)





3. Acute kidney injury on CKD3 - possibly 2/2 pre-renal or renal etiology


- Patient had a period of anuria


- Currently over the last 24 hours has an output of >1300 cc


- will continue to monitor urine output


- lasix / fluids on hold over last 24 hours


- Dr. Guardado (Nephrology) on consult - appreciate their input 





4. s/p Hyperkalemia





5. DM2


- s/p hypoglycemia; will stop q1hour glucose checks


- will restart AC / HS glucose monitoring


- s/p Levemir 


- c/w ISS for now





6. Abnormal UA


- Hematuria; likely 2/2 traumatic Jimenez insertion





7. s/p Bradycardia - likely 2/2 beta blocker


- s/p carvedilol 





8. s/p Hypotension - possibly 2/2 beta blocker





9. Symptomatic anemia / Normocytic anemia; possibly 2/2 LORI 


- Hg remains stable 


- occult blood pending 


- s/p 2 units PRBC transfusion 


- c/w ferrous sulfate





10. Chronic cholecystitis


- HIDA negative





11. Bilateral lower extremity wound ulcers - likely 2/2 DM2


- c/w wound care as outline by podiatry 


- Has completed 10 day course of antibiotics as outlined by podiatry; will 

continue for now (re: possible aspiration)





12. CAD s/p CABG


- c/w ASA and Plavix





13. Neuropathy


- s/p Lyrica





14. GERD


- c/w Omeprazole and Sucralfate





15. DVT prophylaxis


- c/w SCDs





Disposition:


- Will monitor in ICU over next 24 hours; if mental status remains improved 

will downgrade





Code status:


- DNR / DNI





VS,Fishbone, I+O


VS, Fishbone, I+O


Laboratory Tests


5/14/17 05:12








Red Blood Count 3.39 L, Mean Corpuscular Volume 87.2, Mean Corpuscular 

Hemoglobin 27.8, Mean Corpuscular Hemoglobin Concent 31.9 L, Red Cell 

Distribution Width 14.6 H, Neutrophils (%) (Auto) 70.3 H, Lymphocytes (%) (Auto

) 13.0 L, Monocytes (%) (Auto) 9.2 H, Eosinophils (%) (Auto) 4.6 H, Basophils (%

) (Auto) 0.7, Neutrophils # (Auto) 4.2, Lymphocytes # (Auto) 0.9 L, Monocytes # 

(Auto) 0.6, Eosinophils # (Auto) 0.3, Basophils # (Auto) 0.0, Calcium Level 6.7 

#L, Phosphorus Level 3.7 #, Aspartate Amino Transf (AST/SGOT) 9 L, Alanine 

Aminotransferase (ALT/SGPT) 12, Alkaline Phosphatase 100, Total Bilirubin 0.2 #

, Total Protein 5.4 L, Albumin 1.9 #L








Vital Signs








  Date Time  Temp Pulse Resp B/P (MAP) Pulse Ox O2 Delivery O2 Flow Rate FiO2


 


5/14/17 04:00 98.0 77 24 170/71 (104) 96 Nasal Cannula 1.0 


 


5/12/17 08:00        100














I&O- Last 24 Hours up to 6 AM 


 


 5/14/17





 05:59


 


Intake Total 130 ml


 


Output Total 1580 ml


 


Balance -1450 ml

















YOSELIN MCCRARY MD May 14, 2017 10:37

## 2017-05-14 NOTE — REP
PORTABLE CHEST:

 

AP portable view of the chest is performed and compared with prior study of

05/13/2017.

 

Cardiomegaly and interstitial edema persist.  Increasing infiltrate or pleural

fluid is seen inferiorly on the right.  Consolidation in the left lung base with

possible underlying pleural fluid is unchanged.  Multiple sternal wires are

present.  There is a right central venous catheter again seen as well as a left

pacemaker.

 

 

Signed by

Canelo Nicole MD 05/14/2017 07:52 P

## 2017-05-14 NOTE — IPN
DATE:  05/14/2017

 

SUBJECTIVE:  Patient was seen and examined at the bedside this morning in the

intensive care unit (ICU).  Patient is much more alert and awake today.  His

kidney function is improving.  He has a good urine output at this time.  However,

patient's blood pressure is slightly elevated today.

 

REVIEW OF SYSTEMS:  Patient denies any fever, chills, rigors, headache, chest

pain, or shortness of breath.  He denies any nausea or vomiting.  He does report

some mild lower abdominal discomfort.  Rest of review of systems is negative.

 

OBJECTIVE:

VITAL SIGNS:  Temperature is 98 degrees Fahrenheit, blood pressure was 170/71

this morning, pulse is 77, respiratory rate of 20, saturating 96% on nasal

cannula at 1 liter per minute.

INTAKE/OUTPUT:  Urine output was 1340 mL yesterday and 395 mL so far today since

overnight.  Weight in the bed scale is 91.1 kg.  Patient has a hourly urine

output of around 60 mL/hour at this time.

 

PHYSICAL EXAMINATION:

GENERAL:  Patient is awake, alert, oriented times two, laying in bed.  No

apparent distress.

HEAD and NECK EXAM:  Extraocular muscles intact.  Pupils equally round and

reactive to light.  Mucous membranes are moist.  Neck is supple.  There is no

jugular venous distention (JVD).  He has a right-sided triple lumen catheter.

CARDIOVASCULAR:  S1, S2.  He has a grade 3/6 systolic ejection murmur.

RESPIRATORY:  Decreased breath sounds at the bases and mild crackles at the bases

on deep inspiration.

ABDOMEN:  Soft, mildly distended, positive bowel sounds, nontender.  No ascites.

No organomegaly at this time.  Patient has a Jimenez catheter and there is some

debris in the urine in the Jimenez catheter.

EXTREMITIES:  No clubbing or cyanosis.  Patient has trace edema of the bilateral

lower extremities and both feet are wrapped in dressing at this time.

CENTRAL NERVOUS SYSTEM (CNS):  Patient is oriented times two, otherwise he

follows commands.  He has mild asterixis, however his mental status is a lot

better as compared with yesterday.

 

LABORATORY REVIEW:

CBC showed WBC 6, hemoglobin 9.4, platelets are 180.

 

BMP showed sodium 146, potassium 3.9, chloride 116, bicarbonate 22, BUN 50,

creatinine 2.7, it was 3.8 yesterday, calcium 6.7, phosphorous 3.7, magnesium 2,

BNP is 1470, albumin is 1.9.

 

IMAGING:  A chest x-ray done this morning showed cardiomegaly and interstitial

edema.  There was increased infiltrate or pleural fluid seen inferiorly on the

right side and there was consolidation in the left lung base with possible

underlying pleural fluids.

 

CURRENT MEDICATIONS:  Patient's current medications were all reviewed by me.  His

insulin sliding scale protocol has been changed.  Patient is not on any diuretics

at this time.  He was started on ceftaroline 300 mg IV every 12 hours starting

yesterday and I gave him a dose of calcium gluconate 1 gram IV today.

 

ASSESSMENT:  72-year-old male with past medical history of diabetes mellitus type

2, chronic kidney disease stage III, congestive heart failure, and recent

diabetic foot ulcer, admitted at this time initially because of decompensated

congestive heart failure (CHF), however he had to be transferred to intensive

care unit (ICU) because of altered mental status, hypotension, and acute renal

failure.

 

PLAN:

1.  Acute kidney injury superimposed on chronic kidney disease stage III.

Patient is in nonoliguric renal failure at this time.  His urine function is

improving.  Creatinine is slightly trending down.  All the nephrotoxic

medications are on hold.  Continue to monitor for improvement of renal function.

Continue intake and output and daily weights.

 

2.  Hyperkalemia.  Patient's potassium is getting significantly better with

improvement of the renal function, potassium is 3.9 today.  Continue to monitor

for now.  No need to further give Kayexalate at this time.

 

3.  Congestive heart failure.  Patient persistently has interstitial edema,

vascular congestion, and effusions on the xray.  Diuretics were on hold because

of acute kidney injury, however I have ordered one dose of Lasix 40 mg IV today.

 

4.  Hypernatremia.  Hypernatremia must be secondary to diuresis after improvement

of renal function and decreased oral intake.  Patient was encouraged to drink

more water.  I would not give any dextrose 5% (D5) to the patient at this time.

 

 

5.  Metabolic encephalopathy.  Patient is clinically improving.  Opioids, Lyrica,

sedatives are on hold at this time and patient's renal function is also

improving.  Patient's glucose is also better and now he is eating as well.

 

6.  Bilateral diabetic foot ulcers.  Patient finished a 10 day course of

antibiotics.  I see the primary team has started him on ceftaroline which should

possibly cover ulcers and possibility of aspiration pneumonitis as well.  Rest of

the management is as per primary team.

## 2017-05-14 NOTE — IPN
DATE OF SERVICE:  05/13/2017

 

SUBJECTIVE:  Patient was seen and examined at bedside today morning in the

intensive care unit (ICU).  Patient remains slightly confused at this time, but

he is otherwise hemodynamically stable.  Not requiring any pressors.  Patient was

oliguric overnight, but starting this morning he has started making urine.  He is

making around 50 mL of urine an hour.  His renal function is almost stable as

compared with yesterday.

 

REVIEW OF SYSTEMS:  Patient is unable to provide any reliable review of systems

because he is confused and obtunded at this time. However, in the last 24 hours

there are no fevers and chills.  He denies any chest pains at this time.  There

is no shortness of breath.

 

OBJECTIVE:

Vital signs:  Temperature is 98.7 degrees Fahrenheit.  Blood pressure is 152/65.

Pulse is 72.  Respiratory rate of 18, saturating 93% on room air.

Intake and output:  Urine output recorded as 1 liter yesterday, 580 mL so far

today since overnight.  His hourly urine output at this time is around 50 mL an

hour.

 

PHYSICAL EXAMINATION:

General:  Patient is awake, alert, oriented times two lying in bed.  No apparent

distress.

Head and neck exam:  Extraocular muscles intact.  Pupils equally round and

reactive to light.  Mucous membranes are moist.  Neck is supple.  There is no

jugular venous distention (JVD).  Patient right-sided triple lumen catheter.

Cardiovascular:  S1, S2, regular rate.  No murmur, rub or gallop.

Respiratory:  Decreased breath sounds at the bases and mild crackles on deep

inspiration at the bases as well.

Abdomen:  Soft, distended, positive bowel sounds, nontender.  No ascites.  No

organomegaly at this time.  Patient has an indwelling Jimenez catheter.

Extremities:  No clubbing or cyanosis.  Patient has trace edema of the bilateral

lower extremities and he has dressing on the bilateral feet as well.

Central nervous system:  Patient is slightly obtunded.  He is oriented times two.

He does have asterixis.  He is able to follow some of the commands at this time.

He has waxing and waning level of consciousness.

 

LABORATORY REVIEW:  CBC showed a WBC 8.2, hemoglobin 9.5, platelets are 209.

 

Urinalysis done today showed it was cloudy.  There was 2+ protein, 3+ blood.

There was no bacteria.

 

ABG done today morning showed pH 7.25, pCO2 50, pO2 160, bicarbonate 21.7.

BMP this morning showed sodium of 139, potassium 5.4, chloride 105, bicarbonate

24, BUN 65, creatinine 3.8, it was 3.6 yesterday.  Calcium is 7.9, a.m. cortisol

is pending.

 

KING screen is pending.  C3 and C4 levels are normal.

 

IMAGING:  A chest x-ray done today morning showed cardiomegaly with CHF and

interstitial edema, which are unchanged.  There is a dense atelectasis or

infiltrate in the left lung base.

 

CURRENT MEDICATIONS:  Patient's medications were all reviewed by me.  His Zofran

has been stopped.  He is not on any intravenous (IV) fluids at this time.

Doxycycline has also been stopped.  Patient is not on diuretics.  Patient was

given another dose of Kayexalate 15 grams by mouth times one dose.

 

ASSESSMENT:  72-year-old male with past medical history of diabetes, chronic

kidney disease stage III, congestive heart failure, and left diabetic foot ulcer,

admitted this time because of decompensated congestive heart failure (CHF).

Hospital course was complicated by altered mental status, hypotension, and acute

renal failure.

 

PLAN:

1.  Acute kidney injury superimposed on chronic kidney disease stage III.

Patient was oliguric yesterday; however, patient has started making urine

starting this morning.  He is making around 50 mL of urine an hour.  Continue to

monitor for improvement of renal function.  No urgent need to start hemodialysis

at this time.  Nephrotoxic medications were already held yesterday.  Most likely

etiology is use of aggressive diuretics and Bactrim for left foot infection.

 

2.  Hyperkalemia.  Patient's potassium is within acceptable limits.  It was 5.4

this morning.  He was already given a dose of Kayexalate.  Potassium level should

improve with improvement in the renal function and with the dose of Kayexalate

that was already given this morning.  Avoid high potassium diet.

 

3.  Congestive heart failure.  Patient is making urine now.  There are no signs

of fluid overload at this time.  I would not give any diuretics at this time.

Patient started making urine right now.  If needed, probably in the evening or

maybe tomorrow morning, I shall start giving him small doses of diuretics.

 

4.  Metabolic encephalopathy.  It is multifactorial.  Patient is on multiple pain

medications, Lyrica, he was hypotensive, he has acute renal failure as well.

Continue to monitor for now.  Avoid use of opioid pain medications.  Lyrica was

already stopped yesterday.  Avoid the sedatives.

 

5.  Bilateral diabetic foot ulcers.  Patient was on Bactrim, which was stopped

because of renal failure.  He was started on intravenous (IV) Zosyn and

doxycycline, but antibiotic has been stopped by the primary team.  Patient

probably has finished the course of antibiotics at this time.

 

The plan of care was discussed with the hospitalist team, Dr. Anh Emanuel.

## 2017-05-15 VITALS — SYSTOLIC BLOOD PRESSURE: 156 MMHG | DIASTOLIC BLOOD PRESSURE: 68 MMHG

## 2017-05-15 VITALS — DIASTOLIC BLOOD PRESSURE: 64 MMHG | SYSTOLIC BLOOD PRESSURE: 148 MMHG

## 2017-05-15 VITALS — SYSTOLIC BLOOD PRESSURE: 170 MMHG | DIASTOLIC BLOOD PRESSURE: 72 MMHG

## 2017-05-15 VITALS — DIASTOLIC BLOOD PRESSURE: 93 MMHG | SYSTOLIC BLOOD PRESSURE: 144 MMHG

## 2017-05-15 VITALS — SYSTOLIC BLOOD PRESSURE: 156 MMHG | DIASTOLIC BLOOD PRESSURE: 76 MMHG

## 2017-05-15 VITALS — DIASTOLIC BLOOD PRESSURE: 65 MMHG | SYSTOLIC BLOOD PRESSURE: 150 MMHG

## 2017-05-15 VITALS — DIASTOLIC BLOOD PRESSURE: 69 MMHG | SYSTOLIC BLOOD PRESSURE: 157 MMHG

## 2017-05-15 VITALS — DIASTOLIC BLOOD PRESSURE: 81 MMHG | SYSTOLIC BLOOD PRESSURE: 192 MMHG

## 2017-05-15 VITALS — DIASTOLIC BLOOD PRESSURE: 67 MMHG | SYSTOLIC BLOOD PRESSURE: 161 MMHG

## 2017-05-15 LAB
ALBUMIN SERPL BCG-MCNC: 2.3 GM/DL (ref 3.2–5.2)
ALBUMIN/GLOB SERPL: 0.61 {RATIO} (ref 1–1.93)
ALP SERPL-CCNC: 119 U/L (ref 45–117)
ALT SERPL W P-5'-P-CCNC: 12 U/L (ref 12–78)
ANION GAP SERPL CALC-SCNC: 6 MEQ/L (ref 8–16)
AST SERPL-CCNC: 12 U/L (ref 15–37)
BASOPHILS # BLD AUTO: 0 K/MM3 (ref 0–0.2)
BASOPHILS NFR BLD AUTO: 0.7 % (ref 0–1)
BILIRUB SERPL-MCNC: 0.3 MG/DL (ref 0.2–1)
BUN SERPL-MCNC: 50 MG/DL (ref 7–18)
CALCIUM SERPL-MCNC: 7.8 MG/DL (ref 8.8–10.2)
CHLORIDE SERPL-SCNC: 109 MEQ/L (ref 98–107)
CO2 SERPL-SCNC: 27 MEQ/L (ref 21–32)
CREAT SERPL-MCNC: 2.89 MG/DL (ref 0.7–1.3)
EOSINOPHIL # BLD AUTO: 0.4 K/MM3 (ref 0–0.5)
EOSINOPHIL NFR BLD AUTO: 5.6 % (ref 0–3)
ERYTHROCYTE [DISTWIDTH] IN BLOOD BY AUTOMATED COUNT: 14.7 % (ref 11.5–14.5)
GFR SERPL CREATININE-BSD FRML MDRD: 23 ML/MIN/{1.73_M2} (ref 42–?)
GLUCOSE SERPL-MCNC: 148 MG/DL (ref 83–110)
LARGE UNSTAINED CELL #: 0.1 K/MM3 (ref 0–0.4)
LARGE UNSTAINED CELL %: 1.6 % (ref 0–4)
LYMPHOCYTES # BLD AUTO: 0.9 K/MM3 (ref 1.5–4.5)
LYMPHOCYTES NFR BLD AUTO: 12.4 % (ref 24–44)
MAGNESIUM SERPL-MCNC: 2.2 MG/DL (ref 1.8–2.4)
MCH RBC QN AUTO: 27.1 PG (ref 27–33)
MCHC RBC AUTO-ENTMCNC: 31.2 G/DL (ref 32–36.5)
MCV RBC AUTO: 86.8 FL (ref 80–96)
MONOCYTES # BLD AUTO: 0.6 K/MM3 (ref 0–0.8)
MONOCYTES NFR BLD AUTO: 9.4 % (ref 0–5)
NEUTROPHILS # BLD AUTO: 4.7 K/MM3 (ref 1.8–7.7)
NEUTROPHILS NFR BLD AUTO: 70.3 % (ref 36–66)
PLATELET # BLD AUTO: 178 K/MM3 (ref 150–450)
POTASSIUM SERPL-SCNC: 4.8 MEQ/L (ref 3.5–5.1)
PROT SERPL-MCNC: 6.1 GM/DL (ref 6.4–8.2)
SODIUM SERPL-SCNC: 142 MEQ/L (ref 136–145)
WBC # BLD AUTO: 6.7 K/MM3 (ref 4–10)

## 2017-05-15 RX ADMIN — SILVER SULFADIAZINE SCH GM: 10 CREAM TOPICAL at 09:20

## 2017-05-15 RX ADMIN — INSULIN LISPRO SCH UNITS: 100 INJECTION, SOLUTION INTRAVENOUS; SUBCUTANEOUS at 12:15

## 2017-05-15 RX ADMIN — ASPIRIN 325 MG ORAL TABLET SCH MG: 325 PILL ORAL at 09:18

## 2017-05-15 RX ADMIN — NYSTATIN SCH DOSE: 100000 CREAM TOPICAL at 20:44

## 2017-05-15 RX ADMIN — ISOSORBIDE DINITRATE SCH MG: 20 TABLET ORAL at 14:25

## 2017-05-15 RX ADMIN — SODIUM CHLORIDE, PRESERVATIVE FREE SCH ML: 5 INJECTION INTRAVENOUS at 22:20

## 2017-05-15 RX ADMIN — FERROUS SULFATE TAB 325 MG (65 MG ELEMENTAL FE) SCH MG: 325 (65 FE) TAB at 09:19

## 2017-05-15 RX ADMIN — DEXTROSE MONOHYDRATE SCH MLS/HR: 50 INJECTION, SOLUTION INTRAVENOUS at 03:17

## 2017-05-15 RX ADMIN — DEXTROSE MONOHYDRATE SCH MLS/HR: 50 INJECTION, SOLUTION INTRAVENOUS at 15:42

## 2017-05-15 RX ADMIN — SODIUM CHLORIDE, PRESERVATIVE FREE SCH ML: 5 INJECTION INTRAVENOUS at 06:34

## 2017-05-15 RX ADMIN — ISOSORBIDE DINITRATE SCH MG: 20 TABLET ORAL at 22:20

## 2017-05-15 RX ADMIN — SODIUM CHLORIDE SCH ML: 9 INJECTION, SOLUTION INTRAMUSCULAR; INTRAVENOUS; SUBCUTANEOUS at 22:20

## 2017-05-15 RX ADMIN — SODIUM CHLORIDE, PRESERVATIVE FREE SCH ML: 5 INJECTION INTRAVENOUS at 14:00

## 2017-05-15 RX ADMIN — HYDRALAZINE HYDROCHLORIDE SCH MG: 25 TABLET, FILM COATED ORAL at 22:20

## 2017-05-15 RX ADMIN — SODIUM CHLORIDE SCH ML: 9 INJECTION, SOLUTION INTRAMUSCULAR; INTRAVENOUS; SUBCUTANEOUS at 14:25

## 2017-05-15 RX ADMIN — INSULIN LISPRO SCH UNITS: 100 INJECTION, SOLUTION INTRAVENOUS; SUBCUTANEOUS at 20:43

## 2017-05-15 RX ADMIN — HYDRALAZINE HYDROCHLORIDE SCH MG: 25 TABLET, FILM COATED ORAL at 08:03

## 2017-05-15 RX ADMIN — HYDRALAZINE HYDROCHLORIDE SCH MG: 25 TABLET, FILM COATED ORAL at 14:25

## 2017-05-15 RX ADMIN — OMEPRAZOLE SCH MG: 20 CAPSULE, DELAYED RELEASE ORAL at 09:18

## 2017-05-15 RX ADMIN — CLOPIDOGREL BISULFATE SCH MG: 75 TABLET ORAL at 09:18

## 2017-05-15 RX ADMIN — SODIUM CHLORIDE SCH ML: 9 INJECTION, SOLUTION INTRAMUSCULAR; INTRAVENOUS; SUBCUTANEOUS at 06:34

## 2017-05-15 RX ADMIN — ISOSORBIDE DINITRATE SCH MG: 20 TABLET ORAL at 08:03

## 2017-05-15 RX ADMIN — TORSEMIDE SCH MG: 20 TABLET ORAL at 10:49

## 2017-05-15 RX ADMIN — NYSTATIN SCH DOSE: 100000 CREAM TOPICAL at 09:20

## 2017-05-15 RX ADMIN — INSULIN LISPRO SCH UNITS: 100 INJECTION, SOLUTION INTRAVENOUS; SUBCUTANEOUS at 16:55

## 2017-05-15 RX ADMIN — INSULIN LISPRO SCH UNITS: 100 INJECTION, SOLUTION INTRAVENOUS; SUBCUTANEOUS at 08:04

## 2017-05-15 NOTE — IPNPDOC
Text Note


Date of Service


The patient was seen on 5/15/17.





NOTE


Subjective:


Patient is a 72 year old  male with a PMHx of DM2, HTN, DLP, Systolic 

CHF, AICD, CAD s/p CABG (Hx of MI),  PVD s/p b/l FemPop, and CKD3 who presented 

to the ER with SOB / CP. Was found to be in acute heart failure and was 

admitted to telemetry. 





Patient was subsequently upgraded to ICU on 5/12 because of encephalopathy and 

acute renal failure. 





Patient was seen and examined at the bedside today. He is conversive, has been 

tolerating his meals. no problems with bowel movements. No shortness of breath, 

chest pain or palpitations. 





Objective:


Vitals (See below)


General: Lying in bed, no acute distress, comfortable, AAOx3


HEENT: NC, AT


CVS: RRR, +S1S2


Lungs: Fair air entry b/l, no appreciable crackles


Abdomen: Soft, ND, NT, +BSx4


Extremities: +PPx4, - Edema, - Calf tenderness





Assessment and plan:


1. s/p Acute metabolic encephalopathy - likely 2/2 hypoglycemia, exacerbated by 

worsening renal function


- Patient had received a large dose of Levemir the night prior, was 

persistently hypoglycemic over the next 24 hours


- Currently he is awake and alert and conversive


- s/p Glucose checks 1 hour and s/p adjusted D50 schedule


- Glucose levels have not shown any hypoglycemic episodes 


- c/w current glucose checks and ISS; will continue to hold levemir 


- c/w consistent carb diet





2. s/p Acute hypoxic respiratory failure - possibly 2/2 aspiration, possibly 2/

2 acute decompensated CHF and pleural effusion


- No reported shortness of breath, cough


- No reported fevers or leukocytosis 


- No elevation in lactic acid


- CXR 5/15 - reveals improvement compared to 5/14


- c/w Ceftaroline (Day #3)





3. Acute kidney injury on CKD3 - possibly 2/2 pre-renal or renal etiology


- Patient had a period of anuria


- Cr has improved from initial peak, but remains elevated from baseline 


- Continues to make urine with adequate output 


- s/p Lasix 40 IV yesterday


- Dr. Guardado (Nephrology) on consult - appreciate their input 





4. s/p Hyperkalemia





5. DM2


- s/p hypoglycemia; will stop q1hour glucose checks


- s/p Levemir 


- c/w ISS for now





6. Abnormal UA


- Hematuria; likely 2/2 traumatic Jimenez insertion





7. s/p Bradycardia - likely 2/2 beta blocker


- s/p carvedilol 





8. s/p Hypotension - possibly 2/2 beta blocker





9. Symptomatic anemia / Normocytic anemia; possibly 2/2 LORI 


- Hg remains stable 


- occult blood pending 


- s/p 2 units PRBC transfusion 


- c/w ferrous sulfate





10. Chronic cholecystitis


- HIDA negative





11. Bilateral lower extremity wound ulcers - likely 2/2 DM2


- c/w wound care as outline by podiatry 


- Has completed 10 day course of antibiotics as outlined by podiatry; will 

continue for now (re: possible aspiration)





12. CAD s/p CABG


- c/w ASA and Plavix





13. Neuropathy


- s/p Lyrica





14. GERD


- c/w Omeprazole and Sucralfate





15. DVT prophylaxis


- c/w SCDs





Disposition:


- Will downgrade to PCU





VS,Filiberto, I+O


VS, Filiberto, I+O


Laboratory Tests


5/15/17 05:01








Red Blood Count 3.69 L, Mean Corpuscular Volume 86.8, Mean Corpuscular 

Hemoglobin 27.1, Mean Corpuscular Hemoglobin Concent 31.2 L, Red Cell 

Distribution Width 14.7 H, Neutrophils (%) (Auto) 70.3 H, Lymphocytes (%) (Auto

) 12.4 L, Monocytes (%) (Auto) 9.4 H, Eosinophils (%) (Auto) 5.6 H, Basophils (%

) (Auto) 0.7, Neutrophils # (Auto) 4.7, Lymphocytes # (Auto) 0.9 L, Monocytes # 

(Auto) 0.6, Eosinophils # (Auto) 0.4, Basophils # (Auto) 0.0, Calcium Level 7.8 

#L, Aspartate Amino Transf (AST/SGOT) 12 L, Alanine Aminotransferase (ALT/SGPT) 

12, Alkaline Phosphatase 119 H, Total Bilirubin 0.3, Total Protein 6.1 L, 

Albumin 2.3 #L








Vital Signs








  Date Time  Temp Pulse Resp B/P (MAP) Pulse Ox O2 Delivery O2 Flow Rate FiO2


 


5/15/17 09:42  77 18 161/67 (98) 94 Room Air  


 


5/15/17 07:55 98.2       


 


5/14/17 04:00       1.0 


 


5/12/17 08:00        100














I&O- Last 24 Hours up to 6 AM 


 


 5/15/17





 06:00


 


Intake Total 1075 ml


 


Output Total 3260 ml


 


Balance -2185 ml

















YOSELIN MCCRARY MD May 15, 2017 10:56

## 2017-05-15 NOTE — REP
PORTABLE CHEST:  Single view.

 

HISTORY:  CHF.

 

COMPARISON STUDY:  May 14, 2017.

 

FINDINGS:  The patient status post prior median sternotomy.  EKG monitoring

electrodes overlie the chest.  A unipolar pacemaker is seen in the right heart

via the left side.  The heart is mildly enlarged unchanged.  There is hazy

opacity at both bases, left greater than right suggesting small effusions.

Pulmonary vasculature is cephalized.  There is fluid in the minor fissure again

noted on the right.

 

IMPRESSION: Cardiomegaly with cephalization and hazy opacity in the bases

suggestive of effusions.  Pacemaker prior sternotomy.

 

 

Signed by

Christian Doll MD 05/15/2017 12:18 P

## 2017-05-15 NOTE — IPN
DATE:    05/15/2017

 

SUBJECTIVE:  Patient was seen and examined at the bedside this morning in the

intensive care unit (ICU).  He is significantly better.  He is much more awake

and alert.  He was actually sitting on the sofa getting ready to eat his

breakfast.  His renal function is stable.  He was given a dose of Lasix 40 mg IV.

He had a very good urine output with that; however, his creatinine is slightly

bumped from 2.7 to 2.8 today.

 

REVIEW OF SYSTEMS:  Patient denies any fever, chills, rigors, headache, nausea,

vomiting, or chest pain.  He denies any shortness of breath.  The patient denies

any pain in the abdomen, constipation, or diarrhea.  Rest of review of systems is

negative.

 

OBJECTIVE:

VITAL SIGNS:  Temperature is 98.2 degrees Fahrenheit, blood pressure is 161/67,

pulse is 77, respiratory 18, saturating 94% on room air.

INTAKE/OUTPUT:  Urine output recorded as 2.7 liters yesterday and 1 liter so far

today since overnight.

 

PHYSICAL EXAMINATION:

GENERAL:  Patient is awake, alert, oriented times three, sitting on the sofa.  No

apparent distress.

HEAD and NECK EXAM:  Extraocular muscles intact.  Pupils equally round and

reactive to light.  Mucous membranes are moist.  Neck is supple.  There is no

jugular venous distention (JVD).  The patient has a right-sided IJ triple lumen

catheter.

CARDIOVASCULAR:  S1, S2.  Regular rate.  He has a grade 3/6 systolic ejection

murmur.

RESPIRATORY:  Mildly decreased breath sounds at the bases and mild crepitations

on deep inspiration on bilateral bases.

ABDOMEN:  Soft, mildly distended, positive bowel sounds, nontender.  No ascites.

No organomegaly.  The patient has an indwelling Jimenez catheter as well.

EXTREMITIES:  No clubbing or cyanosis.  He has trace edema of the bilateral lower

extremities and his bilateral feet are wrapped in dressings.

CENTRAL NERVOUS SYSTEM (CNS):  He is oriented times three today.  No focal

neurological deficits.  Power is 5/5 in all extremities.

 

LABORATORY REVIEW:

CBC showed WBC 6.7, hemoglobin 10, platelets are 178.

 

BMP showed sodium 142, potassium 4.8, chloride 109, bicarbonate 27, BUN 50,

creatinine 2.8, calcium 7.8, magnesium is 2.2, albumin is 2.3.

 

IMAGING:  A chest x-ray done this morning showed cardiomegaly with cephalization

and hazy opacity in the base, suggestive of effusions, left greater than right.

 

 

CURRENT MEDICATIONS:  Patient's medications were all reviewed by me.  His

amlodipine has been stopped.  He was started on Coreg 3.125 mg by mouth twice a

day, hydralazine 25 mg by mouth every 8 hours, isosorbide 20 mg by mouth every 8

hours and I have started him on torsemide 20 mg by mouth daily.

ASSESSMENT:  72-year-old male with past medical history of diabetes mellitus type

2, chronic kidney disease stage III, congestive heart failure, and recent

diabetic foot ulcer, admitted to intensive care unit (ICU) because of altered

mental status, hypotension and acute renal failure in the setting of

decompensated congestive heart failure (CHF) as well.

 

 

PLAN:

1.  Acute kidney injury superimposed on chronic kidney disease.  It was most

likely secondary to Bactrim and high dose of diuretics.  The patient's renal

function is improving now.  He was still in congestive heart failure (CHF)

yesterday.  He was given a dose of Lasix 40 mg intravenously.  He had a good

urine output; however, there was a slight bump in the creatinine.  His creatinine

is 2.8 at this time.  His baseline creatinine is around 2.  Continue to monitor

for improvement of renal function.

 

2.  Congestive heart failure.  Patient does not look clinically overloaded, but

his x-rays still show cephalization of pulmonary vessels on the chest x-ray.  I

have started the patient on torsemide 20 mg by mouth daily.  The dose will be

adjusted as needed.

 

3. Hypernatremia.  The patient's hypernatremia is significantly better.  Sodium

is 142.

 

4.  Hypertension.  The patient was already seen by cardiology this morning.  They

have stopped amlodipine.  The patient has been switched to hydralazine,

isosorbide and Coreg.  Blood pressure is improving right now.  Management of

antihypertensive regimen is as per cardiology team.

 

5.  Metabolic encephalopathy.  The patient is significantly better at this time.

He is oriented times three.  There is much clinical improvement at this time.

 

6.  Iron deficiency anemia.  The patient's iron levels are low, but because of

acute infection, diabetic foot ulcers, possible recent pulmonary infection, he

cannot get IV iron.  Continue oral iron at this time.  Hemoglobin is 10.  No need

of Aranesp administration or IV packed red blood cell transfusion at this time.

 

 

Plan of care was discussed with the patient's RN at the bedside.

## 2017-05-16 VITALS — DIASTOLIC BLOOD PRESSURE: 65 MMHG | SYSTOLIC BLOOD PRESSURE: 140 MMHG

## 2017-05-16 VITALS — DIASTOLIC BLOOD PRESSURE: 70 MMHG | SYSTOLIC BLOOD PRESSURE: 164 MMHG

## 2017-05-16 VITALS — SYSTOLIC BLOOD PRESSURE: 170 MMHG | DIASTOLIC BLOOD PRESSURE: 73 MMHG

## 2017-05-16 VITALS — SYSTOLIC BLOOD PRESSURE: 175 MMHG | DIASTOLIC BLOOD PRESSURE: 74 MMHG

## 2017-05-16 VITALS — SYSTOLIC BLOOD PRESSURE: 133 MMHG | DIASTOLIC BLOOD PRESSURE: 59 MMHG

## 2017-05-16 VITALS — DIASTOLIC BLOOD PRESSURE: 73 MMHG | SYSTOLIC BLOOD PRESSURE: 191 MMHG

## 2017-05-16 VITALS — SYSTOLIC BLOOD PRESSURE: 175 MMHG | DIASTOLIC BLOOD PRESSURE: 73 MMHG

## 2017-05-16 LAB
ALBUMIN SERPL BCG-MCNC: 2.3 GM/DL (ref 3.2–5.2)
ALBUMIN/GLOB SERPL: 0.64 {RATIO} (ref 1–1.93)
ALP SERPL-CCNC: 114 U/L (ref 45–117)
ALT SERPL W P-5'-P-CCNC: 11 U/L (ref 12–78)
ANION GAP SERPL CALC-SCNC: 4 MEQ/L (ref 8–16)
AST SERPL-CCNC: 12 U/L (ref 15–37)
BASOPHILS # BLD AUTO: 0 K/MM3 (ref 0–0.2)
BASOPHILS NFR BLD AUTO: 0.5 % (ref 0–1)
BILIRUB SERPL-MCNC: 0.2 MG/DL (ref 0.2–1)
BUN SERPL-MCNC: 53 MG/DL (ref 7–18)
CA-I BLD-MCNC: 4.6 MG/DL (ref 4.5–5.3)
CALCIUM SERPL-MCNC: 7.8 MG/DL (ref 8.8–10.2)
CHLORIDE SERPL-SCNC: 110 MEQ/L (ref 98–107)
CO2 SERPL-SCNC: 29 MEQ/L (ref 21–32)
CREAT SERPL-MCNC: 2.71 MG/DL (ref 0.7–1.3)
EOSINOPHIL # BLD AUTO: 0.4 K/MM3 (ref 0–0.5)
EOSINOPHIL NFR BLD AUTO: 6.9 % (ref 0–3)
ERYTHROCYTE [DISTWIDTH] IN BLOOD BY AUTOMATED COUNT: 14.8 % (ref 11.5–14.5)
GFR SERPL CREATININE-BSD FRML MDRD: 24.8 ML/MIN/{1.73_M2} (ref 42–?)
GLUCOSE SERPL-MCNC: 182 MG/DL (ref 83–110)
LARGE UNSTAINED CELL #: 0.1 K/MM3 (ref 0–0.4)
LARGE UNSTAINED CELL %: 1.7 % (ref 0–4)
LYMPHOCYTES # BLD AUTO: 1.1 K/MM3 (ref 1.5–4.5)
LYMPHOCYTES NFR BLD AUTO: 15.4 % (ref 24–44)
MAGNESIUM SERPL-MCNC: 2.2 MG/DL (ref 1.8–2.4)
MCH RBC QN AUTO: 27.7 PG (ref 27–33)
MCHC RBC AUTO-ENTMCNC: 31.6 G/DL (ref 32–36.5)
MCV RBC AUTO: 87.8 FL (ref 80–96)
MONOCYTES # BLD AUTO: 0.6 K/MM3 (ref 0–0.8)
MONOCYTES NFR BLD AUTO: 9.8 % (ref 0–5)
NEUTROPHILS # BLD AUTO: 4.1 K/MM3 (ref 1.8–7.7)
NEUTROPHILS NFR BLD AUTO: 65.7 % (ref 36–66)
PHOSPHATE SERPL-MCNC: 3.3 MG/DL (ref 2.5–4.9)
PLATELET # BLD AUTO: 183 K/MM3 (ref 150–450)
POTASSIUM SERPL-SCNC: 5.2 MEQ/L (ref 3.5–5.1)
PROT SERPL-MCNC: 5.9 GM/DL (ref 6.4–8.2)
SODIUM SERPL-SCNC: 143 MEQ/L (ref 136–145)
WBC # BLD AUTO: 6.2 K/MM3 (ref 4–10)

## 2017-05-16 RX ADMIN — OMEPRAZOLE SCH MG: 20 CAPSULE, DELAYED RELEASE ORAL at 08:25

## 2017-05-16 RX ADMIN — ASPIRIN 325 MG ORAL TABLET SCH MG: 325 PILL ORAL at 08:24

## 2017-05-16 RX ADMIN — INSULIN LISPRO SCH UNITS: 100 INJECTION, SOLUTION INTRAVENOUS; SUBCUTANEOUS at 17:13

## 2017-05-16 RX ADMIN — NYSTATIN SCH DOSE: 100000 CREAM TOPICAL at 08:35

## 2017-05-16 RX ADMIN — ACETAMINOPHEN PRN MG: 325 TABLET ORAL at 21:07

## 2017-05-16 RX ADMIN — CLOPIDOGREL BISULFATE SCH MG: 75 TABLET ORAL at 08:25

## 2017-05-16 RX ADMIN — NYSTATIN SCH DOSE: 100000 CREAM TOPICAL at 20:23

## 2017-05-16 RX ADMIN — HYDRALAZINE HYDROCHLORIDE SCH MG: 50 TABLET, FILM COATED ORAL at 13:41

## 2017-05-16 RX ADMIN — INSULIN LISPRO SCH UNITS: 100 INJECTION, SOLUTION INTRAVENOUS; SUBCUTANEOUS at 20:23

## 2017-05-16 RX ADMIN — INSULIN LISPRO SCH UNITS: 100 INJECTION, SOLUTION INTRAVENOUS; SUBCUTANEOUS at 08:27

## 2017-05-16 RX ADMIN — SILVER SULFADIAZINE SCH GM: 10 CREAM TOPICAL at 09:00

## 2017-05-16 RX ADMIN — ISOSORBIDE DINITRATE SCH MG: 20 TABLET ORAL at 21:07

## 2017-05-16 RX ADMIN — INSULIN LISPRO SCH UNITS: 100 INJECTION, SOLUTION INTRAVENOUS; SUBCUTANEOUS at 12:36

## 2017-05-16 RX ADMIN — TORSEMIDE SCH MG: 20 TABLET ORAL at 08:25

## 2017-05-16 RX ADMIN — DEXTROSE MONOHYDRATE SCH MLS/HR: 50 INJECTION, SOLUTION INTRAVENOUS at 04:59

## 2017-05-16 RX ADMIN — SODIUM CHLORIDE SCH ML: 9 INJECTION, SOLUTION INTRAMUSCULAR; INTRAVENOUS; SUBCUTANEOUS at 21:08

## 2017-05-16 RX ADMIN — ISOSORBIDE DINITRATE SCH MG: 20 TABLET ORAL at 05:07

## 2017-05-16 RX ADMIN — SILVER SULFADIAZINE SCH GM: 10 CREAM TOPICAL at 08:35

## 2017-05-16 RX ADMIN — SODIUM CHLORIDE SCH ML: 9 INJECTION, SOLUTION INTRAMUSCULAR; INTRAVENOUS; SUBCUTANEOUS at 05:08

## 2017-05-16 RX ADMIN — HYDRALAZINE HYDROCHLORIDE SCH MG: 25 TABLET, FILM COATED ORAL at 05:07

## 2017-05-16 RX ADMIN — FERROUS SULFATE TAB 325 MG (65 MG ELEMENTAL FE) SCH MG: 325 (65 FE) TAB at 08:25

## 2017-05-16 RX ADMIN — ISOSORBIDE DINITRATE SCH MG: 20 TABLET ORAL at 13:41

## 2017-05-16 RX ADMIN — HYDRALAZINE HYDROCHLORIDE SCH MG: 50 TABLET, FILM COATED ORAL at 21:07

## 2017-05-16 RX ADMIN — SODIUM CHLORIDE SCH ML: 9 INJECTION, SOLUTION INTRAMUSCULAR; INTRAVENOUS; SUBCUTANEOUS at 13:42

## 2017-05-16 NOTE — IPNPDOC
Subjective


Date Seen


The patient was seen on 5/16/17.





Subjective


Chief Complaint/HPI


The patient is a 72-year-old male admitted with a reason for visit of Acute On 

Chronic Systolic(Congestive)Heart Failure.


General:  Denies: Chills, Night Sweats


Constitutional:  Denies: Chills, Fever


Eyes:  Denies: Pain, Vision change


ENT:  Denies: Head Aches, Ear Pain


Skin:  Denies: Rash, Lesions


Pulmonary:  Denies: Dyspnea, Cough


Cardiovascular:  Denies: Chest Pain, Palpitations


Gastrointestinal:  Denies: Nausea, Vomiting


Genitourinary:  Denies: Dysuria, Frequency


Hematologic:  Denies: Bruising, Bleeding Excessively


Musculoskeletal:  Denies: Neck Pain, Back Pain





Objective


Physical Examination


General Exam:  Positive: Alert, Cooperative, No Acute Distress


ENT Exam:  Positive: Atraumatic, Mucous membr. moist/pink


Neck Exam:  Negative: JVD


Chest Exam:  Positive: Clear to auscultation, Normal air movement, 


   Negative: Rales


Heart Exam:  Positive: Rate Normal, Normal S1, Normal S2


Telemetry:  Positive: Sinus


Abdomen Exam:  Positive: Soft, 


   Negative: Tenderness


Extremity Exam:  Negative: Tenderness, Swelling





Assessment /Plan


Plan/VTE


VTE Prophylaxis Ordered?:  Yes





Plan/Urinary Catheter


Reason for insertion/continuin:  Critical Pt monitoring





Plan


s/p Acute hypoxic respiratory failure 2/2 acute decompensated CHF and pleural 

effusion, resolved


Patient has diuresed a net negative of ~2.3L of fluid here since admission


Appears more euvolemic, and is comfortably breathing % on Room Air


Volume status has been optimized with diuretic dose titrating with the 

assistance of Nephrology


Cardiology input appreciated, and the patient's blood pressure medications have 

been adjusted for treatment of underlying LV systolic dysfunction


2D ECHO notable for LV Systolic dysfunction with an EF of 30-35%


Cont Daily weights, I/O's


We will continue to monitor the patient's status





Acute kidney injury Superimposed on Chronic Kidney Disease Stage 3A-B 


Likely 2/2 Diuretic usage, use of Bactrim as o/p


Serum Creatinine 3.8-->2.7 (Baseline ~2.0)


Patient on Torsemide 20mg as per Nephro


We will continue to monitor renal function


Dr. Guardado on board





Insulin Dependent Diabetes Mellitus


Patient on high doses of Lantus at home, however they have been held here after 

the patient was noted to be hypoglycemic here


Continue ISS for now-->We will add long acting regimen back if the patient's 

blood sugar goes up





Hypertension


Cont current regimen





Bilateral lower extremity wound ulcers


s/p Completion of Abx Trial--no active infective component recognized here





CAD s/p CABG


Cont ASA and Plavix, Coreg





Neuropathy


Lyrica





GERD


Omeprazole and Sucralfate





DVT prophylaxis


SCDs





Disposition--We will continue to optimize the patient's Cardiac and Renal 

status as he does appear to be improving significantly over the last few days. 

PT evaluation ordered. Anticipate D/C in the next 48-72 hrs pending clinical 

improvement.





VS, I&O, 24H, Fishbone


Vital Signs/I&O





Vital Signs








  Date Time  Temp Pulse Resp B/P (MAP) Pulse Ox O2 Delivery O2 Flow Rate FiO2


 


5/16/17 08:25  67  140/65    


 


5/16/17 08:00 99.3  18  100 Room Air  


 


5/14/17 04:00       1.0 


 


5/12/17 08:00        100














I&O- Last 24 Hours up to 6 AM 


 


 5/16/17





 06:00


 


Intake Total 940 ml


 


Output Total 1070 ml


 


Balance -130 ml











Laboratory Data


24H LABS


Laboratory Tests 2


5/15/17 16:45: Bedside Glucose (Misc Panel) 260H


5/15/17 20:42: Bedside Glucose (Misc Panel) 192H


5/16/17 05:02: 


White Blood Count 6.2, Red Blood Count 3.51L, Hemoglobin 9.7L, Hematocrit 30.8L

, Mean Corpuscular Volume 87.8, Mean Corpuscular Hemoglobin 27.7, Mean 

Corpuscular Hemoglobin Concent 31.6L, Red Cell Distribution Width 14.8H, 

Platelet Count 183, Neutrophils (%) (Auto) 65.7, Lymphocytes (%) (Auto) 15.4L, 

Monocytes (%) (Auto) 9.8H, Eosinophils (%) (Auto) 6.9H, Basophils (%) (Auto) 0.5

, Neutrophils # (Auto) 4.1, Lymphocytes # (Auto) 1.1L, Monocytes # (Auto) 0.6, 

Eosinophils # (Auto) 0.4, Basophils # (Auto) 0.0, Large Unclassified Cells % 1.7

, Large Unclassified Cells # 0.1, Anion Gap 4L, Glomerular Filtration Rate 24.8L

, Blood Urea Nitrogen 53H, Creatinine 2.71H, Sodium Level 143, Potassium Level 

5.2H, Chloride Level 110H, Carbon Dioxide Level 29, Calcium Level 7.8L, 

Aspartate Amino Transf (AST/SGOT) 12L, Alanine Aminotransferase (ALT/SGPT) 11L, 

Alkaline Phosphatase 114, Total Bilirubin 0.2, Total Protein 5.9L, Albumin 2.3L

, Whole Blood Ionized Calcium 4.6, Phosphorus Level 3.3, Magnesium Level 2.2, 

Albumin/Globulin Ratio 0.64L


CBC/BMP


Laboratory Tests


5/16/17 05:02








Red Blood Count 3.51 L, Mean Corpuscular Volume 87.8, Mean Corpuscular 

Hemoglobin 27.7, Mean Corpuscular Hemoglobin Concent 31.6 L, Red Cell 

Distribution Width 14.8 H, Neutrophils (%) (Auto) 65.7, Lymphocytes (%) (Auto) 

15.4 L, Monocytes (%) (Auto) 9.8 H, Eosinophils (%) (Auto) 6.9 H, Basophils (%) 

(Auto) 0.5, Neutrophils # (Auto) 4.1, Lymphocytes # (Auto) 1.1 L, Monocytes # (

Auto) 0.6, Eosinophils # (Auto) 0.4, Basophils # (Auto) 0.0, Calcium Level 7.8 L

, Aspartate Amino Transf (AST/SGOT) 12 L, Alanine Aminotransferase (ALT/SGPT) 

11 L, Alkaline Phosphatase 114, Total Bilirubin 0.2, Total Protein 5.9 L, 

Albumin 2.3 L











JUVE PRITCHETT MD May 16, 2017 12:48

## 2017-05-16 NOTE — IPN
DATE:  05/16/2017

 

SUBJECTIVE:  Patient was seen and examined at the bedside today in the morning.

He is much more awake and alert.  He is hemodynamically stable.  Blood pressure

has been improving.  Renal function is almost stable as compared with yesterday.

Patient has a good urine output.

 

REVIEW OF SYSTEMS:  Patient denies any fever, chills, rigors, headache, nausea,

vomiting, chest pain, shortness of breath, pain in abdomen, constipation, or

diarrhea.  Rest of review of systems is negative.

 

OBJECTIVE:

VITAL SIGNS:  Temperature 99.3 degrees Fahrenheit, blood pressure 140/65, pulse

67, respiratory rate of 18, saturating at 100% on room air.

INTAKE and OUTPUT:  Urine output recorded was 1.3 liters yesterday, 600 mL so far

today since overnight.  Weight in the bed scale is 93.1 kg.

 

PHYSICAL EXAMINATION:

GENERAL:  Patient is awake, alert, oriented times three, laying in bed, no

apparent distress.

HEAD and NECK EXAM:  Extraocular muscles intact.  Pupils equally round and

reactive to light.  Mucous membranes are moist.  Neck is supple, there is no

jugular venous distention (JVD).  Patient has a triple lumen catheter in the

right internal jugular (IJ).

CARDIOVASCULAR:  S1, S2, regular rate.  He has a grade 3/6 systolic ejection

murmur.

RESPIRATORY:  Mildly decreased breath sounds at the bases and mild crepitations

at the bases on deep inspiration.

ABDOMEN:  Soft, positive bowel sounds, nontender.  No ascites.  No organomegaly.

Jimenez catheter has been removed today.

EXTREMITIES:  No clubbing or cyanosis.  He has trace edema of the bilateral lower

extremities and bilateral feet are wrapped in dressing.

CENTRAL NERVOUS SYSTEM (CNS):  No focal neurological deficit.  Power is 5/5.  He

is oriented times three.

 

LABORATORY REVIEW:

CBC showed a WBC 6.2, hemoglobin 9.7, platelets are 183.

 

BMP showed sodium 143, potassium 5.2, chloride 110, bicarbonate 29, BUN 53,

creatinine 2.7, calcium 7.8, ionized calcium 4.6, phosphorous 3.3, albumin 2.3.

 

Immunology:  KING screen is negative.

 

CURRENT MEDICATIONS:  Patient's medications were all reviewed by me.  His

ceftaroline has been stopped.  Hydralazine dose has been increased to 50 mg by

mouth every 8 hours.  Patient was given another dose of Kayexalate 30 grams by

mouth times one dose and patient continues to be on torsemide 20 mg by mouth

daily.

 

ASSESSMENT:  72-year-old male with past medical history of diabetes mellitus type

2, chronic kidney disease stage III, congestive heart failure, and recent

diabetic foot ulcers, admitted at this time to intensive care unit (ICU) because

of altered mental status, hypotension, and acute renal failure.

 

PLAN:

1.  Acute kidney injury superimposed on chronic kidney disease stage III.

Patient's baseline creatinine is around 2.  His renal function is improving.  I

have started a low dose of torsemide 20 mg by mouth daily.  Continue to monitor

for improvement of renal function.

 

2.  Congestive heart failure.  Patient is clinically euvolemic, however chest

xrays do show pulmonary vascular congestion.  He was started on torsemide,

continue the dose for now.  Once renal function gets better, I would increase the

dose of torsemide to 40 mg by mouth daily.  I would repeat another chest xray in

the morning.

 

3.   Hypertension.  Patient's blood pressures are still sporadically elevated.

Continue current dose of Coreg and isosorbide.  Hydralazine was already increased

by cardiology to 50 mg every 8 hours.  Rest of the management of

antihypertensives is as per cardiology.

 

4.  Iron deficiency anemia.  Patient's hemoglobin is 9.7.  IV iron was not given

because of acute infection and diabetic foot ulcers.  Continue the oral iron

tablets at this time.

 

5.  Hyperkalemia.  Patient's potassium is slightly elevated at 5.2 today, it

should get better with improvement of kidney function.  I have changed the diet

to low potassium diet.  Patient was already given a dose of Kayexalate today in

the morning.

## 2017-05-16 NOTE — IPN
DATE:  05/16/2017

 

Mr. Dunlap had good night.  He tells me that he did not have any difficulty

sleeping and he feels that his breathing is fairly nonlabored.  He has no

specific complaints.  Review of telemetry monitoring did not reveal any

arrhythmias overnight.

 

VITAL SIGNS: Blood pressure 175/74, heart rate in 60s and 70s.  He is afebrile.

Saturation 94% on room air.  Documented weight 93.1 kg.  His fluid balance

yesterday was felt to be about 440 negative.  His CVP yesterday morning was only

8 though.  He is alert and oriented and appropriate.  His JVP does not appear

elevated.  Lungs are clear to auscultation with good air movement.  Heart exam

reveals regular rhythm.  I do not appreciate gallop, rub or murmur.  Abdomen is

soft and nontender.  There is trace peripheral edema.  Neurologically, he appears

intact.

 

Laboratory wise, his CBC reveals WBC count 6.2, hemoglobin 9.7, hematocrit 30.8

and platelet count 183,000.  Basic metabolic panel reveals potassium 5.2, BUN 53,

creatinine 2.7 for a GFR of 25 and glucose 182.  His albumin is 2.3.

 

ASSESSMENT/PLAN:

Mr. Dunlap is a 72-year-old man who has severe ischemic cardiomyopathy with

severe LV systolic dysfunction and secondary pulmonary hypertension.  He came

with altered mental status, likely due to combination of factors that have been

clearing slowly.  He also has acute on chronic renal failure.  I am going to

advance the dose of hydralazine today as he still remains hypertensive.  I will

leave the dose of beta-blocker unchanged as he was initially bradycardic and also

because his potassium is marginally elevated.  The dosing of diuretics remains

with the nephrology service.  He has been clinically improving.  I think we can

advance his ambulation.  I am hoping that he will be able to go home within a

couple three days.

## 2017-05-17 VITALS — SYSTOLIC BLOOD PRESSURE: 172 MMHG | DIASTOLIC BLOOD PRESSURE: 74 MMHG

## 2017-05-17 VITALS — SYSTOLIC BLOOD PRESSURE: 163 MMHG | DIASTOLIC BLOOD PRESSURE: 70 MMHG

## 2017-05-17 VITALS — DIASTOLIC BLOOD PRESSURE: 85 MMHG | SYSTOLIC BLOOD PRESSURE: 192 MMHG

## 2017-05-17 VITALS — SYSTOLIC BLOOD PRESSURE: 143 MMHG | DIASTOLIC BLOOD PRESSURE: 77 MMHG

## 2017-05-17 VITALS — DIASTOLIC BLOOD PRESSURE: 77 MMHG | SYSTOLIC BLOOD PRESSURE: 168 MMHG

## 2017-05-17 VITALS — SYSTOLIC BLOOD PRESSURE: 135 MMHG | DIASTOLIC BLOOD PRESSURE: 65 MMHG

## 2017-05-17 LAB
ALBUMIN SERPL BCG-MCNC: 2.5 GM/DL (ref 3.2–5.2)
ALBUMIN/GLOB SERPL: 0.69 {RATIO} (ref 1–1.93)
ALP SERPL-CCNC: 110 U/L (ref 45–117)
ALT SERPL W P-5'-P-CCNC: 12 U/L (ref 12–78)
ANION GAP SERPL CALC-SCNC: 6 MEQ/L (ref 8–16)
AST SERPL-CCNC: 8 U/L (ref 15–37)
BASOPHILS # BLD AUTO: 0 K/MM3 (ref 0–0.2)
BASOPHILS NFR BLD AUTO: 0.7 % (ref 0–1)
BILIRUB SERPL-MCNC: 0.2 MG/DL (ref 0.2–1)
BUN SERPL-MCNC: 49 MG/DL (ref 7–18)
CALCIUM SERPL-MCNC: 8.1 MG/DL (ref 8.8–10.2)
CHLORIDE SERPL-SCNC: 110 MEQ/L (ref 98–107)
CO2 SERPL-SCNC: 28 MEQ/L (ref 21–32)
CREAT SERPL-MCNC: 2.52 MG/DL (ref 0.7–1.3)
EOSINOPHIL # BLD AUTO: 0.5 K/MM3 (ref 0–0.5)
EOSINOPHIL NFR BLD AUTO: 9 % (ref 0–3)
ERYTHROCYTE [DISTWIDTH] IN BLOOD BY AUTOMATED COUNT: 14.8 % (ref 11.5–14.5)
GFR SERPL CREATININE-BSD FRML MDRD: 27 ML/MIN/{1.73_M2} (ref 42–?)
GLUCOSE SERPL-MCNC: 137 MG/DL (ref 83–110)
LARGE UNSTAINED CELL #: 0.2 K/MM3 (ref 0–0.4)
LARGE UNSTAINED CELL %: 3.5 % (ref 0–4)
LYMPHOCYTES # BLD AUTO: 1 K/MM3 (ref 1.5–4.5)
LYMPHOCYTES NFR BLD AUTO: 19.6 % (ref 24–44)
Lab: 19.3 NG/ML
MAGNESIUM SERPL-MCNC: 2.3 MG/DL (ref 1.8–2.4)
MCH RBC QN AUTO: 28 PG (ref 27–33)
MCHC RBC AUTO-ENTMCNC: 31.3 G/DL (ref 32–36.5)
MCV RBC AUTO: 89.4 FL (ref 80–96)
MONOCYTES # BLD AUTO: 0.5 K/MM3 (ref 0–0.8)
MONOCYTES NFR BLD AUTO: 9.4 % (ref 0–5)
NEUTROPHILS # BLD AUTO: 3.1 K/MM3 (ref 1.8–7.7)
NEUTROPHILS NFR BLD AUTO: 57.9 % (ref 36–66)
PLATELET # BLD AUTO: 174 K/MM3 (ref 150–450)
POTASSIUM SERPL-SCNC: 4.7 MEQ/L (ref 3.5–5.1)
PROT SERPL-MCNC: 6.1 GM/DL (ref 6.4–8.2)
SODIUM SERPL-SCNC: 144 MEQ/L (ref 136–145)
WBC # BLD AUTO: 5.3 K/MM3 (ref 4–10)

## 2017-05-17 PROCEDURE — 0JCR0ZZ EXTIRPATION OF MATTER FROM LEFT FOOT SUBCUTANEOUS TISSUE AND FASCIA, OPEN APPROACH: ICD-10-PCS | Performed by: PODIATRIST

## 2017-05-17 PROCEDURE — 0JCQ0ZZ EXTIRPATION OF MATTER FROM RIGHT FOOT SUBCUTANEOUS TISSUE AND FASCIA, OPEN APPROACH: ICD-10-PCS | Performed by: PODIATRIST

## 2017-05-17 RX ADMIN — FERROUS SULFATE TAB 325 MG (65 MG ELEMENTAL FE) SCH MG: 325 (65 FE) TAB at 08:14

## 2017-05-17 RX ADMIN — INSULIN LISPRO SCH UNITS: 100 INJECTION, SOLUTION INTRAVENOUS; SUBCUTANEOUS at 20:50

## 2017-05-17 RX ADMIN — ASPIRIN 325 MG ORAL TABLET SCH MG: 325 PILL ORAL at 08:13

## 2017-05-17 RX ADMIN — SODIUM CHLORIDE SCH ML: 9 INJECTION, SOLUTION INTRAMUSCULAR; INTRAVENOUS; SUBCUTANEOUS at 14:32

## 2017-05-17 RX ADMIN — INSULIN LISPRO SCH UNITS: 100 INJECTION, SOLUTION INTRAVENOUS; SUBCUTANEOUS at 12:12

## 2017-05-17 RX ADMIN — SODIUM CHLORIDE SCH ML: 9 INJECTION, SOLUTION INTRAMUSCULAR; INTRAVENOUS; SUBCUTANEOUS at 22:00

## 2017-05-17 RX ADMIN — ISOSORBIDE DINITRATE SCH MG: 20 TABLET ORAL at 05:04

## 2017-05-17 RX ADMIN — NYSTATIN SCH DOSE: 100000 CREAM TOPICAL at 08:15

## 2017-05-17 RX ADMIN — HYDRALAZINE HYDROCHLORIDE SCH MG: 50 TABLET, FILM COATED ORAL at 05:04

## 2017-05-17 RX ADMIN — ISOSORBIDE DINITRATE SCH MG: 20 TABLET ORAL at 21:01

## 2017-05-17 RX ADMIN — OMEPRAZOLE SCH MG: 20 CAPSULE, DELAYED RELEASE ORAL at 08:13

## 2017-05-17 RX ADMIN — ACETAMINOPHEN PRN MG: 325 TABLET ORAL at 21:01

## 2017-05-17 RX ADMIN — SILVER SULFADIAZINE SCH GM: 10 CREAM TOPICAL at 09:00

## 2017-05-17 RX ADMIN — ISOSORBIDE DINITRATE SCH MG: 20 TABLET ORAL at 14:33

## 2017-05-17 RX ADMIN — HYDRALAZINE HYDROCHLORIDE SCH MG: 25 TABLET, FILM COATED ORAL at 21:01

## 2017-05-17 RX ADMIN — INSULIN LISPRO SCH UNITS: 100 INJECTION, SOLUTION INTRAVENOUS; SUBCUTANEOUS at 08:14

## 2017-05-17 RX ADMIN — TORSEMIDE SCH MG: 20 TABLET ORAL at 08:13

## 2017-05-17 RX ADMIN — HYDRALAZINE HYDROCHLORIDE SCH MG: 25 TABLET, FILM COATED ORAL at 14:33

## 2017-05-17 RX ADMIN — SODIUM CHLORIDE SCH ML: 9 INJECTION, SOLUTION INTRAMUSCULAR; INTRAVENOUS; SUBCUTANEOUS at 05:04

## 2017-05-17 RX ADMIN — INSULIN LISPRO SCH UNITS: 100 INJECTION, SOLUTION INTRAVENOUS; SUBCUTANEOUS at 16:50

## 2017-05-17 RX ADMIN — CLOPIDOGREL BISULFATE SCH MG: 75 TABLET ORAL at 08:13

## 2017-05-17 RX ADMIN — NYSTATIN SCH DOSE: 100000 CREAM TOPICAL at 21:02

## 2017-05-17 NOTE — CR
DATE OF CONSULTATION: 05/17/2017

 

REASON FOR CONSULTATION: Foot wounds.

 

Patient seen and examined at bedside. He has been admitted for shortness of

breath and congestive heart failure (CHF) exacerbation. He has been having

wet-to-dry dressings placed on wounds. Denies much pain in his feet.

 

PAST MEDICAL HISTORY:

Significant for:

1.  Diabetes.

2.  Hypertension.

3.  Hyperlipidemia.

4.  Systolic CHF.

5.  Automatic implantable cardioverter-defibrillator (AICD).

6.  Coronary artery disease.

7.  History of myocardial infarction (MI0.

8.  Coronary artery bypass graft (CABG).

9.  Peripheral vascular disease (PVD).

10.  Chronic kidney disease.

 

PAST SURGICAL HISTORY:

Significant for:

1.  Coronary stents.

2.  Bilateral femoral-popliteal bypass.

3.  Coronary artery bypass graft and AICD placement.

 

SOCIAL HISTORY: Denies current smoking or alcohol use.

 

ALLERGIES: [place].

 

REVIEW OF SYSTEMS: Patient is denying nausea, vomiting, fever, chills. States he

is breathing better since admission. Denies pain in feet.

 

HOME MEDICATIONS: Include aspirin, carvedilol, clopidogrel, insulin, losartan,

metoprolol, omeprazole, Lyrica, spironolactone, sucralfate, torsemide, and

Bactrim.

 

VITAL SIGNS: Patient has been afebrile. White blood cell count is 5.3. Lower

extremity examination: Pulses are palpable. Minimal edema to both feet. Wounds

are inspected. The forefoot wounds have all closed. The heel wounds are improved

in nature compared to last seen in office. The left foot heel wound is granular.

No surrounding erythema. The right heel wound has some fibrotic tissue with

granular tissue surrounding.

 

ASSESSMENT:

A 72-year-old male with bilateral feet wounds.

 

PLAN: Wounds are sharply debrided with scalpel, including subcutaneous tissue.

Start dressing changes, calcium alginate for drainage. Cover with Allevyn border

or dry gauze dressing. He may weight bear as tolerated. Patient will followup

with me next week once he is discharged here.

## 2017-05-17 NOTE — IPN
DATE:   05/17/2017

 

SUBJECTIVE:  Patient was seen and examined at the bedside today in the morning in

the intensive care unit (ICU). He was actually getting physical therapy (PT) in

intensive care unit (ICU) when I saw the patient.  The patient is much more awake

and alert.  His renal function continues to improve.  He denies any shortness of

breath at this time.

 

REVIEW OF SYSTEMS:  Patient denies any fever, chills, rigors, headache, nausea,

vomiting, chest pain, shortness of breath, pain in abdomen, constipation, or

diarrhea.  Rest of review of systems is negative.

 

OBJECTIVE:

VITAL SIGNS:  Temperature 98.4 degrees Fahrenheit, blood pressure 163/70, pulse

is 72, respiratory rate of 18, saturating at 95% on room air.

INTAKE and OUTPUT:  Urine output recorded is 1100 mL yesterday and 400 mL so far

today since overnight.  Weight in the bed scale is 91.9 kg.

 

PHYSICAL EXAMINATION:

GENERAL:  Patient is awake, alert, oriented times three, sitting in the bed and

in no apparent distress.

HEAD and NECK EXAM:  Extraocular muscles intact.  Pupils equally round and

reactive to light.  Mucous membranes are moist.  Neck is supple, there is no

jugular venous distention (JVD).  Patient has a triple lumen catheter in the

right internal jugular (IJ).

CARDIOVASCULAR:  S1, S2, regular rate.  The patient has a grade 2/6 systolic

ejection systolic murmur.

RESPIRATORY:  Chest is clear to auscultation bilaterally.  Bilateral equal air

entry.  No rales or rhonchi.

ABDOMEN:  Soft, positive bowel sounds, nontender.  No ascites.  No organomegaly.

 

EXTREMITIES:  No clubbing or cyanosis.  The patient has dressings on the

bilateral feet.

CENTRAL NERVOUS SYSTEM (CNS):  No focal neurological deficit.  Power is 5/5 in

all extremities.

 

LABORATORY REVIEW:

CBC showed a WBC 5.3, hemoglobin 9.8, platelets are 174.

 

BMP showed sodium 144, potassium 4.7, chloride 110, bicarbonate is 28, BUN is 49,

creatinine 2.5, calcium 8.1, albumin is 2.5.

 

CURRENT MEDICATIONS:  Patient's medications were all reviewed by me.  His

carvedilol dose has been increased to 6.25 mg by mouth twice a day and

hydralazine dose has been increased to 75 mg by mouth every 8 hours.  There is no

other change in the medications today as compared with yesterday.

 

ASSESSMENT:  72-year-old male with past medical history of diabetes mellitus type

2, chronic kidney disease stage III, congestive heart failure, and recent

diabetic foot ulcers, admitted at this time to intensive care unit (ICU) because

of altered mental status, hypotension, and acute renal failure.

 

PLAN:

1.  Acute kidney injury superimposed on chronic kidney disease stage III.

Patient's renal function continues to improve.  His creatinine is 2.5 today.  His

baseline creatinine is around 2.  Continue to monitor for improvement of renal

function.

 

2.  Congestive heart failure.  Patient is currently well optimized according to

his volume status.  I will continue current dose of torsemide 20 mg by mouth

daily.  Shortness of breath is improved.

 

3.   Hypertension.  Patient's blood pressures is still elevated.  I see

cardiology service has already increased the dose of Coreg and hydralazine.

Continue current dose of isosorbide.  Rest of the management is as per cardiology

recommendations.

 

4.  Iron deficiency anemia.  Hemoglobin is stable.  IV Venofer will not be given

because of recent foot infections.  Continue oral iron at this time.

 

5.  Hyperkalemia.  Potassium is better at 4.7 today.  I have decreased the

potassium in the diet.  No urgent need of Kayexalate at this time.  Continue to

monitor for now.

## 2017-05-17 NOTE — IPNPDOC
Subjective


Date Seen


The patient was seen on 5/17/17.





Subjective


Chief Complaint/HPI


The patient is a 72-year-old male admitted with a reason for visit of Acute On 

Chronic Systolic(Congestive)Heart Failure.


General:  Denies: Chills, Night Sweats


Constitutional:  Denies: Chills, Fever


Eyes:  Denies: Pain, Vision change


ENT:  Denies: Head Aches, Ear Pain


Skin:  Denies: Rash, Lesions


Pulmonary:  Denies: Dyspnea, Cough


Cardiovascular:  Denies: Chest Pain, Palpitations


Gastrointestinal:  Denies: Nausea, Vomiting


Genitourinary:  Denies: Dysuria, Frequency


Hematologic:  Denies: Bruising, Bleeding Excessively


Musculoskeletal:  Denies: Neck Pain, Back Pain





Objective


Physical Examination


General Exam:  Positive: Alert, Cooperative, No Acute Distress


ENT Exam:  Positive: Atraumatic, Mucous membr. moist/pink


Neck Exam:  Negative: JVD


Chest Exam:  Positive: Clear to auscultation, Normal air movement, 


   Negative: Rales


Heart Exam:  Positive: Rate Normal, Normal S1, Normal S2


Telemetry:  Positive: Sinus


Abdomen Exam:  Positive: Soft, 


   Negative: Tenderness


Extremity Exam:  Negative: Tenderness, Swelling





Assessment /Plan


Plan/VTE


VTE Prophylaxis Ordered?:  Yes





Plan/Urinary Catheter


Reason for insertion/continuin:  Critical Pt monitoring





Plan


s/p Acute hypoxic respiratory failure 2/2 acute decompensated CHF and pleural 

effusion, resolved


Patient has diuresed a net negative of ~2L of fluid here since admission


Appears more euvolemic, and is comfortably breathing % on Room Air


Volume status has been optimized with diuretic dose titrating with the 

assistance of Nephrology


Cardiology input appreciated, and the patient's blood pressure medications have 

been adjusted for treatment of underlying LV systolic dysfunction


2D ECHO notable for LV Systolic dysfunction with an EF of 30-35%


Cont Daily weights, I/O's


We will continue to monitor the patient's status





Acute kidney injury Superimposed on Chronic Kidney Disease Stage 3A-B 


Likely 2/2 Diuretic usage, use of Bactrim as o/p


Serum Creatinine 3.8-->2.7-->2.5 (Baseline ~2.0)


Patient on Torsemide 20mg as per Nephro, will be uptitrated at their discretion


We will continue to monitor renal function


Dr. Guardado on board





Insulin Dependent Diabetes Mellitus


Patient on high doses of Lantus at home, however they have been held here after 

the patient was noted to be hypoglycemic here


Continue ISS for now-->We will add long acting regimen back if the patient's 

blood sugar goes up





Hypertension


Cont current regimen





Bilateral lower extremity wound ulcers


s/p Completion of Abx Trial--no active infective component recognized here





CAD s/p CABG


Cont ASA and Plavix, Coreg





Neuropathy


Lyrica





GERD


Omeprazole and Sucralfate





DVT prophylaxis


SCDs





Disposition--We will continue to optimize the patient's Cardiac and Renal 

status as he does appear to be improving significantly over the last few days. 

PT evaluation noted. Anticipate D/C in the next 48 hrs pending clinical 

improvement.





VS, I&O, 24H, Fishbone


Vital Signs/I&O





Vital Signs








  Date Time  Temp Pulse Resp B/P (MAP) Pulse Ox O2 Delivery O2 Flow Rate FiO2


 


5/17/17 08:14  72  163/70    


 


5/17/17 08:00 98.4  18  95 Room Air  


 


5/14/17 04:00       1.0 


 


5/12/17 08:00        100














I&O- Last 24 Hours up to 6 AM 


 


 5/17/17





 05:59


 


Intake Total 1170 ml


 


Output Total 1000 ml


 


Balance 170 ml











Laboratory Data


24H LABS


Laboratory Tests 2


5/16/17 11:15: Bedside Glucose (Misc Panel) 260H


5/16/17 16:44: Bedside Glucose (Misc Panel) 184H


5/16/17 20:21: Bedside Glucose (Misc Panel) 138H


5/17/17 05:01: 


White Blood Count 5.3, Red Blood Count 3.50L, Hemoglobin 9.8L, Hematocrit 31.3L

, Mean Corpuscular Volume 89.4, Mean Corpuscular Hemoglobin 28.0, Mean 

Corpuscular Hemoglobin Concent 31.3L, Red Cell Distribution Width 14.8H, 

Platelet Count 174, Neutrophils (%) (Auto) 57.9, Lymphocytes (%) (Auto) 19.6L, 

Monocytes (%) (Auto) 9.4H, Eosinophils (%) (Auto) 9.0H, Basophils (%) (Auto) 0.7

, Neutrophils # (Auto) 3.1, Lymphocytes # (Auto) 1.0L, Monocytes # (Auto) 0.5, 

Eosinophils # (Auto) 0.5, Basophils # (Auto) 0.0, Large Unclassified Cells % 3.5

, Large Unclassified Cells # 0.2, Anion Gap 6L, Glomerular Filtration Rate 27.0L

, Blood Urea Nitrogen 49H, Creatinine 2.52H, Sodium Level 144, Potassium Level 

4.7, Chloride Level 110H, Carbon Dioxide Level 28, Calcium Level 8.1L, 

Aspartate Amino Transf (AST/SGOT) 8L, Alanine Aminotransferase (ALT/SGPT) 12, 

Alkaline Phosphatase 110, Total Bilirubin 0.2, Total Protein 6.1L, Albumin 2.5L

, Magnesium Level 2.3, Albumin/Globulin Ratio 0.69L


CBC/BMP


Laboratory Tests


5/17/17 05:01








Red Blood Count 3.50 L, Mean Corpuscular Volume 89.4, Mean Corpuscular 

Hemoglobin 28.0, Mean Corpuscular Hemoglobin Concent 31.3 L, Red Cell 

Distribution Width 14.8 H, Neutrophils (%) (Auto) 57.9, Lymphocytes (%) (Auto) 

19.6 L, Monocytes (%) (Auto) 9.4 H, Eosinophils (%) (Auto) 9.0 H, Basophils (%) 

(Auto) 0.7, Neutrophils # (Auto) 3.1, Lymphocytes # (Auto) 1.0 L, Monocytes # (

Auto) 0.5, Eosinophils # (Auto) 0.5, Basophils # (Auto) 0.0, Calcium Level 8.1 L

, Aspartate Amino Transf (AST/SGOT) 8 L, Alanine Aminotransferase (ALT/SGPT) 12

, Alkaline Phosphatase 110, Total Bilirubin 0.2, Total Protein 6.1 L, Albumin 

2.5 L











JUVE PRITCHETT MD May 17, 2017 11:04

## 2017-05-18 VITALS — SYSTOLIC BLOOD PRESSURE: 162 MMHG | DIASTOLIC BLOOD PRESSURE: 75 MMHG

## 2017-05-18 VITALS — DIASTOLIC BLOOD PRESSURE: 75 MMHG | SYSTOLIC BLOOD PRESSURE: 162 MMHG

## 2017-05-18 VITALS — SYSTOLIC BLOOD PRESSURE: 149 MMHG | DIASTOLIC BLOOD PRESSURE: 68 MMHG

## 2017-05-18 VITALS — SYSTOLIC BLOOD PRESSURE: 161 MMHG | DIASTOLIC BLOOD PRESSURE: 67 MMHG

## 2017-05-18 VITALS — DIASTOLIC BLOOD PRESSURE: 68 MMHG | SYSTOLIC BLOOD PRESSURE: 164 MMHG

## 2017-05-18 LAB
ALBUMIN SERPL BCG-MCNC: 2.6 GM/DL (ref 3.2–5.2)
ALBUMIN/GLOB SERPL: 0.67 {RATIO} (ref 1–1.93)
ALP SERPL-CCNC: 111 U/L (ref 45–117)
ALT SERPL W P-5'-P-CCNC: 14 U/L (ref 12–78)
ANION GAP SERPL CALC-SCNC: 6 MEQ/L (ref 8–16)
AST SERPL-CCNC: 10 U/L (ref 15–37)
BILIRUB SERPL-MCNC: 0.3 MG/DL (ref 0.2–1)
BUN SERPL-MCNC: 42 MG/DL (ref 7–18)
CALCIUM SERPL-MCNC: 8.5 MG/DL (ref 8.8–10.2)
CHLORIDE SERPL-SCNC: 108 MEQ/L (ref 98–107)
CO2 SERPL-SCNC: 29 MEQ/L (ref 21–32)
CREAT SERPL-MCNC: 2.33 MG/DL (ref 0.7–1.3)
ERYTHROCYTE [DISTWIDTH] IN BLOOD BY AUTOMATED COUNT: 14.9 % (ref 11.5–14.5)
GFR SERPL CREATININE-BSD FRML MDRD: 29.5 ML/MIN/{1.73_M2} (ref 42–?)
GLUCOSE SERPL-MCNC: 154 MG/DL (ref 83–110)
MAGNESIUM SERPL-MCNC: 2.3 MG/DL (ref 1.8–2.4)
MCH RBC QN AUTO: 27.7 PG (ref 27–33)
MCHC RBC AUTO-ENTMCNC: 31.8 G/DL (ref 32–36.5)
MCV RBC AUTO: 87.4 FL (ref 80–96)
PLATELET # BLD AUTO: 185 K/MM3 (ref 150–450)
POTASSIUM SERPL-SCNC: 4.6 MEQ/L (ref 3.5–5.1)
PROT SERPL-MCNC: 6.5 GM/DL (ref 6.4–8.2)
SODIUM SERPL-SCNC: 143 MEQ/L (ref 136–145)
WBC # BLD AUTO: 5.5 K/MM3 (ref 4–10)

## 2017-05-18 RX ADMIN — OMEPRAZOLE SCH MG: 20 CAPSULE, DELAYED RELEASE ORAL at 08:25

## 2017-05-18 RX ADMIN — SODIUM CHLORIDE SCH ML: 9 INJECTION, SOLUTION INTRAMUSCULAR; INTRAVENOUS; SUBCUTANEOUS at 06:28

## 2017-05-18 RX ADMIN — NYSTATIN SCH DOSE: 100000 CREAM TOPICAL at 08:27

## 2017-05-18 RX ADMIN — HYDRALAZINE HYDROCHLORIDE SCH MG: 25 TABLET, FILM COATED ORAL at 06:27

## 2017-05-18 RX ADMIN — ISOSORBIDE DINITRATE SCH MG: 20 TABLET ORAL at 06:27

## 2017-05-18 RX ADMIN — ISOSORBIDE DINITRATE SCH MG: 20 TABLET ORAL at 12:52

## 2017-05-18 RX ADMIN — CLOPIDOGREL BISULFATE SCH MG: 75 TABLET ORAL at 08:24

## 2017-05-18 RX ADMIN — SILVER SULFADIAZINE SCH GM: 10 CREAM TOPICAL at 08:27

## 2017-05-18 RX ADMIN — ASPIRIN 325 MG ORAL TABLET SCH MG: 325 PILL ORAL at 08:25

## 2017-05-18 RX ADMIN — TORSEMIDE SCH MG: 20 TABLET ORAL at 08:24

## 2017-05-18 RX ADMIN — INSULIN LISPRO SCH UNITS: 100 INJECTION, SOLUTION INTRAVENOUS; SUBCUTANEOUS at 08:26

## 2017-05-18 RX ADMIN — INSULIN LISPRO SCH UNITS: 100 INJECTION, SOLUTION INTRAVENOUS; SUBCUTANEOUS at 12:52

## 2017-05-18 RX ADMIN — HYDRALAZINE HYDROCHLORIDE SCH MG: 25 TABLET, FILM COATED ORAL at 12:52

## 2017-05-18 RX ADMIN — FERROUS SULFATE TAB 325 MG (65 MG ELEMENTAL FE) SCH MG: 325 (65 FE) TAB at 08:25

## 2017-05-18 NOTE — IPN
DATE: 05/18/2017

 

SUBJECTIVE: Patient was seen and examined at the bedside today in the morning. He

feels much better. He was actually sitting on the sofa. He is afebrile and

hemodynamically stable. He denies any shortness of breath and renal function

continues to improve.

 

REVIEW OF SYSTEMS: Patient denies any fever, chills, rigors, headache, nausea,

vomiting, chest pain, shortness of breath, pain abdomen, constipation, or

diarrhea. Rest of review of systems is negative.

 

OBJECTIVE:

VITAL SIGNS: Temperature is 98 degrees Fahrenheit, blood pressure is 161/67,

pulse is 66, respiratory rate of 18, saturating 95% on room air.

INTAKE AND OUTPUT: Urine output recorded is 1.4 liters yesterday and 250 mL so

far today since overnight. Weight in the bed scale is 90 kg today.

 

PHYSICAL EXAMINATION:

GENERAL: Patient is awake, alert and oriented times three, sitting in the sofa in

no apparent distress.

HEAD AND NECK EXAMINATION: Extraocular muscles intact. Pupils equally round and

reactive to light. Mucous membranes are moist. Neck is supple. There is no

jugular venous distention (JVD). Patient still has a triple lumen in the right

internal jugular (IJ).

CARDIOVASCULAR: S1, S2, regular rate. He has a grade 2/6 systolic ejection

murmur.

RESPIRATORY: Chest is clear to auscultation bilaterally. Bilateral equal air

entry. No rales or rhonchi.

ABDOMEN: Soft, positive bowel sounds, nontender. No ascites. No organomegaly.

EXTREMITIES: No clubbing or cyanosis. The patient has bilateral dressings on the

feet.

CENTRAL NERVOUS SYSTEM (CNS): No focal neurological deficit. Power is 5/5 in all

extremities.

 

LABORATORY REVIEW: CBC showed a WBC of 5.5, hemoglobin 9.8, platelets are 185.

 

BMP showed sodium 143, potassium 4.6, chloride 108, bicarbonate 29, BUN is 42,

creatinine is 2.3 which is trending down, it was 2.5 yesterday, calcium is 8.5,

albumin is 2.6.

 

CURRENT MEDICATIONS: Patient's medications were all reviewed by me. His Percocet

has been stopped. There is no other change in the medications today as compared

with yesterday.

 

ASSESSMENT: A 72-year-old male with a past medical history of diabetes mellitus

type 2, chronic kidney disease stage III, congestive heart failure, and recent

diabetic foot ulcers, admitted this time to intensive care unit (ICU) because of

altered mental status, hypotension, and acute renal failure.

 

PLAN:

1. Acute kidney injury superimposed on chronic kidney disease, stage III.

Patient's renal function is nicely improving. His creatinine is down to 2.3 now.

His baseline creatinine is 2. I am expecting full renal recovery to his baseline

in the next one or two days.

 

2. Congestive heart failure. His volume status is optimized. Continue current

dose of torsemide 20 mg by mouth daily. The rest of the changes in the diuretic

regimen will be done as outpatient.

 

3. Hypertension. Patient is still slightly hypertensive. His antihypertensive

regimen is being adjusted by cardiology service. He is currently on amlodipine 5

mg by mouth daily which was started yesterday, Coreg 6.25 mg by mouth twice a

day, hydralazine 75 mg by mouth every eight hours, isosorbide 20 mg by mouth

every eight hours. Rest of the management of antihypertensive regimen is as per

cardiology service. I would not escalate the antihypertensives at this time

because they have recently been increased in the last 2-3 days. I would wait for

the medications to work for the next 3-4 days.

 

4. Iron deficiency anemia. Continue oral iron at this time. No IV iron was given

because of the foot infection.

 

5. Hyperkalemia. His potassium levels are stable at around 4.6. Continue

low-potassium diet at this time. Avoid angiotensin-converting enzyme (ACE)

inhibitors or angiotensin-receptor blockers.

 

6. Discharge planning. Patient needs to followup with nephrology about two weeks

after discharge from the hospital. It is okay to discharge the patient from

nephrology standpoint. His renal function is improving. Plan of care was

discussed with the hospitalist team.

## 2017-05-18 NOTE — DS.PDOC
Discharge Summary


General


Date of Admission


May 10, 2017 at 20:14


Date of Discharge


5/18/17


Specialist/Consultants Involve


Dr. Snow of Cardiology, Dr. Guardado of Nephrology





Discharge Summary


PROCEDURES PERFORMED DURING STAY: None.





ADMITTING DIAGNOSES: 


1. . Acute decompensated systolic congestive heart failure


2. . Chronic kidney disease


3. . Hypoglycemia





DISCHARGE DIAGNOSES:


1. . Acute decompensated systolic congestive heart failure


2. . Chronic kidney disease


3. . Hypoglycemia





COMPLICATIONS/CHIEF COMPLAINT: Acute On Chronic Systolic(Congestive)Heart 

Failure.





HISTORY OF PRESENT ILLNESS: .


72-year-old male with diabetes mellitus type 2, hypertension, hyperlipidemia, 

chronic systolic CHF, AICD, coronary artery disease with history of MI and CABG

, PVD with history of bilateral femoropopliteal, CKD stage III who presented to 

the emergency department with a chief complaint of worsening shortness of 

breath and chest pain. At that time, the patient stated that laying flat made 

his shortness of breath worse, and he also noted increasing swelling in his 

lower extremities bilaterally.





In the ER, the patient was noted to be in decompensated systolic congestive 

heart failure. He was started on IV Lasix and admitted to the hospital service 

for further evaluation and management.





During hospitalization the patient's diuretic dosing and blood pressure 

medications were managed with the assistance of nephrology and cardiology. 

Since hospitalization the patient has diuresed a net negative of 2.3 L. At this 

time, the patient's volume status has significantly improved. In addition, 

during the patient's stay here in the hospital he has required a significant 

decrease in the dosing of his long-acting insulin. At baseline, the patient 

took 100 units of Lantus in the a.m. and 85 units of Lantus in the PM. However, 

he did suffer an episode of hypoglycemia here. Since then his insulin regimen 

has been adjusted and he is currently been requiring 10 units of insulin a day 

with a sliding scale. I'll place the patient on 10 L of Lantus every morning. I 

have asked the patient to follow-up with his blood sugar levels at home, and if 

they are persistently above 180 that he would need to uptitrate his insulin 

dosing and coordinate this with his primary care physician. At this time, the 

patient states that he is feeling markedly better and he is eager to return 

home. He has been seen by physical therapy here, and has been cleared to return 

home with a prescription for home physical therapy. I advised the patient to 

follow-up with cardiology and nephrology in the next 2-3 weeks for further 

adjustment of his blood pressure and diuretic medications. In addition I have 

also asked the patient follow-up with his primary care physician within one 

week for further evaluation and management of the patient's chronic medical 

comorbidities.





DISCHARGE MEDICATIONS: Please see below.


 


ALLERGIES: Please see below.





PHYSICAL EXAMINATION ON DISCHARGE:


VITAL SIGNS: Please see below.


General Exam:  Positive: Alert, Cooperative, No Acute Distress


ENT Exam:  Positive: Atraumatic, Mucous membr. moist/pink


Neck Exam:  Negative: JVD


Chest Exam:  Positive: Clear to auscultation, Normal air movement, 


   Negative: Rales


Heart Exam:  Positive: Rate Normal, Normal S1, Normal S2


Telemetry:  Positive: Sinus


Abdomen Exam:  Positive: Soft, 


   Negative: Tenderness


Extremity Exam:  Negative: Tenderness, Swelling





LABORATORY DATA: Please see below.





IMAGING: 


REASON:  Dyspnea, cough.


 


COMPARISON:  04/07/2017.


 


AP and lateral views were obtained.


 


There is cardiomegaly.  There has been previous median sternotomy status quo.


Single chamber bipolar pacemaker device status quo.  There is new left


costophrenic angle blunting.  Increased interstitial markings are seen 

throughout


the lung fields.


 


IMPRESSION:


 


Left pleural effusion and suspected interstitial edema with cardiomegaly.





PROGNOSIS: Medically stable at this time, long-term prognosis poor





ACTIVITY: As tolerated.





DIET: . 2 g low sodium diet





DISCHARGE PLAN: 





DISPOSITION: [facility] Home Health Service.





DISCHARGE INSTRUCTIONS:


1. . Follow-up with primary care physician within one week


2. . Follow-up with nephrology, and cardiology within 2-3 weeks


3. . Follow-up with podiatry within 1-2 weeks for further management of chronic 

diabetic wounds


4.   Medication changes during this admission discussed at length with the 

patient and his ex-wife who is at the bedside.





DISCHARGE CONDITION: Stable.





TIME SPENT ON DISCHARGE: Greater than 30 minutes.





Vital Signs/I&Os





Vital Signs








  Date Time  Temp Pulse Resp B/P (MAP) Pulse Ox O2 Delivery O2 Flow Rate FiO2


 


5/18/17 12:52    162/75    


 


5/18/17 12:00 98.3 70 20  96 Room Air  


 


5/14/17 04:00       1.0 


 


5/12/17 08:00        100














I&O- Last 24 Hours up to 6 AM 


 


 5/18/17





 05:59


 


Intake Total 1000 ml


 


Output Total 1250 ml


 


Balance -250 ml











Laboratory Data


Labs 24H


Laboratory Tests 2


5/17/17 16:43: Bedside Glucose (Misc Panel) 180H


5/17/17 20:11: Bedside Glucose (Misc Panel) 153H


5/18/17 06:21: 


Anion Gap 6L, Glomerular Filtration Rate 29.5L, Blood Urea Nitrogen 42H, 

Creatinine 2.33H, Sodium Level 143, Potassium Level 4.6, Chloride Level 108H, 

Carbon Dioxide Level 29, Calcium Level 8.5L, Aspartate Amino Transf (AST/SGOT) 

10L, Alanine Aminotransferase (ALT/SGPT) 14, Alkaline Phosphatase 111, Total 

Bilirubin 0.3, Total Protein 6.5, Albumin 2.6L, Magnesium Level 2.3, Albumin/

Globulin Ratio 0.67L


5/18/17 06:26: Bedside Glucose (Misc Panel) 153H


5/18/17 12:48: Bedside Glucose (Misc Panel) 252H


CBC/BMP


Laboratory Tests


5/18/17 06:21








Red Blood Count 3.53 L, Mean Corpuscular Volume 87.4, Mean Corpuscular 

Hemoglobin 27.7, Mean Corpuscular Hemoglobin Concent 31.8 L, Red Cell 

Distribution Width 14.9 H, Calcium Level 8.5 L, Aspartate Amino Transf (AST/SGOT

) 10 L, Alanine Aminotransferase (ALT/SGPT) 14, Alkaline Phosphatase 111, Total 

Bilirubin 0.3, Total Protein 6.5, Albumin 2.6 L


FSBS





Laboratory Tests








Test


  5/17/17


16:43 5/17/17


20:11 5/18/17


06:26 5/18/17


12:48 Range/Units


 


 


Bedside Glucose (Misc Panel) 180 153 153 252   MG/DL











Discharge Medications


Scheduled


Amlodipine Besylate (Amlodipine Besylate) 5 Mg Tab, 5 MG PO DAILY


Aspirin (Aspirin) 325 Mg Tab, 325 MG PO DAILY, (Reported)


Calcium/Vitamin D (Oyster Shell Calcium 250+ 250-125 mg-Unit) 1 Tab Tab, 1 TAB 

PO BID, (Reported)


Carvedilol (Carvedilol) 6.25 Mg Tab, 6.25 MG PO BID


Clopidogrel Bisulfate (Clopidogrel) 75 Mg Tab, 75 MG PO DAILY, (Reported)


Ferrous Sulfate (Ferrous Sulfate) 325 Mg Tab, 325 MG PO DAILY


Hydralazine HCl (Hydralazine HCl) 50 Mg Tab, 50 MG PO TID


Insulin Glargine (Lantus) 1 Units/0.01 Ml Susp, 10 UNITS SC QAM


Isosorbide Dinitrate (Isosorbide Dinitrate) 20 Mg Tab, 20 MG PO Q8H


Nystatin (Nystatin Oint) 1 Dose/15 Gm Oint, 0 TOP BID


Omeprazole (Omeprazole) 40 Mg Cap, 40 MG PO DAILY, (Reported)


Pregabalin (Lyrica) 150 Mg Cap, 150 MG PO BID, (Reported)


Silver Sulfadiazine (Ssd) 1 % Cre, 1 DOSE TOP DAILY, (Reported)


   APPLY TO FEET 


Sucralfate (Sucralfate) 1 Gm Tab, 1 GM PO ACHS, (Reported)


Torsemide (Demadex) 20 Mg Tab, 20 MG PO DAILY





Scheduled PRN


Albuterol Sulfate (Albuterol Sulfate) 2.5 Mg/3 Ml Nebu, 2.5 MG INH QID PRN for 

SHORTNESS OF BREATH, (Reported)


Metoclopramide HCl (Metoclopramide HCl) 10 Mg Tab, 10 MG PO AC PRN for NAUSEA, (

Reported)


Nitroglycerin (Nitrostat) 0.4 Mg Subl, 0.4 MG SL Q5MP PRN for CHEST PAIN, (

Reported)


Oxycodone/Acetaminophen (Oxycodone/Acetaminophen 5-325 mg) 1 Tab Tab, 2 TAB PO 

Q8H PRN for PAIN, (Reported)





Allergies


Coded Allergies:  


     TURKEY (Verified  Adverse Reaction, Mild, VOMITING, 4/8/17)











JUVE PRITCHETT MD May 18, 2017 15:30

## 2017-05-28 ENCOUNTER — HOSPITAL ENCOUNTER (EMERGENCY)
Dept: HOSPITAL 53 - M ED | Age: 72
Discharge: HOME | End: 2017-05-28
Payer: MEDICARE

## 2017-05-28 VITALS — SYSTOLIC BLOOD PRESSURE: 122 MMHG | DIASTOLIC BLOOD PRESSURE: 62 MMHG

## 2017-05-28 VITALS — BODY MASS INDEX: 31.08 KG/M2 | WEIGHT: 198 LBS | HEIGHT: 67 IN

## 2017-05-28 DIAGNOSIS — R11.2: ICD-10-CM

## 2017-05-28 DIAGNOSIS — R19.7: ICD-10-CM

## 2017-05-28 DIAGNOSIS — R10.9: Primary | ICD-10-CM

## 2017-05-28 LAB
ALBUMIN SERPL BCG-MCNC: 3.1 GM/DL (ref 3.2–5.2)
ALBUMIN/GLOB SERPL: 0.76 {RATIO} (ref 1–1.93)
ALP SERPL-CCNC: 121 U/L (ref 45–117)
ALT SERPL W P-5'-P-CCNC: 16 U/L (ref 12–78)
ANION GAP SERPL CALC-SCNC: 8 MEQ/L (ref 8–16)
AST SERPL-CCNC: 10 U/L (ref 15–37)
BASOPHILS # BLD AUTO: 0.1 K/MM3 (ref 0–0.2)
BASOPHILS NFR BLD AUTO: 0.7 % (ref 0–1)
BILIRUB CONJ SERPL-MCNC: 0.1 MG/DL (ref 0–0.2)
BILIRUB SERPL-MCNC: 0.4 MG/DL (ref 0.2–1)
BUN SERPL-MCNC: 61 MG/DL (ref 7–18)
CALCIUM SERPL-MCNC: 8.9 MG/DL (ref 8.8–10.2)
CHLORIDE SERPL-SCNC: 110 MEQ/L (ref 98–107)
CO2 SERPL-SCNC: 22 MEQ/L (ref 21–32)
CREAT SERPL-MCNC: 2.56 MG/DL (ref 0.7–1.3)
EOSINOPHIL # BLD AUTO: 0.3 K/MM3 (ref 0–0.5)
EOSINOPHIL NFR BLD AUTO: 3 % (ref 0–3)
ERYTHROCYTE [DISTWIDTH] IN BLOOD BY AUTOMATED COUNT: 14.9 % (ref 11.5–14.5)
GFR SERPL CREATININE-BSD FRML MDRD: 26.5 ML/MIN/{1.73_M2} (ref 42–?)
GLUCOSE SERPL-MCNC: 138 MG/DL (ref 83–110)
INR PPP: 1.12
LARGE UNSTAINED CELL #: 0.1 K/MM3 (ref 0–0.4)
LARGE UNSTAINED CELL %: 1.5 % (ref 0–4)
LYMPHOCYTES # BLD AUTO: 1.3 K/MM3 (ref 1.5–4.5)
LYMPHOCYTES NFR BLD AUTO: 14.4 % (ref 24–44)
MAGNESIUM SERPL-MCNC: 2.3 MG/DL (ref 1.8–2.4)
MCH RBC QN AUTO: 27.7 PG (ref 27–33)
MCHC RBC AUTO-ENTMCNC: 31.8 G/DL (ref 32–36.5)
MCV RBC AUTO: 87.1 FL (ref 80–96)
MONOCYTES # BLD AUTO: 0.7 K/MM3 (ref 0–0.8)
MONOCYTES NFR BLD AUTO: 7.6 % (ref 0–5)
NEUTROPHILS # BLD AUTO: 6.4 K/MM3 (ref 1.8–7.7)
NEUTROPHILS NFR BLD AUTO: 72.8 % (ref 36–66)
PLATELET # BLD AUTO: 218 K/MM3 (ref 150–450)
POTASSIUM SERPL-SCNC: 4.9 MEQ/L (ref 3.5–5.1)
PROT SERPL-MCNC: 7.2 GM/DL (ref 6.4–8.2)
SODIUM SERPL-SCNC: 140 MEQ/L (ref 136–145)
WBC # BLD AUTO: 8.8 K/MM3 (ref 4–10)

## 2017-06-27 ENCOUNTER — HOSPITAL ENCOUNTER (INPATIENT)
Dept: HOSPITAL 53 - M ED | Age: 72
LOS: 4 days | Discharge: HOME HEALTH | DRG: 291 | End: 2017-07-01
Attending: HOSPITALIST | Admitting: HOSPITALIST
Payer: MEDICARE

## 2017-06-27 VITALS — BODY MASS INDEX: 32.84 KG/M2 | HEIGHT: 67 IN | WEIGHT: 209.22 LBS

## 2017-06-27 DIAGNOSIS — E11.51: ICD-10-CM

## 2017-06-27 DIAGNOSIS — Z95.5: ICD-10-CM

## 2017-06-27 DIAGNOSIS — E87.5: ICD-10-CM

## 2017-06-27 DIAGNOSIS — E03.9: ICD-10-CM

## 2017-06-27 DIAGNOSIS — L97.429: ICD-10-CM

## 2017-06-27 DIAGNOSIS — N18.3: ICD-10-CM

## 2017-06-27 DIAGNOSIS — D64.9: ICD-10-CM

## 2017-06-27 DIAGNOSIS — L97.419: ICD-10-CM

## 2017-06-27 DIAGNOSIS — E11.621: ICD-10-CM

## 2017-06-27 DIAGNOSIS — E78.5: ICD-10-CM

## 2017-06-27 DIAGNOSIS — I25.10: ICD-10-CM

## 2017-06-27 DIAGNOSIS — I50.23: ICD-10-CM

## 2017-06-27 DIAGNOSIS — I13.0: Primary | ICD-10-CM

## 2017-06-27 DIAGNOSIS — Z95.810: ICD-10-CM

## 2017-06-27 LAB
ANION GAP SERPL CALC-SCNC: 4 MEQ/L (ref 8–16)
BASOPHILS # BLD AUTO: 0 K/MM3 (ref 0–0.2)
BASOPHILS NFR BLD AUTO: 0.5 % (ref 0–1)
BUN SERPL-MCNC: 65 MG/DL (ref 7–18)
CALCIUM SERPL-MCNC: 8.1 MG/DL (ref 8.8–10.2)
CHLORIDE SERPL-SCNC: 110 MEQ/L (ref 98–107)
CO2 SERPL-SCNC: 23 MEQ/L (ref 21–32)
CREAT SERPL-MCNC: 2.78 MG/DL (ref 0.7–1.3)
EOSINOPHIL # BLD AUTO: 0.1 K/MM3 (ref 0–0.5)
EOSINOPHIL NFR BLD AUTO: 2.2 % (ref 0–3)
ERYTHROCYTE [DISTWIDTH] IN BLOOD BY AUTOMATED COUNT: 16.1 % (ref 11.5–14.5)
GFR SERPL CREATININE-BSD FRML MDRD: 24.1 ML/MIN/{1.73_M2} (ref 42–?)
GLUCOSE SERPL-MCNC: 199 MG/DL (ref 83–110)
LARGE UNSTAINED CELL #: 0.1 K/MM3 (ref 0–0.4)
LARGE UNSTAINED CELL %: 1.6 % (ref 0–4)
LYMPHOCYTES # BLD AUTO: 1.2 K/MM3 (ref 1.5–4.5)
LYMPHOCYTES NFR BLD AUTO: 17.6 % (ref 24–44)
MCH RBC QN AUTO: 26.8 PG (ref 27–33)
MCHC RBC AUTO-ENTMCNC: 30 G/DL (ref 32–36.5)
MCV RBC AUTO: 89.2 FL (ref 80–96)
MONOCYTES # BLD AUTO: 0.5 K/MM3 (ref 0–0.8)
MONOCYTES NFR BLD AUTO: 8.4 % (ref 0–5)
NEUTROPHILS # BLD AUTO: 4.2 K/MM3 (ref 1.8–7.7)
NEUTROPHILS NFR BLD AUTO: 69.6 % (ref 36–66)
PLATELET # BLD AUTO: 174 K/MM3 (ref 150–450)
POTASSIUM SERPL-SCNC: 5.9 MEQ/L (ref 3.5–5.1)
SODIUM SERPL-SCNC: 137 MEQ/L (ref 136–145)
T3RU NFR SERPL: 34 % (ref 33–40)
T4 SERPL-MCNC: 4 UG/DL (ref 4.5–12)
WBC # BLD AUTO: 6 K/MM3 (ref 4–10)

## 2017-06-27 NOTE — REP
Clinical:  Chest pain.

 

Technique:  AP and lateral.

 

Comparison:  05/15/2017.

 

Findings:

Cardiomegaly and evidence for prior sternotomy and pacemaker again noted.  Lung

fields demonstrate chronic interstitial changes.  Superimposed pulmonary vascular

congestion/interstitial edema with left lower lobe opacity and small pleural

effusions cannot be excluded.  No pneumothorax.  Skeletal structures intact.

 

Impression:

Cannot exclude pulmonary vascular congestion/interstitial edema with left lower

lobe opacity and small pleural effusions.

 

 

Signed by

Hao Adrian MD 06/27/2017 08:07 P

## 2017-06-28 VITALS — DIASTOLIC BLOOD PRESSURE: 51 MMHG | SYSTOLIC BLOOD PRESSURE: 124 MMHG

## 2017-06-28 VITALS — DIASTOLIC BLOOD PRESSURE: 66 MMHG | SYSTOLIC BLOOD PRESSURE: 151 MMHG

## 2017-06-28 VITALS — DIASTOLIC BLOOD PRESSURE: 82 MMHG | SYSTOLIC BLOOD PRESSURE: 130 MMHG

## 2017-06-28 VITALS — SYSTOLIC BLOOD PRESSURE: 115 MMHG | DIASTOLIC BLOOD PRESSURE: 52 MMHG

## 2017-06-28 VITALS — SYSTOLIC BLOOD PRESSURE: 114 MMHG | DIASTOLIC BLOOD PRESSURE: 61 MMHG

## 2017-06-28 VITALS — DIASTOLIC BLOOD PRESSURE: 67 MMHG | SYSTOLIC BLOOD PRESSURE: 148 MMHG

## 2017-06-28 VITALS — DIASTOLIC BLOOD PRESSURE: 40 MMHG | SYSTOLIC BLOOD PRESSURE: 90 MMHG

## 2017-06-28 VITALS — SYSTOLIC BLOOD PRESSURE: 88 MMHG | DIASTOLIC BLOOD PRESSURE: 50 MMHG

## 2017-06-28 VITALS — SYSTOLIC BLOOD PRESSURE: 126 MMHG | DIASTOLIC BLOOD PRESSURE: 80 MMHG

## 2017-06-28 VITALS — SYSTOLIC BLOOD PRESSURE: 129 MMHG | DIASTOLIC BLOOD PRESSURE: 62 MMHG

## 2017-06-28 VITALS — SYSTOLIC BLOOD PRESSURE: 130 MMHG | DIASTOLIC BLOOD PRESSURE: 69 MMHG

## 2017-06-28 LAB
ANION GAP SERPL CALC-SCNC: 4 MEQ/L (ref 8–16)
BASOPHILS # BLD AUTO: 0 K/MM3 (ref 0–0.2)
BASOPHILS NFR BLD AUTO: 0.5 % (ref 0–1)
BUN SERPL-MCNC: 68 MG/DL (ref 7–18)
CALCIUM SERPL-MCNC: 8 MG/DL (ref 8.8–10.2)
CHLORIDE SERPL-SCNC: 111 MEQ/L (ref 98–107)
CO2 SERPL-SCNC: 26 MEQ/L (ref 21–32)
CREAT SERPL-MCNC: 2.72 MG/DL (ref 0.7–1.3)
EOSINOPHIL # BLD AUTO: 0.1 K/MM3 (ref 0–0.5)
EOSINOPHIL NFR BLD AUTO: 2.8 % (ref 0–3)
ERYTHROCYTE [DISTWIDTH] IN BLOOD BY AUTOMATED COUNT: 16.1 % (ref 11.5–14.5)
GFR SERPL CREATININE-BSD FRML MDRD: 24.7 ML/MIN/{1.73_M2} (ref 42–?)
GLUCOSE SERPL-MCNC: 136 MG/DL (ref 83–110)
LARGE UNSTAINED CELL #: 0.1 K/MM3 (ref 0–0.4)
LARGE UNSTAINED CELL %: 1.8 % (ref 0–4)
LYMPHOCYTES # BLD AUTO: 0.9 K/MM3 (ref 1.5–4.5)
LYMPHOCYTES NFR BLD AUTO: 17.3 % (ref 24–44)
MCH RBC QN AUTO: 27 PG (ref 27–33)
MCHC RBC AUTO-ENTMCNC: 30.2 G/DL (ref 32–36.5)
MCV RBC AUTO: 89.6 FL (ref 80–96)
MONOCYTES # BLD AUTO: 0.4 K/MM3 (ref 0–0.8)
MONOCYTES NFR BLD AUTO: 9.8 % (ref 0–5)
NEUTROPHILS # BLD AUTO: 3 K/MM3 (ref 1.8–7.7)
NEUTROPHILS NFR BLD AUTO: 67.8 % (ref 36–66)
PLATELET # BLD AUTO: 155 K/MM3 (ref 150–450)
POTASSIUM SERPL-SCNC: 5 MEQ/L (ref 3.5–5.1)
SODIUM SERPL-SCNC: 141 MEQ/L (ref 136–145)
WBC # BLD AUTO: 4.4 K/MM3 (ref 4–10)

## 2017-06-28 RX ADMIN — INSULIN LISPRO SCH UNITS: 100 INJECTION, SOLUTION INTRAVENOUS; SUBCUTANEOUS at 21:00

## 2017-06-28 RX ADMIN — DOCUSATE SODIUM SCH MG: 100 CAPSULE, LIQUID FILLED ORAL at 08:27

## 2017-06-28 RX ADMIN — INSULIN LISPRO SCH UNITS: 100 INJECTION, SOLUTION INTRAVENOUS; SUBCUTANEOUS at 08:22

## 2017-06-28 RX ADMIN — HYDRALAZINE HYDROCHLORIDE SCH MG: 50 TABLET, FILM COATED ORAL at 16:53

## 2017-06-28 RX ADMIN — DOCUSATE SODIUM SCH MG: 100 CAPSULE, LIQUID FILLED ORAL at 22:00

## 2017-06-28 RX ADMIN — HYDRALAZINE HYDROCHLORIDE SCH MG: 50 TABLET, FILM COATED ORAL at 08:26

## 2017-06-28 RX ADMIN — INSULIN LISPRO SCH UNITS: 100 INJECTION, SOLUTION INTRAVENOUS; SUBCUTANEOUS at 12:24

## 2017-06-28 RX ADMIN — ISOSORBIDE DINITRATE SCH MG: 20 TABLET ORAL at 14:23

## 2017-06-28 RX ADMIN — SODIUM CHLORIDE SCH UNITS: 4.5 INJECTION, SOLUTION INTRAVENOUS at 06:04

## 2017-06-28 RX ADMIN — PREGABALIN SCH MG: 75 CAPSULE ORAL at 08:27

## 2017-06-28 RX ADMIN — SODIUM CHLORIDE SCH UNITS: 4.5 INJECTION, SOLUTION INTRAVENOUS at 22:23

## 2017-06-28 RX ADMIN — CLOPIDOGREL BISULFATE SCH MG: 75 TABLET ORAL at 08:25

## 2017-06-28 RX ADMIN — PREGABALIN SCH MG: 75 CAPSULE ORAL at 22:14

## 2017-06-28 RX ADMIN — DEXTROSE MONOHYDRATE SCH MG: 50 INJECTION, SOLUTION INTRAVENOUS at 22:23

## 2017-06-28 RX ADMIN — ISOSORBIDE DINITRATE SCH MG: 20 TABLET ORAL at 06:05

## 2017-06-28 RX ADMIN — INSULIN LISPRO SCH UNITS: 100 INJECTION, SOLUTION INTRAVENOUS; SUBCUTANEOUS at 16:50

## 2017-06-28 RX ADMIN — PANTOPRAZOLE SODIUM SCH MG: 40 TABLET, DELAYED RELEASE ORAL at 08:27

## 2017-06-28 RX ADMIN — OMEPRAZOLE SCH MG: 20 CAPSULE, DELAYED RELEASE ORAL at 08:26

## 2017-06-28 RX ADMIN — SODIUM CHLORIDE SCH UNITS: 4.5 INJECTION, SOLUTION INTRAVENOUS at 14:23

## 2017-06-28 RX ADMIN — ASPIRIN SCH MG: 81 TABLET ORAL at 08:29

## 2017-06-28 NOTE — ECGEPIP
Stationary ECG Study

                           [facility] - ED

                                       

                                       Test Date:    2017

Pat Name:     VINNY EASLEY           Department:   

Patient ID:   [id number]                 Room:         [place] [id number]

Gender:       M                        Technician:   clarissa

:          1945               Requested By: Brodie CHANCE

Order Number: [id number]     Reading MD:   Jaleesa Roman

                                 Measurements

Intervals                              Axis          

Rate:         71                       P:            50

WI:           198                      QRS:          12

QRSD:         106                      T:            137

QT:           416                                    

QTc:          454                                    

                           Interpretive Statements

SINUS RHYTHM

INFERIOR MYOCARDIAL INFARCTION, PROBABLY OLD

MODERATE T-WAVE ABNORMALITY, CONSIDER LATERAL ISCHEMIA

SIMILAR 17 18:58

Electronically Signed On 2017 7:37:18 EDT by Jaleesa Roman

## 2017-06-28 NOTE — ECGEPIP
Stationary ECG Study

                           [facility] - ED

                                       

                                       Test Date:    2017

Pat Name:     VINNY EASLEY           Department:   

Patient ID:   [id number]                 Room:         [place] [address]

Gender:       M                        Technician:   cindy

:          1945               Requested By: CALLIE WELLINGTON 

Order Number: [id number]     Reading MD:   Jaleesa Roman

                                 Measurements

Intervals                              Axis          

Rate:         73                       P:            55

TX:           188                      QRS:          12

QRSD:         106                      T:            151

QT:           413                                    

QTc:          457                                    

                           Interpretive Statements

SINUS RHYTHM

POSSIBLE LEFT ATRIAL ENLARGEMENT

INFERIOR MYOCARDIAL INFARCTION, PROBABLY OLD

MODERATE T-WAVE ABNORMALITY, CONSIDER LATERAL ISCHEMIA

SIMILAR 17

Electronically Signed On 2017 7:36:56 EDT by Jaleesa Roman

## 2017-06-28 NOTE — HPE
DATE OF ADMISSION:  06/28/2017

 

ATTENDING PHYSICIAN:  Dr. Emanuel.



CARDIOLOGIST: Dr. Snow.

 

PRIMARY CARE PROVIDER: Nurse practitioner, Ghislaine Segovia.

 

CHIEF COMPLAINT: Chest heaviness, as well as bilateral lower extremity swelling.

 

 

HISTORY OF PRESENT ILLNESS:

This patient is a 72-year-old white male with multiple chronic medical 
conditions

including congestive heart failure (CHF).  Mr. Dunlap came today to the hospital

for evaluation of above complaints. History is provided by himself, as well as 
by

the chart. Actually, the patient had been hospitalized here recently back to May

10, 2017, for CHF exacerbation. Per him, after discharge back home, he was doing

okay initially. However, he said he feels fatigue and bilateral lower extremity

swelling getting worse again in the last few days. Actually, he saw Dr. Snow 
on

last Friday and was told he had fluid retention. However, his Lasix was

discontinued by cardiologist per him. Anyway, since yesterday he said he had 
some

chest heaviness and trouble breathing and decided to come to the emergency room

(ER) for further evaluation. In the ER, his initial workup was not significant.

He was given intravenous (IV) 80 mg of Lasix and planned to discharge home.

However, the patient continued to not feeling good and so medicine service 
called

for admission.

 

REVIEW OF SYSTEMS:

Denies fever. No chills. No headache, no blurry vision. Positive exertional 
chest

heaviness and shortness of breath, but no coughing, no nausea, no vomiting, no

real chest pain. No abdominal pain. No diarrhea. No tingling, numbness, weakness

in arms or lower extremities bilaterally. Was some swelling in bilateral lower

extremities. All other systems reviewed and negative.

 

PAST MEDICAL HISTORY:

1. Coronary artery disease status post stenting and status post coronary artery

bypass graft (CABG).

2. Hypertension.

3. Lower extremity edema.

4. Type 2 diabetes.

5. Congestive heart failure (CHF), ejection fraction (EF) is 32-35%.

6. Peripheral vascular disease with bilateral femoral popliteal bypass.

7. Chronic kidney disease (CKD) stage III.

8. Chronic anemia.

 

PAST SURGICAL HISTORY:

1. Coronary stent.

2. Bilateral femoral popliteal bypass.

3. CABG.

4. Automatic implantable cardioverter defibrillator (AICD) placement.

 

ALLERGIES: No known drug allergies

 

SOCIAL HISTORY:

Remote smoker of cigarettes, but quit four years ago. No alcohol, no drug abuse.

He lives by himself and he is a [employer].

 

FAMILY HISTORY:

Positive for hypertension, carotid disease.

 

MEDICATIONS:  Reviewed.

 

PHYSICAL EXAMINATION:

VITAL SIGNS: Temperature 98, heart rate 70, respirations 14, blood pressure

136/64, and oxygen saturation 98% on two liters oxygen supplement.

GENERAL: He is awake, alert, oriented times three. He is in mild respiratory

distress.

HEENT: Atraumatic. Pupils equal, round, and reactive to light. Ears, nose and

throat are normal.

LUNGS: Bibasilar small crackles. No wheezing.

HEART: S1, S2. Regular. No murmur.

ABDOMEN: Soft. Bowel sounds positive. Mild distention but nontender.

EXTREMITIES: Lower extremities have +2 edema in bilateral lower extremities.

NEUROLOGIC: Nonfocal.

SKIN: No rash.

PSYCHOLOGIC: No acute psychosis.

 

DIAGNOSTIC LABORATORY STUDIES:

CBC and differential showed WBC 6, hemoglobin and hematocrit 8.5 over 28.5,

platelets 174.

Sodium 137, potassium 5.9, chloride 110, bicarbonate 23, BUN 65, creatinine 2.7,

glucose 109. BNP is up to 1760. Tests reviewed.

 

IMPRESSION:

1. Acute on chronic systolic congestive heart failure exacerbation.

2. Type 2 diabetes.

3. Hypertension.

4. Dyslipidemia.

5. Carotid disease.

6. Chronic anemia.

7. Chronic kidney disease stage III.

8. Mild hyperkalemia.

 

PLAN:

The patient will be admitted to the progressive care unit (PCU) for inpatient

treatment. I will start him on IV Lasix. Will consult cardiology. He has two 
sets

negative troponin which ruled out acute myocardial infarction (MI), and he has

worsening chronic anemia, and will followup. He may need a blood transfusion.

Otherwise, will continue his most home medications. Dr. Emanuel will followup

tomorrow.

[facility]

## 2017-06-28 NOTE — IPNPDOC
Date Seen


The patient was seen on 6/28/17.





Progress Note


Subjective:


Mr. Dunlap was seen and evaluated at bedside this morning. He states that he is 

feeling significantly better after having been diuresed. He is actually asking 

if he can go home at this time. Otherwise, he denies any shortness of breath, 

chest discomfort, palpitations, or swelling in the feet or the ankles. He did 

state that his face was a little bit swollen last night, but that this is 

resolved now. Otherwise, the remainder of his review of systems is negative.





Objective:


General: Awake, alert, oriented 3. He is in no apparent distress.


HEENT: Head normocephalic, atraumatic, sclera are nonicteric. Hearing is 

grossly intact to conversation.


Respiratory: Clear to auscultation bilaterally with no wheezes, rales, or 

rhonchi.


Cardiovascular: Regular rate and rhythm, with no rubs, gallops, or murmur.


Abdomen: Soft, nontender, nondistended, no hepatosplenomegaly appreciated. 

Bowel sounds present.


Extremities: 2+ pulses in the radial and dorsalis pedis bilaterally. No 

evidence of clubbing or cyanosis.





Assessment:


1. Acute exacerbation of chronic systolic congestive heart failure


2. Type 2 diabetes mellitus


3. Hypertension


4. Dyslipidemia


5. Carotid artery disease


6. Chronic anemia


7. Chronic kidney disease stage III, baseline creatinine is approximately 2-2.3


8. Mild hyperkalemia upon admission, now resolved


9. Hypothyroidism





Plan:


The patient has an elevated TSH with a borderline low free T4, we will start 

him on 25 MCG of levothyroxine, and recommend that he have outpatient follow-up 

in 4-6 weeks with his primary care provider. We will decrease his dose of Lasix 

from 80 IV q12 to 40 IV every 12 hours at this time.





VS, I&O, 24H, Fishbone


Vital Signs/I&O





Vital Signs








  Date Time  Temp Pulse Resp B/P (MAP) Pulse Ox O2 Delivery O2 Flow Rate FiO2


 


6/28/17 19:02  71 22 114/61 (78) 97 Nasal Cannula 2.0 


 


6/28/17 18:45 97.7       











Laboratory Data


24H LABS


Laboratory Tests 2


6/27/17 22:43: 


Total Creatine Kinase 113, Creatine Kinase MB 5.3H, Creatine Kinase MB Relative 

Index 4.69H, Troponin I 0.08#


6/28/17 03:17: Bedside Glucose (Misc Panel) 187H


6/28/17 04:47: 


Total Creatine Kinase 110, Creatine Kinase MB 5.4H, Creatine Kinase MB Relative 

Index 4.90H, Troponin I 0.10#, White Blood Count 4.4, Red Blood Count 3.05L, 

Hemoglobin 8.2L, Hematocrit 27.3L, Mean Corpuscular Volume 89.6, Mean 

Corpuscular Hemoglobin 27.0, Mean Corpuscular Hemoglobin Concent 30.2L, Red 

Cell Distribution Width 16.1H, Platelet Count 155, Neutrophils (%) (Auto) 67.8H

, Lymphocytes (%) (Auto) 17.3L, Monocytes (%) (Auto) 9.8H, Eosinophils (%) (Auto

) 2.8, Basophils (%) (Auto) 0.5, Neutrophils # (Auto) 3.0, Lymphocytes # (Auto) 

0.9L, Monocytes # (Auto) 0.4, Eosinophils # (Auto) 0.1, Basophils # (Auto) 0.0, 

Large Unclassified Cells % 1.8, Large Unclassified Cells # 0.1, Anion Gap 4L, 

Glomerular Filtration Rate 24.7L, Blood Urea Nitrogen 68H, Creatinine 2.72H, 

Sodium Level 141, Potassium Level 5.0, Chloride Level 111H, Carbon Dioxide 

Level 26, Calcium Level 8.0L


6/28/17 12:02: Bedside Glucose (Misc Panel) 165H


6/28/17 16:46: Bedside Glucose (Misc Panel) 62L


6/28/17 18:39: Bedside Glucose (Misc Panel) 100


6/28/17 20:11: Bedside Glucose (Misc Panel) 134H


CBC/BMP


Laboratory Tests


6/28/17 04:47








Red Blood Count 3.05 L, Mean Corpuscular Volume 89.6, Mean Corpuscular 

Hemoglobin 27.0, Mean Corpuscular Hemoglobin Concent 30.2 L, Red Cell 

Distribution Width 16.1 H, Neutrophils (%) (Auto) 67.8 H, Lymphocytes (%) (Auto

) 17.3 L, Monocytes (%) (Auto) 9.8 H, Eosinophils (%) (Auto) 2.8, Basophils (%) 

(Auto) 0.5, Neutrophils # (Auto) 3.0, Lymphocytes # (Auto) 0.9 L, Monocytes # (

Auto) 0.4, Eosinophils # (Auto) 0.1, Basophils # (Auto) 0.0, Calcium Level 8.0 L

, Total Creatine Kinase 110





6/28/17 12:16











GME ATTESTATION


GME ATTESTATION


My preceptor for this patient encounter was physically present in the building 

during the encounter and was fully available. As needed, all aspects of the 

patient interview, examination, medical decision making process, and medical 

care plan development were reviewed and approved by the preceptor. Preceptor is 

aware and concurs with the plan as stated in the body of this note and will 

attest to such by his/her cosignature.





ATTENDING NOTE


I, Anh Mccrary, have both independently examined this patient as well as 

reviewed the documentation.  I have discussed in detail with the resident the 

findings and plan of treatment as documented in the residents documentation. I 

will continue to follow the patient and offer further guidance to the patients 

care as necessary.











BENEDICT RICE DO Jun 28, 2017 21:54


ANH MCCRARY MD Jul 17, 2017 14:10

## 2017-06-29 VITALS — SYSTOLIC BLOOD PRESSURE: 146 MMHG | DIASTOLIC BLOOD PRESSURE: 58 MMHG

## 2017-06-29 VITALS — SYSTOLIC BLOOD PRESSURE: 124 MMHG | DIASTOLIC BLOOD PRESSURE: 74 MMHG

## 2017-06-29 VITALS — DIASTOLIC BLOOD PRESSURE: 59 MMHG | SYSTOLIC BLOOD PRESSURE: 131 MMHG

## 2017-06-29 VITALS — DIASTOLIC BLOOD PRESSURE: 57 MMHG | SYSTOLIC BLOOD PRESSURE: 128 MMHG

## 2017-06-29 VITALS — DIASTOLIC BLOOD PRESSURE: 54 MMHG | SYSTOLIC BLOOD PRESSURE: 122 MMHG

## 2017-06-29 LAB
ALBUMIN SERPL BCG-MCNC: 2.9 GM/DL (ref 3.2–5.2)
ALBUMIN/GLOB SERPL: 0.67 {RATIO} (ref 1–1.93)
ALP SERPL-CCNC: 236 U/L (ref 45–117)
ALT SERPL W P-5'-P-CCNC: 38 U/L (ref 12–78)
ANION GAP SERPL CALC-SCNC: 4 MEQ/L (ref 8–16)
AST SERPL-CCNC: 16 U/L (ref 15–37)
BASOPHILS # BLD AUTO: 0 K/MM3 (ref 0–0.2)
BASOPHILS NFR BLD AUTO: 0.4 % (ref 0–1)
BILIRUB SERPL-MCNC: 0.3 MG/DL (ref 0.2–1)
BUN SERPL-MCNC: 72 MG/DL (ref 7–18)
CALCIUM SERPL-MCNC: 8.4 MG/DL (ref 8.8–10.2)
CHLORIDE SERPL-SCNC: 110 MEQ/L (ref 98–107)
CO2 SERPL-SCNC: 27 MEQ/L (ref 21–32)
CREAT SERPL-MCNC: 3.4 MG/DL (ref 0.7–1.3)
EOSINOPHIL # BLD AUTO: 0.1 K/MM3 (ref 0–0.5)
EOSINOPHIL NFR BLD AUTO: 1.9 % (ref 0–3)
ERYTHROCYTE [DISTWIDTH] IN BLOOD BY AUTOMATED COUNT: 16.3 % (ref 11.5–14.5)
GFR SERPL CREATININE-BSD FRML MDRD: 19.1 ML/MIN/{1.73_M2} (ref 42–?)
GLUCOSE SERPL-MCNC: 121 MG/DL (ref 83–110)
LARGE UNSTAINED CELL #: 0.1 K/MM3 (ref 0–0.4)
LARGE UNSTAINED CELL %: 2.1 % (ref 0–4)
LYMPHOCYTES # BLD AUTO: 0.9 K/MM3 (ref 1.5–4.5)
LYMPHOCYTES NFR BLD AUTO: 13.9 % (ref 24–44)
MCH RBC QN AUTO: 27 PG (ref 27–33)
MCHC RBC AUTO-ENTMCNC: 30.2 G/DL (ref 32–36.5)
MCV RBC AUTO: 89.3 FL (ref 80–96)
MONOCYTES # BLD AUTO: 0.6 K/MM3 (ref 0–0.8)
MONOCYTES NFR BLD AUTO: 10 % (ref 0–5)
NEUTROPHILS # BLD AUTO: 4 K/MM3 (ref 1.8–7.7)
NEUTROPHILS NFR BLD AUTO: 71.7 % (ref 36–66)
PLATELET # BLD AUTO: 152 K/MM3 (ref 150–450)
POTASSIUM SERPL-SCNC: 4.9 MEQ/L (ref 3.5–5.1)
PROT SERPL-MCNC: 7.2 GM/DL (ref 6.4–8.2)
SODIUM SERPL-SCNC: 141 MEQ/L (ref 136–145)
WBC # BLD AUTO: 5.6 K/MM3 (ref 4–10)

## 2017-06-29 RX ADMIN — PREGABALIN SCH MG: 75 CAPSULE ORAL at 09:13

## 2017-06-29 RX ADMIN — PANTOPRAZOLE SODIUM SCH MG: 40 TABLET, DELAYED RELEASE ORAL at 10:45

## 2017-06-29 RX ADMIN — INSULIN LISPRO SCH UNITS: 100 INJECTION, SOLUTION INTRAVENOUS; SUBCUTANEOUS at 07:30

## 2017-06-29 RX ADMIN — DOCUSATE SODIUM SCH MG: 100 CAPSULE, LIQUID FILLED ORAL at 09:13

## 2017-06-29 RX ADMIN — ASPIRIN SCH MG: 81 TABLET ORAL at 09:13

## 2017-06-29 RX ADMIN — DOCUSATE SODIUM SCH MG: 100 CAPSULE, LIQUID FILLED ORAL at 21:00

## 2017-06-29 RX ADMIN — INSULIN LISPRO SCH UNITS: 100 INJECTION, SOLUTION INTRAVENOUS; SUBCUTANEOUS at 18:50

## 2017-06-29 RX ADMIN — SODIUM CHLORIDE SCH UNITS: 4.5 INJECTION, SOLUTION INTRAVENOUS at 14:37

## 2017-06-29 RX ADMIN — PREGABALIN SCH MG: 75 CAPSULE ORAL at 21:00

## 2017-06-29 RX ADMIN — OMEPRAZOLE SCH MG: 20 CAPSULE, DELAYED RELEASE ORAL at 09:13

## 2017-06-29 RX ADMIN — INSULIN LISPRO SCH UNITS: 100 INJECTION, SOLUTION INTRAVENOUS; SUBCUTANEOUS at 12:51

## 2017-06-29 RX ADMIN — CLOPIDOGREL BISULFATE SCH MG: 75 TABLET ORAL at 09:13

## 2017-06-29 RX ADMIN — LEVOTHYROXINE SODIUM SCH MCG: 25 TABLET ORAL at 06:43

## 2017-06-29 RX ADMIN — SODIUM CHLORIDE SCH UNITS: 4.5 INJECTION, SOLUTION INTRAVENOUS at 21:29

## 2017-06-29 RX ADMIN — DEXTROSE MONOHYDRATE SCH MG: 50 INJECTION, SOLUTION INTRAVENOUS at 09:17

## 2017-06-29 RX ADMIN — INSULIN LISPRO SCH UNITS: 100 INJECTION, SOLUTION INTRAVENOUS; SUBCUTANEOUS at 21:00

## 2017-06-29 RX ADMIN — SODIUM CHLORIDE SCH UNITS: 4.5 INJECTION, SOLUTION INTRAVENOUS at 06:44

## 2017-06-29 NOTE — IPNPDOC
Text Note


Date of Service


The patient was seen on 6/29/17.





NOTE


Subjective:


Patient seen and examined at bedside. Anxious to return home. No new medical 

complaints.





Objective:


General: Awake, alert, oriented 3. He is in no apparent distress.


HEENT: Head normocephalic, atraumatic, sclera are nonicteric. Hearing is 

grossly intact to conversation.


Respiratory: Clear to auscultation bilaterally with no wheezes, rales, or 

rhonchi.


Cardiovascular: Regular rate and rhythm, with no rubs, gallops, or murmur.


Abdomen: Soft, nontender, nondistended, no hepatosplenomegaly appreciated. 

Bowel sounds present.


Extremities: 2+ pulses in the radial and dorsalis pedis bilaterally. No 

evidence of clubbing or cyanosis.





Assessment:


1. Acute exacerbation of chronic systolic congestive heart failure


2. Type 2 diabetes mellitus


3. Hypertension


4. Dyslipidemia


5. Carotid artery disease


6. Chronic anemia


7. Chronic kidney disease stage III, baseline creatinine is approximately 2-2.3


8. Mild hyperkalemia upon admission, now resolved


9. Hypothyroidism





Plan:


Titrate/taper lasix to PO. PT eval.





VS,Fishbone, I+O


VS, Fishbone, I+O


Laboratory Tests


6/28/17 12:16








6/29/17 06:53








Red Blood Count 3.13 L, Mean Corpuscular Volume 89.3, Mean Corpuscular 

Hemoglobin 27.0, Mean Corpuscular Hemoglobin Concent 30.2 L, Red Cell 

Distribution Width 16.3 H, Neutrophils (%) (Auto) 71.7 H, Lymphocytes (%) (Auto

) 13.9 L, Monocytes (%) (Auto) 10.0 H, Eosinophils (%) (Auto) 1.9, Basophils (%

) (Auto) 0.4, Neutrophils # (Auto) 4.0, Lymphocytes # (Auto) 0.9 L, Monocytes # 

(Auto) 0.6, Eosinophils # (Auto) 0.1, Basophils # (Auto) 0.0, Calcium Level 8.4 

L, Aspartate Amino Transf (AST/SGOT) 16, Alanine Aminotransferase (ALT/SGPT) 38

, Alkaline Phosphatase 236 H, Total Bilirubin 0.3, Total Protein 7.2, Albumin 

2.9 L








Vital Signs








  Date Time  Temp Pulse Resp B/P (MAP) Pulse Ox O2 Delivery O2 Flow Rate FiO2


 


6/29/17 08:00 97.8 68 20 124/74 (91) 92 Room Air  


 


6/28/17 19:02       2.0 














I&O- Last 24 Hours up to 6 AM 


 


 6/29/17





 06:00


 


Intake Total 1350 ml


 


Output Total 1750 ml


 


Balance -400 ml

















ANNEL CRUZ MD Jun 29, 2017 09:19

## 2017-06-30 VITALS — SYSTOLIC BLOOD PRESSURE: 154 MMHG | DIASTOLIC BLOOD PRESSURE: 59 MMHG

## 2017-06-30 VITALS — DIASTOLIC BLOOD PRESSURE: 62 MMHG | SYSTOLIC BLOOD PRESSURE: 123 MMHG

## 2017-06-30 VITALS — SYSTOLIC BLOOD PRESSURE: 153 MMHG | DIASTOLIC BLOOD PRESSURE: 70 MMHG

## 2017-06-30 VITALS — DIASTOLIC BLOOD PRESSURE: 85 MMHG | SYSTOLIC BLOOD PRESSURE: 123 MMHG

## 2017-06-30 VITALS — DIASTOLIC BLOOD PRESSURE: 83 MMHG | SYSTOLIC BLOOD PRESSURE: 142 MMHG

## 2017-06-30 VITALS — DIASTOLIC BLOOD PRESSURE: 60 MMHG | SYSTOLIC BLOOD PRESSURE: 129 MMHG

## 2017-06-30 VITALS — DIASTOLIC BLOOD PRESSURE: 72 MMHG | SYSTOLIC BLOOD PRESSURE: 145 MMHG

## 2017-06-30 LAB
ANION GAP SERPL CALC-SCNC: 10 MEQ/L (ref 8–16)
BUN SERPL-MCNC: 78 MG/DL (ref 7–18)
CALCIUM SERPL-MCNC: 8.2 MG/DL (ref 8.8–10.2)
CHLORIDE SERPL-SCNC: 110 MEQ/L (ref 98–107)
CO2 SERPL-SCNC: 22 MEQ/L (ref 21–32)
CREAT SERPL-MCNC: 3.4 MG/DL (ref 0.7–1.3)
ERYTHROCYTE [DISTWIDTH] IN BLOOD BY AUTOMATED COUNT: 16.6 % (ref 11.5–14.5)
GFR SERPL CREATININE-BSD FRML MDRD: 19.1 ML/MIN/{1.73_M2} (ref 42–?)
GLUCOSE SERPL-MCNC: 135 MG/DL (ref 83–110)
MCH RBC QN AUTO: 27.4 PG (ref 27–33)
MCHC RBC AUTO-ENTMCNC: 31 G/DL (ref 32–36.5)
MCV RBC AUTO: 88.2 FL (ref 80–96)
PLATELET # BLD AUTO: 150 K/MM3 (ref 150–450)
POTASSIUM SERPL-SCNC: 5 MEQ/L (ref 3.5–5.1)
SODIUM SERPL-SCNC: 142 MEQ/L (ref 136–145)
WBC # BLD AUTO: 5.5 K/MM3 (ref 4–10)

## 2017-06-30 RX ADMIN — DOCUSATE SODIUM SCH MG: 100 CAPSULE, LIQUID FILLED ORAL at 21:05

## 2017-06-30 RX ADMIN — INSULIN LISPRO SCH UNITS: 100 INJECTION, SOLUTION INTRAVENOUS; SUBCUTANEOUS at 08:53

## 2017-06-30 RX ADMIN — LEVOTHYROXINE SODIUM SCH MCG: 25 TABLET ORAL at 05:50

## 2017-06-30 RX ADMIN — INSULIN LISPRO SCH UNITS: 100 INJECTION, SOLUTION INTRAVENOUS; SUBCUTANEOUS at 17:26

## 2017-06-30 RX ADMIN — INSULIN LISPRO SCH UNITS: 100 INJECTION, SOLUTION INTRAVENOUS; SUBCUTANEOUS at 21:00

## 2017-06-30 RX ADMIN — ASPIRIN SCH MG: 81 TABLET ORAL at 08:53

## 2017-06-30 RX ADMIN — INSULIN LISPRO SCH UNITS: 100 INJECTION, SOLUTION INTRAVENOUS; SUBCUTANEOUS at 12:33

## 2017-06-30 RX ADMIN — DOCUSATE SODIUM SCH MG: 100 CAPSULE, LIQUID FILLED ORAL at 08:53

## 2017-06-30 RX ADMIN — PREGABALIN SCH MG: 75 CAPSULE ORAL at 21:04

## 2017-06-30 RX ADMIN — SODIUM CHLORIDE SCH UNITS: 4.5 INJECTION, SOLUTION INTRAVENOUS at 14:07

## 2017-06-30 RX ADMIN — CLOPIDOGREL BISULFATE SCH MG: 75 TABLET ORAL at 08:53

## 2017-06-30 RX ADMIN — SODIUM CHLORIDE SCH UNITS: 4.5 INJECTION, SOLUTION INTRAVENOUS at 21:04

## 2017-06-30 RX ADMIN — OMEPRAZOLE SCH MG: 20 CAPSULE, DELAYED RELEASE ORAL at 08:54

## 2017-06-30 RX ADMIN — PREGABALIN SCH MG: 75 CAPSULE ORAL at 08:54

## 2017-06-30 RX ADMIN — SODIUM CHLORIDE SCH UNITS: 4.5 INJECTION, SOLUTION INTRAVENOUS at 05:50

## 2017-06-30 NOTE — IPNPDOC
Date Seen


The patient was seen on 6/30/17.





Progress Note


Subjective:


Mr. Dunlap was seen and evaluated at bedside this morning. He states that he 

continues to feel well, and he asks if he may go home. Per the notes from 

physical therapy however, he has been too fatigued to even participate. He was 

informed that if he is unable to participate with PT, he is not a candidate for 

discharge. He states that he will be much more willing to participate with him 

today, and hopefully we can get a better evaluation of his functional status.  

Otherwise, he denies any shortness of breath, chest discomfort, palpitations, 

nausea, vomiting, diarrhea, or pain in his feet, or any rales. Otherwise, the 

remainder of his review of systems is negative.





Objective:


General: Awake, alert, oriented 3. He is in no apparent distress. He is able 

to sit up easily when asked to do so.


HEENT: Head normocephalic, atraumatic, sclera are nonicteric. Hearing is 

grossly intact to conversation.


Respiratory: Clear to auscultation bilaterally with no wheezes, rales, or 

rhonchi.


Cardiovascular: Regular rate and rhythm, with no rubs, gallops, or murmur.


Abdomen: Soft, nontender, nondistended, no hepatosplenomegaly appreciated. 

Bowel sounds present.


Extremities: 2+ pulses in the radial and dorsalis pedis bilaterally. No 

evidence of clubbing or cyanosis. Bilateral wounds on his heels appear to be 

less macerated today, there is no surrounding erythema.





Assessment:


1. Acute exacerbation of chronic systolic congestive heart failure


2. Type 2 diabetes mellitus


3. Hypertension


4. Dyslipidemia


5. Carotid artery disease


6. Chronic anemia


7. Chronic kidney disease stage III, baseline creatinine is approximately 2-2.3


8. Mild hyperkalemia upon admission, now resolved


9. Hypothyroidism





Plan:


His BUN and creatinine have been increasing. I think that he has been 

sufficiently diuresed, therefore we will hold off on any diuretics for today, 

and begin him on oral Lasix tomorrow.  Hopefully he will participate with 

physical therapy today such that we may be able to better determine his 

functional status.





VS, I&O, 24H, Fishbone


Vital Signs/I&O





Vital Signs








  Date Time  Temp Pulse Resp B/P (MAP) Pulse Ox O2 Delivery O2 Flow Rate FiO2


 


6/30/17 11:35 97.5 96 20 153/70 (97) 100 Room Air  


 


6/30/17 07:15       2.0 














I&O- Last 24 Hours up to 6 AM 


 


 6/30/17





 06:00


 


Intake Total 1100 ml


 


Output Total 650 ml


 


Balance 450 ml











Laboratory Data


24H LABS


Laboratory Tests 2


6/29/17 16:51: Bedside Glucose (Misc Panel) 148H


6/29/17 21:32: Bedside Glucose (Misc Panel) 177H


6/30/17 05:06: 


Anion Gap 10, Glomerular Filtration Rate 19.1L, Blood Urea Nitrogen 78H, 

Creatinine 3.40H, Sodium Level 142, Potassium Level 5.0, Chloride Level 110H, 

Carbon Dioxide Level 22, Calcium Level 8.2L


6/30/17 11:31: Bedside Glucose (Misc Panel) 190H


CBC/BMP


Laboratory Tests


6/30/17 05:06








Red Blood Count 3.05 L, Mean Corpuscular Volume 88.2, Mean Corpuscular 

Hemoglobin 27.4, Mean Corpuscular Hemoglobin Concent 31.0 L, Red Cell 

Distribution Width 16.6 H, Calcium Level 8.2 L











BENEDICT RICE DO Jun 30, 2017 14:57

## 2017-07-01 VITALS — DIASTOLIC BLOOD PRESSURE: 63 MMHG | SYSTOLIC BLOOD PRESSURE: 139 MMHG

## 2017-07-01 VITALS — DIASTOLIC BLOOD PRESSURE: 68 MMHG | SYSTOLIC BLOOD PRESSURE: 159 MMHG

## 2017-07-01 VITALS — DIASTOLIC BLOOD PRESSURE: 70 MMHG | SYSTOLIC BLOOD PRESSURE: 150 MMHG

## 2017-07-01 LAB
ANION GAP SERPL CALC-SCNC: 6 MEQ/L (ref 8–16)
BUN SERPL-MCNC: 71 MG/DL (ref 7–18)
CALCIUM SERPL-MCNC: 8.1 MG/DL (ref 8.8–10.2)
CHLORIDE SERPL-SCNC: 110 MEQ/L (ref 98–107)
CO2 SERPL-SCNC: 25 MEQ/L (ref 21–32)
CREAT SERPL-MCNC: 2.94 MG/DL (ref 0.7–1.3)
ERYTHROCYTE [DISTWIDTH] IN BLOOD BY AUTOMATED COUNT: 16.7 % (ref 11.5–14.5)
GFR SERPL CREATININE-BSD FRML MDRD: 22.6 ML/MIN/{1.73_M2} (ref 42–?)
GLUCOSE SERPL-MCNC: 200 MG/DL (ref 83–110)
MCH RBC QN AUTO: 28 PG (ref 27–33)
MCHC RBC AUTO-ENTMCNC: 31.5 G/DL (ref 32–36.5)
MCV RBC AUTO: 88.9 FL (ref 80–96)
PLATELET # BLD AUTO: 166 K/MM3 (ref 150–450)
POTASSIUM SERPL-SCNC: 4.9 MEQ/L (ref 3.5–5.1)
SODIUM SERPL-SCNC: 141 MEQ/L (ref 136–145)
WBC # BLD AUTO: 6.1 K/MM3 (ref 4–10)

## 2017-07-01 RX ADMIN — PREGABALIN SCH MG: 75 CAPSULE ORAL at 08:32

## 2017-07-01 RX ADMIN — DOCUSATE SODIUM SCH MG: 100 CAPSULE, LIQUID FILLED ORAL at 08:31

## 2017-07-01 RX ADMIN — INSULIN LISPRO SCH UNITS: 100 INJECTION, SOLUTION INTRAVENOUS; SUBCUTANEOUS at 08:31

## 2017-07-01 RX ADMIN — LEVOTHYROXINE SODIUM SCH MCG: 25 TABLET ORAL at 05:49

## 2017-07-01 RX ADMIN — SODIUM CHLORIDE SCH UNITS: 4.5 INJECTION, SOLUTION INTRAVENOUS at 05:49

## 2017-07-01 RX ADMIN — OMEPRAZOLE SCH MG: 20 CAPSULE, DELAYED RELEASE ORAL at 08:31

## 2017-07-01 RX ADMIN — ASPIRIN SCH MG: 81 TABLET ORAL at 08:31

## 2017-07-01 RX ADMIN — CLOPIDOGREL BISULFATE SCH MG: 75 TABLET ORAL at 08:31

## 2017-07-01 NOTE — DS.PDOC
Discharge Summary


General


Date of Admission


Jun 28, 2017 at 01:16


Date of Discharge


07/01/2017





Discharge Summary


PRIMARY CARE PHYSICIAN: 


Ghislaine Segovia





ATTENDING AT TIME OF DISCHARGE:


Dr. Dimitrios Ellison





DISCHARGE DIAGNOS(E)S: 


1. Acute exacerbation of chronic systolic congestive heart failure


2. Type 2 diabetes mellitus


3. Hypertension


4. Dyslipidemia


5. Carotid artery disease


6. Chronic anemia


7. Chronic kidney disease stage III, baseline creatinine is approximately 2-2.3


8. Mild hyperkalemia upon admission, now resolved


9. Hypothyroidism


10. Bilateral lower extremity diabetic ulcers on the heels





HPI & HOSPITAL COURSE: 


   Mr. Dunlap is a 72-year-old  male who was admitted to the hospital 

for acute exacerbation of chronic systolic congestive heart failure.  His 

hospitalization was fairly benign, consisting mainly of diuresis.  He was seen 

and evaluated by physical therapy who stated that he did quite well in their 

evaluation, but recommended that he continue with home services upon discharge.


   He did have one episode of unresponsiveness with hypotension, however he was 

aroused within a few minutes or so, and his blood pressure improved without 

treatment, he states that he is a very deep sleeper, and this has been known to 

occur on occasion. 


   Otherwise, the wounds on his heels did appear slightly macerated upon arrival

, therefore they were treated with alginate and foam dressings, and continued 

to have looked well throughout his hospitalization. 


   His thyroid function studies were checked and he was found to be hypothyroid

, therefore he was initiated on 25 MCG of levothyroxine, and it is recommended 

that he have follow-up labs performed in 4-6 weeks to evaluate the efficacy of 

this dose.





PHYSICAL EXAMINATION ON DISCHARGE:


GENERAL: Awake, alert, oriented 3. He is in no acute distress. He is 

requesting to go home.


CARDIOVASCULAR EXAMINATION: Regular rate and rhythm, with no rubs, gallops, or 

murmur.


RESPIRATORY EXAMINATION: Clear to auscultation bilaterally with no wheezes, 

rales, or rhonchi.


ABDOMINAL EXAMINATION: Soft, nontender, nondistended. Bowel sounds present.


EXTREMITIES: No clubbing or edema noted. 2+ pulses in the radial bilaterally.  

Bilateral heel ulcers did not have any surrounding erythema or edema today.





DISPOSITION: 


Home with services





DISCHARGE INSTRUCTIONS: 


Follow-up with primary care provider Ghislaine Segovia within 7-10 days. 

Recommend routine follow-up with Dr. Alex davenport podiatrist regarding his 

bilateral heel ulcers.   If symptoms return, or if you experience worsening of 

your symptoms, please call your doctor or return to the emergency department.





ITEMS THAT NEED OUTPATIENT FOLLOWUP:


He will need a repeat TSH in about 4-6 weeks to evaluate the efficacy of his 

Synthroid dosing. He was instructed to  the prescription of Lasix (

furosemide) that was already prescribed by his cardiologist Dr. Snow, but was 

not picked up prior to this hospitalization. Otherwise, continue with 

recommendations per home PT, and continue with wound care per Dr. Johnson.





My preceptor for this patient encounter was physically present in the building 

during the encounter and was fully available. As needed, all aspects of the 

patient interview, examination, medical decision making process, and medical 

care plan development were reviewed and approved by the preceptor. Preceptor is 

aware and concurs with the plan as stated in the body of this note and will 

attest to such by his/her cosignature.





Vital Signs/I&Os





Vital Signs








  Date Time  Temp Pulse Resp B/P (MAP) Pulse Ox O2 Delivery O2 Flow Rate FiO2


 


7/1/17 08:34  65  139/63    


 


7/1/17 08:30      Room Air  


 


7/1/17 06:20 98.4  17  92   


 


6/30/17 07:15       2.0 














I&O- Last 24 Hours up to 6 AM 


 


 7/1/17





 06:00


 


Intake Total 1840 ml


 


Output Total 575 ml


 


Balance 1265 ml











Laboratory Data


Labs 24H


Laboratory Tests 2


6/30/17 17:23: Bedside Glucose (Misc Panel) 273H


6/30/17 20:29: Bedside Glucose (Misc Panel) 126H


7/1/17 05:52: 


Anion Gap 6L, Glomerular Filtration Rate 22.6L, Blood Urea Nitrogen 71H, 

Creatinine 2.94H, Sodium Level 141, Potassium Level 4.9, Chloride Level 110H, 

Carbon Dioxide Level 25, Calcium Level 8.1L


CBC/BMP


Laboratory Tests


7/1/17 05:52








Red Blood Count 3.09 L, Mean Corpuscular Volume 88.9, Mean Corpuscular 

Hemoglobin 28.0, Mean Corpuscular Hemoglobin Concent 31.5 L, Red Cell 

Distribution Width 16.7 H, Calcium Level 8.1 L


FSBS





Laboratory Tests








Test


  6/30/17


17:23 6/30/17


20:29 Range/Units


 


 


Bedside Glucose (Misc Panel) 273 126   MG/DL











Discharge Medications


Scheduled


Aspirin (Aspir-81) 81 Mg Tab, 81 MG PO DAILY, (Reported)


Calcium/Vitamin D (Oyster Shell Calcium 250+ 250-125 mg-Unit) 1 Tab Tab, 1 TAB 

PO BID, (Reported)


Carvedilol (Carvedilol) 12.5 Mg Tab, 12.5 MG PO BID, (Reported)


Clopidogrel Bisulfate (Clopidogrel) 75 Mg Tab, 75 MG PO DAILY, (Reported)


Hydralazine HCl (Hydralazine HCl) 50 Mg Tab, 50 MG PO TID, (Reported)


Insulin Glargine (Lantus) 1 Units/0.01 Ml Susp, 10 UNITS SC QAM, (Reported)


Isosorbide Dinitrate (Isosorbide Dinitrate) 20 Mg Tab, 20 MG PO Q8H, (Reported)


Levothyroxine Sodium (Synthroid) 25 Mcg Tab, 25 MCG PO DAILY@06


Omeprazole (Omeprazole) 40 Mg Cap, 40 MG PO DAILY, (Reported)


Pregabalin (Lyrica) 150 Mg Cap, 150 MG PO BID, (Reported)





Scheduled PRN


Albuterol Sulfate (Albuterol Sulfate) 2.5 Mg/3 Ml Nebu, 2.5 MG INH QID PRN for 

SHORTNESS OF BREATH, (Reported)


Nitroglycerin (Nitrostat) 0.4 Mg Subl, 0.4 MG SL Q5MP PRN for CHEST PAIN, (

Reported)


Oxycodone/Acetaminophen (Oxycodone/Acetaminophen 5-325 mg) 1 Tab Tab, 2 TAB PO 

Q8H PRN for PAIN, (Reported)





Allergies


Coded Allergies:  


     TURKEY (Verified  Adverse Reaction, Mild, VOMITING, 4/8/17)





GME ATTESTATION


GME ATTESTATION


My preceptor for this patient encounter was physically present in the building 

during the encounter and was fully available. As needed, all aspects of the 

patient interview, examination, medical decision making process, and medical 

care plan development were reviewed and approved by the preceptor. Preceptor is 

aware and concurs with the plan as stated in the body of this note and will 

attest to such by his/her cosignature.





ATTENDING NOTE


I saw and evaluated the patient. I agree with the findings and the plan of care 

as documented in the residents note. 


BENEDICT Rucker MD, DO Jul 1, 2017 11:58


DIMITRIOS ELLISON MD Jul 2, 2017 06:37

## 2017-07-25 ENCOUNTER — HOSPITAL ENCOUNTER (OUTPATIENT)
Dept: HOSPITAL 53 - M LAB REF | Age: 72
End: 2017-07-25
Attending: INTERNAL MEDICINE
Payer: MEDICARE

## 2017-07-25 DIAGNOSIS — D50.9: Primary | ICD-10-CM

## 2017-07-25 LAB
FERRITIN SERPL-MCNC: 93 NG/ML (ref 26–388)
IRON SATN MFR SERPL: 11.3 % (ref 19.7–37.4)
TIBC SERPL-MCNC: 327 UG/DL (ref 250–450)

## 2017-08-10 ENCOUNTER — HOSPITAL ENCOUNTER (OUTPATIENT)
Dept: HOSPITAL 53 - M INFU | Age: 72
Discharge: HOME | End: 2017-08-10
Attending: INTERNAL MEDICINE
Payer: MEDICAID

## 2017-08-10 VITALS — BODY MASS INDEX: 32.84 KG/M2 | HEIGHT: 67 IN | WEIGHT: 209.22 LBS

## 2017-08-10 DIAGNOSIS — D50.9: Primary | ICD-10-CM

## 2017-08-10 PROCEDURE — 96365 THER/PROPH/DIAG IV INF INIT: CPT

## 2017-08-10 PROCEDURE — 96366 THER/PROPH/DIAG IV INF ADDON: CPT

## 2017-08-19 ENCOUNTER — HOSPITAL ENCOUNTER (INPATIENT)
Dept: HOSPITAL 53 - M ED | Age: 72
LOS: 6 days | Discharge: HOME HEALTH | DRG: 291 | End: 2017-08-25
Attending: INTERNAL MEDICINE | Admitting: INTERNAL MEDICINE
Payer: MEDICARE

## 2017-08-19 VITALS — HEIGHT: 67 IN | WEIGHT: 191.36 LBS | BODY MASS INDEX: 30.03 KG/M2

## 2017-08-19 DIAGNOSIS — Z79.899: ICD-10-CM

## 2017-08-19 DIAGNOSIS — E03.9: ICD-10-CM

## 2017-08-19 DIAGNOSIS — E11.22: ICD-10-CM
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
## 2017-08-19 DIAGNOSIS — J96.11: ICD-10-CM

## 2017-08-19 DIAGNOSIS — Z79.891: ICD-10-CM

## 2017-08-19 DIAGNOSIS — E66.9: ICD-10-CM

## 2017-08-19 DIAGNOSIS — M70.21: ICD-10-CM

## 2017-08-19 DIAGNOSIS — I35.0: ICD-10-CM

## 2017-08-19 DIAGNOSIS — Z91.018: ICD-10-CM

## 2017-08-19 DIAGNOSIS — D63.1: ICD-10-CM

## 2017-08-19 DIAGNOSIS — E11.21: ICD-10-CM

## 2017-08-19 DIAGNOSIS — N18.4: ICD-10-CM

## 2017-08-19 DIAGNOSIS — E11.621: ICD-10-CM

## 2017-08-19 DIAGNOSIS — T49.0X5A: ICD-10-CM

## 2017-08-19 DIAGNOSIS — L97.419: ICD-10-CM

## 2017-08-19 DIAGNOSIS — N25.81: ICD-10-CM

## 2017-08-19 DIAGNOSIS — N17.9: ICD-10-CM

## 2017-08-19 DIAGNOSIS — Z95.810: ICD-10-CM

## 2017-08-19 DIAGNOSIS — Z79.82: ICD-10-CM

## 2017-08-19 DIAGNOSIS — E87.5: ICD-10-CM

## 2017-08-19 DIAGNOSIS — E11.9: ICD-10-CM

## 2017-08-19 DIAGNOSIS — Z98.62: ICD-10-CM

## 2017-08-19 DIAGNOSIS — I13.0: Primary | ICD-10-CM

## 2017-08-19 DIAGNOSIS — E11.65: ICD-10-CM

## 2017-08-19 DIAGNOSIS — Z79.4: ICD-10-CM

## 2017-08-19 DIAGNOSIS — Z95.5: ICD-10-CM

## 2017-08-19 DIAGNOSIS — M54.5: ICD-10-CM

## 2017-08-19 DIAGNOSIS — I25.10: ICD-10-CM

## 2017-08-19 DIAGNOSIS — I27.2: ICD-10-CM

## 2017-08-19 DIAGNOSIS — I25.5: ICD-10-CM

## 2017-08-19 DIAGNOSIS — Z79.02: ICD-10-CM

## 2017-08-19 DIAGNOSIS — I50.43: ICD-10-CM

## 2017-08-19 DIAGNOSIS — E11.51: ICD-10-CM

## 2017-08-19 DIAGNOSIS — L97.429: ICD-10-CM

## 2017-08-19 DIAGNOSIS — K57.32: ICD-10-CM

## 2017-08-19 DIAGNOSIS — D69.6: ICD-10-CM

## 2017-08-19 DIAGNOSIS — G25.1: ICD-10-CM

## 2017-08-19 DIAGNOSIS — Z87.891: ICD-10-CM

## 2017-08-20 VITALS — SYSTOLIC BLOOD PRESSURE: 133 MMHG | DIASTOLIC BLOOD PRESSURE: 60 MMHG

## 2017-08-20 VITALS — DIASTOLIC BLOOD PRESSURE: 99 MMHG | SYSTOLIC BLOOD PRESSURE: 146 MMHG

## 2017-08-20 VITALS — SYSTOLIC BLOOD PRESSURE: 144 MMHG | DIASTOLIC BLOOD PRESSURE: 63 MMHG

## 2017-08-20 VITALS — SYSTOLIC BLOOD PRESSURE: 169 MMHG | DIASTOLIC BLOOD PRESSURE: 75 MMHG

## 2017-08-20 VITALS — SYSTOLIC BLOOD PRESSURE: 142 MMHG | DIASTOLIC BLOOD PRESSURE: 70 MMHG

## 2017-08-20 VITALS — SYSTOLIC BLOOD PRESSURE: 147 MMHG | DIASTOLIC BLOOD PRESSURE: 90 MMHG

## 2017-08-20 VITALS — SYSTOLIC BLOOD PRESSURE: 128 MMHG | DIASTOLIC BLOOD PRESSURE: 60 MMHG

## 2017-08-20 LAB
ALBUMIN SERPL BCG-MCNC: 3.1 GM/DL (ref 3.2–5.2)
ALBUMIN/GLOB SERPL: 0.67 {RATIO} (ref 1–1.93)
ALP SERPL-CCNC: 518 U/L (ref 45–117)
ALT SERPL W P-5'-P-CCNC: 48 U/L (ref 12–78)
ANION GAP SERPL CALC-SCNC: 6 MEQ/L (ref 8–16)
ANION GAP SERPL CALC-SCNC: 9 MEQ/L (ref 8–16)
AST SERPL-CCNC: 26 U/L (ref 15–37)
BASE EXCESS BLDA CALC-SCNC: 0.4 MMOL/L (ref -2–2)
BASE EXCESS BLDV CALC-SCNC: 1.4 MMOL/L (ref -2–2)
BASOPHILS # BLD AUTO: 0 K/MM3 (ref 0–0.2)
BASOPHILS # BLD AUTO: 0.1 K/MM3 (ref 0–0.2)
BASOPHILS NFR BLD AUTO: 0.5 % (ref 0–1)
BASOPHILS NFR BLD AUTO: 1.1 % (ref 0–1)
BILIRUB CONJ SERPL-MCNC: 0.2 MG/DL (ref 0–0.2)
BILIRUB SERPL-MCNC: 0.4 MG/DL (ref 0.2–1)
BUN SERPL-MCNC: 78 MG/DL (ref 7–18)
BUN SERPL-MCNC: 81 MG/DL (ref 7–18)
CALCIUM SERPL-MCNC: 7.9 MG/DL (ref 8.8–10.2)
CALCIUM SERPL-MCNC: 8.8 MG/DL (ref 8.8–10.2)
CHLORIDE SERPL-SCNC: 101 MEQ/L (ref 98–107)
CHLORIDE SERPL-SCNC: 99 MEQ/L (ref 98–107)
CO2 BLDA CALC-SCNC: 29.1 MEQ/L (ref 23–31)
CO2 BLDV CALC-SCNC: 31.4 MEQ/L (ref 24–28)
CO2 SERPL-SCNC: 27 MEQ/L (ref 21–32)
CO2 SERPL-SCNC: 30 MEQ/L (ref 21–32)
CREAT SERPL-MCNC: 3.34 MG/DL (ref 0.7–1.3)
CREAT SERPL-MCNC: 3.4 MG/DL (ref 0.7–1.3)
EOSINOPHIL # BLD AUTO: 0.2 K/MM3 (ref 0–0.5)
EOSINOPHIL # BLD AUTO: 0.2 K/MM3 (ref 0–0.5)
EOSINOPHIL NFR BLD AUTO: 2.8 % (ref 0–3)
EOSINOPHIL NFR BLD AUTO: 3.6 % (ref 0–3)
ERYTHROCYTE [DISTWIDTH] IN BLOOD BY AUTOMATED COUNT: 15.4 % (ref 11.5–14.5)
ERYTHROCYTE [DISTWIDTH] IN BLOOD BY AUTOMATED COUNT: 15.4 % (ref 11.5–14.5)
EST. AVERAGE GLUCOSE BLD GHB EST-MCNC: 229 MG/DL (ref 60–110)
GFR SERPL CREATININE-BSD FRML MDRD: 19.1 ML/MIN/{1.73_M2} (ref 42–?)
GFR SERPL CREATININE-BSD FRML MDRD: 19.5 ML/MIN/{1.73_M2} (ref 42–?)
GGT SERPL-CCNC: 475 U/L (ref 15–85)
GLUCOSE SERPL-MCNC: 284 MG/DL (ref 83–110)
GLUCOSE SERPL-MCNC: 300 MG/DL (ref 83–110)
HCO3 BLDA-SCNC: 27.3 MEQ/L (ref 22–26)
HCO3 STD BLDA-SCNC: 24.8 MEQ/L (ref 22–26)
HCO3 STD BLDV-SCNC: 25.6 MEQ/L
INR PPP: 1.08
LARGE UNSTAINED CELL #: 0.1 K/MM3 (ref 0–0.4)
LARGE UNSTAINED CELL #: 0.1 K/MM3 (ref 0–0.4)
LARGE UNSTAINED CELL %: 1.2 % (ref 0–4)
LARGE UNSTAINED CELL %: 1.2 % (ref 0–4)
LYMPHOCYTES # BLD AUTO: 0.9 K/MM3 (ref 1.5–4.5)
LYMPHOCYTES # BLD AUTO: 0.9 K/MM3 (ref 1.5–4.5)
LYMPHOCYTES NFR BLD AUTO: 13.1 % (ref 24–44)
LYMPHOCYTES NFR BLD AUTO: 14.2 % (ref 24–44)
MCH RBC QN AUTO: 27.2 PG (ref 27–33)
MCH RBC QN AUTO: 27.9 PG (ref 27–33)
MCHC RBC AUTO-ENTMCNC: 30.6 G/DL (ref 32–36.5)
MCHC RBC AUTO-ENTMCNC: 31.2 G/DL (ref 32–36.5)
MCV RBC AUTO: 88.9 FL (ref 80–96)
MCV RBC AUTO: 89.4 FL (ref 80–96)
MONOCYTES # BLD AUTO: 0.5 K/MM3 (ref 0–0.8)
MONOCYTES # BLD AUTO: 0.5 K/MM3 (ref 0–0.8)
MONOCYTES NFR BLD AUTO: 7.4 % (ref 0–5)
MONOCYTES NFR BLD AUTO: 8.3 % (ref 0–5)
NEUTROPHILS # BLD AUTO: 4.1 K/MM3 (ref 1.8–7.7)
NEUTROPHILS # BLD AUTO: 4.5 K/MM3 (ref 1.8–7.7)
NEUTROPHILS NFR BLD AUTO: 73 % (ref 36–66)
NEUTROPHILS NFR BLD AUTO: 73.7 % (ref 36–66)
PCO2 BLDA: 56.6 MMHG (ref 35–45)
PCO2 BLDV: 65.4 MMHG (ref 38–50)
PH BLDA: 7.3 UNITS (ref 7.35–7.45)
PLATELET # BLD AUTO: 138 K/MM3 (ref 150–450)
PLATELET # BLD AUTO: 141 K/MM3 (ref 150–450)
PO2 BLDA: 118.8 MMHG (ref 75–100)
PO2 BLDV: 67.4 MMHG (ref 30–50)
POTASSIUM SERPL-SCNC: 4.9 MEQ/L (ref 3.5–5.1)
POTASSIUM SERPL-SCNC: 4.9 MEQ/L (ref 3.5–5.1)
PROT SERPL-MCNC: 7.7 GM/DL (ref 6.4–8.2)
SAO2 % BLDV: 90.2 % (ref 60–80)
SODIUM SERPL-SCNC: 135 MEQ/L (ref 136–145)
SODIUM SERPL-SCNC: 137 MEQ/L (ref 136–145)
T4 FREE SERPL-MCNC: 1.04 NG/DL (ref 0.76–1.46)
WBC # BLD AUTO: 5.7 K/MM3 (ref 4–10)
WBC # BLD AUTO: 6.1 K/MM3 (ref 4–10)

## 2017-08-20 RX ADMIN — PREGABALIN SCH MG: 75 CAPSULE ORAL at 08:05

## 2017-08-20 RX ADMIN — Medication SCH: at 21:11

## 2017-08-20 RX ADMIN — DEXTROSE MONOHYDRATE SCH MG: 50 INJECTION, SOLUTION INTRAVENOUS at 16:24

## 2017-08-20 RX ADMIN — ISOSORBIDE DINITRATE SCH MG: 20 TABLET ORAL at 06:00

## 2017-08-20 RX ADMIN — ISOSORBIDE DINITRATE SCH MG: 20 TABLET ORAL at 13:23

## 2017-08-20 RX ADMIN — DOCUSATE SODIUM,SENNOSIDES SCH TAB: 50; 8.6 TABLET, FILM COATED ORAL at 20:38

## 2017-08-20 RX ADMIN — OMEPRAZOLE SCH MG: 20 CAPSULE, DELAYED RELEASE ORAL at 08:05

## 2017-08-20 RX ADMIN — CALCIUM CARBONATE-CHOLECALCIFEROL TAB 250 MG-125 UNIT SCH MG: 250-125 TAB at 20:39

## 2017-08-20 RX ADMIN — INSULIN LISPRO SCH UNITS: 100 INJECTION, SOLUTION INTRAVENOUS; SUBCUTANEOUS at 20:38

## 2017-08-20 RX ADMIN — HYDRALAZINE HYDROCHLORIDE SCH MG: 50 TABLET, FILM COATED ORAL at 16:27

## 2017-08-20 RX ADMIN — POLYETHYLENE GLYCOL 3350 SCH PKT: 17 POWDER, FOR SOLUTION ORAL at 08:02

## 2017-08-20 RX ADMIN — CLOPIDOGREL BISULFATE SCH MG: 75 TABLET ORAL at 08:03

## 2017-08-20 RX ADMIN — ISOSORBIDE DINITRATE SCH MG: 20 TABLET ORAL at 21:51

## 2017-08-20 RX ADMIN — INSULIN LISPRO SCH UNITS: 100 INJECTION, SOLUTION INTRAVENOUS; SUBCUTANEOUS at 18:04

## 2017-08-20 RX ADMIN — INSULIN LISPRO SCH UNITS: 100 INJECTION, SOLUTION INTRAVENOUS; SUBCUTANEOUS at 11:49

## 2017-08-20 RX ADMIN — HYDRALAZINE HYDROCHLORIDE SCH MG: 50 TABLET, FILM COATED ORAL at 20:39

## 2017-08-20 RX ADMIN — IPRATROPIUM BROMIDE AND ALBUTEROL SULFATE SCH ML: .5; 3 SOLUTION RESPIRATORY (INHALATION) at 02:51

## 2017-08-20 RX ADMIN — INSULIN DETEMIR SCH UNITS: 100 INJECTION, SOLUTION SUBCUTANEOUS at 08:02

## 2017-08-20 RX ADMIN — CALCIUM CARBONATE-CHOLECALCIFEROL TAB 250 MG-125 UNIT SCH MG: 250-125 TAB at 08:04

## 2017-08-20 RX ADMIN — SODIUM CHLORIDE SCH UNITS: 4.5 INJECTION, SOLUTION INTRAVENOUS at 06:00

## 2017-08-20 RX ADMIN — CALCITRIOL SCH MCG: 0.25 CAPSULE ORAL at 08:03

## 2017-08-20 RX ADMIN — LIDOCAINE SCH PATCH: 50 PATCH CUTANEOUS at 08:02

## 2017-08-20 RX ADMIN — ASPIRIN SCH MG: 81 TABLET ORAL at 08:01

## 2017-08-20 RX ADMIN — SODIUM CHLORIDE SCH UNITS: 4.5 INJECTION, SOLUTION INTRAVENOUS at 21:51

## 2017-08-20 RX ADMIN — SODIUM CHLORIDE SCH UNITS: 4.5 INJECTION, SOLUTION INTRAVENOUS at 13:24

## 2017-08-20 RX ADMIN — IPRATROPIUM BROMIDE AND ALBUTEROL SULFATE SCH ML: .5; 3 SOLUTION RESPIRATORY (INHALATION) at 02:49

## 2017-08-20 RX ADMIN — PREGABALIN SCH MG: 75 CAPSULE ORAL at 20:41

## 2017-08-20 RX ADMIN — INSULIN LISPRO SCH UNITS: 100 INJECTION, SOLUTION INTRAVENOUS; SUBCUTANEOUS at 08:03

## 2017-08-20 RX ADMIN — ROSUVASTATIN CALCIUM SCH MG: 10 TABLET, FILM COATED ORAL at 20:39

## 2017-08-20 NOTE — REP
Right elbow series:  Four views.

 

History:  Elbow swelling.

 

Findings:  An intravenous cannula is visible in the antecubital soft tissues.

There is marked gabby olecranon soft tissue swelling consistent with olecranon

bursitis or other soft tissue edema or inflammation.  There is mild coronoid

process spurring indicating some osteoarthritis of the elbow.  No fracture or

bony erosive changes seen.

 

Impression: Gabby olecranon soft tissue swelling consistent with olecranon

bursitis.

 

 

Signed by

Christian Doll MD 08/20/2017 08:48 A

## 2017-08-20 NOTE — REPUSA
CLINICAL HISTORY: Abdominal pain.

TECHNIQUE: Multiple axial, sagittal and coronal CT images were obtained through the abdomen and pelvi
s without administration of oral or IV contrast material.

COMMENTS:

The liver is mildly enlarged without mass or defect. There is no intra or extrahepatic biliary ductal
 dilatation. The spleen is normal. The gallbladder is a gallstone. The pancreas is of normal contour 
and attenuation characteristics. There is no evidence of adrenal mass.

The kidneys are normal in size, shape and configuration. No renal or ureteral calculi are identified.
 There is no hydroureter or hydronephrosis.

There is no evidence for appendicitis. There is no bowel wall thickening. No evidence for small or la
rge bowel obstruction. There is mild ascites.

There is no evidence of intrinsic or extrinsic bladder mass.

Large bowel fecal stasis.

Sigmoid diverticulosis.

Procedure one fat stranding.

Images of the lung bases show no evidence of pleural or parenchymal mass. There are no pleural effusi
ons.

The bony structures are free of lytic or blastic lesions. Multilevel degenerative changes are seen in
volving the thoracolumbar spine.

Scattered calcifications are seen involving the aorta and major branches compatible with atherosclero
sis.

IMPRESSION: 

Sigmoid diverticulitis. No perforation or abscess formation.

Mild hepatomegaly.

Cholelithiasis.

Large fecal stasis.

Thank you for your kind referral of this patient.

     Electronically signed by YASMANY FERNÁNDEZ MD on 08/20/2017 05:31:21 AM ET

## 2017-08-20 NOTE — IPN
DATE:  08/20/2017

 

SUBJECTIVE:

Patient seen and examined. No acute events overnight. Admitted overnight with

shortness of breath that has been going on for 2-3 days, and also right elbow

swelling. Denies any chest pain, pressure, discomfort.

 

VITAL SIGNS:

Temperature 98.6, pulse 57, respirations 18, blood pressure 128/60

 

LABORATORY DATA:

WBC 5.7, hemoglobin and hematocrit 9.1 over 29.3, platelets 138. Chemistry:

Sodium 137, potassium 4.9, chloride 101, bicarbonate 27, BUN 78, creatinine 
3.34.

TSH 15.4.

 

PHYSICAL EXAMINATION:

GENERAL: Patient alert, oriented times three, in no acute distress.

HEENT: Normocephalic, atraumatic.

PULMONARY: Distant breath sounds bilaterally. No significant wheeze.

CARDIAC: Distant heart sounds. Regular. 2/6 systolic murmur.

ABDOMEN: Soft, nontender. Obese. Positive bowel sounds.

EXTREMITIES: Trace edema bilateral lower extremities.

 

ASSESSMENT AND PLAN:

This is a 72-year-old male patient with underlying medical history of congestive

heart failure with ejection fraction of 30-35%, chronic kidney disease (CKD)

stage IV, severe coronary arterial disease status post stent and coronary artery

bypass graft (CABG) with automatic implantable cardioverter defibrillator (AICD)
,

lower extremity swelling, aortic valve sclerosis with mild stenosis,

hypertension, type 2 diabetes insulin dependent, severe peripheral artery 
disease

with bilateral femoral-popliteal bypass, chronic anemia, bilateral diabetic foot

ulcer follows with Dr. Johnson, admitted with shortness of breath.

1. Shortness of breath secondary to likely decompensated congestive heart 
failure

with systolic dysfunction, ejection fraction of 30-35%. Continue Lasix. Lower

extremities only showed trace edema. Followup electrolytes. Cardiology 
consulted.

Cardiac implant. Telemetry monitoring. Arterial blood gas (ABG) is appreciated.

Continue Plavix, aspirin.

2. Chest pain bilateral. Although patient reported chest pain to the nighttime

physician, the patient denies any chest pain right now. There are no complaints.

Telemetry, EKG, aspirin and Plavix. Continue statin, beta blockers. Cardiology

has been consulted.

3. Hypertension. Continue beta blockers, Lasix, hydralazine, Isordil,

spironolactone.

4. Mild abdominal distention. CT scan appreciated. Continue bowel regimen as

ordered.

5. Diverticulosis with questionable diverticulitis, but most likely it is 
colonic

edema due to CHF. Will continue to monitor. Patient currently is asymptomatic.

6. Right elbow olecranon bursitis. Consulted orthopedics. Does not appear to be

infected as per orthopedic doctor, Dr. Elias. Recommend elevation and avoid

bending the elbow. Outpatient followup. X-ray appreciated.

7. Severe lower back pain. CT scan appreciated. Cannot perform MRI. Continue to

monitor. Pain regimen as tolerated. Physical therapy.

8. Bilateral foot ulcer. Patient sees Dr. Johnson. Wounds to be healing well. 
Vashe

cleanser with wet-to-dry dressing for now. PT wound care has been consulted.

9. Mild elevation of creatinine compared to baseline. Baseline chronic kidney

disease (CKD) stage IV. Baseline creatinine about 2.9, currently at 3.4.

Nephrology has been consulted. Followup parathyroid hormone (PTH) and 
phosphorus.

Followup urine studies.

10. Chronic anemia. Continue to monitor. Oral supplementation.

11. Thrombocytopenia. Unclear etiology. Continue to monitor.

12. Aortic stenosis with sclerosis with valve stenosis. Supportive care,

outpatient followup.

13. Coronary arterial disease with stent and coronary artery bypass graft (CABG)
.

Continue aspirin and Plavix and statin. Continue hydralazine, Isordil, beta

blockers, Lasix and spironolactone. EKG is appreciated. Telemetry monitoring.

14. Hypertension. Continue beta blockers, Isordil, hydralazine, spironolactone,

Lasix. Monitor blood pressure. Adjust as needed.

15. Insulin-dependent type two diabetes. Continue on basal bolus. Monitor

fingersticks.

16. Severe peripheral artery disease. Continue aspirin, Plavix, statin. Continue

to monitor. No signs of cyanosis. The patient had recent bilateral

femoral-popliteal artery bypass. Will continue to follow.

17. Deep venous thrombosis (DVT) prophylaxis. Heparin subcutaneous.

 

DISPOSITION:

Pending physical improvement, physical therapy. Appreciate assistance from

nephrology, cardiology and orthopedics.

OBED

## 2017-08-20 NOTE — HPE
DATE OF ADMISSION:  08/20/2017

 

Time patient was seen was at 4:00 a.m.

 

PRIMARY CARE PROVIDER:  Ghislaine Segovia NP

 

CARDIOLOGIST:  Henrique Snow MD

 

VASCULAR SURGEON:  Dr. Power

 

CHIEF COMPLAINT:  Severe sweating last night, also with worsening shortness of

breath and weakness of the lower extremities and with back pain.

 

HISTORY OF PRESENT ILLNESS:  72-year-old male with a past medical history of

heart failure with reduced ejection fraction of 30% with echocardiogram back in

May 2017, and recent decompensated heart failure, also chronic hypoxic

respiratory failure, however, not on oxygen at home, severe coronary artery

disease status post multiple stents and coronary artery bypass grafting (CABG) 5

years ago, aortic valve sclerosis with mild stenosis, hypertension, lower

extremity edema, type 2 diabetes on insulin at home, severe peripheral arterial

disease with bilateral femoral popliteal bypass, chronic kidney disease stage

III-IV, chronic anemia and bilateral calcaneal ulcers, who presented with severe

sweating last night at 9:30 p.m.  Also per the patient, he has been having

increased shortness of breath and worsening chest pain lately.  He also fell out

of bed on Monday and since that time he has been having severe chest pain at the

right lower chest as well as the entire left chest.  He does have a history of

angina however which happens on a daily basis for at least a few months.  He also

complained of feeling bloated and was unable to put his own shoes on because of

the bloated and when he tried to sit up he could not breathe due to his abdominal

being bloated.  He denies any fever or chills.  Denies any abdominal pains, any

nausea, vomiting, diarrhea, or constipation.  Denies any problem with urination.

In addition, he does complain about left elbow swelling for at least one month.

He has been scheduled to see orthopedic surgeon on [facility] to perform a

surgical procedure for the elbow.  In addition, he has been scheduled to see a

doctor in [place] for his vascular problems and also for his lower extremely

weakness.

 

ALLERGIES:  Patient is allergic to turkey, which gives him vomiting.

 

HOME MEDICATIONS:

- albuterol sulfate one inhalation four times a day as needed

- aspirin 81 mg by mouth daily

- calcitriol 0.25 mcg by mouth daily

- calcium with vitamin D one tablet by mouth twice a day

- carvedilol 12.5 mg one tablet by mouth twice a day

- Plavix 75 mg one tablet by mouth daily

- hydralazine 50 mg one tablet by mouth three times a day

- Lantus 10 units subcutaneously every morning

- isosorbide dinitrate 20 mg one tablet by mouth every 8 hours

- levothyroxine 25 mcg by mouth daily

- nitroglycerin 0.4 mg sublingual every 5 minutes as needed

- omeprazole 40 mg one tablet by mouth daily

- oxycodone/acetaminophen one tablet by mouth every 8 hours as needed

- potassium chloride 10 mEq one tablet by mouth twice a day

- Lyrica 150 mg one tablet by mouth twice a day

- Procrit 20,000 intramuscularly every month

- spironolactone 25 mg by mouth daily

- torsemide 20 mg one tablet by mouth twice a day

 

PAST MEDICAL HISTORY:  Includes:

1.  Heart failure with reduced ejection fraction.  Last ejection fraction was 30%

on echocardiogram back in May 2017.

2.  Severe coronary artery disease with multiple stents and CABG, placed back in

2012.

3.  Automatic implantable cardioverter-defibrillator (AICD) placement for heart

failure.

4.  Swelling of lower extremities.

5.  Aortic valve sclerosis with mild stenosis.

6.  Hypertension.

7.  Chronic anemia.

8.  Type 2 diabetes, insulin dependent.

9.  Severe peripheral arterial disease with bilateral femoral popliteal bypass.

10.  Chronic kidney disease stage III-IV, follows with Dr. Valencia.

11.  Bilateral diabetic foot ulcers, debrided by Dr. Johnson.

 

PAST SURGICAL HISTORY:

1.  AICD placement back in 2013.

2.  Multiple stents placed, per patient roughly nine of them before the CABG.

3.  CABG due to severe coronary artery disease, roughly in 2012.

4.  Bilateral peripheral popliteal bypass due to severe peripheral arterial

disease.

5.  Bilateral foot debridement.

 

SOCIAL HISTORY:  Patient quit smoking roughly 5 years ago.  Used to smoke 1-1/2

packs per day for more than 40 years.  Denies any drinking or recreational drug

use.  Patient lives with his daughter, who helps him with dressing change.

Patient has been working minimally recently.

 

FAMILY HISTORY:  Patient's mother had cancer, passed away in her 60s.  Father had

heart disease, passed away in his 70s.  Sister also passed away from heart

disease.

 

REVIEW OF SYSTEMS:

GENERAL:  Patient denies any weight changes, any recent traveling or sick

contacts.  Denies any fever or chills, however admits to severe sweating last

night that was associated with chest pain.

HEENT:  Denies any changes with vision, smell, hearing or taste.  Denies any

trouble swallowing.

CARDIOVASCULAR:  Admits to frequent angina, worse recently.  Admits to shortness

of breath that has also been worse recently.  Patient does not use oxygen at

home.

PULMONARY:  Admits to worsening trouble breathing.

GASTROINTESTINAL (GI):  Denies any abdominal pains, nausea, vomiting, diarrhea or

constipation.  However, the patient has been noticing abdomen becoming more

distended lately to the degree that he cannot tie his own shoes.  Also he

complained of increased shortness of breath when he tried to sit up due to the

belly was pushing on the chest.

MUSCULOSKELETAL:  Admits to severe lower back pain, which has been chronic,

however it worsened after he fell off the bed on Monday.  He also complained also

complained of bilateral lower extremity weakness that has been worsened

afterwards.

HEMATOLOGY/ONCOLOGY:  Denies any weight loss, any ease of bruising or any

bleeding anywhere.

ENDOCRINE:  Denies any heat or cold intolerance, however patient admits severe

sweating, also chest pain as well as a history of insulin dependent type 2

diabetes.

NEUROLOGICAL:  Denies any weakness any one side of his body, however he does

admit to bilateral lower extremity weakness that has been chronic for the

patient, however worsened recently.

PSYCH:  No complaints.

 

PHYSICAL EXAMINATION:

VITAL SIGNS:  Temperature 97.8, pulse 60, respirations 20, blood pressure 172/78,

oxygen was saturating at 95% on 2 liters of nasal cannula.

GENERAL:  Patient is a morbidly obese elderly male who was alert, awake and

oriented times three, poor historian however.  He was laying comfortably in bed

with head elevated at 30 degrees.

HEENT:  Normocephalic.  Patient seems to have a scar on the left forehead from a

childhood trauma.  Otherwise, extraocular motor was intact.  Mucous moist.

NECK:  Supple.  No neck lymphadenopathy.

CARDIOVASCULAR:  Distant heart sounds due to increased AP diameter.  Otherwise

was regular rate and rhythm, 2/6 systolic heart murmur best heard at the left

second intercostal space.  AICD was palpable on the left chest.  The patient's

right lower chest was tender to palpation.

ABDOMEN:  Positive bowel sounds.  Obese, soft, slightly tender to palpation at

the right upper quadrant, and also the left upper quadrant.  Otherwise, no

peritoneal signs, no ecchymosis.

EXTREMITIES:  Trace pitting edema in bilateral lower extremities extending to the

knee.  The patient's dressing at the calcaneal region was intact with slight

serosanguineous drainage.

SKIN:  Warm and dry as stated above.

NEURO:  Cranial nerves II through XII  intact.

 

LABORATORIES:  WBC 6.1, hemoglobin 9, hematocrit 39.4 with a platelet count of

141, MCV of 88.9.

 

ESR is pending.  Morning labs are pending.

 

Sodium 135, potassium 4.9, chloride 99, bicarb 30, anion gap was 6, BUN  81,

creatinine 3.4, slightly above baseline creatinine.  GFR 19.1, fasting glucose

284, calcium 8.8, , CK-MB 5.3.  Troponin less than 0.02.  BNP was 1920,

however, it appears to be the patient's baseline.

 

Second cardiac markers are pending.  Morning basic metabolic panel pending.  CRP

is pending.

 

Patient did have ABG in the emergency room which shows pH 7.3, pCO2 56.6, pO2

118.6, saturation 98.4% with base excess of 0.84 on 2 liters of nasal cannula.

 

Coags show PT 14.2, INR 1.08, PTT 38.9.

 

Blood cultures time two are pending.

 

Patient had a portable chest x-ray in the emergency room, official result is

pending.  Preliminary result compared to May 15, 2017, shows improvement.

 

Lumbar spine CT result is pending.

 

CT of the abdomen and pelvis official result is pending.

 

CT of chest official result is pending.  I reviewed the film myself.  It seems

the patient does have a mild pleural effusion bilaterally, worse on the right

side.

 

ASSESSMENT/PLAN:  72-year-old male with a past medical history of heart failure

with ejection fraction, severe coronary artery disease status post stents and

CABG, status post AICD placement due to severe heart failure, lower extremity

swelling, aortic valve sclerosis with mild stenosis, hypertension, type 2

diabetes, insulin dependent, severe peripheral arterial disease with bilateral

femoral popliteal bypass, chronic kidney disease stage III-IV, chronic anemia,

and bilateral diabetic foot ulcers who presented with:

 

1.  Shortness of breath initially, possibly secondary to decompensated heart

failure.  The patient did receive one dose of Lasix in the emergency room.  On

examination however patient's lungs were clear and lower extremities only showed

trace edema, possibly due to the patient already received Lasix.  Will continue

to monitor.  At this point, the patient is likely back to baseline.  Continue

home torsemide.  Continue to monitor intake and output and continue supplemental

oxygen.  Compared to the patient's ABG done on a month ago, ABG appears to be

similar.  This is likely back to his baseline as well.

 

2.  Chest pain on both right side and left side.  Unknown etiology.  It seems to

be pleuritic and worse with breathing.  CT of the chest has been ordered,

official result is pending.  Will followup.

 

3.  Bloated abdomen with distention.  CT of abdomen and pelvis has been ordered.

Results are pending.  Liver panel has been ordered, results pending.  Coags have

been ordered and did not show abnormalities.  Will continue to follow.  Possibly

ascites.

 

4.  Right elbow swelling, possibly bursitis.  Patient has been scheduled to see

orthopedic surgeon at [facility], however patient stated the elbow has

been warm and has a burning sensation.  An x-ray has been ordered, will followup.

Possibly consult orthopedic surgery while the patient is in the hospital for

drainage and fluid analysis.

 

5.  Severe lower back pain, which appears to be chronic, however worsened after

the patient fell from the bed.  CT of the lumbar spine has been ordered.  Will

followup.  At this point, will place the patient on fall precautions due to the

patient does have bilateral lower extremity weakness.  Physical therapy has been

ordered.  Will followup with their recommendations.

 

6.  Bilateral foot ulcers, which the patient does receive frequent dressing

changes.  Recently, per the patient, it has been improving.  Will continue wound

care.  If there is a sign of worsening infection, will possibly place the patient

on antibiotics.  Blood cultures times two has been ordered.  Will followup.  ESR

and CRP has been ordered as well.  Will followup.  Currently, patient does not

have any fever or chills.  He did have sweating, however no elevated white count.

Will hold off on any antibiotic for now and will start if needed.

 

7.  Chronic kidney disease with a creatinine of 3.4, indicating stage III-IV

which appears to be the patient's baseline.  Will continue to monitor.

 

8.  Chronic anemia with hemoglobin of 9, MCV of 88.9.  Patient did show having a

low folate back on June 25, 2017.  Iron panel was done before and was not low,

therefore will not repeat.  Will order a fecal occult blood however.  At this

point, will possibly start the patient on folate supplement and continue to trend

the patient's CBC.

 

9.  Thrombocytopenia with platelets of 141.  Etiology unclear.  Possibly related

to the patient's heart failure, possibly has worsened liver function due to heart

failure.  Will continue to monitor.  At this point, will continue the patient on

subcu heparin and sequential compression devices (SCD).  If platelets drop

further will consider holding heparin.

 

10.  Aortic valve sclerosis with valve stenosis.  Patient does have a mild

murmur.  Continue to monitor.

 

11.  Coronary artery disease status post stent placement and CABG.  Patient does

complain of frequent angina.  Will continue trending cardiac markers times one.

EKG however did not show changes compared to the past.  Only shows sinus rhythm

with a ventricular rate of 62 and a first degree AV block which was similar to

before as well.

 

12.  Hypertension.  Patient does take multiple blood pressure medications at home

and was found to have elevated blood pressure in the emergency room.  Will

continue to monitor closely.  Possibly representing hypertensive urgency.

Continue beta-blocker carvedilol 12.5 mg by mouth twice a day, isosorbide

dinitrate 20 mg every 8 hours, spironolactone 25 mg by mouth daily, torsemide 20

mg by mouth twice a day, as well as hydralazine 50 mg by mouth three times a day,

with hold parameters.  Continue to monitor.

 

13.  Insulin dependent type 2 diabetes.  Continue insulin sliding scale and

fingersticks.

 

14.  Severe peripheral arterial disease with bilateral femoral popliteal bypass.

The patient's feet feel warm and does not have any cyanosis.  Will continue to

monitor.

 

16.  Deep vein thrombosis (DVT) prophylaxis with sequential compression devices

(SCD) and heparin 5000 units subcutaneously every 8 hours.

 

17.  Fluid, electrolytes and diet.  Patient currently has only mild reduced

sodium.  Will continue the patient on no added sodium diet due to heart failure

and also fluid restriction at 1.5 liters per day due to severe reduced ejection

fraction.

 

DISPOSITION:  Patient does have several nonspecific complaints including chest

pain, abdominal distention and severe sweating.  At this point will followup with

imaging and will followup with cultures, possibly consulting orthopedic surgeon

for the right elbow swelling.

 

Patient's morning attending is Dr. Ann Tee.

 

Patient has been discussed with attending doctor, Dr. Rasmussen.

 

My preceptor for this patient encounter was Dr. Iban Rasmussen.  The preceptor was

physically present in the building during the encounter and was fully available.

As needed, all aspects of the patient interview, examination, medical decision

making process, and medical care plan development were reviewed and approved by

the preceptor.  The preceptor is aware and concurs with the plan as stated in the

body of this note and will attest to such by his/her cosignature.

## 2017-08-20 NOTE — REP
Chest x-ray:  Two views.

 

History:  Dyspnea and cough.

 

Comparison chest x-ray June 27, 2017.

 

Findings:  The patient is status post median sternotomy.  A pacemaker is noted in

the right heart via the left side.  The heart is mildly prominent in size,

unchanged.  There is slight blunting of one of the posterior pleural angles on

the lateral radiograph and there is evidence of subpulmonic left pleural fluid on

the frontal radiograph.  No pulmonary edema is seen.  No infiltrate is noted.

 

Impression:

 

Cardiomegaly, status post pacemaker and median sternotomy.  Small left pleural

effusion.  CHF pattern.

 

 

Signed by

Christian Doll MD 08/20/2017 08:49 A

## 2017-08-20 NOTE — ECGEPIP
Stationary ECG Study

                           [facility] - ED

                                       

                                       Test Date:    2017

Pat Name:     VINNY EASLEY           Department:   

Patient ID:   [id number]                 Room:         [place] [id number]

Gender:       M                        Technician:   

:          [id number]               Requested By: LEANA EDDY

Order Number: [id number]     Reading MD:   Jaleesa Roman

                                 Measurements

Intervals                              Axis          

Rate:         58                       P:            69

TN:           221                      QRS:          37

QRSD:         121                      T:            92

QT:           476                                    

QTc:          470                                    

                           Interpretive Statements

SINUS BRADYCARDIA WITH FIRST DEGREE AV BLOCK

POSSIBLE LEFT ATRIAL ENLARGEMENT

INFERIOR MYOCARDIAL INFARCTION, PROBABLY OLD

IVCD

DELAYED R PROGRESSION

PROLONGED QTC

Electronically Signed On 2017 21:36:37 EDT by Jaleesa Roman

## 2017-08-20 NOTE — CR
DATE OF CONSULTATION:  2017

 

REFERRING PHYSICIAN: Ann Tee MD

 

INDICATION: Congestive heart failure, chest pain, coronary artery disease.

 

HISTORY OF PRESENT ILLNESS: Mr. Dunlap is well known to me. He is a pleasant, 
but

somewhat difficult to manage 72-year-old man who has extensive cardiovascular

history. He presented to [facility] on 2017 because of

paroxysmal nocturnal dyspnea (PND) of severe degree two preceding nights and 
also

episodes of chest discomfort. The discomfort was somewhat atypical. It was

principally localized on the left side of chest and upper abdomen, but there was

also some component in right upper quadrant of his abdomen over his right lower

ribs. He reports that it was present on and off for several days. There was no

obvious precipitating moment and the duration of pain would be typically hours 
at

a time. But it was the dyspnea that brought him to the hospital. The evaluation

in the ER did confirm the presence of congestive heart failure. He had evidence

for bilateral pleural effusions on chest x-ray and there was some minimal

vascularization. Followup CT of the chest also revealed bilateral pleural

effusions, left larger than the right. He was admitted and essentially treated

with his chronic medication with the exception of single additional dose of

Lasix. Today he tells me that he is feeling better and last night he did not 
have

much PND or orthopnea. He did not do much ambulation in the hospital as yet. His

cardiac enzymes have been negative and ECG reveals old inferior wall infarct,

poor R wave progression and component of left ventricular hypertrophy (LVH) but

otherwise is without any evolution or convincing evidence for ischemia.

 

This morning, when I saw him in ICU, he tells me that he feels better, but he

does acknowledge that lately he has been quite sedentary and his exertional

tolerance has been very poor. He follows principally now with Dr. Valencia. He

missed several appointments in our office and what is especially frustrating 
that

he rarely bring his medications and consequently the knowledge of what he

actually takes at home is limited and I am very suspicious that most of the time

the medications are not administered correctly.

 

PAST MEDICAL HISTORY:

1. Coronary artery disease. He had numerous coronary interventions and 
eventually

underwent three vessel coronary artery bypass grafting in  (left internal

mammary artery (LIMA) to left anterior descending (LAD),saphenous vein graft

(SVG) to obtuse marginal and SVG to posterior descending artery (PDA). He has 
not

had heart catheterization since but had nuclear stress test. The last one was in

2015 and revealed left ventricular systolic function severely reduced,

ejection fraction (EF) of 33%. There was large inferior infarct with mild

gabby-infarct ischemia. His last echocardiogram was in May 2017 in

this facility and revealed LVH with estimated EF around 30 to 35%, mild aortic

stenosis and moderate pulmonary hypertension.

 

2. Peripheral vascular disease. He has a history of numerous revascularization

lower extremities, both percutaneously and surgically. He had a right femoral

posterior tibial bypass in . He had left common femoral and peroneal

balloon angioplasty in 2016 and left femoral to peritoneal

bypass in 2016. He follows closely with Dr. Mi.

 

3. Type 2 diabetes.

 

4. Hyperlipidemia.

 

5. Chronic renal insufficiency, stage IV, followed by Batsheva Valencia and [facility].

 

6. Hypertension.

 

7. History of implantable cardioverter defibrillator (ICD) placement in . It

is a single chamber Medtronic ICD.

 

8. History of amputations on both extremities with still wound not completely

healed. Followed by podiatry.

 

OUTPATIENT MEDICATIONS:

- aspirin 81 a day

- calcium as directed by nephrology, 250 twice a day according to my records

- hydralazine 50 three times a day

- isosorbide 20 mg every 8 hours

- Lantus insulin

- Lyrica 150 twice a day

- nitroglycerin as needed

- omeprazole 40 a day

- Percocet as needed

- Plavix 75 a day

- Welchol two tablets twice a day

- Coreg 12.5 twice a day

- Procrit 20,000 once a month

- spironolactone 25 a day

- torsemide 20 mg twice a day

 

ALLERGIES: He reports no medication allergies.

SOCIAL HISTORY: The patient used to smoke but quit quite a few years ago. He 
also

has a history of remote alcohol use, but nothing recently. He currently lives

with his daughter.

 

PAST SURGICAL HISTORY: Positive for coronary artery bypass surgery as above,

Medtronic ICD implant, bilateral cataracts, bilateral surgical revascularization

lower extremities as per HPI.

FAMILY HISTORY: Mother  of cancer in her 60s. Both brothers and a father had

coronary artery disease. One of his brothers  of suicide.

 

REVIEW OF SYSTEMS: He denies any recent fever, chills, nausea, vomiting or

diarrhea. He has had intermittent chest discomfort as per HPI. Dyspnea has been

chronic to some degree. He is usually about [place] Heart Association class III
,

but he had PND in the last two days prior to admission. He has had intermittent

peripheral edema, but not bad lately. No genitourinary symptoms. He denies any

change in his weight at home but he has not been weighing himself.

 

PHYSICAL EXAMINATION: Mr. Dunlap is an elderly man. He is alert, oriented and

appropriate. He does not appear to be in any distress and when I entered his 
room

he was sleeping in completely horizontal position. Last blood pressure was

147/90. Heart rate has been mostly in 50s. It is sinus bradycardia with rubi

atrioventricular (AV) conduction and slightly wide QRS complex. Saturation 96 to

99% on 1 liters of oxygen by nasal cannula. His weight has been documented 93 
kg.

His JVP is very high, at least 5 or 6 cm above clavicle.

LUNGS: Reveal fairly clear lungs. I do not appreciate any obvious pleural

effusions by physical examination. No wheezing.

HEART: Reveals regular rhythm. No gallop. There is a murmur at the base, but not

more than 1 or 2 out of 6 intensity systolic murmur consistent with known mild

AS.

ABDOMEN: Somewhat tender in mostly left lower and upper quadrants, but no

guarding. No rebound tenderness. Bowel sounds are positive. I do not appreciate

hepatosplenomegaly.

EXTREMITIES: Trace edema. There are bandages on both feet.

NEUROLOGICAL: He is alert and oriented, and appropriate. I do not appreciate any

focal weakness or distress.

 

LABORATORY DATA: CBC: Hemoglobin 9.1, hematocrit 29, platelet count 138,000. His

sed rate was 66. Basic metabolic panel: Potassium 4.9, BUN 48, creatinine 3.3.

GFR 20. Glucose 300. Essentially normal liver function test, but for  and

alkaline phosphatase 518. He has normal troponin. His BNP was initially on

admission about 1900 and his TSH of 15.

 

A chest x-ray and CT of the chest as per HPI.

 

ECG as per HPI.

 

ASSESSMENT AND PLAN: Mr. Sánchez Dunlap is a 72-year-old man who has established

coronary artery disease with history of multiple percutaneous interventions and

eventually coronary artery bypass surgery  and chronic

systolic congestive heart failure with associated renal insufficiency presents

with PND and orthopnea as a dominant complaint. I do believe that the

presentation is consistent with acute exacerbation of chronic systolic 
congestive heart failure.

His management has always been complicated by his poor compliance in regards of

bringing his medications to the office and consequently it has been challenging

to really know what he has been taking at home. But nevertheless due to renal

insufficiency he is not a candidate for angiotensin-converting enzyme (ACE)

inhibitors or angiotensin receptor blockers (ARBs). He has been receiving

hydralazine, isosorbide which I will continue. His heart rate is well controlled

on Coreg which will be continued as well. He is clearly volume overloaded and I

am going to change his oral torsemide to IV furosemide. I will start with 80 mg

twice a day, but we can adjust the dose depending on his response and renal

function. For the time being, I will continue spironolactone. He will have a low

sodium diet and will restrict his fluid intake to 1800 ml a day. I am hoping 
that

with this intervention he will improve fairly rapidly, he already tells me he is

feeling better.

 

As for as coronary artery disease is concerned, he has had several days of chest

discomfort that sounds very atypical for angina and I think it is not likely to

represent anginal pain. Will continue formal rule out protocol but I do foresee

that he will be continued on aspirin and Plavix. He is not a very good candidate

for revascularization because it would likely end up into renal failure and

dialysis.



As far as the management of diabetes is concerned it will be left with primary

team. 



The patient does not take any statins at this point. He was taking Crestor

on outpatient basis and Zetia was added, but he complained about myalgias and

consequently the medications were discontinued. I think it is prudent to put him

back on at least on small dose.

 

He is hypothyroid and consequently it is possible that the hypothyroidism had

contributed to his muscle pain.  As far as the hypothyroidism is concerned, 
looking at

the labs in the computer, his TSH has been gradually increasing over last few

months. I will start him on supplementation and will start with 50 mcg of

levothyroxine.

 

I will continue to follow the patient with you. Apparently nephrology service 
has

been asked to see him as well, which certainly would be helpful.

OBED

## 2017-08-20 NOTE — REPUSA
CLINICAL HISTORY: Chest pain.

TECHNIQUE: Multiple axial CT images were obtained through chest without IV contrast material. MPR cor
onal and sagittal sequences were obtained.

COMMENTS:

Moderate cardiomegaly.

Moderate bilateral pleural effusions.

Underlying passive atelectatic airspace disease of the lower lobes.

There is no evidence of pleural or parenchymal mass.

There is no evidence of hilar or mediastinal lymphadenopathy. The heart and great vessels are within 
normal limits.

The visualized portions of the liver are of uniform attenuation without mass or defect. There is no i
ntra or extrahepatic biliary ductal dilatation. The spleen is unremarkable. The visualized pancreas i
s of normal contour and attenuation characteristics. There is no evidence of adrenal mass. The visual
ized portions of the kidneys present no abnormalities.

The bony structures are free of lytic or blastic lesions. Multilevel degenerative changes are seen in
volving the thoracic spine. Scattered calcifications are seen involving the aorta and visualized erika
r branches compatible with atherosclerosis.

Unremarkable median sternotomy wires.

IMPRESSION:

Moderate cardiomegaly.

Moderate bilateral pleural effusions.

Passive atelectatic airspace disease of the lower lobes.

Thank you for your kind referral of this patient.

 

     Electronically signed by YASMANY FERNÁNDEZ MD on 08/20/2017 05:07:44 AM ET

## 2017-08-20 NOTE — REPUSA
CLINICAL HISTORY: Back pain.

TECHNIQUE: Multiple axial images were obtained through the L1-L2, L2-L3, L3-L4, L4-L5 and L5-S1 inter
spaces. Images were also reconstructed in coronal and sagittal planes.

COMMENTS:

There is no fracture visualized. The paraspinal soft tissues are unremarkable. There are no lytic or 
blastic lesions.

Straightening of lumbar lordosis is seen, suggesting muscular spasm. There is evidence of multilevel 
disk disease, demonstrated by osteophytosis ad endplate sclerosis.

Evaluation of individual levels reveals the following:

At L4-L5 and L5-S1, broad-based disk protrusion in conjunction with hypertrophic facet disease result
s in moderate bilateral foraminal narrowing.Canal is mildly stenotic.

At L3-L4, broad-based disk bulge, results in mild bilateral foraminal narrowing. Canal is patent.

L1-L2 and L2-L3 levels are unremarkable.

IMPRESSION:

1. No fracture or spondylolisthesis.

2. Straightening of lumbar lordosis is seen, suggesting muscular spasm.

3. At L4-L5 and L5-S1, broad-based disk protrusion in conjunction with hypertrophic facet disease res
ults in moderate bilateral foraminal narrowing. . Canal is mildly stenotic.

4. At L3-L4, broad-based disk bulge, results in mild bilateral foraminal narrowing. Canal is patent.

Thank you for your kind referral of this patient. 

     Electronically signed by YASMANY FERNÁNDEZ MD on 08/20/2017 05:30:16 AM ET

## 2017-08-21 VITALS — SYSTOLIC BLOOD PRESSURE: 144 MMHG | DIASTOLIC BLOOD PRESSURE: 78 MMHG

## 2017-08-21 VITALS — SYSTOLIC BLOOD PRESSURE: 154 MMHG | DIASTOLIC BLOOD PRESSURE: 64 MMHG

## 2017-08-21 VITALS — DIASTOLIC BLOOD PRESSURE: 98 MMHG | SYSTOLIC BLOOD PRESSURE: 141 MMHG

## 2017-08-21 VITALS — SYSTOLIC BLOOD PRESSURE: 126 MMHG | DIASTOLIC BLOOD PRESSURE: 59 MMHG

## 2017-08-21 VITALS — DIASTOLIC BLOOD PRESSURE: 65 MMHG | SYSTOLIC BLOOD PRESSURE: 144 MMHG

## 2017-08-21 VITALS — DIASTOLIC BLOOD PRESSURE: 66 MMHG | SYSTOLIC BLOOD PRESSURE: 147 MMHG

## 2017-08-21 VITALS — DIASTOLIC BLOOD PRESSURE: 60 MMHG | SYSTOLIC BLOOD PRESSURE: 104 MMHG

## 2017-08-21 LAB
ALBUMIN SERPL BCG-MCNC: 3 GM/DL (ref 3.2–5.2)
ALBUMIN/GLOB SERPL: 0.73 {RATIO} (ref 1–1.93)
ALP SERPL-CCNC: 491 U/L (ref 45–117)
ALT SERPL W P-5'-P-CCNC: 47 U/L (ref 12–78)
ANION GAP SERPL CALC-SCNC: 7 MEQ/L (ref 8–16)
ANION GAP SERPL CALC-SCNC: 8 MEQ/L (ref 8–16)
AST SERPL-CCNC: 28 U/L (ref 15–37)
BASE EXCESS BLDA CALC-SCNC: -0.6 MMOL/L (ref -2–2)
BASOPHILS # BLD AUTO: 0 K/MM3 (ref 0–0.2)
BASOPHILS NFR BLD AUTO: 0.5 % (ref 0–1)
BILIRUB SERPL-MCNC: 0.4 MG/DL (ref 0.2–1)
BUN SERPL-MCNC: 79 MG/DL (ref 7–18)
BUN SERPL-MCNC: 83 MG/DL (ref 7–18)
CALCIUM SERPL-MCNC: 8.4 MG/DL (ref 8.8–10.2)
CALCIUM SERPL-MCNC: 9 MG/DL (ref 8.8–10.2)
CHLORIDE SERPL-SCNC: 102 MEQ/L (ref 98–107)
CHLORIDE SERPL-SCNC: 103 MEQ/L (ref 98–107)
CO2 BLDA CALC-SCNC: 28.2 MEQ/L (ref 23–31)
CO2 SERPL-SCNC: 29 MEQ/L (ref 21–32)
CO2 SERPL-SCNC: 29 MEQ/L (ref 21–32)
CREAT SERPL-MCNC: 3.45 MG/DL (ref 0.7–1.3)
CREAT SERPL-MCNC: 3.48 MG/DL (ref 0.7–1.3)
EOSINOPHIL # BLD AUTO: 0.2 K/MM3 (ref 0–0.5)
EOSINOPHIL NFR BLD AUTO: 2.5 % (ref 0–3)
ERYTHROCYTE [DISTWIDTH] IN BLOOD BY AUTOMATED COUNT: 15.7 % (ref 11.5–14.5)
GFR SERPL CREATININE-BSD FRML MDRD: 18.6 ML/MIN/{1.73_M2} (ref 42–?)
GFR SERPL CREATININE-BSD FRML MDRD: 18.8 ML/MIN/{1.73_M2} (ref 42–?)
GLUCOSE SERPL-MCNC: 147 MG/DL (ref 83–110)
GLUCOSE SERPL-MCNC: 224 MG/DL (ref 83–110)
HCO3 BLDA-SCNC: 26.5 MEQ/L (ref 22–26)
HCO3 STD BLDA-SCNC: 24 MEQ/L (ref 22–26)
LARGE UNSTAINED CELL #: 0.1 K/MM3 (ref 0–0.4)
LARGE UNSTAINED CELL %: 1.3 % (ref 0–4)
LYMPHOCYTES # BLD AUTO: 0.9 K/MM3 (ref 1.5–4.5)
LYMPHOCYTES NFR BLD AUTO: 12.2 % (ref 24–44)
MAGNESIUM SERPL-MCNC: 2.5 MG/DL (ref 1.8–2.4)
MCH RBC QN AUTO: 28 PG (ref 27–33)
MCHC RBC AUTO-ENTMCNC: 30.9 G/DL (ref 32–36.5)
MCV RBC AUTO: 90.5 FL (ref 80–96)
MONOCYTES # BLD AUTO: 0.4 K/MM3 (ref 0–0.8)
MONOCYTES NFR BLD AUTO: 6.6 % (ref 0–5)
NEUTROPHILS # BLD AUTO: 4.8 K/MM3 (ref 1.8–7.7)
NEUTROPHILS NFR BLD AUTO: 76.7 % (ref 36–66)
PCO2 BLDA: 56.3 MMHG (ref 35–45)
PH BLDA: 7.29 UNITS (ref 7.35–7.45)
PHOSPHATE SERPL-MCNC: 4.7 MG/DL (ref 2.5–4.9)
PLATELET # BLD AUTO: 139 K/MM3 (ref 150–450)
PO2 BLDA: 99.4 MMHG (ref 75–100)
POTASSIUM SERPL-SCNC: 5.1 MEQ/L (ref 3.5–5.1)
POTASSIUM SERPL-SCNC: 5.4 MEQ/L (ref 3.5–5.1)
PROT SERPL-MCNC: 7.1 GM/DL (ref 6.4–8.2)
SODIUM SERPL-SCNC: 139 MEQ/L (ref 136–145)
SODIUM SERPL-SCNC: 139 MEQ/L (ref 136–145)
WBC # BLD AUTO: 6.3 K/MM3 (ref 4–10)

## 2017-08-21 RX ADMIN — DEXTROSE MONOHYDRATE SCH MG: 50 INJECTION, SOLUTION INTRAVENOUS at 17:22

## 2017-08-21 RX ADMIN — CALCIUM CARBONATE-CHOLECALCIFEROL TAB 250 MG-125 UNIT SCH MG: 250-125 TAB at 21:32

## 2017-08-21 RX ADMIN — LIDOCAINE SCH PATCH: 50 PATCH CUTANEOUS at 09:32

## 2017-08-21 RX ADMIN — LEVOTHYROXINE SODIUM SCH MCG: 50 TABLET ORAL at 05:20

## 2017-08-21 RX ADMIN — INSULIN DETEMIR SCH UNITS: 100 INJECTION, SOLUTION SUBCUTANEOUS at 09:26

## 2017-08-21 RX ADMIN — ASPIRIN SCH MG: 81 TABLET ORAL at 09:23

## 2017-08-21 RX ADMIN — INSULIN LISPRO SCH UNITS: 100 INJECTION, SOLUTION INTRAVENOUS; SUBCUTANEOUS at 17:23

## 2017-08-21 RX ADMIN — CALCITRIOL SCH MCG: 0.25 CAPSULE ORAL at 09:24

## 2017-08-21 RX ADMIN — SODIUM CHLORIDE SCH UNITS: 4.5 INJECTION, SOLUTION INTRAVENOUS at 05:20

## 2017-08-21 RX ADMIN — HYDRALAZINE HYDROCHLORIDE SCH MG: 50 TABLET, FILM COATED ORAL at 17:23

## 2017-08-21 RX ADMIN — CALCIUM CARBONATE-CHOLECALCIFEROL TAB 250 MG-125 UNIT SCH MG: 250-125 TAB at 09:20

## 2017-08-21 RX ADMIN — OMEPRAZOLE SCH MG: 20 CAPSULE, DELAYED RELEASE ORAL at 09:20

## 2017-08-21 RX ADMIN — ISOSORBIDE DINITRATE SCH MG: 20 TABLET ORAL at 21:29

## 2017-08-21 RX ADMIN — SODIUM CHLORIDE SCH UNITS: 4.5 INJECTION, SOLUTION INTRAVENOUS at 14:25

## 2017-08-21 RX ADMIN — POLYETHYLENE GLYCOL 3350 SCH PKT: 17 POWDER, FOR SOLUTION ORAL at 09:24

## 2017-08-21 RX ADMIN — HYDRALAZINE HYDROCHLORIDE SCH MG: 50 TABLET, FILM COATED ORAL at 09:00

## 2017-08-21 RX ADMIN — SODIUM CHLORIDE SCH UNITS: 4.5 INJECTION, SOLUTION INTRAVENOUS at 21:29

## 2017-08-21 RX ADMIN — DOCUSATE SODIUM,SENNOSIDES SCH TAB: 50; 8.6 TABLET, FILM COATED ORAL at 09:21

## 2017-08-21 RX ADMIN — ISOSORBIDE DINITRATE SCH MG: 20 TABLET ORAL at 05:20

## 2017-08-21 RX ADMIN — INSULIN LISPRO SCH UNITS: 100 INJECTION, SOLUTION INTRAVENOUS; SUBCUTANEOUS at 21:00

## 2017-08-21 RX ADMIN — HYDRALAZINE HYDROCHLORIDE SCH MG: 50 TABLET, FILM COATED ORAL at 21:00

## 2017-08-21 RX ADMIN — ISOSORBIDE DINITRATE SCH MG: 20 TABLET ORAL at 14:26

## 2017-08-21 RX ADMIN — DEXTROSE MONOHYDRATE SCH MG: 50 INJECTION, SOLUTION INTRAVENOUS at 09:17

## 2017-08-21 RX ADMIN — IPRATROPIUM BROMIDE AND ALBUTEROL SULFATE SCH ML: .5; 3 SOLUTION RESPIRATORY (INHALATION) at 14:12

## 2017-08-21 RX ADMIN — INSULIN LISPRO SCH UNITS: 100 INJECTION, SOLUTION INTRAVENOUS; SUBCUTANEOUS at 09:27

## 2017-08-21 RX ADMIN — PREGABALIN SCH MG: 75 CAPSULE ORAL at 09:24

## 2017-08-21 RX ADMIN — IPRATROPIUM BROMIDE AND ALBUTEROL SULFATE SCH ML: .5; 3 SOLUTION RESPIRATORY (INHALATION) at 19:12

## 2017-08-21 RX ADMIN — INSULIN LISPRO SCH UNITS: 100 INJECTION, SOLUTION INTRAVENOUS; SUBCUTANEOUS at 12:00

## 2017-08-21 RX ADMIN — DOCUSATE SODIUM,SENNOSIDES SCH TAB: 50; 8.6 TABLET, FILM COATED ORAL at 21:23

## 2017-08-21 RX ADMIN — CLOPIDOGREL BISULFATE SCH MG: 75 TABLET ORAL at 09:23

## 2017-08-21 RX ADMIN — Medication SCH: at 21:00

## 2017-08-21 RX ADMIN — ROSUVASTATIN CALCIUM SCH MG: 10 TABLET, FILM COATED ORAL at 21:24

## 2017-08-21 NOTE — REP
CT BRAIN WITHOUT CONTRAST:  08/21/2017:

 

Comparison:  05/12/2017, 01/22/2017.

 

Clinical history:  Altered mental status.

 

Findings:  The images were repeated as the patient was unable to remain still for

the examination. Ventricles are midline, symmetric and without midline shift.

Their size proportionate to the diffuse cerebral atrophy and age appropriate.

Basal ganglia show a couple of old lacunar infarcts on the right and one on the

left in the head of the caudate nucleus.  Gray-white junction differentiation was

contained.  Some minor heterogeneous white matter low density change suggesting

small vessel ischemic disease.  Atrophy is greatest in the temporal lobes and

cerebellum.  There is no vascular territory infarct, intracranial hemorrhage,

mass or mass effect.  No extra-axial fluid collection.  Cerebellar atrophy in a

fairly symmetric fashion.  Brainstem intact.  Basal cisterns intact.  Mastoids

and visualized sinuses were clear.  Heavy atherosclerotic calcification of the

carotid siphons.  No fracture or focal lesion identified in the skull base or

calvarium.

 

Impression:

 

1.  There are bilateral old small basal ganglia lacunar infarcts, unchanged with

no evidence of acute infarct, intracranial hemorrhage, mass effect or edema.

 

2.  Some mild chronic small vessel white matter changes and heavy vascular

calcifications carotid siphons.

 

3.  The sinuses and mastoids intact and no fracture in skull base or calvarium.

 

 

Signed by

Emmanuel Cullen MD 08/22/2017 11:00 A

## 2017-08-21 NOTE — IPNPDOC
Subjective


Date Seen


The patient was seen on 8/21/17.





Subjective


Chief Complaint/HPI


The patient is a 72-year-old male admitted with a reason for visit of Acute On 

Chronic Systolic Chf.


Events since last encounter


Patient states he has been doing alright. He expressed interest in discharge 

due to appointment with vascular surgeon in a couple days. Discussed with 

patient why it is important to stay in the hospital.


Constitutional:  Denies: Fever


ENT:  Denies: Head Aches


Skin:  Denies: Lesions


Pulmonary:  Reports: Other Symptoms (wheezing)


Gastrointestinal:  Denies: Nausea, Vomiting


Hematologic:  Denies: Bruising, Bleeding Excessively


Endocrine:  Denies: Polydipsia, Polyphagia





Objective


Physical Examination


General Exam:  Positive: Alert, Cooperative


ENT Exam:  Positive: Atraumatic


Neck Exam:  Positive: Supple


Chest Exam:  Positive: Wheezing


Heart Exam:  Positive: Rate Normal, Normal S1, Normal S2, 


   Negative: Tachycardic, Bradycardic, Murmurs


Abdomen Exam:  Positive: Soft, 


   Negative: Normal bowel sounds, Tenderness, Hepatospenomegaly, Mass


Extremity Exam:  Positive: Normal pulses (Radial pulse 2/4 bilaterally. ), 

Other (enlarged bursae right olecranon)


Skin Exam:  Positive: Other skin issue (Dressings bilaterally heels. )


Neuro Exam:  Positive: Normal Gait





Assessment /Plan


Problems





(1) Heart failure, systolic, with acute decompensation


Problem Text:  Patient has CHF with systolic EF of 30-35%. 





Continuing Lasix. Holding Spironolactone due to patient's K 5.3. Repeat BMP at 

1400. May need to consider other potassium lowering methods if not lower. 





Continue statin, aspirin and Plavix. 





Patient has history of Aortic Sclerosis and cardiac implant. 





Cardiology has been consulted. Appreciate their assistance in managing this 

patient. 





(2) Olecranon bursitis of right elbow


Problem Text:  Discussed with patient holding arm straight and not bending arm 

or putting weight into it. Patient agreed. 





Monitor for signs of infection. 





(3) Diabetic ulcer of both feet


Problem Text:  Changing dressings today. Monitor wounds. Appreciate assistance 

from Dr. Johnson at this time in managing this patient. 





(4) Chronic anemia


Problem Text:  Continue to monitor. 





(5) Thrombocytopenia


Problem Text:  Continue to monitor. 





(6) Hypertension


Status:  Chronic


Problem Text:  Monitor BP. Continue medications. 





(7) Acute on chronic kidney failure


Status:  Acute


Problem Text:  Continue to monitor. 





Nephrology has been consulted. Appreciate their assistance in managing this 

patient. 





(8) Diabetes mellitus


Status:  Chronic


Problem Text:  Continue medications. Insulin. 





(9) Wheezing


Problem Text:  Prescribe patient Duonebs, scheduled and PRN. 








Plan/VTE


VTE Prophylaxis Ordered?:  Yes (Heparin)





VS, I&O, 24H, Fishbone


Vital Signs/I&O





Vital Signs








  Date Time  Temp Pulse Resp B/P (MAP) Pulse Ox O2 Delivery O2 Flow Rate FiO2


 


8/21/17 09:42  62  126/59    


 


8/21/17 08:00 97.1  22  91 Nasal Cannula 2.0 














I&O- Last 24 Hours up to 6 AM 


 


 8/21/17





 05:59


 


Intake Total 2000 ml


 


Output Total 2115 ml


 


Balance -115 ml











Laboratory Data


24H LABS


Laboratory Tests 2


8/20/17 11:44: Bedside Glucose (Misc Panel) 222H


8/20/17 17:25: Bedside Glucose (Misc Panel) 121H


8/20/17 18:29: 


Urine Appearance CLEAR, Urine Color STRAW, Urine pH 5.0, Urine Specific Gravity 

1.006, Urine Protein 1+H, Urine Glucose (UA) NEGATIVE, Urine Ketones NEGATIVE, 

Urine Urobilinogen 0.2, Urine Bilirubin NEGATIVE, Urine Leukocyte Esterase 

NEGATIVE, Urine Blood NEGATIVE, Urine Nitrite NEGATIVE, Urine WBC (Auto) 1, 

Urine RBC (Auto) 1, Urine Hyaline Casts (Auto) 1, Urine Bacteria (Auto) NEGATIVE

, Urine Squamous Epithelial Cells 0, Urine Sperm (Auto) , Urine Random 

Creatinine 22.8, Urine Random Total Protein 36.8H, Urine Random Sodium 107


8/20/17 20:36: Bedside Glucose (Misc Panel) 170H


8/21/17 01:56: Bedside Glucose (Misc Panel) 207H


8/21/17 04:10: 


White Blood Count 6.3, Red Blood Count 3.27L, Hemoglobin 9.1L, Hematocrit 29.6L

, Mean Corpuscular Volume 90.5, Mean Corpuscular Hemoglobin 28.0, Mean 

Corpuscular Hemoglobin Concent 30.9L, Red Cell Distribution Width 15.7H, 

Platelet Count 139L, Neutrophils (%) (Auto) 76.7H, Lymphocytes (%) (Auto) 12.2L

, Monocytes (%) (Auto) 6.6H, Eosinophils (%) (Auto) 2.5, Basophils (%) (Auto) 

0.5, Neutrophils # (Auto) 4.8, Lymphocytes # (Auto) 0.9L, Monocytes # (Auto) 0.4

, Eosinophils # (Auto) 0.2, Basophils # (Auto) 0.0, Large Unclassified Cells % 

1.3, Large Unclassified Cells # 0.1, Anion Gap 7L, Glomerular Filtration Rate 

18.8L, Blood Urea Nitrogen 79H, Creatinine 3.45H, Sodium Level 139, Potassium 

Level 5.4H, Chloride Level 103, Carbon Dioxide Level 29, Calcium Level 8.4L, 

Phosphorus Level 4.7, Aspartate Amino Transf (AST/SGOT) 28, Alanine 

Aminotransferase (ALT/SGPT) 47, Alkaline Phosphatase 491H, Total Bilirubin 0.4, 

Total Protein 7.1, Albumin 3.0L, Magnesium Level 2.5H, Albumin/Globulin Ratio 

0.73L, Parathyroid Hormone (Intact) 136.1H


CBC/BMP


Laboratory Tests


8/21/17 04:10








Red Blood Count 3.27 L, Mean Corpuscular Volume 90.5, Mean Corpuscular 

Hemoglobin 28.0, Mean Corpuscular Hemoglobin Concent 30.9 L, Red Cell 

Distribution Width 15.7 H, Neutrophils (%) (Auto) 76.7 H, Lymphocytes (%) (Auto

) 12.2 L, Monocytes (%) (Auto) 6.6 H, Eosinophils (%) (Auto) 2.5, Basophils (%) 

(Auto) 0.5, Neutrophils # (Auto) 4.8, Lymphocytes # (Auto) 0.9 L, Monocytes # (

Auto) 0.4, Eosinophils # (Auto) 0.2, Basophils # (Auto) 0.0, Calcium Level 8.4 L

, Phosphorus Level 4.7, Aspartate Amino Transf (AST/SGOT) 28, Alanine 

Aminotransferase (ALT/SGPT) 47, Alkaline Phosphatase 491 H, Total Bilirubin 0.4

, Total Protein 7.1, Albumin 3.0 L


Microbiology





Microbiology


8/20/17 Blood Culture - Preliminary, Resulted


          No growth after 24 hours . All specim...


8/20/17 Blood Culture - Preliminary, Resulted


          No growth after 24 hours . All specim...





GME ATTESTATION


GME ATTESTATION


My preceptor for this patient encounter was Dr. He and he was physically 

present in the building during the encounter and was fully available. As needed

, all aspects of the patient interview, examination, medical decision making 

process, and medical care plan development were reviewed and approved by the 

preceptor. Preceptor is aware and concurs with the plan as stated in the body 

of this note and will attest to such by his/her co-signature.





ATTENDING NOTE


Patient is seen and examined. Agree with the plan mentioned in the resident's 

note. I will continue to participate in the care of the patient. 





ANNEL Reza MD, DO Aug 21, 2017 12:12


DIMITRIOS ELLISON MD Aug 25, 2017 12:03

## 2017-08-21 NOTE — CR
DATE OF CONSULTATION:  08/20/2017

 

INDICATION:  Olecranon bursitis.

 

HISTORY OF PRESENT ILLNESS:  Mr. Dunlap is a 72-year-old gentleman with many

medical problems who has developed olecranon bursitis in the right elbow about 3

weeks ago.  He is quite tired during this exam, but he specifically denies

banging it.  He denies prior history of olecranon bursitis.  He basically has

slightly decreased range of motion due to the swelling and he has discomfort when

he leans on it, but otherwise it does not bother him.  He does not use any type

of elbow brace or attempt to limit his elbow flexion at night.  No prior

operations or surgery on the elbow.

 

For the patient's past medical history, past surgical history, medications,

allergies, social history, and review of systems, please see the hospitalist's

history and physical (H and P), which was available for my review in the

electronic medical record.  In brief, he has bilateral heel ulcers, peripheral

vascular disease, and coronary artery disease status post coronary artery bypass

graft (CABG) and stenting.

 

PHYSICAL EXAMINATION:

Reveals an elderly male in no apparent distress.  He responds to questions

appropriately, but does fall asleep during our conversation.

CARDIOVASCULAR:  He has a 1+ radial pulse with irregular rate.

PULMONARY:  He has regular, nonlabored breathing.

MUSCULOSKELETAL:  Exam of the right arm reveals a large olecranon bursa.  It is

soft on palpation and relatively nontender.  There is no erythema. There are no

wounds or drainage.  He has 130 degrees of elbow flexion and can extend about 15

degrees short of full extension.  He has painless forearm pronation and

supination.  The medial and lateral epicondyles are nontender.  He can fire

extensor pollicis longus (EPL), flexor pollicis longus (FPL), and interosseous

(IO).  He does have muscle wasting in the right hand.  Sensation:  Light touch is

intact in his hand, he denied paresthesias in the ulnar nerve distribution.

 

RADIOLOGY:  X-rays of the right elbow report, but image were not available for my

review, which stated soft tissue swelling, but no fractures in the right elbow.

 

ASSESSMENT AND PLAN:  This patient is a 72-year-old gentleman with many medical

problems, admitted for heart failure, who has a right elbow olecranon bursitis.

There are absolutely no signs of infection.  I explained to the patient there are

zero indications at this point for aspiration given that there is no infection.

Should he develop cellulitis or even gross purulence within the bursa, I still

recommend treatment with antibiotics.  Would only aspirate the right elbow for

recurrent infection to try to identify the bacterial species.  At this point, he

would be best to limit his elbow flexion when resting or sleeping.  This could be

done with a padded elbow brace with the pad on the side of the bursa during the

day and spun 180 degrees at night to limit elbow flexion.  Alternatively, he

could use some tape to secure an extra sweatshirt or some type of towel in the

front of the elbow, which would limit his elbow flexion beyond 90 degrees for

sleeping.  Certainly, ice is reasonable.  He is not allowed to take nonsteroidal

anti-inflammatory medications.  I would not recommend surgical excision for a

nonseptic olecranon bursitis, especially given all his medical problems.  This

patient does not need any outpatient followup for his elbow.

 

Thank you for asking me to see this patient.

## 2017-08-21 NOTE — IPN
DATE:  08/21/2017

 

Mr. Dunlap tells me that he was able to sleep relatively well yesterday and he

did not have any paroxysmal nocturnal dyspnea.  He also denies any chest pain.

When I walked to his room, he was actually laying completely flat and sleeping.

He denies any palpitations, either.  Review of telemetry monitoring reveals no

arrhythmias.

 

VITAL SIGNS:  Blood pressure 154/64, heart rate has been mostly in 60s.  He is

afebrile.  Saturation is 91 to 93% of 2 liters of oxygen by nasal cannula.

Documented urine output yesterday was 2500 mL but the balance was negative only

about 600.  The weight has not been obtained this morning as yet.  He is alert

and oriented and appropriate.  His jugular venous pressure is still very high, at

least 4 to 5 cm above clavicle.  Lungs reveal bilateral end inspiratory crackles,

mostly over lower half of lung fields.  I do not appreciate any findings to

suggest pleural effusion.  Heart exam reveals regular rhythm.  Murmur is

unchanged.  No gallop.  Abdomen is obese but soft.  There is a small amount of

peripheral edema.  Neurologically, he is intact.

 

LABORATORY WISE:

Hemoglobin 9.1, hematocrit 29.6, platelet count 139,000.  Basic metabolic panel:

potassium 5.4, BUN 79, creatinine 3.5 for GFR 19, glucose 224, albumin is 3.0.

 

ASSESSMENT AND PLAN:

Mr. Dunlap is a 72-year-old man who has chronic systolic/diastolic congestive

heart failure that got acutely exacerbated.  It is due to underlying ischemic

cardiomyopathy and a concomitant stage IV renal insufficiency.  So far, even

though with good diuretic response to Lasix yesterday, we did not accomplish

much.  He is still quite volume overloaded and undoubtedly his filling pressures

are high.  Consequently, I am going to continue current dose of Lasix.  I am

going to discontinue the spironolactone as he is hyperkalemic.  Unfortunately,

his renal function, if anything, got slightly worse since yesterday.  I think it

is a little too early to make conclusions from this and I would continue diuresis

that is being done.  I am somewhat hopeful that the addition of thyroid

replacement will bring some improvement as he clearly was hypothyroid coming in.

The remaining issues are stable.

## 2017-08-22 VITALS — DIASTOLIC BLOOD PRESSURE: 52 MMHG | SYSTOLIC BLOOD PRESSURE: 148 MMHG

## 2017-08-22 VITALS — SYSTOLIC BLOOD PRESSURE: 134 MMHG | DIASTOLIC BLOOD PRESSURE: 56 MMHG

## 2017-08-22 VITALS — DIASTOLIC BLOOD PRESSURE: 80 MMHG | SYSTOLIC BLOOD PRESSURE: 150 MMHG

## 2017-08-22 VITALS — SYSTOLIC BLOOD PRESSURE: 130 MMHG | DIASTOLIC BLOOD PRESSURE: 78 MMHG

## 2017-08-22 VITALS — DIASTOLIC BLOOD PRESSURE: 80 MMHG | SYSTOLIC BLOOD PRESSURE: 182 MMHG

## 2017-08-22 VITALS — SYSTOLIC BLOOD PRESSURE: 180 MMHG | DIASTOLIC BLOOD PRESSURE: 80 MMHG

## 2017-08-22 LAB
ALBUMIN SERPL BCG-MCNC: 3.3 GM/DL (ref 3.2–5.2)
ALBUMIN/GLOB SERPL: 0.72 {RATIO} (ref 1–1.93)
ALP SERPL-CCNC: 525 U/L (ref 45–117)
ALT SERPL W P-5'-P-CCNC: 41 U/L (ref 12–78)
ANION GAP SERPL CALC-SCNC: 7 MEQ/L (ref 8–16)
AST SERPL-CCNC: 23 U/L (ref 15–37)
BASOPHILS # BLD AUTO: 0 K/MM3 (ref 0–0.2)
BASOPHILS NFR BLD AUTO: 0.5 % (ref 0–1)
BILIRUB SERPL-MCNC: 0.4 MG/DL (ref 0.2–1)
BUN SERPL-MCNC: 84 MG/DL (ref 7–18)
CALCIUM SERPL-MCNC: 9.2 MG/DL (ref 8.8–10.2)
CHLORIDE SERPL-SCNC: 102 MEQ/L (ref 98–107)
CO2 SERPL-SCNC: 32 MEQ/L (ref 21–32)
CREAT SERPL-MCNC: 3.41 MG/DL (ref 0.7–1.3)
EOSINOPHIL # BLD AUTO: 0.1 K/MM3 (ref 0–0.5)
EOSINOPHIL NFR BLD AUTO: 1.6 % (ref 0–3)
ERYTHROCYTE [DISTWIDTH] IN BLOOD BY AUTOMATED COUNT: 15.8 % (ref 11.5–14.5)
GFR SERPL CREATININE-BSD FRML MDRD: 19 ML/MIN/{1.73_M2} (ref 42–?)
GLUCOSE SERPL-MCNC: 126 MG/DL (ref 83–110)
LARGE UNSTAINED CELL #: 0.1 K/MM3 (ref 0–0.4)
LARGE UNSTAINED CELL %: 1.9 % (ref 0–4)
LYMPHOCYTES # BLD AUTO: 0.9 K/MM3 (ref 1.5–4.5)
LYMPHOCYTES NFR BLD AUTO: 13.6 % (ref 24–44)
MAGNESIUM SERPL-MCNC: 2.7 MG/DL (ref 1.8–2.4)
MCH RBC QN AUTO: 28.2 PG (ref 27–33)
MCHC RBC AUTO-ENTMCNC: 32 G/DL (ref 32–36.5)
MCV RBC AUTO: 88.3 FL (ref 80–96)
MONOCYTES # BLD AUTO: 0.6 K/MM3 (ref 0–0.8)
MONOCYTES NFR BLD AUTO: 8.8 % (ref 0–5)
NEUTROPHILS # BLD AUTO: 4.6 K/MM3 (ref 1.8–7.7)
NEUTROPHILS NFR BLD AUTO: 73.6 % (ref 36–66)
PLATELET # BLD AUTO: 147 K/MM3 (ref 150–450)
POTASSIUM SERPL-SCNC: 4.7 MEQ/L (ref 3.5–5.1)
PROT SERPL-MCNC: 7.9 GM/DL (ref 6.4–8.2)
SODIUM SERPL-SCNC: 141 MEQ/L (ref 136–145)
WBC # BLD AUTO: 6.2 K/MM3 (ref 4–10)

## 2017-08-22 RX ADMIN — IPRATROPIUM BROMIDE AND ALBUTEROL SULFATE SCH ML: .5; 3 SOLUTION RESPIRATORY (INHALATION) at 20:22

## 2017-08-22 RX ADMIN — DOCUSATE SODIUM,SENNOSIDES SCH TAB: 50; 8.6 TABLET, FILM COATED ORAL at 21:41

## 2017-08-22 RX ADMIN — CALCIUM CARBONATE-CHOLECALCIFEROL TAB 250 MG-125 UNIT SCH MG: 250-125 TAB at 21:42

## 2017-08-22 RX ADMIN — INSULIN LISPRO SCH UNITS: 100 INJECTION, SOLUTION INTRAVENOUS; SUBCUTANEOUS at 08:39

## 2017-08-22 RX ADMIN — LIDOCAINE SCH PATCH: 50 PATCH CUTANEOUS at 08:44

## 2017-08-22 RX ADMIN — IPRATROPIUM BROMIDE AND ALBUTEROL SULFATE SCH ML: .5; 3 SOLUTION RESPIRATORY (INHALATION) at 15:18

## 2017-08-22 RX ADMIN — LEVOTHYROXINE SODIUM SCH MCG: 50 TABLET ORAL at 06:57

## 2017-08-22 RX ADMIN — INSULIN DETEMIR SCH UNITS: 100 INJECTION, SOLUTION SUBCUTANEOUS at 08:43

## 2017-08-22 RX ADMIN — HYDRALAZINE HYDROCHLORIDE SCH MG: 25 TABLET, FILM COATED ORAL at 21:41

## 2017-08-22 RX ADMIN — HYDRALAZINE HYDROCHLORIDE SCH MG: 50 TABLET, FILM COATED ORAL at 16:47

## 2017-08-22 RX ADMIN — INSULIN LISPRO SCH UNITS: 100 INJECTION, SOLUTION INTRAVENOUS; SUBCUTANEOUS at 21:00

## 2017-08-22 RX ADMIN — ISOSORBIDE DINITRATE SCH MG: 20 TABLET ORAL at 06:57

## 2017-08-22 RX ADMIN — POLYETHYLENE GLYCOL 3350 SCH PKT: 17 POWDER, FOR SOLUTION ORAL at 08:43

## 2017-08-22 RX ADMIN — CALCITRIOL SCH MCG: 0.25 CAPSULE ORAL at 08:44

## 2017-08-22 RX ADMIN — DEXTROSE MONOHYDRATE SCH MG: 50 INJECTION, SOLUTION INTRAVENOUS at 16:46

## 2017-08-22 RX ADMIN — INSULIN LISPRO SCH UNITS: 100 INJECTION, SOLUTION INTRAVENOUS; SUBCUTANEOUS at 12:10

## 2017-08-22 RX ADMIN — INSULIN LISPRO SCH UNITS: 100 INJECTION, SOLUTION INTRAVENOUS; SUBCUTANEOUS at 17:30

## 2017-08-22 RX ADMIN — DEXTROSE MONOHYDRATE SCH MG: 50 INJECTION, SOLUTION INTRAVENOUS at 08:43

## 2017-08-22 RX ADMIN — SODIUM CHLORIDE SCH UNITS: 4.5 INJECTION, SOLUTION INTRAVENOUS at 21:18

## 2017-08-22 RX ADMIN — CLOPIDOGREL BISULFATE SCH MG: 75 TABLET ORAL at 08:46

## 2017-08-22 RX ADMIN — CALCIUM CARBONATE-CHOLECALCIFEROL TAB 250 MG-125 UNIT SCH MG: 250-125 TAB at 08:45

## 2017-08-22 RX ADMIN — ISOSORBIDE DINITRATE SCH MG: 20 TABLET ORAL at 13:11

## 2017-08-22 RX ADMIN — DOCUSATE SODIUM,SENNOSIDES SCH TAB: 50; 8.6 TABLET, FILM COATED ORAL at 08:44

## 2017-08-22 RX ADMIN — IPRATROPIUM BROMIDE AND ALBUTEROL SULFATE SCH ML: .5; 3 SOLUTION RESPIRATORY (INHALATION) at 07:38

## 2017-08-22 RX ADMIN — Medication SCH: at 21:42

## 2017-08-22 RX ADMIN — ROSUVASTATIN CALCIUM SCH MG: 10 TABLET, FILM COATED ORAL at 21:41

## 2017-08-22 RX ADMIN — HYDRALAZINE HYDROCHLORIDE SCH MG: 50 TABLET, FILM COATED ORAL at 08:45

## 2017-08-22 RX ADMIN — SODIUM CHLORIDE SCH UNITS: 4.5 INJECTION, SOLUTION INTRAVENOUS at 13:10

## 2017-08-22 RX ADMIN — OMEPRAZOLE SCH MG: 20 CAPSULE, DELAYED RELEASE ORAL at 08:45

## 2017-08-22 RX ADMIN — IPRATROPIUM BROMIDE AND ALBUTEROL SULFATE SCH ML: .5; 3 SOLUTION RESPIRATORY (INHALATION) at 03:20

## 2017-08-22 RX ADMIN — SODIUM CHLORIDE SCH UNITS: 4.5 INJECTION, SOLUTION INTRAVENOUS at 06:58

## 2017-08-22 RX ADMIN — ISOSORBIDE DINITRATE SCH MG: 20 TABLET ORAL at 21:42

## 2017-08-22 RX ADMIN — ASPIRIN SCH MG: 81 TABLET ORAL at 08:45

## 2017-08-22 NOTE — REP
PORTABLE CHEST:

 

AP portable view of the chest is performed and compared to a prior study of

08/20/2017.

 

There is again cardiomegaly with vascular congestion.  Left pleural effusion and

basilar infiltrates/atelectasis is essentially unchanged.  Multiple sternal wires

are present.  There is a left pacemaker again noted.

 

IMPRESSION: Essentially stable exam.

 

 

Signed by

Canelo Nicole MD 08/22/2017 04:40 P

## 2017-08-22 NOTE — IPNPDOC
Subjective


Date Seen


The patient was seen on 8/22/17.





Subjective


Chief Complaint/HPI


The patient is a 72-year-old male admitted with a reason for visit of Acute On 

Chronic Systolic Chf.


Events since last encounter


Patient notes that he is feeling ok today. Is acting sluggish on exam. Not as 

active as yesterday. Patient sleeping on and off during exam. Sitter is in room 

with patient. He was combative overnight, pulling out IVs, etc. Gets aggravated 

sooner than usual today. Patient denies any pain on exam. Eating and sleeping 

alright.


Constitutional:  Denies: Chills, Fever


Eyes:  Denies: Vision change


ENT:  Denies: Head Aches


Gastrointestinal:  Denies: Nausea, Vomiting


Hematologic:  Denies: Bruising, Bleeding Excessively


Musculoskeletal:  Denies: Back Pain


Neurological:  Denies: Numbness





Objective


Physical Examination


General Exam:  Positive: Alert, Cooperative


ENT Exam:  Positive: Atraumatic


Neck Exam:  Positive: Supple


Chest Exam:  Positive: Normal air movement


Heart Exam:  Positive: Rate Normal, Normal S1, Normal S2, 


   Negative: Rubs


Abdomen Exam:  Positive: Soft, 


   Negative: Normal bowel sounds, Tenderness, Hepatospenomegaly, Mass


Extremity Exam:  Positive: Normal pulses (Radial pulse 2/4 bilaterally. ), 

Other (bursitis right olecranon)


Skin Exam:  Positive: Other skin issue (Dressings bilaterally heels. )


Neuro Exam:  Positive: Normal Gait





Assessment /Plan


Problems





(1) Heart failure, systolic, with acute decompensation


Problem Text:  Patient has CHF with systolic EF of 30-35%. 





Continuing diuresis with Lasix. Holding Spironolactone due to patient's K 5.3. 

Repeat BMP at 1400. 


Continue statin, aspirin and Plavix.  





Cardiology has been consulted. Appreciate their assistance in managing this 

patient. 


Per Dr. Snow's note, no changes to medications today. 





(2) Olecranon bursitis of right elbow


Problem Text:  Had previously discussed with patient holding arm straight and 

not bending arm or putting weight into it. Patient agreed. 





Monitor for signs of infection. 





(3) Diabetic ulcer of both feet


Problem Text:  Changing dressings daily. Monitor wounds. Appreciate assistance 

from Dr. Johnson at this time in managing this patient. 





(4) Chronic anemia


Problem Text:  Continue to monitor. 











Item Value  Date Time


 


Hemoglobin 9.3 g/dl L 8/22/17 0516


 


Hematocrit 29.0 % L 8/22/17 0516











(5) Thrombocytopenia


Problem Text:  Continue to monitor. 











Item Value  Date Time


 


Platelet Count 147 k/mm3 L 8/22/17 0516











(6) Hypertension


Status:  Chronic


Problem Text:  Monitor BP. Continue medications. 


Had elevated BP this morning at 180/80. Continue to monitor. May need to 

changed meds if BP remains elevated. 








 Vital Signs








Label Value  Date Time


 


Blood Pressure Assessment 182/81 8/22/17 0657


 


Blood Pressure Assessment 150/80 (103) 8/22/17 0825


 


Blood Pressure Assessment 150/80 8/22/17 0845


 


Blood Pressure Assessment 180/80 (113) 8/22/17 1200











(7) Chronic kidney disease


Status:  Chronic


Problem Text:  Continue to monitor. 





Nephrology has been consulted. Appreciate their assistance in managing this 

patient. 





(8) Diabetes mellitus


Status:  Chronic


Problem Text:  Continue medications. Insulin. 





(9) Wheezing


Problem Text:  Continue Duonebs, scheduled and PRN. 





(10) HUSSEIN (acute kidney injury)


Status:  Acute


Problem Text:  Patient has HUSSEIN on chronic. Continue to monitor. Appreciate 

Nephrology in management of this patient. 





(11) Ischemic cardiomyopathy


Problem Text:  Patient has history of CAD and implantable defibrillator. 





(12) Coronary artery disease


Status:  Chronic


Problem Text:  Patient has history with stent placement. 








Plan/VTE


VTE Prophylaxis Ordered?:  Yes (Heparin)





Plan/Urinary Catheter


Reason for insertion/continuin:  Critical Pt monitoring





VS, I&O, 24H, Fishbone


Vital Signs/I&O





Vital Signs








  Date Time  Temp Pulse Resp B/P (MAP) Pulse Ox O2 Delivery O2 Flow Rate FiO2


 


8/22/17 16:47    182/80    


 


8/22/17 16:00 97.0 66 20  92 Room Air  


 


8/21/17 16:00        














I&O- Last 24 Hours up to 6 AM 


 


 8/22/17





 05:59


 


Intake Total 240 ml


 


Output Total 1500 ml


 


Balance -1260 ml











Laboratory Data


24H LABS


Laboratory Tests 2


8/21/17 18:44: 


Lactic Acid Level 0.6, Ammonia 40H


8/21/17 21:04: Bedside Glucose (Misc Panel) 156H


8/22/17 05:16: 


White Blood Count 6.2, Red Blood Count 3.29L, Hemoglobin 9.3L, Hematocrit 29.0L

, Mean Corpuscular Volume 88.3, Mean Corpuscular Hemoglobin 28.2, Mean 

Corpuscular Hemoglobin Concent 32.0, Red Cell Distribution Width 15.8H, 

Platelet Count 147L, Neutrophils (%) (Auto) 73.6H, Lymphocytes (%) (Auto) 13.6L

, Monocytes (%) (Auto) 8.8H, Eosinophils (%) (Auto) 1.6, Basophils (%) (Auto) 

0.5, Neutrophils # (Auto) 4.6, Lymphocytes # (Auto) 0.9L, Monocytes # (Auto) 0.6

, Eosinophils # (Auto) 0.1, Basophils # (Auto) 0.0, Large Unclassified Cells % 

1.9, Large Unclassified Cells # 0.1, Anion Gap 7L, Glomerular Filtration Rate 

19.0L, Blood Urea Nitrogen 84H, Creatinine 3.41H, Sodium Level 141, Potassium 

Level 4.7, Chloride Level 102, Carbon Dioxide Level 32, Calcium Level 9.2, 

Aspartate Amino Transf (AST/SGOT) 23, Alanine Aminotransferase (ALT/SGPT) 41, 

Alkaline Phosphatase 525H, Total Bilirubin 0.4, Total Protein 7.9, Albumin 3.3, 

Magnesium Level 2.7H, Albumin/Globulin Ratio 0.72L


8/22/17 11:46: Bedside Glucose (Misc Panel) 119H


8/22/17 16:58: Bedside Glucose (Misc Panel) 133H


CBC/BMP


Laboratory Tests


8/22/17 05:16








Red Blood Count 3.29 L, Mean Corpuscular Volume 88.3, Mean Corpuscular 

Hemoglobin 28.2, Mean Corpuscular Hemoglobin Concent 32.0, Red Cell 

Distribution Width 15.8 H, Neutrophils (%) (Auto) 73.6 H, Lymphocytes (%) (Auto

) 13.6 L, Monocytes (%) (Auto) 8.8 H, Eosinophils (%) (Auto) 1.6, Basophils (%) 

(Auto) 0.5, Neutrophils # (Auto) 4.6, Lymphocytes # (Auto) 0.9 L, Monocytes # (

Auto) 0.6, Eosinophils # (Auto) 0.1, Basophils # (Auto) 0.0, Calcium Level 9.2, 

Aspartate Amino Transf (AST/SGOT) 23, Alanine Aminotransferase (ALT/SGPT) 41, 

Alkaline Phosphatase 525 H, Total Bilirubin 0.4, Total Protein 7.9, Albumin 3.3


Microbiology





Microbiology


8/20/17 Blood Culture - Preliminary, Resulted


          No Growth after 48 hours. All Specime...


8/20/17 Blood Culture - Preliminary, Resulted


          No Growth after 48 hours. All Specime...





GME ATTESTATION


GME ATTESTATION


My preceptor for this patient encounter was Dr. Hernandez and he was physically 

present in the building during the encounter and was fully available. As needed

, all aspects of the patient interview, examination, medical decision making 

process, and medical care plan development were reviewed and approved by the 

preceptor. Preceptor is aware and concurs with the plan as stated in the body 

of this note and will attest to such by his/her co-signature.











ANNEL DRAKE DO Aug 22, 2017 18:43


NORBERTO HERNANDEZ MD Sep 4, 2017 21:24

## 2017-08-22 NOTE — IPN
DATE:  08/22/2017

 

Mr. Dunlap has not been doing overly well.  Apparently yesterday he had episodes

of agitation and was trying to crawl out of bed and pull out his Jimenez catheter

and actually sitter was necessary.  This morning he is a relatively easily

arousable but seems slightly dazed.  Nevertheless, most of his answers are

appropriate.  He denies any chest pain or shortness of breath.  Surprisingly, to

me, he prefers to lie completely flat, which is unusual for patients with

clear-cut congestive heart failure.

 

VITAL SIGNS:  Blood pressure this morning was as high as 180 but throughout the

night it was mostly in 120s and 130s.  Heart rate has been in 60s and 70 in sinus

rhythm with occasional ventricular ectopy.  He is afebrile.  Saturates in low 90s

on room air.  Fluid balance yesterday was documented as 1300 negative.  Weight

has not been documented yet this morning.  He is alert and oriented times three.

His jugular venous pressure is still quite high.  Lungs are unchanged.  There are

still fine end inspiratory crackles throughout both lung fields.  I do not

appreciate any physical findings suggestive of pleural effusion indicating that

it is not overly large.  Heart exam reveals regular rhythm.  There is murmur at

the apex at approximately 1 or 2 out of 6 intensity.  It is blowing in nature and

likely consistent with MR.  I do not appreciate gallop.  Abdomen is obese but

soft.  There is no significant peripheral edema.  Neurologically, as above, I do

not appreciate any focal weakness, but he definitely has coarse tremors of upper

extremities.

 

LABORATORY WISE:

Basic metabolic panel reveals potassium 4.7, BUN 84, creatinine 3.4 for

calculated GFR 19, glucose was 126, magnesium 2.7 and ammonia level was drawn

yesterday evening and was 40.  CBC:  Hemoglobin 9.3, hematocrit  29, platelet

count 147,000.

 

ASSESSMENT AND PLAN:  Mr. Dunlap is a 72-year-old man who has ischemic

cardiomyopathy with chronic systolic/diastolic congestive heart failure that is

acutely exacerbated.  There is a concomitant stage IV renal insufficiency.  He is

also diabetic.  As far as congestive heart failure is concerned, I would continue

current course.  We have accomplished modest diuresis, but he remains volume

overloaded.  His renal function is holding relatively steady.  His blood pressure

was very high this morning but it was only isolated reading and for the most part

his blood pressure has been well controlled.  If needed, we can further increase

the dose of hydralazine.  I do not believe he can get any more beta blockers as

his heart rate is typically in relatively bradycardic range.  As far as coronary

artery disease is concerned, he has no anginal symptoms and is on antiplatelet

medications.  Finally, I am somewhat concerned about his neurologic condition.  I

suspect that the tremor could be related to Lyrica and the dose was already

reduced.  I think we may need to reduce it further depending on his clinical

course.  I hope that this is not an early sign of uremia.  He is followed in this

regard by nephrology service as well.  We will continue following the patient in

the hospital.  No medication changes for me today.

## 2017-08-22 NOTE — CR
DATE OF CONSULTATION: 08/20/2017

 

REFERRING PHYSICIAN: Dr. Ann Tee

 

REASON FOR CONSULTATION: Acute kidney injury superimposed on chronic kidney

disease IV, congestive heart failure with exacerbation.

 

CHIEF COMPLAINT: The patient was admitted on April 20 with the complaint of

worsening shortness of breath for the past three days.

 

HISTORY OF PRESENT ILLNESS: Mr. Sánchez Dunlap is a 72-year-old male with a past

medical history of chronic kidney disease (CKD) stage III, advancing now to CKD

stage IV with a baseline creatinine in the low 2s, with an average estimated GFR

of about 30 mL per minute for the past eight months, with history of repeat 
acute

kidney injury (AKIs) in the recent past with associated deterioation in renal 
function 

and progression of CKD to stage IV . Past medical history includes type 2 
diabetes,

hypertension longstanding with congestive heart failure with a left ventricular

ejection fraction of around 30-35% with moderate pulmonary hypertension and mild

aortic stenosis. He has a history of numerous coronary interventions and 
coronary

artery bypass grafting (CABG) in 2013 and several admissions for acute on 
chronic

systolic heart failure exacerbations this year including one in June, in May and

in April of 2017. He did require HD X 2 on another recent admission in [facility]

for heart failure exacerbation with volume overload and concomitant HUSSEIN.

 

The patient is now seen in the progressive care unit (PCU).  He complains of a

three day history of increasing shortness of breath with paroxysmal nocturnal

dyspnea and atypical chest discomfort. The patient states he has been compliant

with his home diuretic regimen. He states he is currently taking Torsemide 40 mg

daily. He denies any fluid restriction in his diet.

 

On triage he was noted to be hypertensive with a saturation of 94% on room air.

Chest x-ray was consistent with congestive heart failure (CHF) pattern and small

pleural effusion. The patient had a noncontrast chest CT which showed moderate

bilateral pleural effusions. He had a CT of the abdomen and pelvis which showed

sigmoid diverticulitis.  Kidneys seen on the CAT scan were without any

hydroureter or hydronephrosis.

 

He was treated with Lasix 40 mg intravenous (IV) once and started on Lasix 80 mg

IV twice a day. He does not have a Jimenez catheter in place. He denies any 
history

of urinary retention, denies any problem initiating a urinary stream. He denies

sensation of incomplete void or problems emptying his bladder. The patient 
denies

any nonsteroidal antiinflammatory drugs (NSAID) use. He is unable to detail his

medications and dosages. There is no family at the bedside this morning. He

states he feels better since being admitted. He has made 800 mL of urine in 
total

overnight with a negative fluid balance of 430 mL. He denies any PND or 
orthopnea

overnight.

 

PAST MEDICAL HISTORY:

1. CKD Stage III advancing to CKD Stage IV. Review of the patient's eGFR since

   January 2017 showed an average eGFR of 30 mL per minute with history of HUSSEIN 

   requiring short term hemodialysis in the recent past and deterioration of 
GFR.

2. Ischemic cardiomyopathy status post numerous coronary interventions and CABG

   in 2013. Last echocardiogram with a left ventricular  ejection fraction of

   30-35%.

3. Type 2 diabetes on home insulin.

4. Hypertension.

5. Peripheral vascular disease status post numerous revascularizations of lower

   extremities. Right femoral posterior tibial bypass in 2011 and left femoral 
to

   peritoneal bypass in 2015.

 

HOME MEDICATIONS: Reviewed and include;

- aspirin 81 mg daily

- calcitriol 0.25 mcg daily

- calcium with Vitamin D one tab twice daily

- Coreg 12.5 twice daily

- Plavix 75 daily

- hydralazine 50 mg three times a day

- insulin Lantus 10 units every morning

- isosorbide dinitrate 20 mg by mouth every 8 hours

- levothyroxine 25 mcg tab daily

- omeprazole 40 mg by mouth daily

- potassium 10 mEq by mouth twice a day

- Procrit 20,000 units intramuscularly every month

- spironolactone 25 mg daily

- torsemide 20 mg twice a day

 

ALLERGIES: No known drug allergies.

 

PAST SURGICAL HISTORY:

1. CABG in 2013.

2.  Implantable cardioverter defibrillator (ICD) placement in 2014.

3.  Bilateral femoral peritoneal bypass.

4.  History of amputations on both lower extremities.

5.  Bilateral cataracts.

SOCIAL HISTORY: Ex-smoker, quit smoking about five years prior. Previously 1 to

1-1/2 packs per daily for more than 40 years. Denies alcohol or recreational 
drug

use. The patient lives with his daughter.

 

FAMILY HISTORY:  Heart disease.



REVIEW OF SYSTEMS:

GENERAL: The patient denies checking daily weights. Denies any recent febrile

illnesses, fever or chills.

HEENT: Denies any sore throat or change in vision.

CARDIOVASCULAR: Admits to worsening shortness of breath over the past three days

and occasional right upper quadrant discomfort. No use of home oxygen.

PULMONARY: Admits to orthopnea and paroxysmal nocturnal dyspnea for the last two

nights.

GASTROINTESTINAL (GI): Denies nausea, vomiting, diarrhea.

MUSCULOSKELETAL: Chronic low back pain, chronic lower extremity weakness. Denies

edema of the lower extremities.

GENITOURINARY (): Denies dysuria, hematuria, no trouble with urinary stream.

NEUROLOGIC:  Denies headache, denies syncopal episodes. Admits to chronic lower

extremity weakness.

Remainder review of systems is negative.



PHYSICAL EXAMINATION:

VITAL SIGNS: Temperature 98.6, pulse 67, respiratory rate 18, blood pressure

128/60 pulse oximetry 95% on room air.

GENERAL: The patient is seen lying in bed with the head of bed elevated at 30

degrees. He is awake, alert and in no acute distress.

HEENT: Normocephalic, moist mucous membranes. EOMI

NECK: Supple. Distended neck veins present.

LUNGS: Decreased breath sounds at bilateral base, otherwise clear. Saturating 95
%

on room air.

HEART: Sinus bradycardia, regular rhythm. No edema in the lower extremities.

ABDOMEN: Soft, obese, positive bowel sounds. No appreciable organomegaly.

EXTREMITIES: No edema. Dressing in place on feet.

NEUROLOGIC: Alert, awake, and oriented times three. Appropriately interactive 
and

following commands.

Psychiatric: appropriate mood and affect.

 

LABORATORY DATA: Hemoglobin 9.1, WBC 5.7, platelets 138. Sodium 137, potassium

49, bicarbonate 27, creatinine 3.3, glucose 300, A1c 9.6%. Corrected calcium 
8.6.

Brain natriuretic peptide (BNP) 1920.  Blood gas consistent with respiratory

acidosis.

 

IMAGING:

Chest x-ray with no pulmonary edema seen.

CT of the chest with bilateral moderate pleural effusion.

CT of the abdomen and pelvis: Sigmoid diverticulitis. Kidneys visualized with no

hydroureter or hydronephrosis.



Inpatient medications reviewed by myself.

 

ASSESSMENT AND PLAN: This is a 72-year-old male with longstanding history of

poorly controlled diabetes, hypertension, ischemic cardiomyopathy with left

ventricular  ejection fraction of 35% peripheral vascular disease, has been

having recurrent admissions for decompensated heart failure. The patient has a

history of chronic kidney disease (CKD) which has been progressive on review of

labs over the past year and recurrent acute kidney injuries. . He is now 
admitted

with an acute exacerbation of congestive heart failure. He is unsure of his home

diuretic regimen but a chart review shows Torsemide 40 mg daily with Aldactone 
25

mg daily. He has not been on an angiotensin converting enzyme (ACE) or an

angiotensin receptor blocker (ARB) therapy due to advanced chronic kidney 
disease

and history of recurrent acute kidney injuries (AKIs) in the past year.

 

1. HUSSEIN on CKD  Stage IV: Imaging shows no obstruction. Electrolytes are stable

   with a potassium of 4.9 and a bicarbonate of 27. The patient has had several

   repeat admissions for decompensated heart failure. He is unsure of his home

   medication regimen and is likely not appropriately fluid restricting at this

   time as he is volume overloaded, I agree with an increase in diuretics to

   Lasix 80 intravenous (IV) twice a day. Further management of his diuretics

   will be based upon his renal function and clinical appearance.

Review of labs over the past year does show a progression in his chronic kidney

disease likely due to his repeat AKIs which are in and of themselves a risk

factor for more rapid progression of chronic kidney disease. He has required 

2 sessions of hemodialysis in the recent past in the setting of HUSSEIN with CHF 
exacerbation.

Urine electrolytes have been ordered and those will be followed as well. 

OK to continue with Aldactone 25mg daily and monitor potassium.



2. Coronary artery disease status post coronary artery bypass grafting, systolic

cardiomyopathy. Will need to assess the patient's home situation to improve

compliance with medications and to decrease the recurrence of hospitalizations

for volume overload. Agree with IV diuretics with close monitoring of renal

function and electrolytes.

 

3. Hypertension. Hemodynamically stable at this time on carvedilol 12.5 mg twice

a day, hydralazine 50 mg by mouth three times a day, Isordil 20 mg every 8 hours

and Aldactone 25 mg daily. Would avoid hypotension and borderline hypotension

while we are aggressively diuresing the patient to decrease the risk of further

deterioration of renal function.



4. Type 2 diabetes, poorly controlled. Most recent A1c 9.6%. Again, likely a

reflection of poor medication compliance at home.



5. Hypothyroidism. TSH 15 and noted patient was started on Levoxyl.

 

The plan of care was discussed with Dr. Snow.

 

 

 

Vi

 

 

 

 

cc:    MD OBED Ritter

## 2017-08-22 NOTE — IPN
DATE:  08/21/2017

 

SUBJECTIVE:   The patient is seen today at the bedside in the intensive care 
unit

(ICU).  RN is present.  The patient complains of twitching in his hands and

dropping things.  He now has a condom catheter placed. He denies any orthopnea 
or

paroxysmal nocturnal dyspnea.  Overnight, denies any chest discomfort.  He has

been started on fluid restriction.

 

REVIEW OF SYSTEMS:  No headaches, no syncope or presyncopal episode.  No chest

pain.  No palpitations.  No orthopnea.  No nausea.  No vomiting, diarrhea.  No

abdominal pain.  No lower extremity swelling. + Hand twitching. Remainder of 
review of 

systems is negative.

 

VITAL SIGNS:  Temperature 97.8, heart rate 65, respiratory rate 20, blood

pressure 144/25, pulse oximetry 97% on 2 liters nasal cannula.  Intake 2000 mL

and output 2565, net negative 565 mL.  No daily weight recorded for today.

 

PHYSICAL EXAMINATION:  

General: The patient is awake, alert, oriented times three.

Swallowing his pills and sitting up at the edge of the bed in no acute distress.

HEENT:  Normocephalic.  Moist mucous membranes.

NECK:  Supple.  Prominent neck veins.

PULMONARY:  Symmetric bilateral air entry.  Decreased at base.

CARDIAC:  S1, S2.  Regular rhythm. AICD chest wall

ABDOMEN:  Soft, nontender with positive bowel sounds.

GENITOURINARY:  Condom catheter.

EXTREMITIES:  Pedal edema. 

Neurologic: noted tremor of bilateral hands, otherwise no focal deficit

 

LABORATORY DATA: WBC 6.3, hemoglobin 9.1, platelet count 139.

 

Sodium 139, potassium 5.4, bicarbonate 29, creatinine 3.4, corrected calcium 9.2
,

magnesium 2.5, phosphorous 4.7, alkaline phosphatase 491.  .

 

Microbiology:  Blood cultures remain with no growth for 24 hours.

 

ASSESSMENT AND PLAN:   This is a 72-year-old male with past medical history of

ischemic systolic cardiomyopathy with ejection fraction of 30 to 35%, chronic

kidney disease stage III advancing to chronic kidney disease stage IV, status

post coronary artery bypass graft (CABG), status post AICD, hypertension,

insulin-dependent diabetes, peripheral arterial disease with bilateral femoral

popliteal bypass and bilateral diabetic foot ulcer, who was admitted with

shortness of breath and decompensated congestive heart failure (CHF) and acute 
on

chronic renal insufficiency.

 

1.  Acute kidney injury on chronic kidney disease IV.  His urine random sodium 
is

elevated, consistent with a tubular injury and use of diuretics. Urine protein 
to

creatinine ratio is about 1.5 grams, consistent with his poorly controlled

diabetes.  Serial lab review shows a progression in his chronic kidney disease.

At this time, while we are actively diuresing the patient with Lasix 80 mg IV

twice a day, I would not want too tightly control his blood pressure as it will

be more difficult to diurese him and increase the risks of worsening of his

kidney function. Refractory volume overload in advanced CKD would be an 
indication

for initiation of chronic hemodialysis.

 

2.  Shortness of breath secondary to decompensated congestive heart

failure (CHF), pleural effusions.  We will fluid restrict the patient to 1500 
mL daily, continue

Lasix 80 mg IV twice a day and we will adjust diuretics based on the clinical

response and laboratory studies.  We will allow systolic of 130 and 140s while 
he

is being aggressively diuresed.

 

3.  Hyperkalemia.  Diet changed to 2 grams potassium.  Spironolactone is on 
hold.

Continue with Lasix for kaliuresis

 

4.  Hypertension.  Holding parameters added for hydralazine.  Carvedilol

decreased to 6.25 mg twice a day.  Aldactone is on hold for hyperkalemia.  He

also remains on Isordil 20 mg by mouth every 8 hours.

 

5.  Insulin-dependent diabetes mellitus, poorly controlled with A1/c 9.6% with

subnephrotic proteinuria.  Not a candidate for ACE or ARB therapy due to 
advanced

chronic kidney disease and propensity for hyperkalemia.

 

6.  Twitch.  The patient complains of increased twitching in the arms. Unlikely 
to be 

uremia given his current GFR. I feel it is more likely the Lyrica that is the 
culprit 

and I have reduced the dose. Lyrica is 90% excreted in urine and associated 
with tremors.

 

The plan of care was discussed with Dr. Ann Tee, hospitalist.

OBED

## 2017-08-23 VITALS — DIASTOLIC BLOOD PRESSURE: 59 MMHG | SYSTOLIC BLOOD PRESSURE: 136 MMHG

## 2017-08-23 VITALS — SYSTOLIC BLOOD PRESSURE: 151 MMHG | DIASTOLIC BLOOD PRESSURE: 68 MMHG

## 2017-08-23 VITALS — DIASTOLIC BLOOD PRESSURE: 62 MMHG | SYSTOLIC BLOOD PRESSURE: 131 MMHG

## 2017-08-23 VITALS — DIASTOLIC BLOOD PRESSURE: 88 MMHG | SYSTOLIC BLOOD PRESSURE: 139 MMHG

## 2017-08-23 VITALS — DIASTOLIC BLOOD PRESSURE: 66 MMHG | SYSTOLIC BLOOD PRESSURE: 140 MMHG

## 2017-08-23 VITALS — DIASTOLIC BLOOD PRESSURE: 66 MMHG | SYSTOLIC BLOOD PRESSURE: 168 MMHG

## 2017-08-23 VITALS — DIASTOLIC BLOOD PRESSURE: 62 MMHG | SYSTOLIC BLOOD PRESSURE: 167 MMHG

## 2017-08-23 LAB
ALBUMIN SERPL BCG-MCNC: 3 GM/DL (ref 3.2–5.2)
ALBUMIN/GLOB SERPL: 0.7 {RATIO} (ref 1–1.93)
ALP SERPL-CCNC: 442 U/L (ref 45–117)
ALT SERPL W P-5'-P-CCNC: 31 U/L (ref 12–78)
ANION GAP SERPL CALC-SCNC: 8 MEQ/L (ref 8–16)
AST SERPL-CCNC: 15 U/L (ref 15–37)
BASOPHILS # BLD AUTO: 0 K/MM3 (ref 0–0.2)
BASOPHILS NFR BLD AUTO: 0.8 % (ref 0–1)
BILIRUB SERPL-MCNC: 0.4 MG/DL (ref 0.2–1)
BUN SERPL-MCNC: 84 MG/DL (ref 7–18)
CALCIUM SERPL-MCNC: 8.5 MG/DL (ref 8.8–10.2)
CHLORIDE SERPL-SCNC: 104 MEQ/L (ref 98–107)
CO2 SERPL-SCNC: 31 MEQ/L (ref 21–32)
CREAT SERPL-MCNC: 3.47 MG/DL (ref 0.7–1.3)
EOSINOPHIL # BLD AUTO: 0.1 K/MM3 (ref 0–0.5)
EOSINOPHIL NFR BLD AUTO: 2.5 % (ref 0–3)
ERYTHROCYTE [DISTWIDTH] IN BLOOD BY AUTOMATED COUNT: 16.1 % (ref 11.5–14.5)
GFR SERPL CREATININE-BSD FRML MDRD: 18.7 ML/MIN/{1.73_M2} (ref 42–?)
GLUCOSE SERPL-MCNC: 81 MG/DL (ref 83–110)
HBV SURFACE AB SER QL: NEGATIVE
LARGE UNSTAINED CELL #: 0.2 K/MM3 (ref 0–0.4)
LARGE UNSTAINED CELL %: 3.4 % (ref 0–4)
LYMPHOCYTES # BLD AUTO: 1 K/MM3 (ref 1.5–4.5)
LYMPHOCYTES NFR BLD AUTO: 19.6 % (ref 24–44)
MAGNESIUM SERPL-MCNC: 2.7 MG/DL (ref 1.8–2.4)
MCH RBC QN AUTO: 28.3 PG (ref 27–33)
MCHC RBC AUTO-ENTMCNC: 32 G/DL (ref 32–36.5)
MCV RBC AUTO: 88.4 FL (ref 80–96)
MONOCYTES # BLD AUTO: 0.4 K/MM3 (ref 0–0.8)
MONOCYTES NFR BLD AUTO: 7.9 % (ref 0–5)
NEUTROPHILS # BLD AUTO: 3.4 K/MM3 (ref 1.8–7.7)
NEUTROPHILS NFR BLD AUTO: 65.7 % (ref 36–66)
PLATELET # BLD AUTO: 151 K/MM3 (ref 150–450)
POTASSIUM SERPL-SCNC: 4.5 MEQ/L (ref 3.5–5.1)
PROT SERPL-MCNC: 7.3 GM/DL (ref 6.4–8.2)
SODIUM SERPL-SCNC: 143 MEQ/L (ref 136–145)
WBC # BLD AUTO: 5.2 K/MM3 (ref 4–10)

## 2017-08-23 PROCEDURE — 02HV33Z INSERTION OF INFUSION DEVICE INTO SUPERIOR VENA CAVA, PERCUTANEOUS APPROACH: ICD-10-PCS | Performed by: SURGERY

## 2017-08-23 RX ADMIN — Medication SCH: at 21:00

## 2017-08-23 RX ADMIN — INSULIN LISPRO SCH UNITS: 100 INJECTION, SOLUTION INTRAVENOUS; SUBCUTANEOUS at 07:30

## 2017-08-23 RX ADMIN — ONDANSETRON PRN MG: 2 INJECTION INTRAMUSCULAR; INTRAVENOUS at 17:45

## 2017-08-23 RX ADMIN — INSULIN LISPRO SCH UNITS: 100 INJECTION, SOLUTION INTRAVENOUS; SUBCUTANEOUS at 12:07

## 2017-08-23 RX ADMIN — CALCITRIOL SCH MCG: 0.25 CAPSULE ORAL at 09:40

## 2017-08-23 RX ADMIN — SODIUM CHLORIDE SCH UNITS: 4.5 INJECTION, SOLUTION INTRAVENOUS at 17:09

## 2017-08-23 RX ADMIN — ISOSORBIDE DINITRATE SCH MG: 20 TABLET ORAL at 14:00

## 2017-08-23 RX ADMIN — HYDRALAZINE HYDROCHLORIDE SCH MG: 25 TABLET, FILM COATED ORAL at 21:00

## 2017-08-23 RX ADMIN — SODIUM CHLORIDE SCH UNITS: 4.5 INJECTION, SOLUTION INTRAVENOUS at 04:49

## 2017-08-23 RX ADMIN — DOCUSATE SODIUM,SENNOSIDES SCH TAB: 50; 8.6 TABLET, FILM COATED ORAL at 09:00

## 2017-08-23 RX ADMIN — IPRATROPIUM BROMIDE AND ALBUTEROL SULFATE SCH ML: .5; 3 SOLUTION RESPIRATORY (INHALATION) at 20:33

## 2017-08-23 RX ADMIN — INSULIN LISPRO SCH UNITS: 100 INJECTION, SOLUTION INTRAVENOUS; SUBCUTANEOUS at 21:00

## 2017-08-23 RX ADMIN — DOCUSATE SODIUM,SENNOSIDES SCH TAB: 50; 8.6 TABLET, FILM COATED ORAL at 21:00

## 2017-08-23 RX ADMIN — HYDRALAZINE HYDROCHLORIDE SCH MG: 25 TABLET, FILM COATED ORAL at 16:00

## 2017-08-23 RX ADMIN — HYDRALAZINE HYDROCHLORIDE SCH MG: 25 TABLET, FILM COATED ORAL at 09:39

## 2017-08-23 RX ADMIN — IPRATROPIUM BROMIDE AND ALBUTEROL SULFATE SCH ML: .5; 3 SOLUTION RESPIRATORY (INHALATION) at 14:00

## 2017-08-23 RX ADMIN — POLYETHYLENE GLYCOL 3350 SCH PKT: 17 POWDER, FOR SOLUTION ORAL at 09:00

## 2017-08-23 RX ADMIN — ISOSORBIDE DINITRATE SCH MG: 20 TABLET ORAL at 22:19

## 2017-08-23 RX ADMIN — CALCIUM CARBONATE-CHOLECALCIFEROL TAB 250 MG-125 UNIT SCH MG: 250-125 TAB at 22:19

## 2017-08-23 RX ADMIN — IPRATROPIUM BROMIDE AND ALBUTEROL SULFATE SCH ML: .5; 3 SOLUTION RESPIRATORY (INHALATION) at 02:00

## 2017-08-23 RX ADMIN — ISOSORBIDE DINITRATE SCH MG: 20 TABLET ORAL at 06:05

## 2017-08-23 RX ADMIN — SODIUM CHLORIDE SCH UNITS: 4.5 INJECTION, SOLUTION INTRAVENOUS at 22:20

## 2017-08-23 RX ADMIN — INSULIN DETEMIR SCH UNITS: 100 INJECTION, SOLUTION SUBCUTANEOUS at 09:42

## 2017-08-23 RX ADMIN — ROSUVASTATIN CALCIUM SCH MG: 10 TABLET, FILM COATED ORAL at 22:19

## 2017-08-23 RX ADMIN — LEVOTHYROXINE SODIUM SCH MCG: 50 TABLET ORAL at 06:05

## 2017-08-23 RX ADMIN — CALCIUM CARBONATE-CHOLECALCIFEROL TAB 250 MG-125 UNIT SCH MG: 250-125 TAB at 09:00

## 2017-08-23 RX ADMIN — IPRATROPIUM BROMIDE AND ALBUTEROL SULFATE SCH ML: .5; 3 SOLUTION RESPIRATORY (INHALATION) at 07:03

## 2017-08-23 RX ADMIN — INSULIN LISPRO SCH UNITS: 100 INJECTION, SOLUTION INTRAVENOUS; SUBCUTANEOUS at 17:10

## 2017-08-23 RX ADMIN — OMEPRAZOLE SCH MG: 20 CAPSULE, DELAYED RELEASE ORAL at 17:09

## 2017-08-23 RX ADMIN — CLOPIDOGREL BISULFATE SCH MG: 75 TABLET ORAL at 17:14

## 2017-08-23 RX ADMIN — ASPIRIN SCH MG: 81 TABLET ORAL at 09:39

## 2017-08-23 RX ADMIN — LIDOCAINE SCH PATCH: 50 PATCH CUTANEOUS at 09:40

## 2017-08-23 RX ADMIN — PREGABALIN SCH MG: 75 CAPSULE ORAL at 17:08

## 2017-08-23 NOTE — IPN
DATE:  08/23/2017

 

Mr. Dunlap tells me that he had a pretty decent night.  He did not have any

difficulty sleeping.  There was no paroxysmal nocturnal dyspnea (PND) or

orthopnea.  This morning he looks a lot more alert and oriented than he did

yesterday.  He still has some tremor of upper extremities though, and he had hard

time eating his breakfast on his own.

 

He weighs 90.8 kg, which is not significantly changed compared to 2 days ago.

Blood pressure this morning was as high as 175, but most of the time is in 130s

to low 150s. Afebrile.  Saturation 92% on room air, and heart rate has been in

60s and 70s.  Fluid balance yesterday was documented as a liter and half

negative.  He is alert and oriented times three.  His jugular venous pulse (JVP)

is still high but there is improvement.  I do not see more than maybe 2 cm

elevation above clavicle.  Lungs still have fine end inspiratory crackles in both

sides, but again, improved compared to yesterday.  Heart exam regular rhythm.  No

gallop.  Murmur is unchanged.  Abdomen is soft but obese.  There is no peripheral

edema.  Neurologically, besides tremor, I do not appreciate any focal weakness.

 

 

His laboratories reveal potassium 4.5, BUN 84, creatinine 3.5 for GFR about 18,

glucose 81, magnesium 2.7, albumin 3.  His BNP is down to 1180, which is still

very high but improved since admission.

 

ASSESSMENT AND PLAN:

Mr. Dunlap is a 72-year-old man who has acute on chronic systolic congestive

heart failure due to underlying ischemic cardiomyopathy.  He also has diabetic

nephropathy and stage IV renal failure.  He does not respond to diuresis as well

as what I would be hoping for and his renal function remains approximately

stable.  I am afraid that his tremor actually does indicate early neurologic

impairment related to renal dysfunction.  Even though I do not appreciate any

other signs of uremia, the fact that this renal function if anything is slightly

worse indicates that he indeed will likely have a progressive renal failure.  He

is being taken for placement of permanent dialysis catheter as we speak, which I

think is certainly reasonable.

 

From my perspective, I will continue current management with vasodilators,

diuresis and beta-blockers.  He is not a candidate for angiotensin-converting

enzyme (ACE) inhibitor or ARB due to renal dysfunction, but if dialysis is

started we certainly can reintroduce them.

 

As far as coronary artery disease is concern, he has no is no anginal symptoms

and it has been mostly stable.

## 2017-08-23 NOTE — IPN
DATE:  08/22/2017

 

SUBJECTIVE:   The patient is seen today at the bedside today.  He has a sitter

present.  The patient was significant restless overnight and had tugging at his

Jimenez catheter.  This morning, he is drowsy and arousable to tactile stimulus 
and

verbal stimulus.  He gives short answers and falls back to sleep.  Per the 
sitter

at the bedside, the patient was awake earlier and had eaten his entire breakfast

tray without issue.

 

REVIEW OF SYSTEMS:  Limited secondary to the patient's clinical condition.  He

does deny chest pain, shortness of breath.

 

VITAL SIGNS:  Temperature 97.2, pulse 68, respiratory rate 20, blood pressure

150/80, saturating 92% on room air.  Intake not recorded.  Urine output 1500 mL

out of the Ijmenez catheter.  Weight on the bed scale 90.3 kg.

 

PHYSICAL EXAMINATION:  The patient is lying flat in bed with one pillow.  He is

somnolent and drowsy, arousable to verbal and tactile stimulus.  He gives short

one to two word answers to questions and then falls back to sleep.

HEENT: Moist mucous membranes.  Extraocular muscles intact.

NECK:  Supple without distention of jugular vein.

PULMONARY: Bilateral audible air entry on room air. Decreased at bases

CARDIOVASCULAR :  S1, S2.  Regular rate. 2+ radial pulse

ABDOMEN:  Soft, nontender.  No guarding or rebound tenderness.

GENITOURINARY:  Jimenez catheter in place.

EXTREMITIES:  No edema.

NEUROLOGIC:  Somnolent, drowsy.

PSYCHIATRIC:  Somnolent at the time of my visit, however, previously reported 
to be

agitated.  Sitter currently at the bedside.

 

LABORATORY DATA: White count 6.2, hemoglobin 9.3, platelets 147.

 

Coagulation:  INR 1.0.  Glucose 133, sodium 141, potassium 4.7, bicarbonate 32,

BUN 84, creatinine 3.4, magnesium 2.7.

 

INPATIENT MEDICATIONS:  Reviewed.  The patient was started on amlodipine 5 mg by

mouth daily by the primary team.  He continues on calcitriol 0.25 mg daily, 
carvedilol 6.25 mg twice

a day, Lasix 80 mg IV twice a day, hydralazine 50 mg by mouth three times a day,

Isordil 20 mg by mouth every 8 hours.  His Lyrica dose was decreased to 75 mg

daily by myself

 

ASSESSMENT AND PLAN:   This is a 72-year-old male with past medical history of

ischemic systolic cardiomyopathy with ejection fraction of 30 to 35%, chronic

kidney disease stage IV, history of dialysis times two sessions in the past,

status post coronary artery bypass graft (CABG), status post AICD, hypertension,

peripheral arterial disease, status post bilateral femoral popliteal bypass,

insulin-dependent diabetes, who has had recurrent admissions for decompensated

heart failure, acute on chronic renal insufficiency.

 

1.  Metabolic encephalopathy.  The patient was noted to be somnolent today.  He

has been agitated overnight and is now on a one to one sitter. His BUN is 80s. 
His

encephalopathy is likely multifactorial from lyrica accumulation and early 
uremia.

I spoke with the patient's daughter, Whitney Lewis, regarding initiation of 
chronic hemodialysis. 

At this time, with the patient's cardiomyopathy and recurrent admissions for 

decompensated heart failure and his advancing chronic kidney disease stage IV 

and now his encephalopathy, I believe that it is prudent to initiate him on 
hemodialysis. 

I discussed with his daughter, who agrees for the same.  Dr. Montoya has been 
consulted for line

placement and we will plan for first hemodialysis session tomorrow, 08/23/2017.

 

2.  Congestive heart failure (CHF).  The patient remains on fluid restriction 
and

Lasix 80 mg IV twice a day.  Today, he is seen lying flat in bed.  He is on room

air.  He does not seem overtly volume overloaded at this time.  Repeat chest

x-ray shows unchanged pleural effusion, basilar infiltrates and vascular

congestion.  Initiating him on chronic hemodialysis should help with his overall

volume management and decrease his readmissions for decompensated heart failure.

 

 

3.  Hyperkalemia.  Potassium has been stable on 2 gram potassium diet with

spironolactone on hold and Lasix for kaliuresis.

 

4.  Hypertension.  He is started on Norvasc 5 mg daily by the primary team.  He 
continues on

carvedilol 6.25 mg twice a day and hydralazine 50 mg by mouth three times a day,

Isordil 20 mg by mouth every 8 hours. His BP should improve with serial 
hemodialysis.

 

 

Plan of care was discussed with the patient's daughter, Whitney Lewis, and with 
Dr. Eligio Trujillo.

[facility] Hospital[facility]UNU

## 2017-08-23 NOTE — IPNPDOC
Text Note


Date of Service


The patient was seen on 8/23/17.





NOTE


Subjective: Patient denies chest pain/shortness of breath/palpitations. Will be 

going for hemodialysis today.





Objective: 


Vitals: (see below) 


General: No acute distress, laying comfortably in bed.


HEENT: Moist mucous membranes.


Neck: No JVD or lymphadenopathy. Right dialysis catheter in place. No bleeding 

noted.


Cardiac: RRR, No murmurs


Pulm: Diminished breath sounds are coarse crackles at the bases bilaterally. No 

wheezing, rhonchi


Abd: NT/ND + BS


Ext: Trace edema bilateral lower extremities. No cyanosis. 


Alert and oriented 3. Does not appear confused.





Labs (see below) 





Images:


CT chest 8/20/20/17 IMPRESSION: Moderate cardiomegaly. Moderate bilateral 

pleural effusions. Passive atelectatic airspace disease of the lower lobes.





CT abdomen and pelvis 88/20/17 IMPRESSION:  Sigmoid diverticulitis. No 

perforation or abscess formation.


Mild hepatomegaly. Cholelithiasis. Large fecal stasis.





CT lumbar spine. 8/20/17


IMPRESSION:


1. No fracture or spondylolisthesis.


2. Straightening of lumbar lordosis is seen, suggesting muscular spasm.


3. At L4-L5 and L5-S1, broad-based disk protrusion in conjunction with 

hypertrophic facet disease results in moderate bilateral foraminal narrowing. . 

Canal is mildly stenotic.


4. At L3-L4, broad-based disk bulge, results in mild bilateral foraminal 

narrowing. Canal is patent.





Assessment/Plan


1. Acute Decompensated systolic/diastolic heart failure; patient with known 

ischemic cardiomyopathy EF 30-35% , status post ICD . Has a history of CAD 

status post CABG. Was initially tempted to be diuresed however did not diurese 

very well to diuretics. Patient is currently undergoing hemodialysis given his 

concomitant renal failure. Continue aspirin/beta blockers/isosorbide dinitrate/

hydralazine; no ACE inhibitor/ARB at this time given his renal failure. 

Cardiology on board.


2. Acute renal failure with a history of chronic kidney disease. Patient did 

have his dialysis catheter placed on 8/22 and is having hemodialysis started 8/ 23. Nephrology on board


3. Hypertension- better control today. Hydralazine has been increased 

yesterday. On Lasix/spironolactone/beta blockers/isosorbide dinitrate.


4. Right elbow bursitis- seen by orthopedics with ruminations for outpatient 

follow-up. Elevation, avoiding bending of the elbow was recommended.


5. Severe lower back pain- CT lumbar spine (see above). Pain control. Unable to 

obtain MRI patient has AICD. His occult therapy.


6. Bilateral foot ulcers- seen by Dr. uSki Wilson. Continue wound dressing. Wound 

care.


7. Chronic anemia- likely secondary to chronic disease. Hemoglobin stable. No 

need for transfusion at this time. We will continue to monitor.


8. Diabetes mellitus- continue current insulin regimen.


9. History of COPD- status post bilateral femoral popliteal artery bypass, on 

aspirin/statins/Plavix. Continue to monitor. No signs of cyanosis.





DVT prophylaxis- heparin subcutaneous





Prognosis is very poor as the patient has severe vasculopath with decompensated 

heart failure and progression towards hemodialysis. We'll continue to monitor 

in PCU.





VS,Fishbone, I+O


VS, Fishbone, I+O


Laboratory Tests


8/23/17 05:01








Red Blood Count 3.29 L, Mean Corpuscular Volume 88.4, Mean Corpuscular 

Hemoglobin 28.3, Mean Corpuscular Hemoglobin Concent 32.0, Red Cell 

Distribution Width 16.1 H, Neutrophils (%) (Auto) 65.7, Lymphocytes (%) (Auto) 

19.6 L, Monocytes (%) (Auto) 7.9 H, Eosinophils (%) (Auto) 2.5, Basophils (%) (

Auto) 0.8, Neutrophils # (Auto) 3.4, Lymphocytes # (Auto) 1.0 L, Monocytes # (

Auto) 0.4, Eosinophils # (Auto) 0.1, Basophils # (Auto) 0.0, Calcium Level 8.5 L

, Aspartate Amino Transf (AST/SGOT) 15, Alanine Aminotransferase (ALT/SGPT) 31, 

Alkaline Phosphatase 442 H, Total Bilirubin 0.4, Total Protein 7.3, Albumin 3.0 

L








Vital Signs








  Date Time  Temp Pulse Resp B/P (MAP) Pulse Ox O2 Delivery O2 Flow Rate FiO2


 


8/23/17 12:00 97.0 65 20 168/66 (100) 91 Room Air  


 


8/21/17 16:00        














I&O- Last 24 Hours up to 6 AM 


 


 8/23/17





 06:00


 


Intake Total 500 ml


 


Output Total 1300 ml


 


Balance -800 ml

















ABED,NORBERTO MD Aug 23, 2017 14:34

## 2017-08-24 VITALS — SYSTOLIC BLOOD PRESSURE: 120 MMHG | DIASTOLIC BLOOD PRESSURE: 62 MMHG

## 2017-08-24 VITALS — SYSTOLIC BLOOD PRESSURE: 150 MMHG | DIASTOLIC BLOOD PRESSURE: 66 MMHG

## 2017-08-24 VITALS — SYSTOLIC BLOOD PRESSURE: 159 MMHG | DIASTOLIC BLOOD PRESSURE: 70 MMHG

## 2017-08-24 VITALS — DIASTOLIC BLOOD PRESSURE: 56 MMHG | SYSTOLIC BLOOD PRESSURE: 118 MMHG

## 2017-08-24 VITALS — DIASTOLIC BLOOD PRESSURE: 65 MMHG | SYSTOLIC BLOOD PRESSURE: 139 MMHG

## 2017-08-24 VITALS — SYSTOLIC BLOOD PRESSURE: 168 MMHG | DIASTOLIC BLOOD PRESSURE: 73 MMHG

## 2017-08-24 LAB
ALBUMIN SERPL BCG-MCNC: 3 GM/DL (ref 3.2–5.2)
ALBUMIN/GLOB SERPL: 0.77 {RATIO} (ref 1–1.93)
ALP SERPL-CCNC: 422 U/L (ref 45–117)
ALT SERPL W P-5'-P-CCNC: 25 U/L (ref 12–78)
ANION GAP SERPL CALC-SCNC: 9 MEQ/L (ref 8–16)
AST SERPL-CCNC: 15 U/L (ref 15–37)
BASOPHILS # BLD AUTO: 0 K/MM3 (ref 0–0.2)
BASOPHILS NFR BLD AUTO: 0.5 % (ref 0–1)
BILIRUB SERPL-MCNC: 0.4 MG/DL (ref 0.2–1)
BUN SERPL-MCNC: 44 MG/DL (ref 7–18)
CALCIUM SERPL-MCNC: 7.7 MG/DL (ref 8.8–10.2)
CHLORIDE SERPL-SCNC: 104 MEQ/L (ref 98–107)
CO2 SERPL-SCNC: 30 MEQ/L (ref 21–32)
CREAT SERPL-MCNC: 2.65 MG/DL (ref 0.7–1.3)
EOSINOPHIL # BLD AUTO: 0.2 K/MM3 (ref 0–0.5)
EOSINOPHIL NFR BLD AUTO: 3.2 % (ref 0–3)
ERYTHROCYTE [DISTWIDTH] IN BLOOD BY AUTOMATED COUNT: 16.1 % (ref 11.5–14.5)
FERRITIN SERPL-MCNC: 147 NG/ML (ref 26–388)
GFR SERPL CREATININE-BSD FRML MDRD: 25.5 ML/MIN/{1.73_M2} (ref 42–?)
GLUCOSE SERPL-MCNC: 111 MG/DL (ref 83–110)
IRON SATN MFR SERPL: 15.6 % (ref 19.7–50)
LARGE UNSTAINED CELL #: 0.2 K/MM3 (ref 0–0.4)
LARGE UNSTAINED CELL %: 2.8 % (ref 0–4)
LYMPHOCYTES # BLD AUTO: 1.3 K/MM3 (ref 1.5–4.5)
LYMPHOCYTES NFR BLD AUTO: 18.8 % (ref 24–44)
MAGNESIUM SERPL-MCNC: 2.5 MG/DL (ref 1.8–2.4)
MCH RBC QN AUTO: 27.9 PG (ref 27–33)
MCHC RBC AUTO-ENTMCNC: 31.3 G/DL (ref 32–36.5)
MCV RBC AUTO: 89.2 FL (ref 80–96)
MONOCYTES # BLD AUTO: 0.6 K/MM3 (ref 0–0.8)
MONOCYTES NFR BLD AUTO: 10.9 % (ref 0–5)
NEUTROPHILS # BLD AUTO: 3.7 K/MM3 (ref 1.8–7.7)
NEUTROPHILS NFR BLD AUTO: 63.7 % (ref 36–66)
PHOSPHATE SERPL-MCNC: 4.2 MG/DL (ref 2.5–4.9)
PLATELET # BLD AUTO: 143 K/MM3 (ref 150–450)
POTASSIUM SERPL-SCNC: 4.4 MEQ/L (ref 3.5–5.1)
PROT SERPL-MCNC: 6.9 GM/DL (ref 6.4–8.2)
SODIUM SERPL-SCNC: 143 MEQ/L (ref 136–145)
TIBC SERPL-MCNC: 314 UG/DL (ref 250–450)
WBC # BLD AUTO: 5.8 K/MM3 (ref 4–10)

## 2017-08-24 RX ADMIN — HYDRALAZINE HYDROCHLORIDE SCH MG: 25 TABLET, FILM COATED ORAL at 21:00

## 2017-08-24 RX ADMIN — INSULIN LISPRO SCH UNITS: 100 INJECTION, SOLUTION INTRAVENOUS; SUBCUTANEOUS at 12:46

## 2017-08-24 RX ADMIN — IPRATROPIUM BROMIDE AND ALBUTEROL SULFATE SCH ML: .5; 3 SOLUTION RESPIRATORY (INHALATION) at 01:36

## 2017-08-24 RX ADMIN — POLYETHYLENE GLYCOL 3350 SCH PKT: 17 POWDER, FOR SOLUTION ORAL at 09:00

## 2017-08-24 RX ADMIN — ISOSORBIDE DINITRATE SCH MG: 20 TABLET ORAL at 18:13

## 2017-08-24 RX ADMIN — SODIUM CHLORIDE SCH UNITS: 4.5 INJECTION, SOLUTION INTRAVENOUS at 05:36

## 2017-08-24 RX ADMIN — INSULIN DETEMIR SCH UNITS: 100 INJECTION, SOLUTION SUBCUTANEOUS at 10:04

## 2017-08-24 RX ADMIN — IPRATROPIUM BROMIDE AND ALBUTEROL SULFATE SCH ML: .5; 3 SOLUTION RESPIRATORY (INHALATION) at 08:38

## 2017-08-24 RX ADMIN — INSULIN LISPRO SCH UNITS: 100 INJECTION, SOLUTION INTRAVENOUS; SUBCUTANEOUS at 07:32

## 2017-08-24 RX ADMIN — ASPIRIN SCH MG: 81 TABLET ORAL at 10:03

## 2017-08-24 RX ADMIN — PREGABALIN SCH MG: 75 CAPSULE ORAL at 10:02

## 2017-08-24 RX ADMIN — CALCITRIOL SCH MCG: 0.25 CAPSULE ORAL at 10:01

## 2017-08-24 RX ADMIN — SODIUM CHLORIDE SCH UNITS: 4.5 INJECTION, SOLUTION INTRAVENOUS at 23:23

## 2017-08-24 RX ADMIN — ONDANSETRON PRN MG: 2 INJECTION INTRAMUSCULAR; INTRAVENOUS at 05:36

## 2017-08-24 RX ADMIN — CALCIUM CARBONATE-CHOLECALCIFEROL TAB 250 MG-125 UNIT SCH MG: 250-125 TAB at 23:22

## 2017-08-24 RX ADMIN — IPRATROPIUM BROMIDE AND ALBUTEROL SULFATE SCH ML: .5; 3 SOLUTION RESPIRATORY (INHALATION) at 19:42

## 2017-08-24 RX ADMIN — IPRATROPIUM BROMIDE AND ALBUTEROL SULFATE SCH ML: .5; 3 SOLUTION RESPIRATORY (INHALATION) at 13:38

## 2017-08-24 RX ADMIN — DOCUSATE SODIUM,SENNOSIDES SCH TAB: 50; 8.6 TABLET, FILM COATED ORAL at 23:22

## 2017-08-24 RX ADMIN — CLOPIDOGREL BISULFATE SCH MG: 75 TABLET ORAL at 10:01

## 2017-08-24 RX ADMIN — ROSUVASTATIN CALCIUM SCH MG: 10 TABLET, FILM COATED ORAL at 23:22

## 2017-08-24 RX ADMIN — LIDOCAINE SCH PATCH: 50 PATCH CUTANEOUS at 10:04

## 2017-08-24 RX ADMIN — OMEPRAZOLE SCH MG: 20 CAPSULE, DELAYED RELEASE ORAL at 10:02

## 2017-08-24 RX ADMIN — HYDRALAZINE HYDROCHLORIDE SCH MG: 25 TABLET, FILM COATED ORAL at 10:01

## 2017-08-24 RX ADMIN — LEVOTHYROXINE SODIUM SCH MCG: 50 TABLET ORAL at 05:36

## 2017-08-24 RX ADMIN — ISOSORBIDE DINITRATE SCH MG: 20 TABLET ORAL at 05:36

## 2017-08-24 RX ADMIN — DOCUSATE SODIUM,SENNOSIDES SCH TAB: 50; 8.6 TABLET, FILM COATED ORAL at 09:00

## 2017-08-24 RX ADMIN — INSULIN LISPRO SCH UNITS: 100 INJECTION, SOLUTION INTRAVENOUS; SUBCUTANEOUS at 21:00

## 2017-08-24 RX ADMIN — HYDRALAZINE HYDROCHLORIDE SCH MG: 25 TABLET, FILM COATED ORAL at 18:13

## 2017-08-24 RX ADMIN — CALCIUM CARBONATE-CHOLECALCIFEROL TAB 250 MG-125 UNIT SCH MG: 250-125 TAB at 10:01

## 2017-08-24 RX ADMIN — INSULIN LISPRO SCH UNITS: 100 INJECTION, SOLUTION INTRAVENOUS; SUBCUTANEOUS at 18:19

## 2017-08-24 RX ADMIN — SODIUM CHLORIDE SCH UNITS: 4.5 INJECTION, SOLUTION INTRAVENOUS at 22:00

## 2017-08-24 RX ADMIN — ISOSORBIDE DINITRATE SCH MG: 20 TABLET ORAL at 23:22

## 2017-08-24 RX ADMIN — Medication SCH: at 21:00

## 2017-08-24 RX ADMIN — SODIUM CHLORIDE SCH UNITS: 4.5 INJECTION, SOLUTION INTRAVENOUS at 18:13

## 2017-08-24 NOTE — IPN
DATE OF SERVICE:  08/23/2017

 

SUBJECTIVE:  The patient is seen today on hemodialysis.  He had a right IJ

PermaCath placed today by Dr. Montoya.  He was seen earlier in a stretcher lying

flat without any pillow and comfortable.  He denies any shortness of breath at

this time, palpitations or chest pain.  The patient continues to require a 
sitter

and notes ongoing tremor in the hands.

 

REVIEW OF SYSTEMS:  Negative for headache, dizziness, chest pain, shortness of

breath, palpitations, nausea, vomiting, or diarrhea.  Positive for tremor in the

hands and nighttime agitation. Remainder of review of systems is negative.

 

VITAL SIGNS:  Temperature 97.0, pulse 65, respiratory rate 20, blood pressure

168/66, saturating 91% on room air.

INTAKE AND OUTPUT:  Intake is not recorded in total.  Urine output is 1750 mL.

His weight on the bed scale is 90.8 kg.

 

PHYSICAL EXAMINATION:

The patient is seen on hemodialysis.  He is awake, alert, oriented times three 
at

this time in no acute distress.

HEENT:  Moist mucous membranes.  Extraocular muscles intact.

NECK:  Improvement in jugular venous distention (JVD).  Right IJ PermaCath

present.

LUNGS:  Mild bibasilar crackles, otherwise clear.

CARDIAC:  S1, S2.  Regular rhythm, 2+ radial pulses.  No peripheral edema.  No

dependent edema.

ABDOMEN:  Soft, nontender.

NEUROLOGIC:  Alert and oriented times three, appropriately interactive and

conversational.  Tremor of the hands.

PSYCHIATRIC:  Appropriate mood and affect.

 

LABORATORY DATA: White count 5.2, hemoglobin 9.3, platelets 151.

 

Sodium 143, potassium 4.5, bicarbonate 31, BUN 84, creatinine 3.4, GFR 18 mL per

minute, corrected calcium 9.3, magnesium 2.7



Microbiology: Blood Cx's no growth to date

 

 Inpatient Medications: reviewed by myself 



Assessment and Plan: 



This is a 72-year-old male with past medical history of

ischemic systolic cardiomyopathy with ejection fraction of 30 to 35%, chronic

kidney disease stage IV, history of dialysis times two sessions in the past,

status post coronary artery bypass graft (CABG), status post AICD, hypertension,

peripheral arterial disease, status post bilateral femoral popliteal bypass,

insulin-dependent diabetes, who has had recurrent admissions for decompensated

heart failure, acute on chronic renal insufficiency.

 

1.  Metabolic encephalopathy, tremor; likely multifactorial from lyrica 
accumulation 

and early uremia. Both components should improve with dialysis. Had permacath 
placed

and is receiving hemodialysis treatment without issue.

 

2.  Congestive heart failure (CHF).  The patient remains on fluid restriction 
and

I will discontinue Lasix IV. He is an excellent candidate for continuation of 
oral diuretics 

while on chronic hemodialysis. Today, he is seen lying flat in bed.  He is on 
room

air.  He does not seem overtly volume overloaded at this time.  Repeat chest

x-ray shows unchanged pleural effusion, basilar infiltrates and vascular

congestion.  Initiating him on chronic hemodialysis should help with his overall

volume management and decrease his readmissions for decompensated heart failure.

I would not initiate RAAS blockade at this time. If pt is compliant with renal 
diet as 

an outpatient and does not miss his chronic hemodialysis sessions; and his 
predialysis K 

is controlled, then he would be an excellent candidate for RAAS blockade. As he 
has

significant residual renal function he is at risk for hyperkalemia from ACE / 
ARB and so

initiation should be done after several serial predialysis K's are normokalemic.

 

 

3.  Hyperkalemia.  Potassium has been stable on 2 gram potassium diet with

spironolactone on hold and Lasix for kaliuresis. Will monitor predialysis serum 
K as

an outpatient, if he remains normokalemic then he would benefit from RAAS 
blockade.

 

4.  Hypertension.  His BP should improve with serial hemodialysis. RAAS 
blockade initiation

as above.



 

 

Discharge Planning: Requested social work consult for outpatient dialysis set 
up.









[facility]

## 2017-08-24 NOTE — IPNPDOC
Text Note


Date of Service


The patient was seen on 8/24/17.





NOTE


CC:The patient is a 72-year-old male admitted with a reason for visit of Acute 

On Chronic Systolic Chf.





Subjective: Patient is feeling better today. Patient is also sleepy on exam. 

Reported not sleeping well last night. He is more with it than he had been on 

previous exams. States that he tolerated dialysis yesterday. His had dialysis 

in the past. Patient reports he is eating okay.





Objective: 


Vitals: (see below) 


General: No acute distress, laying comfortably in bed.


HEENT: Moist mucous membranes.


Neck: No JVD or lymphadenopathy


Cardiac: RRR. Systolic murmur 2/6 best heard left sternal border.


Pulm: Diminished breath sounds at the bases b/l. No wheezing, rhonchi


Abd: Bowel sounds auscultation. No tenderness to palpation.


Ext: No edema or cyanosis





Labs (see below) 





Assessment/Plan


1. Decompensated heart failure, systolic. EF 30-35%.


Appreciate cardiology's assistance in management of this patient's time.


Continue aspirin/beta blockers/isosorbide dinitrate/hydralazine; no ACE 

inhibitor/ARB at this time given his renal failure. 


Recent BMP measured 1180. Improved with HD. 





2. Acute on chronic renal failure stage 4, with progression to HD.


Patient has failed diuresis. Hemodialysis was started yesterday. Patient's BUN 

decreased 44 today. 


Appreciate nephrology's assistance in management of this patient's time.


Patient will receive more hemodialysis later today.





3. Iron deficiency anemia


Patient has a low iron and normal TIBC with a normal ferritin.


We'll start patient on some iron supplementation.





4. Chronic anemia


Patient has anemia chronic disease with some underlying iron deficiency anemia. 

We'll continue to follow at this time.





5. Hypertension


Continue patient's medications at this time.





6. Olecranon bursitis.


Continue to follow at this time.





7. Foot ulcers.


Appreciate Dr. Johnson's assistance in care of this patient.


Continue wound care dressings daily.





8.History of coronary artery disease- status post CABG, on aspirin/statins/

Plavix. 





9. Implantable defibrillator.








DVT prophy: Heparin





Dispo: Continue to monitor patient's volume status. Will need more dialysis.





VS,Fishbone, I+O


VS, Fishbone, I+O


Laboratory Tests


8/24/17 05:04








Red Blood Count 3.31 L, Mean Corpuscular Volume 89.2, Mean Corpuscular 

Hemoglobin 27.9, Mean Corpuscular Hemoglobin Concent 31.3 L, Red Cell 

Distribution Width 16.1 H, Neutrophils (%) (Auto) 63.7, Lymphocytes (%) (Auto) 

18.8 L, Monocytes (%) (Auto) 10.9 H, Eosinophils (%) (Auto) 3.2 H, Basophils (%

) (Auto) 0.5, Neutrophils # (Auto) 3.7, Lymphocytes # (Auto) 1.3 L, Monocytes # 

(Auto) 0.6, Eosinophils # (Auto) 0.2, Basophils # (Auto) 0.0, Calcium Level 7.7 

L, Aspartate Amino Transf (AST/SGOT) 15, Alanine Aminotransferase (ALT/SGPT) 25

, Alkaline Phosphatase 422 H, Total Bilirubin 0.4, Total Protein 6.9, Albumin 

3.0 L








Vital Signs








  Date Time  Temp Pulse Resp B/P (MAP) Pulse Ox O2 Delivery O2 Flow Rate FiO2


 


8/24/17 18:20      Room Air  


 


8/24/17 18:13    120/62    


 


8/24/17 18:05 97.8 64 20  93   


 


8/24/17 02:08       2.0 














I&O- Last 24 Hours up to 6 AM 


 


 8/24/17





 06:00


 


Intake Total 1140 ml


 


Output Total 2550 ml


 


Balance -1410 ml











GME ATTESTATION


GME ATTESTATION


My preceptor for this patient encounter was Dr. Trujillo and he was physically 

present in the building during the encounter and was fully available. As needed

, all aspects of the patient interview, examination, medical decision making 

process, and medical care plan development were reviewed and approved by the 

preceptor. Preceptor is aware and concurs with the plan as stated in the body 

of this note and will attest to such by his/her co-signature.











ANNEL DRAKE DO Aug 24, 2017 20:06


NORBERTO TRUJILLO MD Aug 25, 2017 07:26

## 2017-08-24 NOTE — IPN
DATE:  08/24/2017

 

Mr. Dunlap was started on dialysis yesterday evening and they were able to 
remove

about a liter of fluid and he already feels much better.  Also, the tremor that

was present yesterday is improved, besides his dyspnea.

 

This morning, his vital signs reveal a blood pressure 159/70, heart rate has 
been

in 50s and 60s.  He is afebrile.  Saturation is 92% on room air.  Weight is 87.7

kg.  He is alert and oriented times three.  His jugular venous pulse (JVP) is

still about 3 cm above clavicle.  Lungs are much clearer, even though he still

has some faint rhonchi and  crackles over both bases.  Heart exam reveals 
regular

rhythm.  There is still a high pitched musical murmur at the apex, about 2/6 in

intensity.  Abdomen is soft, nontender.  Extremities have no significant edema.

There is a new dialysis catheter in through the right IJ.

 

ASSESSMENT/PLAN:

Mr. Dunlap is a 72-year-old man who has ischemic cardiomyopathy with severe LV

systolic dysfunction and no acute on chronic systolic/diastolic congestive heart

failure.  The situation was further complicated by progressive renal failure.  
He

was initiated on dialysis yesterday.  He still makes a decent amount of urine.  
I

reviewed the notes of the primary team and nephrology.

 

I think that from my perspective the decision about changing medications was 
down

to hope whether that there is still potential that he can come off dialysis.  If

that is something that is a consideration, and I will leave this to nephrology,

then I would to not change his medications fundamentally.  I do not believe 
there

is much role for amlodipine in the setting of taking high dose of hydralazine

because the mechanism of action is virtually identical and in my textbook it 
does

not make much sense to combine those two medications.  On the other hand, if it

is believed that he will stay on dialysis permanently, then I would give him an

ACE inhibitor to replace hydralazine or at least to be able to reduce the dose 
of

hydralazine.  The outcomes of people with congestive heart failure are certainly

better when they can utilize ACE inhibitors compared to purely vasodilating 
agents.

Even though the tested population was not dialyzed and it is not completely 
clear

that the benefit transferred to dialyzed population, it is still the current

recommendation.  Otherwise, the remaining medications I would leave unchanged.

[facility]

## 2017-08-25 VITALS — SYSTOLIC BLOOD PRESSURE: 127 MMHG | DIASTOLIC BLOOD PRESSURE: 62 MMHG

## 2017-08-25 VITALS — SYSTOLIC BLOOD PRESSURE: 135 MMHG | DIASTOLIC BLOOD PRESSURE: 60 MMHG

## 2017-08-25 LAB
ALBUMIN SERPL BCG-MCNC: 3.1 GM/DL (ref 3.2–5.2)
ALBUMIN/GLOB SERPL: 0.72 {RATIO} (ref 1–1.93)
ALP SERPL-CCNC: 390 U/L (ref 45–117)
ALT SERPL W P-5'-P-CCNC: 20 U/L (ref 12–78)
ANION GAP SERPL CALC-SCNC: 7 MEQ/L (ref 8–16)
AST SERPL-CCNC: 13 U/L (ref 15–37)
BILIRUB SERPL-MCNC: 0.4 MG/DL (ref 0.2–1)
BUN SERPL-MCNC: 31 MG/DL (ref 7–18)
CALCIUM SERPL-MCNC: 8.8 MG/DL (ref 8.8–10.2)
CHLORIDE SERPL-SCNC: 104 MEQ/L (ref 98–107)
CO2 SERPL-SCNC: 30 MEQ/L (ref 21–32)
CREAT SERPL-MCNC: 2.94 MG/DL (ref 0.7–1.3)
GFR SERPL CREATININE-BSD FRML MDRD: 22.6 ML/MIN/{1.73_M2} (ref 42–?)
GLUCOSE SERPL-MCNC: 136 MG/DL (ref 83–110)
MAGNESIUM SERPL-MCNC: 2.3 MG/DL (ref 1.8–2.4)
POTASSIUM SERPL-SCNC: 4.6 MEQ/L (ref 3.5–5.1)
PROT SERPL-MCNC: 7.4 GM/DL (ref 6.4–8.2)
SODIUM SERPL-SCNC: 141 MEQ/L (ref 136–145)

## 2017-08-25 RX ADMIN — INSULIN LISPRO SCH UNITS: 100 INJECTION, SOLUTION INTRAVENOUS; SUBCUTANEOUS at 12:24

## 2017-08-25 RX ADMIN — IPRATROPIUM BROMIDE AND ALBUTEROL SULFATE SCH ML: .5; 3 SOLUTION RESPIRATORY (INHALATION) at 02:00

## 2017-08-25 RX ADMIN — CALCIUM CARBONATE-CHOLECALCIFEROL TAB 250 MG-125 UNIT SCH MG: 250-125 TAB at 08:48

## 2017-08-25 RX ADMIN — SODIUM CHLORIDE SCH UNITS: 4.5 INJECTION, SOLUTION INTRAVENOUS at 06:00

## 2017-08-25 RX ADMIN — IPRATROPIUM BROMIDE AND ALBUTEROL SULFATE SCH ML: .5; 3 SOLUTION RESPIRATORY (INHALATION) at 07:26

## 2017-08-25 RX ADMIN — CALCITRIOL SCH MCG: 0.25 CAPSULE ORAL at 08:47

## 2017-08-25 RX ADMIN — POLYETHYLENE GLYCOL 3350 SCH PKT: 17 POWDER, FOR SOLUTION ORAL at 08:49

## 2017-08-25 RX ADMIN — INSULIN DETEMIR SCH UNITS: 100 INJECTION, SOLUTION SUBCUTANEOUS at 08:46

## 2017-08-25 RX ADMIN — LIDOCAINE SCH PATCH: 50 PATCH CUTANEOUS at 08:58

## 2017-08-25 RX ADMIN — LEVOTHYROXINE SODIUM SCH MCG: 50 TABLET ORAL at 06:18

## 2017-08-25 RX ADMIN — DOCUSATE SODIUM,SENNOSIDES SCH TAB: 50; 8.6 TABLET, FILM COATED ORAL at 08:48

## 2017-08-25 RX ADMIN — OMEPRAZOLE SCH MG: 20 CAPSULE, DELAYED RELEASE ORAL at 08:47

## 2017-08-25 RX ADMIN — PREGABALIN SCH MG: 75 CAPSULE ORAL at 08:47

## 2017-08-25 RX ADMIN — CLOPIDOGREL BISULFATE SCH MG: 75 TABLET ORAL at 08:48

## 2017-08-25 RX ADMIN — ISOSORBIDE DINITRATE SCH MG: 20 TABLET ORAL at 06:18

## 2017-08-25 RX ADMIN — ASPIRIN SCH MG: 81 TABLET ORAL at 08:47

## 2017-08-25 RX ADMIN — INSULIN LISPRO SCH UNITS: 100 INJECTION, SOLUTION INTRAVENOUS; SUBCUTANEOUS at 08:46

## 2017-08-25 NOTE — IPN
DATE:  08/24/2017

 

The patient is seen at the bedside this morning. He has no complaints. He

tolerated hemodialysis yesterday with 1 kg ultrafiltration (UF). His tremor has

resolved, and he is not requiring a sitter at the bedside. His Jimenez catheter

remains in place at this time. Patient is tolerating his diet and has no

complaints.

 

REVIEW OF SYSTEMS: Negative for headache, chest pain, shortness of breath,

palpitations, nausea, vomiting, diarrhea. Remainder of review of systems is

negative.

 

VITAL SIGNS: Temperature 97.6, pulse 64, blood pressure 159/70, saturating 90% 
on

room air. Intake and output: In for 1340.  Hemodialysis removed 1000 mL

yesterday. Urine total 1400. Weight in the bed scale 90.8 kg yesterday and 87.7

kg today.

 

PHYSICAL EXAMINATION:

GENERAL: Patient is alert, awake, oriented in no acute distress.

HEENT: Moist mucous membranes. Moist tongue. Extraocular muscles intact.

NECK: Right internal jugular (IJ) Perm-A-Cath. Supple.

LUNGS: Bibasilar crackles present.

HEART: S1, S2, regular rhythm.

ABDOMEN: Soft, nontender, nondistended

GENITOURINARY:  Jimenez with urine present.

EXTREMITIES: No edema, no clubbing.

Neurologic: no tremor

 

LABORATORY DATA:

White count 5.8, hemoglobin 9.2, platelets 143.

 

Glucose 114, sodium 143, potassium 4.4, bicarbonate 30, BUN 44, corrected 
calcium

8.5, mg 2.5, phosphorus 4.2. Ferritin 147, iron 49, transferrin saturation 15%.

.

 

INPATIENT MEDICATIONS:  Reviewed by myself. He will be started on Venofer for

iron deficiency. I will stop his milk of magnesia. Remainder of his medications

are unchanged from prior.

 

ASSESSMENT AND PLAN:

1.  Advanced chronic kidney disease (CKD), stage IV, with history of recent

dialysis in the recent past and recurrent admissions for decompensated heart

failure and volume overload refractory to diuretics, coupled with encephalopathy

and new-onset tremor. The patient has been started on chronic hemodialysis and

will now continue with the same. Social work consult has been placed for

outpatient dialysis setup. A BUN in the 80s with a glomerular filtration rate

(GFR) of greater than 15 mL per minute is not typically associated with uremic

symptoms; however, after one dialysis treatment, the patient has had significant

improvement in his tremor and his mentation. Both may have been due to lyrica.

 

2.  Chronic systolic/diastolic congestive heart failure. The patient's volume

status continues to improve. He does have moderate bilateral pleural effusions.

We will continue to ultrafiltrate him on dialysis. The patient has been 
counseled

regarding fluid restriction. He does have good residual renal function, and if

his volume status is refractory to management with ultrafiltration on dialysis,

he would be a good candidate for oral diuretics on nondialysis days. At this 
time

I would not start him on an angiotensin-converting enzyme (ACE) or angiotensin

receptor blocker (ARB); however, if in the outpatient setting he is compliant

with a renal diet, and his predialysis potassiums are normokalemic, he would be

very appropriate for RAAS blockade. If, however, he is

noncompliant with dietary restrictions and has a high predialysis potassium,

given that he does have significant renal residual function, he would have a

propensity to be hyperkalemic from ACE or ARB.

 

3.  Anemia of chronic kidney disease. Iron studies reveal iron deficiency. We

will start the patient on Venofer and continue with Aranesp.

 

4.  Secondary hyperparathyroidism, parathyroid hormone (PTH) 107 with a stable

phosphorus and corrected calcium of 8.5. The patient continues on calcitriol and

will start vitamin D analog in the outpatient setting.

MTDD

## 2017-08-25 NOTE — IPN
DATE:  08/25/2017

 

Mr. Dunlap is asking me to let him go home.  He says that he is feeling much

better after two sessions of dialysis.  Denies significant dyspnea.  Denies

palpitations.  I reviewed his telemetry tracings and there were no significant

arrhythmias other than occasional PVCs.

 

Vital signs:  Blood pressure 135/60, heart rate has been in 60s.  He is afebrile.

Saturation is 96% room air.  Fluid balance yesterday was about 1600 negative.  2

liters were removed during dialysis. Weight is 86.8 kg.

 

He is alert and oriented and appropriate.  His jugular venous pulse (JVP) does

not appear elevated above clavicle in sitting position.  Lungs reveal only very

fine inspiratory crackles over maybe 1/5 of both lung fields.  Heart exam regular

rhythm.  There is unchanged murmur at the apex.  Abdomen is obese but soft.

There is no significant edema.

 

Laboratory wise:  Hemoglobin 9.2, hematocrit is 29, platelet count 143,000.

Basic metabolic panel:  Potassium 4.6, BUN 2.9 and creatinine 1.36.

 

ASSESSMENT AND PLAN:  Mr. Dunlap is 72-year-old man who has ischemic

cardiomyopathy and he had resulting systolic/diastolic congestive heart failure.

He presented with acute exacerbation in setting of stage IV renal insufficiency.

The decision was made to initiate dialysis as the patient had several episodes of

tremor that probably represented early sign of uremia.  He had he is much better

in that regard and that symptoms completely resolved.  The dialysis also has been

very successful in removing volume.  I do not have any new recommendations

compared to my recommendations dictated yesterday.  Again, if it is felt that the

dialysis will become permanent, which I think it is likely, I would recommend to

discontinue hydralazine and start him on angiotensin-converting enzyme (ACE)

inhibitor.  Otherwise, I think the remaining medications should be as they are.

Also of note, he was started on thyroid supplementation.  That will need to be

followed on outpatient basis.  I intend to see the patient in approximately 2-3

weeks.

## 2017-08-26 NOTE — IPN
DATE:  08/25/2017

 

SUBJECTIVE:

The patient is seen this morning at bedside. He has no complaints. He had an

uneventful night last night. He is in good spirits. Reports good appetite. I

spoke with the . He has been set up for outpatient hemodialysis. 
His

first treatment will be Tuesday 08/29, with a chair at 3 p.m. this was

communicated by myself to the patient, and the patient is now discharge pending.

The patient had his Jimenez removed and he reports voiding afterwards without

issue.

 

REVIEW OF SYSTEMS:

Negative for headache, dizziness, chest pain, shortness of breath, palpitations,

cough, nausea, vomiting, diarrhea, tremor, edema. Remainder of review of systems

is negative.

 

OBJECTIVE:

VITAL SIGNS: Afebrile 97.5, pulse 67, blood pressure 135/60, saturating 96% on

room air.

INTAKE AND OUTPUT: The patient was dialyzed yesterday with 2000 mL

ultrafiltration. He is net negative 1.6 liters. His weight in the bed scale 
today

is 86.8 kg, down from 93 kg on admission.

 

PHYSICAL EXAMINATION:

GENERAL: Awake, alert, and oriented, interactive, appropriately conversational 
in

no acute distress.

HEENT: Extraocular muscles intact. Moist mucous membranes.

NECK: Right internal jugular (IJ) PermCath. No jugular venous distention.

CHEST: S1, S2. Regular rate and rhythm. 2+ radial pulse. No edema in the

extremities. No sacral or dependent edema.

LUNGS: Decreased breath sounds at base. Fine bibasilar crackle. Otherwise clear.

 

ABDOMEN: Soft, obese, nontender, nondistended.

GENITOURINARY (): No Jimenez.

EXTREMITIES: No edema or cyanosis.

NEUROLOGIC: No tremor. No asterixis. Appropriately interactive and

conversational.

PSYCHIATRIC: Appropriate mood and affect.

 

LABORATORY DATA:

No CBC from today. Sodium 141, potassium 4.6, bicarbonate 30, creatinine 2.9, 
BUN

31, corrected calcium 9.5, magnesium 2.3.

 

INPATIENT MEDICATIONS:

Reviewed by myself and no changes in previous 24 hours.

 

ASSESSMENT AND PLAN:

A 72-year-old male with past medical history of ischemic systolic cardiomyopathy

and diastolic dysfunction with ejection fraction 30-35%, chronic kidney disease

(CKD) stage IV, progressive, with recurrent acute kidney injury (HUSSEIN) and 
history

of dialysis in the recent past, status post coronary artery bypass graft (CABG),

status post automatic implantable cardioverter defibrillator (AICD),

hypertension, peripheral arterial disease, status post bilateral

femoral-popliteal bypass, insulin-dependent diabetes, with recurrent admissions

for decompensated heart failure and acute on chronic renal insufficiency, with

refractory volume overload.

 

1. Advanced CKD stage IV with estimated glomerular filtration rate (GFR) of 15-
18

mL per minute, systolic and diastolic cardiomyopathy with recurrent admission 
for

refractory volume overload. The patient has now progressed to chronic

hemodialysis for control of his refractory volume overload which has resulted in

several inpatient admissions over the past few months, with concomitant HUSSEIN,

including HUSSEIN requiring renal replacement therapy in the recent past. The 
patient

also had medical encephalopathy on this admission. This is either a 
manifestation

of early uremia which is hard to believe with a GFR of 18 mL per minute and 
blood

urea nitrogen (BUN) of 80, or more likely due to Lyrica accumulation. 

His encephalopathy and tremor have cleared with dialysis, likely due to removal 
of drug.

His pregabalin dosage has also been decreased. The patient is now set up for

Tuesday, Thursday, Saturday hemodialysis in the outpatient unit. This has been

communicated to him. He will need to continue to followup with vascular as an

outpatient for arteriovenous (AV) fistula creation.



2. Congestive heart failure, systolic/diastolic cardiomyopathy with ejection

fraction (EF) of 30-35%. The patient is counseled on fluid restriction of 1500 
mL

per day. He has significant residual renal function and is an excellent 
candidate

to continue oral diuretic therapy. He will be discharged on torsemide 40 mg by

mouth daily. He remains on carvedilol. RAAS blockade initiation depending on 

outpatient pre-dialysis K, if normokalemic then he can be started on lisinopril 
2.5 mg daily.

 

DISCHARGE PLAN:

The patient is cleared for discharge today from a renal point of view. His first

outpatient hemodialysis will be Tuesday, 08/29, at 3:30 p.m. he is aware of the

date, time and place of his dialysis. He has been counseled on fluid restriction
,

renal diet, and to continue his oral diuretics at home. Discharge plan has been

discussed with Dr. Eligio Trujillo.

[facility]

## 2017-09-07 NOTE — REPKIM
DATE OF PROCEDURE:  08/23/2017

 

PREOPERATIVE DIAGNOSIS:  End-stage renal disease requiring hemodialysis.

 

POSTOPERATIVE DIAGNOSIS:  End-stage renal disease requiring hemodialysis.

 

PROCEDURE:  Ultrasound- and fluoroscopic-guided right internal jugular vein 19 cm

tipped tip to cuff central venous tunneled catheter placement with a 24 cm

EvenMore hemodialysis catheter.

 

ATTENDING SURGEON:  Dr. PARESH Montoya

 

ASSISTANT:  Maricel Wilson RT

 

ANESTHESIA:  Local with 10 mL of 2% lidocaine.

 

FLUOROSCOPIC TIME:  0.1 minutes

 

CONTRAST:  None.

 

COMPLICATIONS:  None.

 

DRAINS:  None.

 

SPECIMENS:  None.

 

IMPLANTS:  19 cm tip to cuff EvenMore hemodialysis catheter inserted into the

right internal jugular vein.

 

INDICATION:  Patient is a 72-year-old male with renal failure requiring access

for hemodialysis.  Patient will undergo placement of a right internal jugular

vein central venous tunneled catheter.  Risks, benefits, and alternative

treatment options were discussed with the patient.  Benefits included, but were

not limited to, access for hemodialysis.  Alternative treatment options included,

but were not limited to, no intervention.  Risks included, but were not limited

to, infection, bleeding, pneumothorax, hemothorax, possible need for open

surgical intervention, cerebrovascular accident, myocardial infarction, pulmonary

embolus, deep venous thrombosis (DVT), loss of limb, loss of life, and poor

outcome.  Patient understands, accepts these risks, and consents to proceed.

 

DESCRIPTION OF PROCEDURE:  Patient was taken to the angiography suite, placed

supine on the angiography room table, and then prepped and draped in a standard

surgical fashion.  A time-out was then conducted by myself and the team members

within the room, confirming the correct patient, procedure, and laterality.

 

Ultrasound was then used to evaluate the right internal jugular vein, which was

noted to be easily compressible, widely patent, and free of thrombus.  Ultrasound

was then used to guide cannulation of the right internal jugular vein with a

micropuncture needle after anesthetizing the overlying skin with 1% lidocaine.

Real-time concurrent ultrasound guidance was used with visualization of the entry

of the micropuncture needle into the right internal jugular vein with a hard copy

image preserved.

 

The micropuncture wire was advanced through the micropuncture needle, which was

upsized to a micropuncture sheath.  An Amplatz wire was advanced through the

micropuncture sheath and into the inferior vena cava, after which the catheter

was tunneled through a puncture wound in the right chest and brought out through

a puncture wound at the right internal jugular vein entry site.  The right

internal jugular vein was then sequentially dilated under fluoroscopic guidance,

and an introducer sheath was positioned.  The catheter was advanced through the

introducer sheath and positioned with the tip in the superior vena cava/right

atrial junction.  Both ports of the catheter were aspirated, noted to aspirate

easily, and then flushed with heparinized saline.  The catheter was secured to

the right anterior chest wall using #2-0 Prolene suture after anesthetizing the

overlying skin with 2% lidocaine.

 

The puncture wound in the right neck was closed using a #3-0 Monocryl in an

inverted interrupted fashion.  Dressings were applied.  Patient tolerated the

procedure well.  All instrument, sponge, and needle counts were correct at the

end of the case.  There were no complications.  Dr. Montoya was present for and

directed the entire case.  Patient was transferred to the holding area and,

subsequently, to the floor in stable condition.  The PermCath is stable for use

for hemodialysis access.

 

RADIOLOGIC SUPERVISION AND INTERPRETATION:  The right internal jugular vein was

widely patent, easily compressible, and free of thrombus under ultrasound

evaluation.  The ultrasound was used to guide cannulation of the right internal

jugular vein with a real-time concurrent visualization of the entry of the needle

into the internal jugular vein with a hard copy image preserved.  The catheter

was then advanced through the introducer sheath and positioned with the tip in

superior vena cava/right atrial junction.  Final fluoroscopic image showed the

catheter to be in good position and good alignment with the tip in the superior

vena cava/right atrial junction with no pneumo- or hemothorax noted.

## 2017-09-10 ENCOUNTER — HOSPITAL ENCOUNTER (EMERGENCY)
Dept: HOSPITAL 53 - M ED | Age: 72
Discharge: HOME | End: 2017-09-10
Payer: MEDICARE

## 2017-09-10 VITALS — SYSTOLIC BLOOD PRESSURE: 203 MMHG | DIASTOLIC BLOOD PRESSURE: 85 MMHG

## 2017-09-10 VITALS — DIASTOLIC BLOOD PRESSURE: 83 MMHG | SYSTOLIC BLOOD PRESSURE: 208 MMHG

## 2017-09-10 VITALS — BODY MASS INDEX: 30.45 KG/M2 | WEIGHT: 194.01 LBS | HEIGHT: 67 IN

## 2017-09-10 DIAGNOSIS — I12.9: ICD-10-CM

## 2017-09-10 DIAGNOSIS — I25.10: ICD-10-CM

## 2017-09-10 DIAGNOSIS — E11.9: ICD-10-CM

## 2017-09-10 DIAGNOSIS — E03.9: ICD-10-CM

## 2017-09-10 DIAGNOSIS — R07.9: Primary | ICD-10-CM

## 2017-09-10 DIAGNOSIS — Z95.1: ICD-10-CM

## 2017-09-10 DIAGNOSIS — Z87.891: ICD-10-CM

## 2017-09-10 DIAGNOSIS — I50.9: ICD-10-CM

## 2017-09-10 DIAGNOSIS — N18.9: ICD-10-CM

## 2017-09-10 DIAGNOSIS — K21.9: ICD-10-CM

## 2017-09-10 LAB
ALBUMIN SERPL BCG-MCNC: 3.1 GM/DL (ref 3.2–5.2)
ALBUMIN/GLOB SERPL: 0.82 {RATIO} (ref 1–1.93)
ALP SERPL-CCNC: 205 U/L (ref 45–117)
ALT SERPL W P-5'-P-CCNC: 21 U/L (ref 12–78)
ANION GAP SERPL CALC-SCNC: 5 MEQ/L (ref 8–16)
AST SERPL-CCNC: 16 U/L (ref 15–37)
BASOPHILS # BLD AUTO: 0 K/MM3 (ref 0–0.2)
BASOPHILS NFR BLD AUTO: 0.5 % (ref 0–1)
BILIRUB CONJ SERPL-MCNC: 0.1 MG/DL (ref 0–0.2)
BILIRUB SERPL-MCNC: 0.3 MG/DL (ref 0.2–1)
BUN SERPL-MCNC: 21 MG/DL (ref 7–18)
CALCIUM SERPL-MCNC: 8.9 MG/DL (ref 8.8–10.2)
CHLORIDE SERPL-SCNC: 100 MEQ/L (ref 98–107)
CO2 SERPL-SCNC: 33 MEQ/L (ref 21–32)
CREAT SERPL-MCNC: 2.08 MG/DL (ref 0.7–1.3)
EOSINOPHIL # BLD AUTO: 0.2 K/MM3 (ref 0–0.5)
EOSINOPHIL NFR BLD AUTO: 3.2 % (ref 0–3)
ERYTHROCYTE [DISTWIDTH] IN BLOOD BY AUTOMATED COUNT: 14.5 % (ref 11.5–14.5)
GFR SERPL CREATININE-BSD FRML MDRD: 33.7 ML/MIN/{1.73_M2} (ref 42–?)
GLUCOSE SERPL-MCNC: 260 MG/DL (ref 83–110)
INR PPP: 1.06
LARGE UNSTAINED CELL #: 0.2 K/MM3 (ref 0–0.4)
LARGE UNSTAINED CELL %: 2.7 % (ref 0–4)
LYMPHOCYTES # BLD AUTO: 0.7 K/MM3 (ref 1.5–4.5)
LYMPHOCYTES NFR BLD AUTO: 12.2 % (ref 24–44)
MCH RBC QN AUTO: 28 PG (ref 27–33)
MCHC RBC AUTO-ENTMCNC: 32.5 G/DL (ref 32–36.5)
MCV RBC AUTO: 86 FL (ref 80–96)
MONOCYTES # BLD AUTO: 0.5 K/MM3 (ref 0–0.8)
MONOCYTES NFR BLD AUTO: 8.5 % (ref 0–5)
NEUTROPHILS # BLD AUTO: 4.1 K/MM3 (ref 1.8–7.7)
NEUTROPHILS NFR BLD AUTO: 72.8 % (ref 36–66)
PLATELET # BLD AUTO: 218 K/MM3 (ref 150–450)
POTASSIUM SERPL-SCNC: 4 MEQ/L (ref 3.5–5.1)
PROT SERPL-MCNC: 6.9 GM/DL (ref 6.4–8.2)
SODIUM SERPL-SCNC: 138 MEQ/L (ref 136–145)
WBC # BLD AUTO: 5.7 K/MM3 (ref 4–10)

## 2017-09-10 NOTE — REP
Clinical:  Chest pain .

 

Comparison: 08/22/2017 .

 

Technique:  PA and lateral.

 

Findings:

The mediastinum and cardiac silhouette are normal.  The lung fields are clear and

without acute consolidation, effusion, or pneumothorax.  The skeletal structures

are intact and normal.

 

Impression:

1.   No acute cardiopulmonary process.

 

 

Signed by

Hao Adrian MD 09/10/2017 07:35 A

## 2017-09-11 NOTE — ECGEPIP
Stationary ECG Study

                           [facility] - ED

                                       

                                       Test Date:    2017-09-10

Pat Name:     VINNY EASLEY           Department:   

Patient ID:   [id number]                 Room:         -

Gender:       M                        Technician:   aiden

:          1945               Requested By: CALLIE WELLINGTON 

Order Number: [id number]     Reading MD:   Jaleesa Roman

                                 Measurements

Intervals                              Axis          

Rate:         67                       P:            37

WV:           203                      QRS:          16

QRSD:         109                      T:            130

QT:           448                                    

QTc:          474                                    

                           Interpretive Statements

SINUS RHYTHM

POSSIBLE LEFT ATRIAL ENLARGEMENT

PROBABLE INFERIOR MYOCARDIAL INFARCTION, PROBABLY OLD

MODERATE T-WAVE ABNORMALITY, CONSIDER ISCHEMIA COMPARED 17

PROLONGED QTC

Electronically Signed On 9- 7:36:43 EDT by Jaleesa Roman

## 2017-09-11 NOTE — ECGEPIP
Stationary ECG Study

                           [facility] - ED

                                       

                                       Test Date:    2017-09-10

Pat Name:     VINNY EASLEY           Department:   

Patient ID:   [id number]                 Room:         -

Gender:       M                        Technician:   JDEYSI

:          1945               Requested By: Jaleesa Roman 

Order Number: [id number]     Reading MD:   Brodie Brown

                                 Measurements

Intervals                              Axis          

Rate:         72                       P:            36

NJ:           204                      QRS:          21

QRSD:         108                      T:            117

QT:           437                                    

QTc:          478                                    

                           Interpretive Statements

SINUS RHYTHM

POSSIBLE LEFT ATRIAL ENLARGEMENT

PROBABLE INFERIOR MYOCARDIAL INFARCTION, PROBABLY OLD

MODERATE T-WAVE ABNORMALITY, CONSIDER LATERAL ISCHEMIA

SIMILAR TO PRIOR ON SAME DATE

Electronically Signed On 2017 19:20:51 EDT by Brodie Brown

## 2017-09-11 NOTE — ECGEPIP
Stationary ECG Study

                           [facility] - ED

                                       

                                       Test Date:    2017-09-10

Pat Name:     VINNY EASLEY           Department:   

Patient ID:   [id number]                 Room:         -

Gender:       M                        Technician:   JDEYSI

:          1945               Requested By: CALLIE WELLINGTON 

Order Number: [id number]     Reading MD:   Brodie Brown

                                 Measurements

Intervals                              Axis          

Rate:         77                       P:            49

KS:           211                      QRS:          38

QRSD:         109                      T:            124

QT:           408                                    

QTc:          463                                    

                           Interpretive Statements

SINUS RHYTHM WITH FIRST DEGREE AV BLOCK

POSSIBLE LEFT ATRIAL ENLARGEMENT

POSSIBLE INFERIOR MYOCARDIAL INFARCTION, PROBABLY OLD

MODERATE T-WAVE ABNORMALITY, CONSIDER LATERAL ISCHEMIA

SIMILAR TO PRIOR ON SAME DATE

Electronically Signed On 2017 19:17:26 EDT by Brodie Brown

## 2017-10-03 ENCOUNTER — HOSPITAL ENCOUNTER (OUTPATIENT)
Dept: HOSPITAL 53 - M LAB REF | Age: 72
End: 2017-10-03
Attending: PODIATRIST
Payer: MEDICARE

## 2017-10-03 DIAGNOSIS — L89.622: Primary | ICD-10-CM

## 2017-10-14 ENCOUNTER — HOSPITAL ENCOUNTER (EMERGENCY)
Dept: HOSPITAL 53 - M ED | Age: 72
Discharge: HOME | End: 2017-10-14
Payer: MEDICARE

## 2017-10-14 VITALS — SYSTOLIC BLOOD PRESSURE: 146 MMHG | DIASTOLIC BLOOD PRESSURE: 64 MMHG

## 2017-10-14 VITALS — HEIGHT: 67 IN | WEIGHT: 185.39 LBS | BODY MASS INDEX: 29.1 KG/M2

## 2017-10-14 DIAGNOSIS — I11.0: ICD-10-CM

## 2017-10-14 DIAGNOSIS — I50.9: ICD-10-CM

## 2017-10-14 DIAGNOSIS — Z79.899: ICD-10-CM

## 2017-10-14 DIAGNOSIS — E11.43: Primary | ICD-10-CM

## 2017-10-14 DIAGNOSIS — Z99.2: ICD-10-CM

## 2017-10-14 DIAGNOSIS — Z79.4: ICD-10-CM

## 2017-10-14 DIAGNOSIS — Z79.82: ICD-10-CM

## 2017-10-14 DIAGNOSIS — N18.6: ICD-10-CM

## 2017-10-14 DIAGNOSIS — Z91.018: ICD-10-CM

## 2017-10-14 LAB
ALBUMIN SERPL BCG-MCNC: 3.1 GM/DL (ref 3.2–5.2)
ALBUMIN/GLOB SERPL: 0.6 {RATIO} (ref 1–1.93)
ALP SERPL-CCNC: 188 U/L (ref 45–117)
ALT SERPL W P-5'-P-CCNC: 18 U/L (ref 12–78)
ANION GAP SERPL CALC-SCNC: 6 MEQ/L (ref 8–16)
AST SERPL-CCNC: 12 U/L (ref 15–37)
BASOPHILS # BLD AUTO: 0.1 10^3/UL (ref 0–0.2)
BASOPHILS NFR BLD AUTO: 0.7 % (ref 0–1)
BILIRUB CONJ SERPL-MCNC: < 0.1 MG/DL (ref 0–0.2)
BILIRUB SERPL-MCNC: 0.2 MG/DL (ref 0.2–1)
BUN SERPL-MCNC: 47 MG/DL (ref 7–18)
CALCIUM SERPL-MCNC: 9 MG/DL (ref 8.8–10.2)
CHLORIDE SERPL-SCNC: 95 MEQ/L (ref 98–107)
CO2 SERPL-SCNC: 33 MEQ/L (ref 21–32)
CREAT SERPL-MCNC: 5.45 MG/DL (ref 0.7–1.3)
EOSINOPHIL # BLD AUTO: 0.2 10^3/UL (ref 0–0.5)
EOSINOPHIL NFR BLD AUTO: 1.6 % (ref 0–3)
ERYTHROCYTE [DISTWIDTH] IN BLOOD BY AUTOMATED COUNT: 13.4 % (ref 11.5–14.5)
GFR SERPL CREATININE-BSD FRML MDRD: 11.1 ML/MIN/{1.73_M2} (ref 42–?)
GLUCOSE SERPL-MCNC: 227 MG/DL (ref 83–110)
IMM GRANULOCYTES NFR BLD: 0.5 % (ref 0–0)
LYMPHOCYTES # BLD AUTO: 1.3 10^3/UL (ref 1.5–4.5)
LYMPHOCYTES NFR BLD AUTO: 13.6 % (ref 24–44)
MCH RBC QN AUTO: 27.5 PG (ref 27–33)
MCHC RBC AUTO-ENTMCNC: 32.5 G/DL (ref 32–36.5)
MCV RBC AUTO: 84.7 FL (ref 80–96)
MONOCYTES # BLD AUTO: 0.8 10^3/UL (ref 0–0.8)
MONOCYTES NFR BLD AUTO: 7.9 % (ref 0–5)
NEUTROPHILS # BLD AUTO: 7.2 10^3/UL (ref 1.8–7.7)
NEUTROPHILS NFR BLD AUTO: 75.7 % (ref 36–66)
NRBC BLD AUTO-RTO: 0 % (ref 0–0)
PLATELET # BLD AUTO: 351 10^3/UL (ref 150–450)
POTASSIUM SERPL-SCNC: 5 MEQ/L (ref 3.5–5.1)
PROT SERPL-MCNC: 8.3 GM/DL (ref 6.4–8.2)
SODIUM SERPL-SCNC: 134 MEQ/L (ref 136–145)
WBC # BLD AUTO: 9.5 10^3/UL (ref 4–10)

## 2017-10-14 PROCEDURE — 93005 ELECTROCARDIOGRAM TRACING: CPT

## 2017-10-14 PROCEDURE — 80048 BASIC METABOLIC PNL TOTAL CA: CPT

## 2017-10-14 PROCEDURE — 82550 ASSAY OF CK (CPK): CPT

## 2017-10-14 PROCEDURE — 85025 COMPLETE CBC W/AUTO DIFF WBC: CPT

## 2017-10-14 PROCEDURE — 96374 THER/PROPH/DIAG INJ IV PUSH: CPT

## 2017-10-14 PROCEDURE — 83690 ASSAY OF LIPASE: CPT

## 2017-10-14 PROCEDURE — 80076 HEPATIC FUNCTION PANEL: CPT

## 2017-10-14 PROCEDURE — 93041 RHYTHM ECG TRACING: CPT

## 2017-10-14 PROCEDURE — 84484 ASSAY OF TROPONIN QUANT: CPT

## 2017-10-14 PROCEDURE — 99284 EMERGENCY DEPT VISIT MOD MDM: CPT

## 2017-10-14 PROCEDURE — 74022 RADEX COMPL AQT ABD SERIES: CPT

## 2017-10-14 PROCEDURE — 83605 ASSAY OF LACTIC ACID: CPT

## 2017-10-14 PROCEDURE — 82553 CREATINE MB FRACTION: CPT

## 2017-10-15 NOTE — REP
Abdomen series:  Four views.

 

History:  Abdominal pain.

 

Comparison study September 10, 2017.

 

Findings:  Upright chest radiograph demonstrates a right internal jugular

Voofzs-X-Uqpz catheter and a left transvenous pacemaker.  Median sternotomy wires

are seen.  Heart is not enlarged.  The lungs are well inflated and clear.  There

is no evidence of infiltrate or free subdiaphragmatic air.

 

Supine and erect views of the abdomen show surgical clips in the inguinal soft

tissues bilaterally.  There is a arterial stent in the right external iliac

artery.  Vascular calcification is noted in the left upper quadrant.  The bowel

gas pattern is normal.  There is a 2.7 cm calcification in the right mid abdomen.

This may be artifactual as August 20, 2017 CT study did not show a similar

density.  No urinary tract calculus is seen.  No significant air fluid level is

noted.  No evidence of free air.

 

Impression:

 

Unremarkable bowel gas pattern.  Right midabdominal calcific density likely

artifactual.

 

 

Signed by

Christian Doll MD 10/15/2017 09:37 A

## 2017-10-16 NOTE — ECGEPIP
Stationary ECG Study

                           [facility] - ED

                                       

                                       Test Date:    2017-10-14

Pat Name:     VINNY EASLEY           Department:   

Patient ID:   [id number]                 Room:         -

Gender:       M                        Technician:   ar

:          1945               Requested By: Brodie CHANCE

Order Number: [id number]     Reading MD:   Jaleesa Roman

                                 Measurements

Intervals                              Axis          

Rate:         57                       P:            56

UT:           195                      QRS:          24

QRSD:         109                      T:            130

QT:           477                                    

QTc:          468                                    

                           Interpretive Statements

SINUS BRADYCARDIA

POSSIBLE LEFT ATRIAL ENLARGEMENT

INFERIOR MYOCARDIAL INFARCTION, OF INDETERMINATE AGE, CLINICAL CORRELATION

MODERATE T-WAVE ABNORMALITY, CONSIDER LATERAL ISCHEMIA

SIMILAR 9/10/17

Electronically Signed On 10- 7:53:45 EDT by Jaleesa Roman

## 2017-11-28 ENCOUNTER — HOSPITAL ENCOUNTER (OUTPATIENT)
Dept: HOSPITAL 53 - SSVSNF1 | Age: 72
End: 2017-11-28
Attending: INTERNAL MEDICINE

## 2017-11-28 DIAGNOSIS — E11.9: Primary | ICD-10-CM

## 2017-11-28 LAB
ANION GAP SERPL CALC-SCNC: 8 MEQ/L (ref 8–16)
BUN SERPL-MCNC: 20 MG/DL (ref 7–18)
CALCIUM SERPL-MCNC: 9.1 MG/DL (ref 8.8–10.2)
CHLORIDE SERPL-SCNC: 101 MEQ/L (ref 98–107)
CO2 SERPL-SCNC: 31 MEQ/L (ref 21–32)
CREAT SERPL-MCNC: 3.92 MG/DL (ref 0.7–1.3)
ERYTHROCYTE [DISTWIDTH] IN BLOOD BY AUTOMATED COUNT: 16.4 % (ref 11.5–14.5)
EST. AVERAGE GLUCOSE BLD GHB EST-MCNC: 131 MG/DL (ref 60–110)
GFR SERPL CREATININE-BSD FRML MDRD: 16.2 ML/MIN/{1.73_M2} (ref 42–?)
GLUCOSE SERPL-MCNC: 148 MG/DL (ref 83–110)
MCH RBC QN AUTO: 29.5 PG (ref 27–33)
MCHC RBC AUTO-ENTMCNC: 31.4 G/DL (ref 32–36.5)
MCV RBC AUTO: 93.8 FL (ref 80–96)
NRBC BLD AUTO-RTO: 0 % (ref 0–0)
PLATELET # BLD AUTO: 260 10^3/UL (ref 150–450)
POTASSIUM SERPL-SCNC: 4.9 MEQ/L (ref 3.5–5.1)
SODIUM SERPL-SCNC: 140 MEQ/L (ref 136–145)
WBC # BLD AUTO: 15.9 10^3/UL (ref 4–10)

## 2017-11-29 ENCOUNTER — HOSPITAL ENCOUNTER (OUTPATIENT)
Dept: HOSPITAL 53 - SSVSNF1 | Age: 72
End: 2017-11-29
Attending: INTERNAL MEDICINE

## 2017-11-29 DIAGNOSIS — M86.9: Primary | ICD-10-CM

## 2017-11-29 LAB
ERYTHROCYTE [DISTWIDTH] IN BLOOD BY AUTOMATED COUNT: 16.1 % (ref 11.5–14.5)
MCH RBC QN AUTO: 28.9 PG (ref 27–33)
MCHC RBC AUTO-ENTMCNC: 31 G/DL (ref 32–36.5)
MCV RBC AUTO: 93.4 FL (ref 80–96)
NRBC BLD AUTO-RTO: 0 % (ref 0–0)
PLATELET # BLD AUTO: 274 10^3/UL (ref 150–450)
WBC # BLD AUTO: 13.7 10^3/UL (ref 4–10)

## 2017-12-01 LAB — ERYTHROCYTE [SEDIMENTATION RATE] IN BLOOD BY WESTERGREN METHOD: 71 MM/HR (ref 0–20)

## 2017-12-05 ENCOUNTER — HOSPITAL ENCOUNTER (OUTPATIENT)
Dept: HOSPITAL 53 - SSVSNF1 | Age: 72
End: 2017-12-05
Attending: INTERNAL MEDICINE
Payer: MEDICARE

## 2017-12-05 ENCOUNTER — HOSPITAL ENCOUNTER (OUTPATIENT)
Dept: HOSPITAL 53 - SSVSNF1 | Age: 72
End: 2017-12-05
Attending: INTERNAL MEDICINE

## 2017-12-05 DIAGNOSIS — M86.9: Primary | ICD-10-CM

## 2017-12-05 DIAGNOSIS — Z89.512: Primary | ICD-10-CM

## 2017-12-05 LAB
ANION GAP SERPL CALC-SCNC: 5 MEQ/L (ref 8–16)
BUN SERPL-MCNC: 20 MG/DL (ref 7–18)
CALCIUM SERPL-MCNC: 8.5 MG/DL (ref 8.8–10.2)
CHLORIDE SERPL-SCNC: 102 MEQ/L (ref 98–107)
CO2 SERPL-SCNC: 32 MEQ/L (ref 21–32)
CREAT SERPL-MCNC: 2.52 MG/DL (ref 0.7–1.3)
ERYTHROCYTE [DISTWIDTH] IN BLOOD BY AUTOMATED COUNT: 16.4 % (ref 11.5–14.5)
GFR SERPL CREATININE-BSD FRML MDRD: 27 ML/MIN/{1.73_M2} (ref 42–?)
GLUCOSE SERPL-MCNC: 172 MG/DL (ref 83–110)
MCH RBC QN AUTO: 29.1 PG (ref 27–33)
MCHC RBC AUTO-ENTMCNC: 31.1 G/DL (ref 32–36.5)
MCV RBC AUTO: 93.6 FL (ref 80–96)
NRBC BLD AUTO-RTO: 0 % (ref 0–0)
PLATELET # BLD AUTO: 258 10^3/UL (ref 150–450)
POTASSIUM SERPL-SCNC: 5 MEQ/L (ref 3.5–5.1)
SODIUM SERPL-SCNC: 139 MEQ/L (ref 136–145)
WBC # BLD AUTO: 10.1 10^3/UL (ref 4–10)

## 2017-12-05 NOTE — REP
LEFT KNEE, FOUR VIEWS:

 

HISTORY:  None provided.

 

The patient is status post below the knee amputation.  There is no acute fracture

or dislocation.  There is narrowing of the medial knee joint space.  The patient

is status post femoral artery stenting.  Multiple surgical clips are present.

 

IMPRESSION:

 

1.  The patient is status post below the knee amputation

 

2.  Degenerative change as described above.

 

 

Signed by

Ghassan Palma MD 12/05/2017 10:11 A

## 2017-12-13 ENCOUNTER — HOSPITAL ENCOUNTER (OUTPATIENT)
Dept: HOSPITAL 53 - SSVSNF1 | Age: 72
End: 2017-12-13
Attending: INTERNAL MEDICINE
Payer: MEDICARE

## 2017-12-13 DIAGNOSIS — M86.9: Primary | ICD-10-CM

## 2017-12-13 LAB
ANION GAP SERPL CALC-SCNC: 8 MEQ/L (ref 8–16)
BUN SERPL-MCNC: 33 MG/DL (ref 7–18)
CALCIUM SERPL-MCNC: 8.8 MG/DL (ref 8.8–10.2)
CHLORIDE SERPL-SCNC: 103 MEQ/L (ref 98–107)
CO2 SERPL-SCNC: 28 MEQ/L (ref 21–32)
CREAT SERPL-MCNC: 3.05 MG/DL (ref 0.7–1.3)
ERYTHROCYTE [DISTWIDTH] IN BLOOD BY AUTOMATED COUNT: 16.4 % (ref 11.5–14.5)
GFR SERPL CREATININE-BSD FRML MDRD: 21.7 ML/MIN/{1.73_M2} (ref 42–?)
GLUCOSE SERPL-MCNC: 182 MG/DL (ref 83–110)
MCH RBC QN AUTO: 29.5 PG (ref 27–33)
MCHC RBC AUTO-ENTMCNC: 31.1 G/DL (ref 32–36.5)
MCV RBC AUTO: 94.8 FL (ref 80–96)
NRBC BLD AUTO-RTO: 0 % (ref 0–0)
PLATELET # BLD AUTO: 220 10^3/UL (ref 150–450)
POTASSIUM SERPL-SCNC: 5.1 MEQ/L (ref 3.5–5.1)
SODIUM SERPL-SCNC: 139 MEQ/L (ref 136–145)
WBC # BLD AUTO: 10.9 10^3/UL (ref 4–10)

## 2017-12-19 ENCOUNTER — HOSPITAL ENCOUNTER (OUTPATIENT)
Dept: HOSPITAL 53 - SSVSNF1 | Age: 72
End: 2017-12-19
Attending: INTERNAL MEDICINE
Payer: MEDICARE

## 2017-12-19 DIAGNOSIS — M85.80: Primary | ICD-10-CM

## 2017-12-19 LAB
ANION GAP SERPL CALC-SCNC: 10 MEQ/L (ref 8–16)
BUN SERPL-MCNC: 24 MG/DL (ref 7–18)
CALCIUM SERPL-MCNC: 8.7 MG/DL (ref 8.8–10.2)
CHLORIDE SERPL-SCNC: 99 MEQ/L (ref 98–107)
CO2 SERPL-SCNC: 29 MEQ/L (ref 21–32)
CREAT SERPL-MCNC: 2.54 MG/DL (ref 0.7–1.3)
ERYTHROCYTE [DISTWIDTH] IN BLOOD BY AUTOMATED COUNT: 15.9 % (ref 11.5–14.5)
GFR SERPL CREATININE-BSD FRML MDRD: 26.7 ML/MIN/{1.73_M2} (ref 42–?)
GLUCOSE SERPL-MCNC: 208 MG/DL (ref 83–110)
MCH RBC QN AUTO: 29.8 PG (ref 27–33)
MCHC RBC AUTO-ENTMCNC: 31 G/DL (ref 32–36.5)
MCV RBC AUTO: 96.4 FL (ref 80–96)
NRBC BLD AUTO-RTO: 0 % (ref 0–0)
PLATELET # BLD AUTO: 226 10^3/UL (ref 150–450)
POTASSIUM SERPL-SCNC: 4.8 MEQ/L (ref 3.5–5.1)
SODIUM SERPL-SCNC: 138 MEQ/L (ref 136–145)
WBC # BLD AUTO: 7.5 10^3/UL (ref 4–10)

## 2017-12-22 ENCOUNTER — HOSPITAL ENCOUNTER (OUTPATIENT)
Dept: HOSPITAL 53 - M SDC | Age: 72
Discharge: HOME | End: 2017-12-22
Attending: SURGERY
Payer: MEDICARE

## 2017-12-22 VITALS — SYSTOLIC BLOOD PRESSURE: 183 MMHG | DIASTOLIC BLOOD PRESSURE: 73 MMHG

## 2017-12-22 VITALS — WEIGHT: 180 LBS | HEIGHT: 67 IN | BODY MASS INDEX: 28.25 KG/M2

## 2017-12-22 DIAGNOSIS — N17.9: Primary | ICD-10-CM

## 2017-12-22 DIAGNOSIS — Z53.09: ICD-10-CM

## 2018-01-04 ENCOUNTER — HOSPITAL ENCOUNTER (OUTPATIENT)
Dept: HOSPITAL 53 - SSVSNF1 | Age: 73
End: 2018-01-04
Attending: INTERNAL MEDICINE
Payer: MEDICARE

## 2018-01-04 DIAGNOSIS — N18.9: Primary | ICD-10-CM

## 2018-01-10 ENCOUNTER — HOSPITAL ENCOUNTER (OUTPATIENT)
Dept: HOSPITAL 53 - SSVSNF1 | Age: 73
End: 2018-01-10
Attending: INTERNAL MEDICINE
Payer: MEDICARE

## 2018-01-10 ENCOUNTER — HOSPITAL ENCOUNTER (OUTPATIENT)
Dept: HOSPITAL 53 - M SDC | Age: 73
Discharge: HOME | End: 2018-01-10
Attending: SURGERY
Payer: MEDICARE

## 2018-01-10 DIAGNOSIS — E10.22: ICD-10-CM

## 2018-01-10 DIAGNOSIS — Z79.899: ICD-10-CM

## 2018-01-10 DIAGNOSIS — Z91.018: ICD-10-CM

## 2018-01-10 DIAGNOSIS — Z89.512: ICD-10-CM

## 2018-01-10 DIAGNOSIS — Z95.5: ICD-10-CM

## 2018-01-10 DIAGNOSIS — I25.2: ICD-10-CM

## 2018-01-10 DIAGNOSIS — Z96.1: ICD-10-CM

## 2018-01-10 DIAGNOSIS — Z72.0: ICD-10-CM

## 2018-01-10 DIAGNOSIS — Z95.810: ICD-10-CM

## 2018-01-10 DIAGNOSIS — I13.2: ICD-10-CM

## 2018-01-10 DIAGNOSIS — N18.6: Primary | ICD-10-CM

## 2018-01-10 DIAGNOSIS — E10.40: ICD-10-CM

## 2018-01-10 DIAGNOSIS — Z79.82: ICD-10-CM

## 2018-01-10 DIAGNOSIS — I49.9: ICD-10-CM

## 2018-01-10 DIAGNOSIS — N18.9: Primary | ICD-10-CM

## 2018-01-10 DIAGNOSIS — M12.9: ICD-10-CM

## 2018-01-10 DIAGNOSIS — Z79.4: ICD-10-CM

## 2018-01-10 DIAGNOSIS — I25.10: ICD-10-CM

## 2018-01-10 DIAGNOSIS — Z87.891: ICD-10-CM

## 2018-01-10 LAB
GLUCOSE BLDC GLUCOMTR-MCNC: 103 MG/DL (ref 83–110)
POTASSIUM SERUM: 4.4 MEQ/L (ref 3.5–5.1)

## 2018-01-10 PROCEDURE — 36818 AV FUSE UPPR ARM CEPHALIC: CPT

## 2018-01-10 RX ADMIN — LIDOCAINE HYDROCHLORIDE 1 ML: 10 INJECTION, SOLUTION INFILTRATION; PERINEURAL at 15:36

## 2018-01-10 RX ADMIN — BUPIVACAINE HYDROCHLORIDE 1 ML: 5 INJECTION, SOLUTION EPIDURAL; INTRACAUDAL at 15:25

## 2018-01-10 RX ADMIN — SODIUM CHLORIDE 1 UNITS: 4.5 INJECTION, SOLUTION INTRAVENOUS at 15:28

## 2018-01-16 ENCOUNTER — HOSPITAL ENCOUNTER (OUTPATIENT)
Dept: HOSPITAL 53 - SSVSNF1 | Age: 73
End: 2018-01-16
Attending: INTERNAL MEDICINE
Payer: MEDICARE

## 2018-01-16 DIAGNOSIS — N18.9: Primary | ICD-10-CM

## 2018-01-18 ENCOUNTER — HOSPITAL ENCOUNTER (EMERGENCY)
Dept: HOSPITAL 53 - M ED | Age: 73
Discharge: HOME | End: 2018-01-18
Payer: MEDICARE

## 2018-01-18 DIAGNOSIS — N18.4: ICD-10-CM

## 2018-01-18 DIAGNOSIS — I50.9: ICD-10-CM

## 2018-01-18 DIAGNOSIS — Z95.5: ICD-10-CM

## 2018-01-18 DIAGNOSIS — F17.220: ICD-10-CM

## 2018-01-18 DIAGNOSIS — G62.9: ICD-10-CM

## 2018-01-18 DIAGNOSIS — Z79.4: ICD-10-CM

## 2018-01-18 DIAGNOSIS — Z79.899: ICD-10-CM

## 2018-01-18 DIAGNOSIS — Z98.890: ICD-10-CM

## 2018-01-18 DIAGNOSIS — I99.9: Primary | ICD-10-CM

## 2018-01-18 DIAGNOSIS — E11.9: ICD-10-CM

## 2018-01-18 DIAGNOSIS — Z79.01: ICD-10-CM

## 2018-01-18 DIAGNOSIS — Z99.2: ICD-10-CM

## 2018-01-18 DIAGNOSIS — I25.2: ICD-10-CM

## 2018-01-18 DIAGNOSIS — Z95.828: ICD-10-CM

## 2018-01-18 DIAGNOSIS — Z79.82: ICD-10-CM

## 2018-01-18 DIAGNOSIS — Z91.018: ICD-10-CM

## 2018-01-18 PROCEDURE — 99284 EMERGENCY DEPT VISIT MOD MDM: CPT

## 2018-02-08 ENCOUNTER — HOSPITAL ENCOUNTER (OUTPATIENT)
Dept: HOSPITAL 53 - M IRPRO | Age: 73
Discharge: HOME | End: 2018-02-08
Attending: SURGERY
Payer: MEDICARE

## 2018-02-08 DIAGNOSIS — Z95.0: ICD-10-CM

## 2018-02-08 DIAGNOSIS — T82.858A: Primary | ICD-10-CM

## 2018-02-08 DIAGNOSIS — N18.9: ICD-10-CM

## 2018-02-08 PROCEDURE — 36902 INTRO CATH DIALYSIS CIRCUIT: CPT

## 2018-02-13 ENCOUNTER — HOSPITAL ENCOUNTER (OUTPATIENT)
Dept: HOSPITAL 53 - SSVSNF1 | Age: 73
End: 2018-02-13
Attending: INTERNAL MEDICINE
Payer: MEDICARE

## 2018-02-13 DIAGNOSIS — D64.9: Primary | ICD-10-CM

## 2018-02-13 LAB
ANION GAP: 8 MEQ/L (ref 8–16)
BLOOD UREA NITROGEN: 32 MG/DL (ref 7–18)
CALCIUM LEVEL: 8.1 MG/DL (ref 8.8–10.2)
CARBON DIOXIDE LEVEL: 31 MEQ/L (ref 21–32)
CHLORIDE LEVEL: 99 MEQ/L (ref 98–107)
CREATININE FOR GFR: 2.84 MG/DL (ref 0.7–1.3)
GFR SERPL CREATININE-BSD FRML MDRD: 23.5 ML/MIN/{1.73_M2} (ref 42–?)
GLUCOSE, FASTING: 174 MG/DL (ref 70–100)
HEMATOCRIT: 36 % (ref 42–52)
HEMOGLOBIN: 11.8 G/DL (ref 14–18)
MEAN CORPUSCULAR HEMOGLOBIN: 29.5 PG (ref 27–33)
MEAN CORPUSCULAR HGB CONC: 32.8 G/DL (ref 32–36.5)
MEAN CORPUSCULAR VOLUME: 90 FL (ref 80–96)
NRBC BLD AUTO-RTO: 0 % (ref 0–0)
PLATELET COUNT, AUTOMATED: 224 10^3/UL (ref 150–450)
POTASSIUM SERUM: 4.3 MEQ/L (ref 3.5–5.1)
RED BLOOD COUNT: 4 10^6/UL (ref 4.3–6.1)
RED CELL DISTRIBUTION WIDTH: 12.5 % (ref 11.5–14.5)
SODIUM LEVEL: 138 MEQ/L (ref 136–145)
WHITE BLOOD COUNT: 7.6 10^3/UL (ref 4–10)

## 2018-02-13 PROCEDURE — 80048 BASIC METABOLIC PNL TOTAL CA: CPT

## 2018-06-09 ENCOUNTER — HOSPITAL ENCOUNTER (OUTPATIENT)
Dept: HOSPITAL 53 - M LRY | Age: 73
End: 2018-06-09
Attending: NURSE PRACTITIONER
Payer: MEDICARE

## 2018-06-09 DIAGNOSIS — Z95.1: ICD-10-CM

## 2018-06-09 DIAGNOSIS — R06.89: Primary | ICD-10-CM

## 2018-06-09 DIAGNOSIS — Z95.828: ICD-10-CM

## 2018-06-09 DIAGNOSIS — I51.7: ICD-10-CM

## 2018-06-09 DIAGNOSIS — Z95.0: ICD-10-CM

## 2018-06-09 PROCEDURE — 82948 REAGENT STRIP/BLOOD GLUCOSE: CPT

## 2018-06-09 PROCEDURE — 71046 X-RAY EXAM CHEST 2 VIEWS: CPT

## 2018-06-10 ENCOUNTER — HOSPITAL ENCOUNTER (EMERGENCY)
Dept: HOSPITAL 53 - M ED | Age: 73
Discharge: HOME | End: 2018-06-10
Payer: MEDICARE

## 2018-06-10 DIAGNOSIS — I12.9: ICD-10-CM

## 2018-06-10 DIAGNOSIS — Z86.2: ICD-10-CM

## 2018-06-10 DIAGNOSIS — Z95.828: ICD-10-CM

## 2018-06-10 DIAGNOSIS — R91.1: ICD-10-CM

## 2018-06-10 DIAGNOSIS — R94.31: ICD-10-CM

## 2018-06-10 DIAGNOSIS — Z95.5: ICD-10-CM

## 2018-06-10 DIAGNOSIS — I50.9: ICD-10-CM

## 2018-06-10 DIAGNOSIS — Z79.4: ICD-10-CM

## 2018-06-10 DIAGNOSIS — Z99.2: ICD-10-CM

## 2018-06-10 DIAGNOSIS — I73.9: ICD-10-CM

## 2018-06-10 DIAGNOSIS — Z87.891: ICD-10-CM

## 2018-06-10 DIAGNOSIS — Z79.01: ICD-10-CM

## 2018-06-10 DIAGNOSIS — Z79.82: ICD-10-CM

## 2018-06-10 DIAGNOSIS — J90: ICD-10-CM

## 2018-06-10 DIAGNOSIS — N18.9: ICD-10-CM

## 2018-06-10 DIAGNOSIS — J18.9: Primary | ICD-10-CM

## 2018-06-10 DIAGNOSIS — I25.10: ICD-10-CM

## 2018-06-10 DIAGNOSIS — E11.51: ICD-10-CM

## 2018-06-10 DIAGNOSIS — Z86.31: ICD-10-CM

## 2018-06-10 DIAGNOSIS — Z98.890: ICD-10-CM

## 2018-06-10 DIAGNOSIS — Z79.899: ICD-10-CM

## 2018-06-10 LAB
ANION GAP: 6 MEQ/L (ref 8–16)
BASO #: 0 10^3/UL (ref 0–0.2)
BASO %: 0.5 % (ref 0–1)
BLOOD UREA NITROGEN: 42 MG/DL (ref 7–18)
CALCIUM LEVEL: 8.4 MG/DL (ref 8.8–10.2)
CARBON DIOXIDE LEVEL: 29 MEQ/L (ref 21–32)
CHLORIDE LEVEL: 98 MEQ/L (ref 98–107)
CK MB CFR.DF SERPL CALC: 3.89
CK SERPL-CCNC: 77 U/L (ref 39–308)
CK-MB VALUE MASS: 3 NG/ML (ref ?–3.6)
CREATININE FOR GFR: 4.57 MG/DL (ref 0.7–1.3)
EOS #: 0.1 10^3/UL (ref 0–0.5)
EOSINOPHIL NFR BLD AUTO: 1.4 % (ref 0–3)
GFR SERPL CREATININE-BSD FRML MDRD: 13.5 ML/MIN/{1.73_M2} (ref 42–?)
GLUCOSE, FASTING: 241 MG/DL (ref 70–100)
HEMATOCRIT: 37.7 % (ref 42–52)
HEMOGLOBIN: 12.5 G/DL (ref 13.5–17.5)
IMMATURE GRANULOCYTE %: 0.5 % (ref 0–3)
LYMPH #: 0.5 10^3/UL (ref 1.5–4.5)
LYMPH %: 8 % (ref 24–44)
MEAN CORPUSCULAR HEMOGLOBIN: 29.3 PG (ref 27–33)
MEAN CORPUSCULAR HGB CONC: 33.2 G/DL (ref 32–36.5)
MEAN CORPUSCULAR VOLUME: 88.5 FL (ref 80–96)
MONO #: 0.5 10^3/UL (ref 0–0.8)
MONO %: 8.1 % (ref 0–5)
NEUTROPHILS #: 5.1 10^3/UL (ref 1.8–7.7)
NEUTROPHILS %: 81.5 % (ref 36–66)
NRBC BLD AUTO-RTO: 0 % (ref 0–0)
NT-PRO BNP: (no result) PG/ML (ref ?–125)
PLATELET COUNT, AUTOMATED: 173 10^3/UL (ref 150–450)
POTASSIUM SERUM: 4 MEQ/L (ref 3.5–5.1)
RED BLOOD COUNT: 4.26 10^6/UL (ref 4.3–6.1)
RED CELL DISTRIBUTION WIDTH: 12.5 % (ref 11.5–14.5)
SODIUM LEVEL: 133 MEQ/L (ref 136–145)
TROPONIN I: < 0.02 NG/ML (ref ?–0.1)
WHITE BLOOD COUNT: 6.3 10^3/UL (ref 4–10)

## 2018-06-10 PROCEDURE — 71250 CT THORAX DX C-: CPT

## 2018-06-10 RX ADMIN — ACETAMINOPHEN 1 MG: 325 TABLET ORAL at 12:29

## 2018-06-10 RX ADMIN — DOXYCYCLINE HYCLATE 1 MG: 100 TABLET, COATED ORAL at 12:26

## 2018-06-15 ENCOUNTER — HOSPITAL ENCOUNTER (EMERGENCY)
Dept: HOSPITAL 53 - M ED | Age: 73
Discharge: HOME | End: 2018-06-15
Payer: MEDICARE

## 2018-06-15 DIAGNOSIS — I51.7: ICD-10-CM

## 2018-06-15 DIAGNOSIS — Z91.018: ICD-10-CM

## 2018-06-15 DIAGNOSIS — Z95.5: ICD-10-CM

## 2018-06-15 DIAGNOSIS — Z95.1: ICD-10-CM

## 2018-06-15 DIAGNOSIS — I10: ICD-10-CM

## 2018-06-15 DIAGNOSIS — I25.10: ICD-10-CM

## 2018-06-15 DIAGNOSIS — I50.9: ICD-10-CM

## 2018-06-15 DIAGNOSIS — I20.8: Primary | ICD-10-CM

## 2018-06-15 DIAGNOSIS — Z79.82: ICD-10-CM

## 2018-06-15 DIAGNOSIS — Z79.4: ICD-10-CM

## 2018-06-15 DIAGNOSIS — I25.2: ICD-10-CM

## 2018-06-15 DIAGNOSIS — Z79.899: ICD-10-CM

## 2018-06-15 DIAGNOSIS — N18.6: ICD-10-CM

## 2018-06-15 LAB
ALBUMIN/GLOBULIN RATIO: 0.65 (ref 1–1.93)
ALBUMIN: 2.8 GM/DL (ref 3.2–5.2)
ALKALINE PHOSPHATASE: 167 U/L (ref 45–117)
ALT SERPL W P-5'-P-CCNC: 25 U/L (ref 12–78)
ANION GAP: 9 MEQ/L (ref 8–16)
AST SERPL-CCNC: 29 U/L (ref 7–37)
BASO #: 0 10^3/UL (ref 0–0.2)
BASO %: 0.4 % (ref 0–1)
BILIRUB CONJ SERPL-MCNC: 0.2 MG/DL (ref 0–0.2)
BILIRUBIN,TOTAL: 0.5 MG/DL (ref 0.2–1)
BLOOD UREA NITROGEN: 43 MG/DL (ref 7–18)
CALCIUM LEVEL: 7.9 MG/DL (ref 8.8–10.2)
CARBON DIOXIDE LEVEL: 28 MEQ/L (ref 21–32)
CHLORIDE LEVEL: 96 MEQ/L (ref 98–107)
CK MB CFR.DF SERPL CALC: 0.71
CK MB CFR.DF SERPL CALC: 0.76
CK SERPL-CCNC: 405 U/L (ref 39–308)
CK SERPL-CCNC: 417 U/L (ref 39–308)
CK-MB VALUE MASS: 2.9 NG/ML (ref ?–3.6)
CK-MB VALUE MASS: 3.2 NG/ML (ref ?–3.6)
CREATININE FOR GFR: 4.97 MG/DL (ref 0.7–1.3)
EOS #: 0.2 10^3/UL (ref 0–0.5)
EOSINOPHIL NFR BLD AUTO: 1.4 % (ref 0–3)
GFR SERPL CREATININE-BSD FRML MDRD: 12.3 ML/MIN/{1.73_M2} (ref 42–?)
GLUCOSE, FASTING: 238 MG/DL (ref 70–100)
HEMATOCRIT: 29.6 % (ref 42–52)
HEMOGLOBIN: 9.8 G/DL (ref 13.5–17.5)
IMMATURE GRANULOCYTE %: 0.5 % (ref 0–3)
INR: 1.21
LIPASE: 60 U/L (ref 73–393)
LYMPH #: 1 10^3/UL (ref 1.5–4.5)
LYMPH %: 8.5 % (ref 24–44)
MEAN CORPUSCULAR HEMOGLOBIN: 29.3 PG (ref 27–33)
MEAN CORPUSCULAR HGB CONC: 33.1 G/DL (ref 32–36.5)
MEAN CORPUSCULAR VOLUME: 88.4 FL (ref 80–96)
MONO #: 1.2 10^3/UL (ref 0–0.8)
MONO %: 10.2 % (ref 0–5)
NEUTROPHILS #: 9 10^3/UL (ref 1.8–7.7)
NEUTROPHILS %: 79 % (ref 36–66)
NRBC BLD AUTO-RTO: 0 % (ref 0–0)
NT-PRO BNP: (no result) PG/ML (ref ?–125)
PARTIAL THROMBOPLASTIN TIME: 41.6 SECONDS (ref 26.8–37.9)
PLATELET COUNT, AUTOMATED: 188 10^3/UL (ref 150–450)
POTASSIUM SERUM: 3.8 MEQ/L (ref 3.5–5.1)
PROTHROMBIN TIME: 15.5 SECONDS (ref 12.4–14.5)
RED BLOOD COUNT: 3.35 10^6/UL (ref 4.3–6.1)
RED CELL DISTRIBUTION WIDTH: 12.4 % (ref 11.5–14.5)
SODIUM LEVEL: 133 MEQ/L (ref 136–145)
TOTAL PROTEIN: 7.1 GM/DL (ref 6.4–8.2)
TROPONIN I: 0.49 NG/ML (ref ?–0.1)
TROPONIN I: 0.56 NG/ML (ref ?–0.1)
WHITE BLOOD COUNT: 11.4 10^3/UL (ref 4–10)

## 2018-06-15 RX ADMIN — SODIUM CHLORIDE 1 MLS/HR: 9 INJECTION, SOLUTION INTRAVENOUS at 18:06

## 2018-06-15 RX ADMIN — ASPIRIN 81 MG CHEWABLE TABLET 1 MG: 81 TABLET CHEWABLE at 17:23

## 2018-06-15 RX ADMIN — ONDANSETRON 1 MG: 2 INJECTION INTRAMUSCULAR; INTRAVENOUS at 18:07

## 2018-06-23 ENCOUNTER — HOSPITAL ENCOUNTER (OUTPATIENT)
Dept: HOSPITAL 53 - M ED | Age: 73
Setting detail: OBSERVATION
LOS: 1 days | Discharge: HOME | End: 2018-06-24
Attending: INTERNAL MEDICINE
Payer: MEDICARE

## 2018-06-23 DIAGNOSIS — I27.20: ICD-10-CM

## 2018-06-23 DIAGNOSIS — N18.6: ICD-10-CM

## 2018-06-23 DIAGNOSIS — E87.5: ICD-10-CM

## 2018-06-23 DIAGNOSIS — Z79.01: ICD-10-CM

## 2018-06-23 DIAGNOSIS — I73.9: ICD-10-CM

## 2018-06-23 DIAGNOSIS — Z95.1: ICD-10-CM

## 2018-06-23 DIAGNOSIS — Z79.82: ICD-10-CM

## 2018-06-23 DIAGNOSIS — I12.0: ICD-10-CM

## 2018-06-23 DIAGNOSIS — E11.21: ICD-10-CM

## 2018-06-23 DIAGNOSIS — I25.10: ICD-10-CM

## 2018-06-23 DIAGNOSIS — J44.9: ICD-10-CM

## 2018-06-23 DIAGNOSIS — E03.9: ICD-10-CM

## 2018-06-23 DIAGNOSIS — Z98.61: ICD-10-CM

## 2018-06-23 DIAGNOSIS — R06.02: ICD-10-CM

## 2018-06-23 DIAGNOSIS — Z79.4: ICD-10-CM

## 2018-06-23 DIAGNOSIS — Z79.899: ICD-10-CM

## 2018-06-23 DIAGNOSIS — R53.83: Primary | ICD-10-CM

## 2018-06-23 DIAGNOSIS — Z95.810: ICD-10-CM

## 2018-06-23 LAB
ALBUMIN/GLOBULIN RATIO: 0.57 (ref 1–1.93)
ALBUMIN: 2.8 GM/DL (ref 3.2–5.2)
ALKALINE PHOSPHATASE: 209 U/L (ref 45–117)
ALT SERPL W P-5'-P-CCNC: 25 U/L (ref 12–78)
ANION GAP: 5 MEQ/L (ref 8–16)
ANION GAP: 7 MEQ/L (ref 8–16)
AST SERPL-CCNC: 29 U/L (ref 7–37)
BASO #: 0.1 10^3/UL (ref 0–0.2)
BASO %: 0.7 % (ref 0–1)
BILIRUB CONJ SERPL-MCNC: < 0.1 MG/DL (ref 0–0.2)
BILIRUBIN,TOTAL: 0.4 MG/DL (ref 0.2–1)
BLOOD UREA NITROGEN: 30 MG/DL (ref 7–18)
BLOOD UREA NITROGEN: 31 MG/DL (ref 7–18)
CALCIUM LEVEL: 8.4 MG/DL (ref 8.8–10.2)
CALCIUM LEVEL: 8.5 MG/DL (ref 8.8–10.2)
CARBON DIOXIDE LEVEL: 29 MEQ/L (ref 21–32)
CARBON DIOXIDE LEVEL: 31 MEQ/L (ref 21–32)
CHLORIDE LEVEL: 101 MEQ/L (ref 98–107)
CHLORIDE LEVEL: 102 MEQ/L (ref 98–107)
CK MB CFR.DF SERPL CALC: 5.17
CK SERPL-CCNC: 87 U/L (ref 39–308)
CK-MB VALUE MASS: 4.5 NG/ML (ref ?–3.6)
CREATININE FOR GFR: 3.65 MG/DL (ref 0.7–1.3)
CREATININE FOR GFR: 3.65 MG/DL (ref 0.7–1.3)
EOS #: 0.2 10^3/UL (ref 0–0.5)
EOSINOPHIL NFR BLD AUTO: 2 % (ref 0–3)
GFR SERPL CREATININE-BSD FRML MDRD: 17.6 ML/MIN/{1.73_M2} (ref 42–?)
GFR SERPL CREATININE-BSD FRML MDRD: 17.6 ML/MIN/{1.73_M2} (ref 42–?)
GLUCOSE, FASTING: 207 MG/DL (ref 70–100)
GLUCOSE, FASTING: 236 MG/DL (ref 70–100)
HEMATOCRIT: 33.9 % (ref 42–52)
HEMOGLOBIN: 10.8 G/DL (ref 13.5–17.5)
IMMATURE GRANULOCYTE %: 0.9 % (ref 0–3)
INR: 1.03
LACTIC ACID SEPSIS PROTOCOL: 1.9 MMOL/L (ref 0.4–2)
LYMPH #: 1 10^3/UL (ref 1.5–4.5)
LYMPH %: 11.3 % (ref 24–44)
MEAN CORPUSCULAR HEMOGLOBIN: 29.3 PG (ref 27–33)
MEAN CORPUSCULAR HGB CONC: 31.9 G/DL (ref 32–36.5)
MEAN CORPUSCULAR VOLUME: 92.1 FL (ref 80–96)
MONO #: 0.8 10^3/UL (ref 0–0.8)
MONO %: 9.3 % (ref 0–5)
NEUTROPHILS #: 6.4 10^3/UL (ref 1.8–7.7)
NEUTROPHILS %: 75.8 % (ref 36–66)
NRBC BLD AUTO-RTO: 0.2 % (ref 0–0)
NT-PRO BNP: (no result) PG/ML (ref ?–125)
PLATELET COUNT, AUTOMATED: 239 10^3/UL (ref 150–450)
POTASSIUM SERUM: 5.5 MEQ/L (ref 3.5–5.1)
POTASSIUM SERUM: 6 MEQ/L (ref 3.5–5.1)
PROTHROMBIN TIME: 13.6 SECONDS (ref 12.4–14.5)
RED BLOOD COUNT: 3.68 10^6/UL (ref 4.3–6.1)
RED CELL DISTRIBUTION WIDTH: 13.2 % (ref 11.5–14.5)
SODIUM LEVEL: 137 MEQ/L (ref 136–145)
SODIUM LEVEL: 138 MEQ/L (ref 136–145)
THYROID STIMULATING HORMONE: 6.3 UIU/ML (ref 0.36–3.74)
TOTAL PROTEIN: 7.7 GM/DL (ref 6.4–8.2)
TROPONIN I: < 0.02 NG/ML (ref ?–0.1)
WHITE BLOOD COUNT: 8.5 10^3/UL (ref 4–10)

## 2018-06-23 RX ADMIN — FUROSEMIDE 1 MG: 40 TABLET ORAL at 20:00

## 2018-06-23 RX ADMIN — IPRATROPIUM BROMIDE AND ALBUTEROL SULFATE 1 ML: .5; 3 SOLUTION RESPIRATORY (INHALATION) at 19:20

## 2018-06-23 RX ADMIN — METHYLPREDNISOLONE SODIUM SUCCINATE 1 MG: 125 INJECTION, POWDER, FOR SOLUTION INTRAMUSCULAR; INTRAVENOUS at 18:51

## 2018-06-23 RX ADMIN — INSULIN LISPRO 1 UNITS: 100 INJECTION, SOLUTION INTRAVENOUS; SUBCUTANEOUS at 21:00

## 2018-06-23 RX ADMIN — INSULIN DETEMIR 1 UNITS: 100 INJECTION, SOLUTION SUBCUTANEOUS at 22:12

## 2018-06-23 RX ADMIN — PREGABALIN 1 MG: 75 CAPSULE ORAL at 22:12

## 2018-06-23 RX ADMIN — TORSEMIDE 1 MG: 20 TABLET ORAL at 22:13

## 2018-06-23 RX ADMIN — SODIUM POLYSTYRENE SULFONATE 1 GM: 15 SUSPENSION ORAL; RECTAL at 19:45

## 2018-06-23 RX ADMIN — SODIUM CHLORIDE 1 UNITS: 4.5 INJECTION, SOLUTION INTRAVENOUS at 22:12

## 2018-06-24 LAB
ANION GAP: 8 MEQ/L (ref 8–16)
BASO #: 0 10^3/UL (ref 0–0.2)
BASO %: 0.2 % (ref 0–1)
BLOOD UREA NITROGEN: 40 MG/DL (ref 7–18)
CALCIUM LEVEL: 8.2 MG/DL (ref 8.8–10.2)
CARBON DIOXIDE LEVEL: 30 MEQ/L (ref 21–32)
CHLORIDE LEVEL: 98 MEQ/L (ref 98–107)
CREATININE FOR GFR: 4.07 MG/DL (ref 0.7–1.3)
EOS #: 0 10^3/UL (ref 0–0.5)
EOSINOPHIL NFR BLD AUTO: 0 % (ref 0–3)
GFR SERPL CREATININE-BSD FRML MDRD: 15.5 ML/MIN/{1.73_M2} (ref 42–?)
GLUCOSE BLDC GLUCOMTR-MCNC: 399 MG/DL (ref 83–110)
GLUCOSE, FASTING: 381 MG/DL (ref 70–100)
HEMATOCRIT: 34.6 % (ref 42–52)
HEMOGLOBIN: 11 G/DL (ref 13.5–17.5)
IMMATURE GRANULOCYTE %: 0.7 % (ref 0–3)
LYMPH #: 0.5 10^3/UL (ref 1.5–4.5)
LYMPH %: 5.9 % (ref 24–44)
MEAN CORPUSCULAR HEMOGLOBIN: 29.2 PG (ref 27–33)
MEAN CORPUSCULAR HGB CONC: 31.8 G/DL (ref 32–36.5)
MEAN CORPUSCULAR VOLUME: 91.8 FL (ref 80–96)
MONO #: 0.3 10^3/UL (ref 0–0.8)
MONO %: 2.8 % (ref 0–5)
NEUTROPHILS #: 8.3 10^3/UL (ref 1.8–7.7)
NEUTROPHILS %: 90.4 % (ref 36–66)
NRBC BLD AUTO-RTO: 0 % (ref 0–0)
PLATELET COUNT, AUTOMATED: 246 10^3/UL (ref 150–450)
POTASSIUM SERUM: 5.3 MEQ/L (ref 3.5–5.1)
RED BLOOD COUNT: 3.77 10^6/UL (ref 4.3–6.1)
RED CELL DISTRIBUTION WIDTH: 13.3 % (ref 11.5–14.5)
SODIUM LEVEL: 136 MEQ/L (ref 136–145)
WHITE BLOOD COUNT: 9.2 10^3/UL (ref 4–10)

## 2018-06-24 RX ADMIN — LISINOPRIL 1 MG: 10 TABLET ORAL at 08:11

## 2018-06-24 RX ADMIN — SEVELAMER CARBONATE 1 MG: 800 TABLET, FILM COATED ORAL at 08:10

## 2018-06-24 RX ADMIN — TORSEMIDE 1 MG: 20 TABLET ORAL at 08:11

## 2018-06-24 RX ADMIN — SEVELAMER CARBONATE 1 MG: 800 TABLET, FILM COATED ORAL at 11:26

## 2018-06-24 RX ADMIN — ACETAMINOPHEN 1 MG: 325 TABLET ORAL at 02:00

## 2018-06-24 RX ADMIN — INSULIN LISPRO 10 UNITS: 100 INJECTION, SOLUTION INTRAVENOUS; SUBCUTANEOUS at 08:10

## 2018-06-24 RX ADMIN — CLOPIDOGREL BISULFATE 1 MG: 75 TABLET ORAL at 08:11

## 2018-06-24 RX ADMIN — INSULIN LISPRO 10 UNITS: 100 INJECTION, SOLUTION INTRAVENOUS; SUBCUTANEOUS at 11:26

## 2018-06-24 RX ADMIN — ASPIRIN 1 MG: 81 TABLET ORAL at 08:11

## 2018-06-24 RX ADMIN — PREGABALIN 1 MG: 75 CAPSULE ORAL at 08:11

## 2018-06-24 RX ADMIN — SODIUM CHLORIDE 1 UNITS: 4.5 INJECTION, SOLUTION INTRAVENOUS at 05:34

## 2018-07-10 ENCOUNTER — HOSPITAL ENCOUNTER (OUTPATIENT)
Dept: HOSPITAL 53 - M RADPRO | Age: 73
End: 2018-07-10
Attending: INTERNAL MEDICINE
Payer: MEDICARE

## 2018-07-10 DIAGNOSIS — J90: Primary | ICD-10-CM

## 2018-07-10 DIAGNOSIS — Z86.79: ICD-10-CM

## 2018-07-10 DIAGNOSIS — Z95.5: ICD-10-CM

## 2018-07-10 DIAGNOSIS — Z91.018: ICD-10-CM

## 2018-07-10 DIAGNOSIS — Z79.899: ICD-10-CM

## 2018-07-10 DIAGNOSIS — Z79.891: ICD-10-CM

## 2018-07-10 DIAGNOSIS — Z79.4: ICD-10-CM

## 2018-07-10 DIAGNOSIS — Z79.02: ICD-10-CM

## 2018-07-10 DIAGNOSIS — Z89.512: ICD-10-CM

## 2018-07-10 DIAGNOSIS — Z79.82: ICD-10-CM

## 2018-07-10 LAB
ALBUMIN/GLOBULIN RATIO: 1.03 (ref 1–1.93)
ALBUMIN: 3.8 GM/DL (ref 3.2–5.2)
ALKALINE PHOSPHATASE: 150 U/L (ref 45–117)
ALT SERPL W P-5'-P-CCNC: 17 U/L (ref 12–78)
AMYLASE, BODY FLUID: 27 U/L
ANION GAP: 3 MEQ/L (ref 8–16)
AST SERPL-CCNC: 16 U/L (ref 7–37)
BILIRUBIN,TOTAL: 0.5 MG/DL (ref 0.2–1)
BLOOD UREA NITROGEN: 21 MG/DL (ref 7–18)
CALCIUM LEVEL: 8.4 MG/DL (ref 8.8–10.2)
CARBON DIOXIDE LEVEL: 36 MEQ/L (ref 21–32)
CHLORIDE LEVEL: 103 MEQ/L (ref 98–107)
CREATININE FOR GFR: 3.15 MG/DL (ref 0.7–1.3)
GFR SERPL CREATININE-BSD FRML MDRD: 20.8 ML/MIN/{1.73_M2} (ref 42–?)
GLUCOSE, FASTING: 128 MG/DL (ref 70–100)
LDH, BODY FLUID: 134 U/L
POTASSIUM SERUM: 4.8 MEQ/L (ref 3.5–5.1)
SODIUM LEVEL: 142 MEQ/L (ref 136–145)
SPECIMEN SOURCE FLD: (no result)
TOTAL PROTEIN: 7.5 GM/DL (ref 6.4–8.2)

## 2018-07-10 PROCEDURE — 32555 ASPIRATE PLEURA W/ IMAGING: CPT

## 2021-09-10 NOTE — REP
UPPER GI AIR CONTRAST AND SMALL BOWEL FOLLOW-THROUGH:

 

The procedure was performed under the direct supervision of Dr. Cullen. The

images were reviewed with Dr. Cullen.

 

The  film shows no organomegaly or pathological masses. The intestinal gas

pattern is nonspecific. The patient is status-post median sternotomy. There are

surgical clips noted in the epigastric region. There is a pacer lead identified.

There is a right iliac artery stent identified. There are surgical clips noted

over the hips bilaterally, likely representing prior vascular surgery.

 

Liquid barium and gas producing granules were given in the erect position as well

as liquid barium in the prone oblique position in order to perform a double

contrast upper GI examination. Additionally liquid barium was given at the end of

the examination in order to perform a small bowel follow-through.

 

The oral and pharyngeal stages of deglutition are unremarkable. Esophageal

transport is prompt and efficient and there is no esophagitis, stricture, mucosal

ring, or hiatal hernia. There is gastroesophageal reflux demonstrated to above

the level of the vincent.

 

The patient was unable to retain the air. Therefore examination of the stomach is

somewhat limited. The stomach is grossly normal. The rugal folds are smooth and

regular. There is no gastritis, neoplasm or ulcer disease.

 

In the duodenal bulb and proximal second portion of the duodenum there are

thickened folds. There is no kellen ulcer identified. This likely represents

duodenitis. The visualized portion of the proximal small bowel appears normal in

course and caliber.

 

The barium column was followed through the small bowel to the level of the

terminal ileum. Small bowel transit time was approximately 4 hours and 20

minutes. Ballottement was performed by Dr. Cullen. During fluoroscopy gentle

palpation shows all loops are freely movable and pliable. There are no fixed or

angulated loops. The small bowel mucosal pattern is normal in course and caliber.

There is no transition to suggest a partial small bowel obstruction. Spot filming

of terminal ileum shows it to be unremarkable.

 

IMPRESSION:

 

1. There is gastroesophageal reflux demonstrated to the above the level of the

vincent.

 

2. There are thickened folds in the duodenal bulb and proximal second portion of

the duodenum. There was no kellen ulcer identified. This likely represents

duodenitis.

 

3 minutes and 54 seconds of fluoroscopy time was utilized for this procedure.

 

 

Reviewed by

KHURRAM Rush 04/12/2017 05:09 PEdited and Signed by

Emmanuel Cullen MD 04/13/2017 07:43 A Dapsone Pregnancy And Lactation Text: This medication is Pregnancy Category C and is not considered safe during pregnancy or breast feeding.

## 2024-01-13 NOTE — IPN
DATE:  04/12/2017

 

71-year-old male seen at bedside.  He is resting comfortably.  Denies any

specific complaints.  No headache, lightheadedness, dizziness.  No chest pain.

No nausea or vomiting.

 

OBJECTIVE:

Temperature is 98.4, pulse 68, respiratory rate 16, blood pressure 158/75, SpO2

is 99% on room air.

GENERAL:  The patient appears to be in no acute distress.  He is alert, oriented.

 

HEENT:  Unremarkable.

LUNGS:  Clear.

HEART:  Regular rate and rhythm.

ABDOMEN:  Soft. Normoactive bowel sounds.  No rebound.  No tenderness.

EXTREMITIES:  No edema.  No calf tenderness.

 

LABORATORY DATA:

White count 8.8, hemoglobin 10.3, platelets are 299.

 

Sodium 137, potassium 4.8, chloride 95, bicarbonate 36, anion gap is 6, BUN is

35, creatinine 2.58, glucose is 165.

 

AST 80, ALT 10, alkaline phosphatase 121, amylase 18.

 

Upper GI with small bowel follow through results are pending at this time.

 

ASSESSMENT AND PLAN:

1.  Gallbladder thickening with multiple mobile calculi.  Gallbladder wall is

thickened to 3 mm.

2.  Chronic cholecystitis with no intrahepatic or biliary dilation appreciated.

This likely will need outpatient workup.

3.  Shortness of breath, possibly related to decompensated congestive heart

failure (CHF).  He does appear to be much more compensated.  Continue with 2 gram

sodium restriction, fluid restriction.  We did switch him off of IV Lasix and

metolazone to oral diuretics.  ACE inhibitors are on hold currently.

 

4.  Epigastric discomfort with dry heaves.  Appears to be improved.  Upper GI

with small bowel follow through has been ordered, those results are pending at

this time.  He is doing well on Carafate and proton pump inhibitor.

 

5.  Chronic kidney disease.  Appears to be stable for the last 6 months.

Continue to hold ACE inhibitors.

 

6.  Lower extremity ulcers, appreciate Dr. Gosselin's recommendations.

 

7.  Diarrhea, appears chronic in nature.  He has had several colonoscopies in the

past.  Continue with Bacid.

 

8.  Coronary artery disease with prior history of myocardial infarction.

Coronary artery bypass graft (CABG) two years ago.  Continue on aspirin, Plavix,

beta blocker.  Not currently on a statin.  We will defer to Dr. Snow.

 

9.  Type 2 diabetes.  Continue fingersticks before food and nightly with sliding

scale coverage as needed.

 

10.  Hypertension.  Lisinopril is on hold due to his renal status.  We will

continue with other antihypertensives with hold parameters.

 

11.  Gastroesophageal reflux disease (GERD).  As outlined.  Continue on proton

pump inhibitor and Carafate.

 

12.  Anemia, appears to likely be related to iron deficiency.  His hemoglobin and

hematocrit have trended upward and likely will need outpatient followup with

colonoscopy.

 

DISPOSITION:  Continue to monitor.  He does appear to be improving.  He is

tolerating his oral antidiuretics well.    We will anticipate home discharge

tomorrow, likely will need assistance with public health.  Tomorrow morning, he

will likely need to be discharged home with services.  He has been unable to

assess stairs at present.  We will see how he does tomorrow. Good